# Patient Record
Sex: MALE | Race: WHITE | NOT HISPANIC OR LATINO
[De-identification: names, ages, dates, MRNs, and addresses within clinical notes are randomized per-mention and may not be internally consistent; named-entity substitution may affect disease eponyms.]

---

## 2017-01-12 ENCOUNTER — LABORATORY RESULT (OUTPATIENT)
Age: 47
End: 2017-01-12

## 2017-01-12 ENCOUNTER — APPOINTMENT (OUTPATIENT)
Dept: HEMATOLOGY ONCOLOGY | Facility: CLINIC | Age: 47
End: 2017-01-12

## 2017-01-12 VITALS
WEIGHT: 198 LBS | SYSTOLIC BLOOD PRESSURE: 139 MMHG | DIASTOLIC BLOOD PRESSURE: 81 MMHG | HEIGHT: 67 IN | HEART RATE: 87 BPM | TEMPERATURE: 99.1 F | BODY MASS INDEX: 31.08 KG/M2

## 2017-01-16 ENCOUNTER — FORM ENCOUNTER (OUTPATIENT)
Age: 47
End: 2017-01-16

## 2017-01-17 ENCOUNTER — OUTPATIENT (OUTPATIENT)
Dept: OUTPATIENT SERVICES | Facility: HOSPITAL | Age: 47
LOS: 1 days | End: 2017-01-17
Payer: COMMERCIAL

## 2017-01-17 ENCOUNTER — APPOINTMENT (OUTPATIENT)
Dept: CT IMAGING | Facility: CLINIC | Age: 47
End: 2017-01-17

## 2017-01-17 DIAGNOSIS — C34.11 MALIGNANT NEOPLASM OF UPPER LOBE, RIGHT BRONCHUS OR LUNG: ICD-10-CM

## 2017-01-17 PROCEDURE — 74177 CT ABD & PELVIS W/CONTRAST: CPT

## 2017-01-17 PROCEDURE — 71260 CT THORAX DX C+: CPT

## 2017-01-19 ENCOUNTER — APPOINTMENT (OUTPATIENT)
Dept: HEMATOLOGY ONCOLOGY | Facility: CLINIC | Age: 47
End: 2017-01-19

## 2017-01-19 VITALS
DIASTOLIC BLOOD PRESSURE: 82 MMHG | TEMPERATURE: 98.7 F | HEIGHT: 67 IN | BODY MASS INDEX: 30.13 KG/M2 | HEART RATE: 90 BPM | SYSTOLIC BLOOD PRESSURE: 134 MMHG | WEIGHT: 192 LBS

## 2017-01-31 ENCOUNTER — RX RENEWAL (OUTPATIENT)
Age: 47
End: 2017-01-31

## 2017-03-22 ENCOUNTER — APPOINTMENT (OUTPATIENT)
Dept: DERMATOLOGY | Facility: CLINIC | Age: 47
End: 2017-03-22

## 2017-04-17 ENCOUNTER — RX RENEWAL (OUTPATIENT)
Age: 47
End: 2017-04-17

## 2017-04-24 ENCOUNTER — OUTPATIENT (OUTPATIENT)
Dept: OUTPATIENT SERVICES | Facility: HOSPITAL | Age: 47
LOS: 1 days | Discharge: HOME | End: 2017-04-24

## 2017-04-24 VITALS
OXYGEN SATURATION: 98 % | HEART RATE: 78 BPM | SYSTOLIC BLOOD PRESSURE: 118 MMHG | TEMPERATURE: 98 F | RESPIRATION RATE: 18 BRPM | DIASTOLIC BLOOD PRESSURE: 70 MMHG

## 2017-04-24 VITALS — HEIGHT: 67 IN | WEIGHT: 190.04 LBS

## 2017-04-24 RX ORDER — SODIUM CHLORIDE 9 MG/ML
3 INJECTION INTRAMUSCULAR; INTRAVENOUS; SUBCUTANEOUS EVERY 8 HOURS
Qty: 0 | Refills: 0 | Status: DISCONTINUED | OUTPATIENT
Start: 2017-04-24 | End: 2017-05-09

## 2017-04-24 RX ORDER — CEFAZOLIN SODIUM 1 G
1000 VIAL (EA) INJECTION ONCE
Qty: 0 | Refills: 0 | Status: DISCONTINUED | OUTPATIENT
Start: 2017-04-24 | End: 2017-05-09

## 2017-04-24 NOTE — PACU DISCHARGE NOTE - COMMENTS
Pt. and signifigant other receive written and oral instructions for wound care, device use and post op MD followup.

## 2017-04-25 ENCOUNTER — APPOINTMENT (OUTPATIENT)
Dept: DERMATOLOGY | Facility: CLINIC | Age: 47
End: 2017-04-25

## 2017-04-25 VITALS — BODY MASS INDEX: 29.82 KG/M2 | HEIGHT: 67 IN | WEIGHT: 190 LBS

## 2017-04-28 DIAGNOSIS — Z87.891 PERSONAL HISTORY OF NICOTINE DEPENDENCE: ICD-10-CM

## 2017-04-28 DIAGNOSIS — Z85.118 PERSONAL HISTORY OF OTHER MALIGNANT NEOPLASM OF BRONCHUS AND LUNG: ICD-10-CM

## 2017-04-28 DIAGNOSIS — R55 SYNCOPE AND COLLAPSE: ICD-10-CM

## 2017-05-15 ENCOUNTER — OUTPATIENT (OUTPATIENT)
Dept: OUTPATIENT SERVICES | Facility: HOSPITAL | Age: 47
LOS: 1 days | End: 2017-05-15
Payer: COMMERCIAL

## 2017-05-15 DIAGNOSIS — S61.213A LACERATION WITHOUT FOREIGN BODY OF LEFT MIDDLE FINGER WITHOUT DAMAGE TO NAIL, INITIAL ENCOUNTER: ICD-10-CM

## 2017-05-15 DIAGNOSIS — S61.209A UNSPECIFIED OPEN WOUND OF UNSPECIFIED FINGER WITHOUT DAMAGE TO NAIL, INITIAL ENCOUNTER: ICD-10-CM

## 2017-05-15 PROCEDURE — G0463: CPT

## 2017-05-16 ENCOUNTER — APPOINTMENT (OUTPATIENT)
Dept: CT IMAGING | Facility: CLINIC | Age: 47
End: 2017-05-16

## 2017-05-18 ENCOUNTER — APPOINTMENT (OUTPATIENT)
Dept: HEMATOLOGY ONCOLOGY | Facility: CLINIC | Age: 47
End: 2017-05-18

## 2017-05-18 DIAGNOSIS — Z87.891 PERSONAL HISTORY OF NICOTINE DEPENDENCE: ICD-10-CM

## 2017-05-18 DIAGNOSIS — L26 EXFOLIATIVE DERMATITIS: ICD-10-CM

## 2017-05-18 DIAGNOSIS — Z79.899 OTHER LONG TERM (CURRENT) DRUG THERAPY: ICD-10-CM

## 2017-05-18 DIAGNOSIS — Z98.890 OTHER SPECIFIED POSTPROCEDURAL STATES: ICD-10-CM

## 2017-05-18 DIAGNOSIS — Z80.0 FAMILY HISTORY OF MALIGNANT NEOPLASM OF DIGESTIVE ORGANS: ICD-10-CM

## 2017-05-18 DIAGNOSIS — C34.90 MALIGNANT NEOPLASM OF UNSPECIFIED PART OF UNSPECIFIED BRONCHUS OR LUNG: ICD-10-CM

## 2017-05-18 DIAGNOSIS — E66.3 OVERWEIGHT: ICD-10-CM

## 2017-05-18 DIAGNOSIS — Z80.7 FAMILY HISTORY OF OTHER MALIGNANT NEOPLASMS OF LYMPHOID, HEMATOPOIETIC AND RELATED TISSUES: ICD-10-CM

## 2017-06-01 ENCOUNTER — FORM ENCOUNTER (OUTPATIENT)
Age: 47
End: 2017-06-01

## 2017-06-02 ENCOUNTER — OUTPATIENT (OUTPATIENT)
Dept: OUTPATIENT SERVICES | Facility: HOSPITAL | Age: 47
LOS: 1 days | End: 2017-06-02
Payer: COMMERCIAL

## 2017-06-02 ENCOUNTER — APPOINTMENT (OUTPATIENT)
Dept: CT IMAGING | Facility: CLINIC | Age: 47
End: 2017-06-02

## 2017-06-02 DIAGNOSIS — Z00.8 ENCOUNTER FOR OTHER GENERAL EXAMINATION: ICD-10-CM

## 2017-06-02 PROCEDURE — 71250 CT THORAX DX C-: CPT

## 2017-06-05 ENCOUNTER — APPOINTMENT (OUTPATIENT)
Dept: HEMATOLOGY ONCOLOGY | Facility: CLINIC | Age: 47
End: 2017-06-05

## 2017-06-05 VITALS
DIASTOLIC BLOOD PRESSURE: 73 MMHG | TEMPERATURE: 98.6 F | HEART RATE: 80 BPM | HEIGHT: 67 IN | BODY MASS INDEX: 29.82 KG/M2 | SYSTOLIC BLOOD PRESSURE: 113 MMHG | WEIGHT: 190 LBS

## 2017-06-25 ENCOUNTER — RX RENEWAL (OUTPATIENT)
Age: 47
End: 2017-06-25

## 2017-06-28 ENCOUNTER — APPOINTMENT (OUTPATIENT)
Dept: DERMATOLOGY | Facility: CLINIC | Age: 47
End: 2017-06-28

## 2017-06-28 RX ORDER — MOMETASONE FUROATE 1 MG/G
0.1 OINTMENT TOPICAL TWICE DAILY
Qty: 1 | Refills: 2 | Status: ACTIVE | COMMUNITY
Start: 2017-06-28 | End: 1900-01-01

## 2017-07-24 ENCOUNTER — APPOINTMENT (OUTPATIENT)
Dept: DERMATOLOGY | Facility: CLINIC | Age: 47
End: 2017-07-24

## 2017-07-24 VITALS — WEIGHT: 190 LBS | HEIGHT: 67 IN | BODY MASS INDEX: 29.82 KG/M2

## 2017-08-14 ENCOUNTER — RECORD ABSTRACTING (OUTPATIENT)
Age: 47
End: 2017-08-14

## 2017-09-01 ENCOUNTER — APPOINTMENT (OUTPATIENT)
Dept: NEUROLOGY | Facility: CLINIC | Age: 47
End: 2017-09-01

## 2017-09-17 ENCOUNTER — FORM ENCOUNTER (OUTPATIENT)
Age: 47
End: 2017-09-17

## 2017-09-18 ENCOUNTER — APPOINTMENT (OUTPATIENT)
Dept: CT IMAGING | Facility: CLINIC | Age: 47
End: 2017-09-18
Payer: COMMERCIAL

## 2017-09-18 ENCOUNTER — OUTPATIENT (OUTPATIENT)
Dept: OUTPATIENT SERVICES | Facility: HOSPITAL | Age: 47
LOS: 1 days | End: 2017-09-18
Payer: COMMERCIAL

## 2017-09-18 DIAGNOSIS — Z00.8 ENCOUNTER FOR OTHER GENERAL EXAMINATION: ICD-10-CM

## 2017-09-18 PROCEDURE — 71250 CT THORAX DX C-: CPT

## 2017-09-18 PROCEDURE — 71250 CT THORAX DX C-: CPT | Mod: 26

## 2017-09-22 ENCOUNTER — APPOINTMENT (OUTPATIENT)
Dept: HEMATOLOGY ONCOLOGY | Facility: CLINIC | Age: 47
End: 2017-09-22
Payer: COMMERCIAL

## 2017-09-22 ENCOUNTER — LABORATORY RESULT (OUTPATIENT)
Age: 47
End: 2017-09-22

## 2017-09-22 VITALS
SYSTOLIC BLOOD PRESSURE: 107 MMHG | WEIGHT: 188 LBS | BODY MASS INDEX: 29.51 KG/M2 | DIASTOLIC BLOOD PRESSURE: 69 MMHG | TEMPERATURE: 98.4 F | HEART RATE: 83 BPM | HEIGHT: 67 IN

## 2017-09-22 LAB
HCT VFR BLD CALC: 49 %
HGB BLD-MCNC: 16.7 G/DL
MCHC RBC-ENTMCNC: 30.3 PG
MCHC RBC-ENTMCNC: 34 GM/DL
MCV RBC AUTO: 89.2 FL
PLATELET # BLD AUTO: 319 K/UL
RBC # BLD: 5.49 M/UL
RBC # FLD: 12.2 %
WBC # FLD AUTO: 10.4 K/UL

## 2017-09-22 PROCEDURE — 36415 COLL VENOUS BLD VENIPUNCTURE: CPT

## 2017-09-22 PROCEDURE — 85025 COMPLETE CBC W/AUTO DIFF WBC: CPT

## 2017-09-22 PROCEDURE — 99215 OFFICE O/P EST HI 40 MIN: CPT | Mod: 25

## 2017-09-25 LAB
ANION GAP SERPL CALC-SCNC: 15 MMOL/L
BUN SERPL-MCNC: 13 MG/DL
CALCIUM SERPL-MCNC: 10 MG/DL
CHLORIDE SERPL-SCNC: 101 MMOL/L
CO2 SERPL-SCNC: 23 MMOL/L
CREAT SERPL-MCNC: 1.25 MG/DL
GLUCOSE SERPL-MCNC: 116 MG/DL
POTASSIUM SERPL-SCNC: 4.5 MMOL/L
SODIUM SERPL-SCNC: 139 MMOL/L

## 2017-10-04 ENCOUNTER — APPOINTMENT (OUTPATIENT)
Dept: INTERNAL MEDICINE | Facility: CLINIC | Age: 47
End: 2017-10-04
Payer: COMMERCIAL

## 2017-10-04 VITALS
HEART RATE: 110 BPM | SYSTOLIC BLOOD PRESSURE: 130 MMHG | TEMPERATURE: 98.7 F | DIASTOLIC BLOOD PRESSURE: 72 MMHG | RESPIRATION RATE: 18 BRPM | HEIGHT: 67 IN | OXYGEN SATURATION: 97 % | BODY MASS INDEX: 29.35 KG/M2 | WEIGHT: 187 LBS

## 2017-10-04 DIAGNOSIS — C34.91 MALIGNANT NEOPLASM OF UNSPECIFIED PART OF RIGHT BRONCHUS OR LUNG: ICD-10-CM

## 2017-10-04 DIAGNOSIS — I31.9 DISEASE OF PERICARDIUM, UNSPECIFIED: ICD-10-CM

## 2017-10-04 PROCEDURE — 94727 GAS DIL/WSHOT DETER LNG VOL: CPT

## 2017-10-04 PROCEDURE — 99215 OFFICE O/P EST HI 40 MIN: CPT | Mod: 25

## 2017-10-04 PROCEDURE — 94060 EVALUATION OF WHEEZING: CPT

## 2017-10-04 PROCEDURE — 94729 DIFFUSING CAPACITY: CPT

## 2017-10-04 RX ORDER — OXYCODONE AND ACETAMINOPHEN 5; 325 MG/1; MG/1
5-325 TABLET ORAL
Qty: 8 | Refills: 0 | Status: COMPLETED | COMMUNITY
Start: 2017-04-24

## 2017-10-04 RX ORDER — AMOXICILLIN AND CLAVULANATE POTASSIUM 875; 125 MG/1; MG/1
875-125 TABLET, COATED ORAL
Qty: 20 | Refills: 0 | Status: COMPLETED | COMMUNITY
Start: 2017-05-30

## 2017-10-04 RX ORDER — AZITHROMYCIN 250 MG/1
250 TABLET, FILM COATED ORAL
Qty: 6 | Refills: 0 | Status: COMPLETED | COMMUNITY
Start: 2017-09-07

## 2017-10-06 ENCOUNTER — OTHER (OUTPATIENT)
Age: 47
End: 2017-10-06

## 2017-10-09 ENCOUNTER — APPOINTMENT (OUTPATIENT)
Dept: HEMATOLOGY ONCOLOGY | Facility: CLINIC | Age: 47
End: 2017-10-09
Payer: COMMERCIAL

## 2017-10-09 ENCOUNTER — APPOINTMENT (OUTPATIENT)
Dept: THORACIC SURGERY | Facility: CLINIC | Age: 47
End: 2017-10-09
Payer: COMMERCIAL

## 2017-10-09 VITALS
WEIGHT: 187 LBS | OXYGEN SATURATION: 98 % | HEART RATE: 98 BPM | DIASTOLIC BLOOD PRESSURE: 94 MMHG | SYSTOLIC BLOOD PRESSURE: 147 MMHG | HEIGHT: 67 IN | BODY MASS INDEX: 29.35 KG/M2

## 2017-10-09 VITALS
SYSTOLIC BLOOD PRESSURE: 147 MMHG | HEIGHT: 67 IN | TEMPERATURE: 98.2 F | DIASTOLIC BLOOD PRESSURE: 94 MMHG | BODY MASS INDEX: 29.35 KG/M2 | WEIGHT: 187 LBS | HEART RATE: 90 BPM

## 2017-10-09 PROCEDURE — 36415 COLL VENOUS BLD VENIPUNCTURE: CPT

## 2017-10-09 PROCEDURE — 85025 COMPLETE CBC W/AUTO DIFF WBC: CPT

## 2017-10-09 PROCEDURE — 99213 OFFICE O/P EST LOW 20 MIN: CPT

## 2017-10-13 ENCOUNTER — OUTPATIENT (OUTPATIENT)
Dept: OUTPATIENT SERVICES | Facility: HOSPITAL | Age: 47
LOS: 1 days | Discharge: HOME | End: 2017-10-13
Payer: COMMERCIAL

## 2017-10-13 VITALS
OXYGEN SATURATION: 99 % | HEIGHT: 66 IN | HEART RATE: 89 BPM | WEIGHT: 192.9 LBS | SYSTOLIC BLOOD PRESSURE: 134 MMHG | RESPIRATION RATE: 17 BRPM | TEMPERATURE: 98 F | DIASTOLIC BLOOD PRESSURE: 65 MMHG

## 2017-10-13 DIAGNOSIS — Z98.890 OTHER SPECIFIED POSTPROCEDURAL STATES: Chronic | ICD-10-CM

## 2017-10-13 DIAGNOSIS — J94.8 OTHER SPECIFIED PLEURAL CONDITIONS: ICD-10-CM

## 2017-10-13 DIAGNOSIS — I30.9 ACUTE PERICARDITIS, UNSPECIFIED: Chronic | ICD-10-CM

## 2017-10-13 LAB
APPEARANCE UR: CLEAR — SIGNIFICANT CHANGE UP
BASOPHILS # BLD AUTO: 0.2 K/UL — SIGNIFICANT CHANGE UP (ref 0–0.2)
BASOPHILS NFR BLD AUTO: 1.2 % — SIGNIFICANT CHANGE UP (ref 0–2)
BILIRUB UR-MCNC: NEGATIVE — SIGNIFICANT CHANGE UP
COLOR SPEC: YELLOW — SIGNIFICANT CHANGE UP
DIFF PNL FLD: NEGATIVE — SIGNIFICANT CHANGE UP
EOSINOPHIL # BLD AUTO: 0.2 K/UL — SIGNIFICANT CHANGE UP (ref 0–0.5)
EOSINOPHIL NFR BLD AUTO: 1.1 % — SIGNIFICANT CHANGE UP (ref 0–6)
GLUCOSE UR QL: NEGATIVE MG/DL — SIGNIFICANT CHANGE UP
HCT VFR BLD CALC: 47.9 % — SIGNIFICANT CHANGE UP (ref 39–50)
HGB BLD-MCNC: 15.6 G/DL — SIGNIFICANT CHANGE UP (ref 13–17)
KETONES UR-MCNC: NEGATIVE — SIGNIFICANT CHANGE UP
LEUKOCYTE ESTERASE UR-ACNC: NEGATIVE — SIGNIFICANT CHANGE UP
LYMPHOCYTES # BLD AUTO: 30.7 % — SIGNIFICANT CHANGE UP (ref 13–44)
LYMPHOCYTES # BLD AUTO: 4.4 K/UL — HIGH (ref 1–3.3)
MCHC RBC-ENTMCNC: 29.7 PG — SIGNIFICANT CHANGE UP (ref 27–34)
MCHC RBC-ENTMCNC: 32.4 GM/DL — SIGNIFICANT CHANGE UP (ref 32–36)
MCV RBC AUTO: 91.5 FL — SIGNIFICANT CHANGE UP (ref 80–100)
MONOCYTES # BLD AUTO: 1.1 K/UL — HIGH (ref 0–0.9)
MONOCYTES NFR BLD AUTO: 7.8 % — SIGNIFICANT CHANGE UP (ref 2–14)
NEUTROPHILS # BLD AUTO: 8.5 K/UL — HIGH (ref 1.8–7.4)
NEUTROPHILS NFR BLD AUTO: 59.2 % — SIGNIFICANT CHANGE UP (ref 43–77)
NITRITE UR-MCNC: NEGATIVE — SIGNIFICANT CHANGE UP
PH UR: 7 — SIGNIFICANT CHANGE UP (ref 5–8)
PLATELET # BLD AUTO: 381 K/UL — SIGNIFICANT CHANGE UP (ref 150–400)
PROT UR-MCNC: NEGATIVE MG/DL — SIGNIFICANT CHANGE UP
RBC # BLD: 5.24 M/UL — SIGNIFICANT CHANGE UP (ref 4.2–5.8)
RBC # FLD: 12.1 % — SIGNIFICANT CHANGE UP (ref 10.3–14.5)
SP GR SPEC: 1.01 — SIGNIFICANT CHANGE UP (ref 1.01–1.02)
UROBILINOGEN FLD QL: NEGATIVE MG/DL — SIGNIFICANT CHANGE UP
WBC # BLD: 14.4 K/UL — HIGH (ref 3.8–10.5)
WBC # FLD AUTO: 14.4 K/UL — HIGH (ref 3.8–10.5)

## 2017-10-13 PROCEDURE — 71020: CPT | Mod: 26

## 2017-10-13 NOTE — H&P PST ADULT - HISTORY OF PRESENT ILLNESS
46 years old male with left pleural effusion. Diagnosed with right lung cancer 2015. He had a syncopal episode in 2015 and was diagnosed with Pericarditis and incidentally from CT.Scan Lung cancer. Presently on oral chemotherapy. Patient has been experiencing increase SOB. Denies chest pain. He was diagnosed with left pleural effusion . Planned bronchoscopy and pleurodesis

## 2017-10-13 NOTE — H&P PST ADULT - PSH
Acute pericarditis, unspecified type  pericardial window 11/2015, 12/2015  H/O bilateral inguinal hernia repair  as a child

## 2017-10-13 NOTE — H&P PST ADULT - PMH
Anxiety and depression    Bronchitis  Asthmatic  Dermatitis  due to medication  Erectile dysfunction, unspecified erectile dysfunction type    History of kidney stones    History of pericarditis    Lung cancer, upper lobe  right. metastatic non small cell Lung Carcinoma.  Pleural effusion    Syncope, unspecified syncope type

## 2017-10-13 NOTE — H&P PST ADULT - FAMILY HISTORY
Mother  Still living? No  Family history of cancer, Age at diagnosis: Age Unknown     Father  Still living? No  Family history of leukemia, Age at diagnosis: Age Unknown

## 2017-10-13 NOTE — H&P PST ADULT - ASSESSMENT
46 years old male present to [facility], [place] prior to flexible bronchoscopy, left robot assisted pleurodesis with pleural biopsy with Dr. Samuel Salvador.  Plan   1. NPO after midnight  2. Take the following medications with sips of water on the day of procedure: Ativan or Valium if needed  3. Drink a quart of extra  fluids the day before your surgery.  4. Medical clearance with Dr. Cervantes  5. CBC and  Urinalysis sent to lab  6.  CXR done

## 2017-10-16 LAB
MISCELLANEOUS TEST: NORMAL
PROC NAME: NORMAL

## 2017-10-19 RX ORDER — SODIUM CHLORIDE 9 MG/ML
1000 INJECTION INTRAMUSCULAR; INTRAVENOUS; SUBCUTANEOUS
Qty: 0 | Refills: 0 | Status: DISCONTINUED | OUTPATIENT
Start: 2017-10-20 | End: 2017-10-21

## 2017-10-20 ENCOUNTER — INPATIENT (INPATIENT)
Facility: HOSPITAL | Age: 47
LOS: 4 days | Discharge: HOME | End: 2017-10-25
Attending: THORACIC SURGERY (CARDIOTHORACIC VASCULAR SURGERY) | Admitting: THORACIC SURGERY (CARDIOTHORACIC VASCULAR SURGERY)
Payer: COMMERCIAL

## 2017-10-20 ENCOUNTER — APPOINTMENT (OUTPATIENT)
Dept: THORACIC SURGERY | Facility: HOSPITAL | Age: 47
End: 2017-10-20
Payer: COMMERCIAL

## 2017-10-20 ENCOUNTER — RESULT REVIEW (OUTPATIENT)
Age: 47
End: 2017-10-20

## 2017-10-20 VITALS
HEIGHT: 66 IN | HEART RATE: 111 BPM | OXYGEN SATURATION: 98 % | DIASTOLIC BLOOD PRESSURE: 79 MMHG | SYSTOLIC BLOOD PRESSURE: 126 MMHG | WEIGHT: 192.9 LBS | TEMPERATURE: 99 F | RESPIRATION RATE: 14 BRPM

## 2017-10-20 DIAGNOSIS — I30.9 ACUTE PERICARDITIS, UNSPECIFIED: Chronic | ICD-10-CM

## 2017-10-20 DIAGNOSIS — Z98.890 OTHER SPECIFIED POSTPROCEDURAL STATES: Chronic | ICD-10-CM

## 2017-10-20 LAB
ANION GAP SERPL CALC-SCNC: 8 MMOL/L — SIGNIFICANT CHANGE UP (ref 5–17)
BASOPHILS # BLD AUTO: 0.1 K/UL — SIGNIFICANT CHANGE UP (ref 0–0.2)
BUN SERPL-MCNC: 15 MG/DL — SIGNIFICANT CHANGE UP (ref 7–23)
CALCIUM SERPL-MCNC: 8.9 MG/DL — SIGNIFICANT CHANGE UP (ref 8.5–10.1)
CHLORIDE SERPL-SCNC: 108 MMOL/L — SIGNIFICANT CHANGE UP (ref 96–108)
CO2 SERPL-SCNC: 23 MMOL/L — SIGNIFICANT CHANGE UP (ref 22–31)
CREAT SERPL-MCNC: 1.3 MG/DL — SIGNIFICANT CHANGE UP (ref 0.5–1.3)
EOSINOPHIL # BLD AUTO: 0 K/UL — SIGNIFICANT CHANGE UP (ref 0–0.5)
GLUCOSE SERPL-MCNC: 137 MG/DL — HIGH (ref 70–99)
HCT VFR BLD CALC: 48.2 % — SIGNIFICANT CHANGE UP (ref 39–50)
HGB BLD-MCNC: 16.2 G/DL — SIGNIFICANT CHANGE UP (ref 13–17)
LYMPHOCYTES # BLD AUTO: 1.3 K/UL — SIGNIFICANT CHANGE UP (ref 1–3.3)
LYMPHOCYTES # BLD AUTO: 4 % — LOW (ref 13–44)
MANUAL DIF COMMENT BLD-IMP: SIGNIFICANT CHANGE UP
MCHC RBC-ENTMCNC: 30.7 PG — SIGNIFICANT CHANGE UP (ref 27–34)
MCHC RBC-ENTMCNC: 33.7 GM/DL — SIGNIFICANT CHANGE UP (ref 32–36)
MCV RBC AUTO: 91.1 FL — SIGNIFICANT CHANGE UP (ref 80–100)
MONOCYTES # BLD AUTO: 0.7 K/UL — SIGNIFICANT CHANGE UP (ref 0–0.9)
MONOCYTES NFR BLD AUTO: 5 % — SIGNIFICANT CHANGE UP (ref 2–14)
NEUTROPHILS # BLD AUTO: 26.1 K/UL — HIGH (ref 1.8–7.4)
NEUTROPHILS NFR BLD AUTO: 91 % — HIGH (ref 43–77)
PLAT MORPH BLD: NORMAL — SIGNIFICANT CHANGE UP
PLATELET # BLD AUTO: 350 K/UL — SIGNIFICANT CHANGE UP (ref 150–400)
POTASSIUM SERPL-MCNC: 4 MMOL/L — SIGNIFICANT CHANGE UP (ref 3.5–5.3)
POTASSIUM SERPL-SCNC: 4 MMOL/L — SIGNIFICANT CHANGE UP (ref 3.5–5.3)
RBC # BLD: 5.29 M/UL — SIGNIFICANT CHANGE UP (ref 4.2–5.8)
RBC # FLD: 12.6 % — SIGNIFICANT CHANGE UP (ref 10.3–14.5)
RBC BLD AUTO: SIGNIFICANT CHANGE UP
SODIUM SERPL-SCNC: 139 MMOL/L — SIGNIFICANT CHANGE UP (ref 135–145)
WBC # BLD: 28.3 K/UL — HIGH (ref 3.8–10.5)
WBC # FLD AUTO: 28.3 K/UL — HIGH (ref 3.8–10.5)

## 2017-10-20 PROCEDURE — S2900 ROBOTIC SURGICAL SYSTEM: CPT | Mod: NC

## 2017-10-20 PROCEDURE — 88305 TISSUE EXAM BY PATHOLOGIST: CPT | Mod: 26

## 2017-10-20 PROCEDURE — 31622 DX BRONCHOSCOPE/WASH: CPT

## 2017-10-20 PROCEDURE — 32650 THORACOSCOPY W/PLEURODESIS: CPT | Mod: LT

## 2017-10-20 PROCEDURE — 88104 CYTOPATH FL NONGYN SMEARS: CPT | Mod: 26

## 2017-10-20 PROCEDURE — 71010: CPT | Mod: 26

## 2017-10-20 RX ORDER — HYDROMORPHONE HYDROCHLORIDE 2 MG/ML
30 INJECTION INTRAMUSCULAR; INTRAVENOUS; SUBCUTANEOUS
Qty: 0 | Refills: 0 | Status: DISCONTINUED | OUTPATIENT
Start: 2017-10-20 | End: 2017-10-24

## 2017-10-20 RX ORDER — FENTANYL CITRATE 50 UG/ML
50 INJECTION INTRAVENOUS
Qty: 0 | Refills: 0 | Status: DISCONTINUED | OUTPATIENT
Start: 2017-10-20 | End: 2017-10-21

## 2017-10-20 RX ORDER — ONDANSETRON 8 MG/1
4 TABLET, FILM COATED ORAL EVERY 6 HOURS
Qty: 0 | Refills: 0 | Status: DISCONTINUED | OUTPATIENT
Start: 2017-10-20 | End: 2017-10-23

## 2017-10-20 RX ORDER — NALOXONE HYDROCHLORIDE 4 MG/.1ML
0.1 SPRAY NASAL
Qty: 0 | Refills: 0 | Status: DISCONTINUED | OUTPATIENT
Start: 2017-10-20 | End: 2017-10-25

## 2017-10-20 RX ORDER — HEPARIN SODIUM 5000 [USP'U]/ML
5000 INJECTION INTRAVENOUS; SUBCUTANEOUS EVERY 8 HOURS
Qty: 0 | Refills: 0 | Status: DISCONTINUED | OUTPATIENT
Start: 2017-10-20 | End: 2017-10-25

## 2017-10-20 RX ORDER — OXYCODONE HYDROCHLORIDE 5 MG/1
10 TABLET ORAL EVERY 6 HOURS
Qty: 0 | Refills: 0 | Status: DISCONTINUED | OUTPATIENT
Start: 2017-10-20 | End: 2017-10-21

## 2017-10-20 RX ORDER — ONDANSETRON 8 MG/1
4 TABLET, FILM COATED ORAL ONCE
Qty: 0 | Refills: 0 | Status: DISCONTINUED | OUTPATIENT
Start: 2017-10-20 | End: 2017-10-21

## 2017-10-20 RX ORDER — OXYCODONE HYDROCHLORIDE 5 MG/1
5 TABLET ORAL EVERY 4 HOURS
Qty: 0 | Refills: 0 | Status: DISCONTINUED | OUTPATIENT
Start: 2017-10-20 | End: 2017-10-21

## 2017-10-20 RX ORDER — ONDANSETRON 8 MG/1
4 TABLET, FILM COATED ORAL EVERY 6 HOURS
Qty: 0 | Refills: 0 | Status: DISCONTINUED | OUTPATIENT
Start: 2017-10-21 | End: 2017-10-25

## 2017-10-20 RX ORDER — HYDROMORPHONE HYDROCHLORIDE 2 MG/ML
0.5 INJECTION INTRAMUSCULAR; INTRAVENOUS; SUBCUTANEOUS
Qty: 0 | Refills: 0 | Status: DISCONTINUED | OUTPATIENT
Start: 2017-10-20 | End: 2017-10-24

## 2017-10-20 RX ORDER — SODIUM CHLORIDE 9 MG/ML
1000 INJECTION INTRAMUSCULAR; INTRAVENOUS; SUBCUTANEOUS
Qty: 0 | Refills: 0 | Status: DISCONTINUED | OUTPATIENT
Start: 2017-10-20 | End: 2017-10-24

## 2017-10-20 RX ADMIN — HYDROMORPHONE HYDROCHLORIDE 30 MILLILITER(S): 2 INJECTION INTRAMUSCULAR; INTRAVENOUS; SUBCUTANEOUS at 17:01

## 2017-10-20 RX ADMIN — HYDROMORPHONE HYDROCHLORIDE 0.5 MILLIGRAM(S): 2 INJECTION INTRAMUSCULAR; INTRAVENOUS; SUBCUTANEOUS at 17:33

## 2017-10-20 RX ADMIN — SODIUM CHLORIDE 75 MILLILITER(S): 9 INJECTION INTRAMUSCULAR; INTRAVENOUS; SUBCUTANEOUS at 17:06

## 2017-10-20 RX ADMIN — HEPARIN SODIUM 5000 UNIT(S): 5000 INJECTION INTRAVENOUS; SUBCUTANEOUS at 21:57

## 2017-10-20 RX ADMIN — HYDROMORPHONE HYDROCHLORIDE 0.5 MILLIGRAM(S): 2 INJECTION INTRAMUSCULAR; INTRAVENOUS; SUBCUTANEOUS at 17:10

## 2017-10-20 NOTE — PATIENT PROFILE ADULT. - BLOOD TRANSFUSION, PREVIOUS, PROFILE
Oral Chemotherapy Education    Patient: Moody Mooney    Date:  9/7/17  Diagnosis: CML   Caregivers present:     Drug names:  Tasigna 300 mg BID     Treatment Effects on Bone Marrow:  Chemotherapy action on cancer / normal cells}  Function of white blood no

## 2017-10-20 NOTE — BRIEF OPERATIVE NOTE - POST-OP DX
Malignant neoplasm of lung, unspecified laterality, unspecified part of lung  10/20/2017    Active  Michelle Orellana

## 2017-10-21 DIAGNOSIS — J90 PLEURAL EFFUSION, NOT ELSEWHERE CLASSIFIED: ICD-10-CM

## 2017-10-21 LAB
ANION GAP SERPL CALC-SCNC: 9 MMOL/L — SIGNIFICANT CHANGE UP (ref 5–17)
BASOPHILS # BLD AUTO: 0.1 K/UL — SIGNIFICANT CHANGE UP (ref 0–0.2)
BASOPHILS NFR BLD AUTO: 0.2 % — SIGNIFICANT CHANGE UP (ref 0–2)
BUN SERPL-MCNC: 18 MG/DL — SIGNIFICANT CHANGE UP (ref 7–23)
CALCIUM SERPL-MCNC: 9.5 MG/DL — SIGNIFICANT CHANGE UP (ref 8.5–10.1)
CHLORIDE SERPL-SCNC: 104 MMOL/L — SIGNIFICANT CHANGE UP (ref 96–108)
CO2 SERPL-SCNC: 23 MMOL/L — SIGNIFICANT CHANGE UP (ref 22–31)
CREAT SERPL-MCNC: 1.44 MG/DL — HIGH (ref 0.5–1.3)
EOSINOPHIL # BLD AUTO: 0 K/UL — SIGNIFICANT CHANGE UP (ref 0–0.5)
EOSINOPHIL NFR BLD AUTO: 0 % — SIGNIFICANT CHANGE UP (ref 0–6)
GLUCOSE SERPL-MCNC: 121 MG/DL — HIGH (ref 70–99)
HCT VFR BLD CALC: 47.8 % — SIGNIFICANT CHANGE UP (ref 39–50)
HGB BLD-MCNC: 15.8 G/DL — SIGNIFICANT CHANGE UP (ref 13–17)
LYMPHOCYTES # BLD AUTO: 1.1 K/UL — SIGNIFICANT CHANGE UP (ref 1–3.3)
LYMPHOCYTES # BLD AUTO: 4.3 % — LOW (ref 13–44)
MANUAL DIF COMMENT BLD-IMP: SIGNIFICANT CHANGE UP
MCHC RBC-ENTMCNC: 30.2 PG — SIGNIFICANT CHANGE UP (ref 27–34)
MCHC RBC-ENTMCNC: 33 GM/DL — SIGNIFICANT CHANGE UP (ref 32–36)
MCV RBC AUTO: 91.4 FL — SIGNIFICANT CHANGE UP (ref 80–100)
MONOCYTES # BLD AUTO: 1.2 K/UL — HIGH (ref 0–0.9)
MONOCYTES NFR BLD AUTO: 4.5 % — SIGNIFICANT CHANGE UP (ref 2–14)
NEUTROPHILS # BLD AUTO: 24.1 K/UL — HIGH (ref 1.8–7.4)
NEUTROPHILS NFR BLD AUTO: 91 % — HIGH (ref 43–77)
PLAT MORPH BLD: NORMAL — SIGNIFICANT CHANGE UP
PLATELET # BLD AUTO: 390 K/UL — SIGNIFICANT CHANGE UP (ref 150–400)
POTASSIUM SERPL-MCNC: 4.6 MMOL/L — SIGNIFICANT CHANGE UP (ref 3.5–5.3)
POTASSIUM SERPL-SCNC: 4.6 MMOL/L — SIGNIFICANT CHANGE UP (ref 3.5–5.3)
RBC # BLD: 5.23 M/UL — SIGNIFICANT CHANGE UP (ref 4.2–5.8)
RBC # FLD: 12.1 % — SIGNIFICANT CHANGE UP (ref 10.3–14.5)
RBC BLD AUTO: NORMAL — SIGNIFICANT CHANGE UP
SODIUM SERPL-SCNC: 136 MMOL/L — SIGNIFICANT CHANGE UP (ref 135–145)
WBC # BLD: 26.5 K/UL — HIGH (ref 3.8–10.5)
WBC # FLD AUTO: 26.5 K/UL — HIGH (ref 3.8–10.5)

## 2017-10-21 PROCEDURE — 99223 1ST HOSP IP/OBS HIGH 75: CPT

## 2017-10-21 PROCEDURE — 71010: CPT | Mod: 26

## 2017-10-21 RX ORDER — ALPRAZOLAM 0.25 MG
0.25 TABLET ORAL EVERY 8 HOURS
Qty: 0 | Refills: 0 | Status: DISCONTINUED | OUTPATIENT
Start: 2017-10-21 | End: 2017-10-21

## 2017-10-21 RX ORDER — ALPRAZOLAM 0.25 MG
0.25 TABLET ORAL EVERY 6 HOURS
Qty: 0 | Refills: 0 | Status: DISCONTINUED | OUTPATIENT
Start: 2017-10-21 | End: 2017-10-25

## 2017-10-21 RX ORDER — PROCHLORPERAZINE MALEATE 5 MG
10 TABLET ORAL EVERY 6 HOURS
Qty: 0 | Refills: 0 | Status: DISCONTINUED | OUTPATIENT
Start: 2017-10-21 | End: 2017-10-25

## 2017-10-21 RX ADMIN — ONDANSETRON 4 MILLIGRAM(S): 8 TABLET, FILM COATED ORAL at 06:05

## 2017-10-21 RX ADMIN — HEPARIN SODIUM 5000 UNIT(S): 5000 INJECTION INTRAVENOUS; SUBCUTANEOUS at 22:16

## 2017-10-21 RX ADMIN — Medication 0.25 MILLIGRAM(S): at 16:59

## 2017-10-21 RX ADMIN — HEPARIN SODIUM 5000 UNIT(S): 5000 INJECTION INTRAVENOUS; SUBCUTANEOUS at 06:03

## 2017-10-21 RX ADMIN — HEPARIN SODIUM 5000 UNIT(S): 5000 INJECTION INTRAVENOUS; SUBCUTANEOUS at 16:59

## 2017-10-21 RX ADMIN — Medication 10 MILLIGRAM(S): at 09:58

## 2017-10-21 RX ADMIN — ONDANSETRON 4 MILLIGRAM(S): 8 TABLET, FILM COATED ORAL at 01:18

## 2017-10-21 RX ADMIN — HYDROMORPHONE HYDROCHLORIDE 0.5 MILLIGRAM(S): 2 INJECTION INTRAMUSCULAR; INTRAVENOUS; SUBCUTANEOUS at 23:12

## 2017-10-21 RX ADMIN — Medication 0.25 MILLIGRAM(S): at 22:50

## 2017-10-21 NOTE — CONSULT NOTE ADULT - SUBJECTIVE AND OBJECTIVE BOX
HPI: 46 y.o. male w/ h.o. 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 anxiety, asthmatic bronchitis, h.o. syncope;  seen in early Oct w/ SOB, CT scan showed stable R pulm nodules, new moderate L effusion.  Underwent robotic assisted pleurodeisis and pleural bx on 10/20.  Says SOB is improved.  Denies: cough ,wheeze, sputum or hemoptysis.  No chills.  Is afebrile.       PAST MEDICAL & SURGICAL HISTORY:  Anxiety and depression  Erectile dysfunction, unspecified erectile dysfunction type  Dermatitis: due to medication  History of kidney stones  Bronchitis: Asthmatic  History of pericarditis  Pleural effusion  Syncope, unspecified syncope type  Lung cancer, upper lobe: right. metastatic non small cell Lung Carcinoma.  H/O bilateral inguinal hernia repair: as a child  Acute pericarditis, unspecified type: pericardial window 11/2015, 12/2015      MEDICATIONS  (STANDING):  heparin  Injectable 5000 Unit(s) SubCutaneous every 8 hours  HYDROmorphone PCA (1 mG/mL) 30 milliLiter(s) PCA Continuous PCA Continuous  sodium chloride 0.9%. 1000 milliLiter(s) (40 mL/Hr) IV Continuous <Continuous>    MEDICATIONS  (PRN):  HYDROmorphone PCA (1 mG/mL) Rescue Clinician Bolus 0.5 milliGRAM(s) IV Push every 15 minutes PRN for Pain Scale GREATER THAN 6  naloxone Injectable 0.1 milliGRAM(s) IV Push every 3 minutes PRN For ANY of the following changes in patient status:  A. RR LESS THAN 10 breaths per minute, B. Oxygen saturation LESS THAN 90%, C. Sedation score of 6  ondansetron Injectable 4 milliGRAM(s) IV Push every 6 hours PRN Nausea  ondansetron Injectable 4 milliGRAM(s) IV Push every 6 hours PRN Nausea  prochlorperazine   Injectable 10 milliGRAM(s) IntraMuscular every 6 hours PRN nausea      Allergies    No Known Allergies    Intolerances        SOCIAL HISTORY: Denies tobacco, etoh abuse or illicit drug use    FAMILY HISTORY:  Family history of leukemia (Father)  Family history of cancer (Mother)      Vital Signs Last 24 Hrs  T(C): 36.1 (21 Oct 2017 08:15), Max: 37.1 (20 Oct 2017 14:12)  T(F): 97 (21 Oct 2017 08:15), Max: 98.8 (20 Oct 2017 14:12)  HR: 102 (21 Oct 2017 09:00) (81 - 123)  BP: 130/90 (21 Oct 2017 09:00) (101/48 - 132/110)  BP(mean): 98 (21 Oct 2017 09:00) (80 - 115)  RR: 17 (21 Oct 2017 09:00) (7 - 19)  SpO2: 96% (21 Oct 2017 09:00) (96% - 100%)    REVIEW OF SYSTEMS:    CONSTITUTIONAL:  As per HPI.  HEENT:  Eyes:  No diplopia or blurred vision. ENT:  No earache, sore throat or runny nose.  CARDIOVASCULAR:  No pressure, squeezing, tightness, heaviness or aching about the chest, neck, axilla or epigastrium.  RESPIRATORY:  No cough, shortness of breath, PND or orthopnea.  GASTROINTESTINAL:  No nausea, vomiting or diarrhea.  GENITOURINARY:  No dysuria, frequency or urgency.  MUSCULOSKELETAL:  As per HPI.  SKIN:  No change in skin, hair or nails.  NEUROLOGIC:  No paresthesias, fasciculations, seizures or weakness.  PSYCHIATRIC:  No disorder of thought or mood.  ENDOCRINE:  No heat or cold intolerance, polyuria or polydipsia.  HEMATOLOGICAL:  No easy bruising or bleedings:  .     PHYSICAL EXAMINATION:    GENERAL APPEARANCE:  Pt. is not currently dyspneic, in no distress. Pt. is alert, oriented, and pleasant.  HEENT:  Pupils are normal and react normally. No icterus. Mucous membranes well colored.  NECK:  Supple. No lymphadenopathy. Jugular venous pressure not elevated. Carotids equal.   HEART:   The cardiac impulse has a normal quality. Regular. Normal S1 and S2. 1-2/6 murmer.  CHEST:  Chest is clear to auscultation. Normal respiratory effort.  ABDOMEN:  Soft and nontender.   EXTREMITIES:  There is no cyanosis, clubbing or edema.   SKIN:  No rash or significant lesions are noted.  Neuro: Alert, awake, and O x 3.      LABS:                        15.8   26.5  )-----------( 390      ( 21 Oct 2017 05:30 )             47.8     10-21    136  |  104  |  18  ----------------------------<  121<H>  4.6   |  23  |  1.44<H>    Ca    9.5      21 Oct 2017 05:30                    RADIOLOGY & ADDITIONAL STUDIES:     EXAM:  CHEST SINGLE VIEW FRONTAL                            PROCEDURE DATE:  10/21/2017          INTERPRETATION:  History: LEFT pleurodesis    Chest:  one view.      Comparison: 10/20/2017    AP radiograph of the chest demonstrates LEFT chest tubesunchanged in   position. Subsegmental atelectasis in LEFT lower lobe. LEFT subcutaneous   emphysema laterally and within the neck. The cardiac silhouette is normal   in size. Osseous structures are intact.    Impression:LEFT chest tubes unchanged in position. Subsegmental   atelectasis in LEFT lower lobe. LEFT subcutaneous emphysema laterally and   within the neck
47 yo gentleman with PMHx of NSCLC stage IV currently on targeted therapy Afatinib 40 mg daily. Patient had a recent surveillance CT scan of the chest 9/2017 c/c a new pleural effusion concerned for progression of disease. S/p pleurodesis and pleural biopsy 10/20/17; patient has two chest tubes in the left chest and a PCA pump with moderate pain control. Afebrile.       PAST MEDICAL & SURGICAL HISTORY:  Anxiety and depression  Erectile dysfunction, unspecified erectile dysfunction type  Dermatitis: due to medication  History of kidney stones  Bronchitis: Asthmatic  History of pericarditis  Pleural effusion  Syncope, unspecified syncope type  Lung cancer, upper lobe: right. metastatic non small cell Lung Carcinoma.  H/O bilateral inguinal hernia repair: as a child  Acute pericarditis, unspecified type: pericardial window 11/2015, 12/2015      Allergies    No Known Allergies    FAMILY HISTORY:        Family history of leukemia (Father)  Family history of cancer (Mother)      Social History: , social ETOH use, former smoker (quit 1990's)      ICU Vital Signs Last 24 Hrs  T(C): 36.7 (21 Oct 2017 16:53), Max: 36.7 (20 Oct 2017 21:00)  T(F): 98 (21 Oct 2017 16:53), Max: 98.1 (20 Oct 2017 21:00)  HR: 110 (21 Oct 2017 17:00) (90 - 123)  BP: 127/76 (21 Oct 2017 17:00) (101/48 - 132/110)  BP(mean): 87 (21 Oct 2017 17:00) (71 - 115)  ABP: 111/80 (20 Oct 2017 22:00) (111/80 - 145/84)  ABP(mean): --  RR: 13 (21 Oct 2017 16:00) (7 - 19)  SpO2: 97% (21 Oct 2017 17:00) (95% - 100%)        General gentleman in NAD  HEENT  Lungs decreased breath sounds in the LLL, no W/ R/C.   CVS S1 S2 regular, no M/R/G  Abdomen soft NT ND positive for BS, no organomegaly.  Extremities: No C/C/E          WBC Count: 26.5 K/uL    RBC Count: 5.23 M/uL    Hemoglobin: 15.8 g/dL    Hematocrit: 47.8 %    Mean Cell Volume: 91.4 fl    Mean Cell Hemoglobin: 30.2 pg    Mean Cell Hemoglobin Conc: 33.0 gm/dL    Red Cell Distrib Width: 12.1 %    Platelet Count - Automated: 390 K/uL    Auto Neutrophil #: 24.1 K/uL    Auto Lymphocyte #: 1.1 K/uL    Auto Monocyte #: 1.2 K/uL    Auto Eosinophil #: 0.0 K/uL    Auto Basophil #: 0.1 K/uL    Auto Neutrophil %: 91.0: Differential percentages must be correlated with absolute numbers for  clinical significance. %    Auto Lymphocyte %: 4.3 %    Auto Monocyte %: 4.5 %    Auto Eosinophil %: 0.0 %    Auto Basophil %: 0.2 %    Manual Differential (10.21.17 @ 05:30)    Red Cell Morphology: Normal    Platelet Morphology: Normal    Comment - Hematology: Results verified by smear review.      Basic Metabolic Panel (10.21.17 @ 05:30)    Sodium, Serum: 136 mmol/L    Potassium, Serum: 4.6 mmol/L    Chloride, Serum: 104 mmol/L    Carbon Dioxide, Serum: 23 mmol/L    Anion Gap, Serum: 9 mmol/L    Blood Urea Nitrogen, Serum: 18 mg/dL    Creatinine, Serum: 1.44 mg/dL    Glucose, Serum: 121 mg/dL    Calcium, Total Serum: 9.5 mg/dL    eGFR if Non : 58: Interpretative comment  The units for eGFR are ml/min/1.73m2 (normalized body surface area). The  eGFR is calculated from a serum creatinine using the CKD-EPI equation.  Other variables required for calculation are race, age and sex. Among  patients with chronic kidney disease (CKD), the eGFR is useful in  determining the stage of disease according to KDOQI CKD classification.  All eGFR results are reported numerically with the following  interpretation.          GFR                    With                 Without     (ml/min/1.73 m2)    Kidney Damage       Kidney Damage        >= 90                    Stage 1                     Normal        60-89                    Stage 2                     Decreased GFR        30-59     Stage 3                     Stage 3        15-29                    Stage 4                     Stage 4        < 15                      Stage 5                     Stage 5  Each stage of CKD assumes that the associated GFR level has been in  effect for at least 3 months. Determination of stages one and two (with  eGFR > 59 ml/min/m2) requires estimation of kidney damage for at least 3  months as defined by structural or functional abnormalities.  Limitations: All estimates of GFR will be less accurate for patients at  extremes of muscle mass (including but not limited to frail elderly,  critically ill, or cancer patients), those with unusual diets, and those  with conditions associated with reduced secretion or extrarenal  elimination of creatinine. The eGFR equation is not recommended for use  in patients with unstable creatinine levels. mL/min/1.73M2    eGFR if African American: 67 mL/min/1.73M2      MEDICATIONS  (STANDING):  heparin  Injectable 5000 Unit(s) SubCutaneous every 8 hours  HYDROmorphone PCA (1 mG/mL) 30 milliLiter(s) PCA Continuous PCA Continuous  sodium chloride 0.9%. 1000 milliLiter(s) (40 mL/Hr) IV Continuous <Continuous>
Patient is a 46y old  Male who presents with a chief complaint of "I feel better"      HPI: Pt is a 47 y/o male with h/o anxiety, depression, Rt lung NSCLC diagnosed in  who is on oral chemo who was diagnosed with Lt pleural effusion who was admitted for Pleurodesis and plural biopsy.  Post-op medicine consult called for comanagement.  Pt was c/o nausea earlier, failed zofran, responded to compazine, feels better.  C/o mild Lt sided chest wall pain from CTs.        PAST MEDICAL & SURGICAL HISTORY:  Anxiety and depression  Erectile dysfunction, unspecified erectile dysfunction type  Dermatitis: due to medication  History of kidney stones  Bronchitis: Asthmatic  History of pericarditis  Pleural effusion  Syncope, unspecified syncope type  Lung cancer, upper lobe: right. metastatic non small cell Lung Carcinoma.  H/O bilateral inguinal hernia repair: as a child  Acute pericarditis, unspecified type: pericardial window 2015, 2015      Allergies    No Known Allergies    Intolerances        MEDICATIONS  (STANDING):  heparin  Injectable 5000 Unit(s) SubCutaneous every 8 hours  HYDROmorphone PCA (1 mG/mL) 30 milliLiter(s) PCA Continuous PCA Continuous  sodium chloride 0.9%. 1000 milliLiter(s) (40 mL/Hr) IV Continuous <Continuous>    MEDICATIONS  (PRN):  HYDROmorphone PCA (1 mG/mL) Rescue Clinician Bolus 0.5 milliGRAM(s) IV Push every 15 minutes PRN for Pain Scale GREATER THAN 6  naloxone Injectable 0.1 milliGRAM(s) IV Push every 3 minutes PRN For ANY of the following changes in patient status:  A. RR LESS THAN 10 breaths per minute, B. Oxygen saturation LESS THAN 90%, C. Sedation score of 6  ondansetron Injectable 4 milliGRAM(s) IV Push every 6 hours PRN Nausea  ondansetron Injectable 4 milliGRAM(s) IV Push every 6 hours PRN Nausea  prochlorperazine   Injectable 10 milliGRAM(s) IntraMuscular every 6 hours PRN nausea      FAMILY HISTORY:  Family history of leukemia (Father)  Family history of cancer (Mother)      Social History: , social ETOH use, former smoker (quit )      Vital Signs Last 24 Hrs  T(C): 36.1 (21 Oct 2017 08:15), Max: 37.1 (20 Oct 2017 14:12)  T(F): 97 (21 Oct 2017 08:15), Max: 98.8 (20 Oct 2017 14:12)  HR: 102 (21 Oct 2017 09:00) (81 - 123)  BP: 130/90 (21 Oct 2017 09:00) (101/48 - 132/110)  BP(mean): 98 (21 Oct 2017 09:00) (80 - 115)  RR: 17 (21 Oct 2017 09:00) (7 - 19)  SpO2: 96% (21 Oct 2017 09:00) (96% - 100%)    Daily Height in cm: 167.64 (20 Oct 2017 14:12)    Daily Weight in k (21 Oct 2017 05:50)    I&O's Summary    20 Oct 2017 07:01  -  21 Oct 2017 07:00  --------------------------------------------------------  IN: 1251 mL / OUT: 1540 mL / NET: -289 mL          Data                          15.8   26.5  )-----------( 390      ( 21 Oct 2017 05:30 )             47.8       10-21    136  |  104  |  18  ----------------------------<  121<H>  4.6   |  23  |  1.44<H>    Ca    9.5      21 Oct 2017 05:30

## 2017-10-21 NOTE — PHYSICAL THERAPY INITIAL EVALUATION ADULT - GENERAL OBSERVATIONS, REHAB EVAL
L CT's; IV; HM; BP cuff; pulse oxym; pt rec'd seated in recliner; ; O2 Sat 97%; RR 18; /78; pain level 6/10; RN Krys made aware; fiancee / friend present

## 2017-10-21 NOTE — CONSULT NOTE ADULT - ASSESSMENT
Stage 4 nonsmall cell lung cancer, on gilotriff before admission.  Followed by oncology, Dr Arenas.  s/p L robotic assisted pleurodeisis and pleural bx on 10/20.  Has 2 L CT's.  Incentive inspirometry.   h.o. pericardial window 2 years ago.    h.o. syncope. s/p Lynx implanted.
45 yo gentleman with PMHx of NSCLC stage IV currently on targeted therapy Afatinib 40 mg daily. Patient had a recent surveillance CT scan of the chest 9/2017 c/c a new pleural effusion concerned for progression of disease. S/p pleurodesis and pleural biopsy 10/20/17;      I explained to patient my concerns with POD and reviewed with him and his girlfriend the benefits versus risks of dual targeted therapy with Cetuximab and Afatinib if the biopsy is positive for recurrent disease.    Continue pain management with PCA dilaudid, transition to oral once patient improves.    Continue management as per ICU team.    Continue Afatinib 40 mg at bed time.     ALL Arenas. MD. MSc.
Pt is a 45 y/o male with h/o anxiety, depression, Rt lung NSCLC diagnosed in 2015 who is on oral chemo who was diagnosed with Lt pleural effusion who was admitted for Pleurodesis and plural biopsy.  Post-op medicine consult called for comanagement.     * Rt NSCLC-hold gilotrif for now  * Lt Pleural Effusion-s/p pleurodessis and pleural biopsy ? malignant, f/u cytology and fluid analysis.  CT management per Dr Salvador. f/u CXR, monitor post-op labs, encourage use of incentive spirometry.  * Leukocytosis-likely reactive from above procedure, monitor for now since he is asymptomatic and non-toxic.  * Anxiety/Depression-resume low dose xanax as needed.  * Proph- heparin  * Disp-OOB, ambulate  * Comm- d/w pt, RN, family at bedside, Dr Tran, Dr Salvador

## 2017-10-21 NOTE — PHYSICAL THERAPY INITIAL EVALUATION ADULT - MODALITIES TREATMENT COMMENTS
pt left seated in recliner post Eval; all above lines / monitors in place; fiancee / friend present; callbell in reach; pt instructed not to get up alone; call nursing for assist; kennedy well; pain 6/10; MILTON silver

## 2017-10-22 DIAGNOSIS — Z86.79 PERSONAL HISTORY OF OTHER DISEASES OF THE CIRCULATORY SYSTEM: ICD-10-CM

## 2017-10-22 DIAGNOSIS — R55 SYNCOPE AND COLLAPSE: ICD-10-CM

## 2017-10-22 DIAGNOSIS — F41.8 OTHER SPECIFIED ANXIETY DISORDERS: ICD-10-CM

## 2017-10-22 DIAGNOSIS — C34.10 MALIGNANT NEOPLASM OF UPPER LOBE, UNSPECIFIED BRONCHUS OR LUNG: ICD-10-CM

## 2017-10-22 LAB
ANION GAP SERPL CALC-SCNC: 7 MMOL/L — SIGNIFICANT CHANGE UP (ref 5–17)
BUN SERPL-MCNC: 14 MG/DL — SIGNIFICANT CHANGE UP (ref 7–23)
CALCIUM SERPL-MCNC: 8.9 MG/DL — SIGNIFICANT CHANGE UP (ref 8.5–10.1)
CHLORIDE SERPL-SCNC: 99 MMOL/L — SIGNIFICANT CHANGE UP (ref 96–108)
CO2 SERPL-SCNC: 27 MMOL/L — SIGNIFICANT CHANGE UP (ref 22–31)
CREAT SERPL-MCNC: 1 MG/DL — SIGNIFICANT CHANGE UP (ref 0.5–1.3)
GLUCOSE SERPL-MCNC: 102 MG/DL — HIGH (ref 70–99)
HCT VFR BLD CALC: 42 % — SIGNIFICANT CHANGE UP (ref 39–50)
HGB BLD-MCNC: 14.1 G/DL — SIGNIFICANT CHANGE UP (ref 13–17)
MCHC RBC-ENTMCNC: 30.2 PG — SIGNIFICANT CHANGE UP (ref 27–34)
MCHC RBC-ENTMCNC: 33.5 GM/DL — SIGNIFICANT CHANGE UP (ref 32–36)
MCV RBC AUTO: 90 FL — SIGNIFICANT CHANGE UP (ref 80–100)
PLATELET # BLD AUTO: 306 K/UL — SIGNIFICANT CHANGE UP (ref 150–400)
POTASSIUM SERPL-MCNC: 4 MMOL/L — SIGNIFICANT CHANGE UP (ref 3.5–5.3)
POTASSIUM SERPL-SCNC: 4 MMOL/L — SIGNIFICANT CHANGE UP (ref 3.5–5.3)
RBC # BLD: 4.67 M/UL — SIGNIFICANT CHANGE UP (ref 4.2–5.8)
RBC # FLD: 11.9 % — SIGNIFICANT CHANGE UP (ref 10.3–14.5)
SODIUM SERPL-SCNC: 133 MMOL/L — LOW (ref 135–145)
WBC # BLD: 20.6 K/UL — HIGH (ref 3.8–10.5)
WBC # FLD AUTO: 20.6 K/UL — HIGH (ref 3.8–10.5)

## 2017-10-22 PROCEDURE — 99233 SBSQ HOSP IP/OBS HIGH 50: CPT

## 2017-10-22 RX ORDER — KETOROLAC TROMETHAMINE 30 MG/ML
30 SYRINGE (ML) INJECTION EVERY 6 HOURS
Qty: 0 | Refills: 0 | Status: DISCONTINUED | OUTPATIENT
Start: 2017-10-22 | End: 2017-10-24

## 2017-10-22 RX ADMIN — Medication 30 MILLIGRAM(S): at 09:46

## 2017-10-22 RX ADMIN — Medication 30 MILLIGRAM(S): at 23:12

## 2017-10-22 RX ADMIN — HYDROMORPHONE HYDROCHLORIDE 30 MILLILITER(S): 2 INJECTION INTRAMUSCULAR; INTRAVENOUS; SUBCUTANEOUS at 16:03

## 2017-10-22 RX ADMIN — HEPARIN SODIUM 5000 UNIT(S): 5000 INJECTION INTRAVENOUS; SUBCUTANEOUS at 14:53

## 2017-10-22 RX ADMIN — HEPARIN SODIUM 5000 UNIT(S): 5000 INJECTION INTRAVENOUS; SUBCUTANEOUS at 22:44

## 2017-10-22 RX ADMIN — Medication 30 MILLIGRAM(S): at 16:54

## 2017-10-22 RX ADMIN — Medication 30 MILLIGRAM(S): at 23:45

## 2017-10-22 RX ADMIN — Medication 30 MILLIGRAM(S): at 10:45

## 2017-10-22 RX ADMIN — HEPARIN SODIUM 5000 UNIT(S): 5000 INJECTION INTRAVENOUS; SUBCUTANEOUS at 05:36

## 2017-10-22 NOTE — PROGRESS NOTE ADULT - ASSESSMENT
Pt is a 47 y/o male with h/o anxiety, depression, Rt lung NSCLC diagnosed in 2015 who is on oral chemo who was diagnosed with Lt pleural effusion who was admitted for Pleurodesis and plural biopsy.  Post-op medicine consult called for comanagement.     * Stage IV NSCLC-continue to hold gilotrif for now  * Lt Pleural Effusion-s/p pleurodessis and pleural biopsy ? malignant, f/u cytology and fluid analysis.  CT management per Dr Salvador. f/u CXR, monitor post-op labs, encourage use of incentive spirometry.  * Leukocytosis-likely reactive from above procedure, monitor for now since he is asymptomatic, non-toxic, improving.  * Anxiety/Depression-xanax as needed.  * Proph- heparin  * Disp-OOB, ambulate  * Comm- d/w pt, RN, family at bedside

## 2017-10-22 NOTE — PROGRESS NOTE ADULT - ASSESSMENT
Stage 4 nonsmall cell lung cancer, on gilotriff before admission.   s/p L robotic assisted pleurodeisis and pleural bx on 10/20 (results pending).  Has 2 L CT's.  Incentive inspirometry.    h.o. pericardial window 2 years ago.    h.o. syncope. s/p Lynx implanted. Stage 4 nonsmall cell lung cancer, On gilotriff.  Oncology appreciated.   s/p L robotic assisted pleurodeisis and pleural bx on 10/20 (results pending).  Has 2 L CT's.  Incentive inspirometry.    h.o. pericardial window 2 years ago.    h.o. syncope. s/p Lynx implanted.

## 2017-10-23 LAB
ANION GAP SERPL CALC-SCNC: 6 MMOL/L — SIGNIFICANT CHANGE UP (ref 5–17)
BUN SERPL-MCNC: 13 MG/DL — SIGNIFICANT CHANGE UP (ref 7–23)
CALCIUM SERPL-MCNC: 9.2 MG/DL — SIGNIFICANT CHANGE UP (ref 8.5–10.1)
CHLORIDE SERPL-SCNC: 99 MMOL/L — SIGNIFICANT CHANGE UP (ref 96–108)
CO2 SERPL-SCNC: 29 MMOL/L — SIGNIFICANT CHANGE UP (ref 22–31)
CREAT SERPL-MCNC: 1.05 MG/DL — SIGNIFICANT CHANGE UP (ref 0.5–1.3)
GLUCOSE SERPL-MCNC: 94 MG/DL — SIGNIFICANT CHANGE UP (ref 70–99)
HCT VFR BLD CALC: 38 % — LOW (ref 39–50)
HGB BLD-MCNC: 12.8 G/DL — LOW (ref 13–17)
MCHC RBC-ENTMCNC: 30.6 PG — SIGNIFICANT CHANGE UP (ref 27–34)
MCHC RBC-ENTMCNC: 33.7 GM/DL — SIGNIFICANT CHANGE UP (ref 32–36)
MCV RBC AUTO: 90.6 FL — SIGNIFICANT CHANGE UP (ref 80–100)
PLATELET # BLD AUTO: 276 K/UL — SIGNIFICANT CHANGE UP (ref 150–400)
POTASSIUM SERPL-MCNC: 4.1 MMOL/L — SIGNIFICANT CHANGE UP (ref 3.5–5.3)
POTASSIUM SERPL-SCNC: 4.1 MMOL/L — SIGNIFICANT CHANGE UP (ref 3.5–5.3)
RBC # BLD: 4.2 M/UL — SIGNIFICANT CHANGE UP (ref 4.2–5.8)
RBC # FLD: 11.9 % — SIGNIFICANT CHANGE UP (ref 10.3–14.5)
SODIUM SERPL-SCNC: 134 MMOL/L — LOW (ref 135–145)
WBC # BLD: 10.9 K/UL — HIGH (ref 3.8–10.5)
WBC # FLD AUTO: 10.9 K/UL — HIGH (ref 3.8–10.5)

## 2017-10-23 PROCEDURE — 99233 SBSQ HOSP IP/OBS HIGH 50: CPT

## 2017-10-23 PROCEDURE — 71010: CPT | Mod: 26

## 2017-10-23 RX ORDER — HYDROMORPHONE HYDROCHLORIDE 2 MG/ML
0.5 INJECTION INTRAMUSCULAR; INTRAVENOUS; SUBCUTANEOUS
Qty: 0 | Refills: 0 | Status: DISCONTINUED | OUTPATIENT
Start: 2017-10-23 | End: 2017-10-25

## 2017-10-23 RX ADMIN — Medication 30 MILLIGRAM(S): at 05:55

## 2017-10-23 RX ADMIN — Medication 30 MILLIGRAM(S): at 22:00

## 2017-10-23 RX ADMIN — HEPARIN SODIUM 5000 UNIT(S): 5000 INJECTION INTRAVENOUS; SUBCUTANEOUS at 16:38

## 2017-10-23 RX ADMIN — Medication 30 MILLIGRAM(S): at 10:59

## 2017-10-23 RX ADMIN — HEPARIN SODIUM 5000 UNIT(S): 5000 INJECTION INTRAVENOUS; SUBCUTANEOUS at 05:23

## 2017-10-23 RX ADMIN — Medication 30 MILLIGRAM(S): at 13:00

## 2017-10-23 RX ADMIN — Medication 30 MILLIGRAM(S): at 05:22

## 2017-10-23 RX ADMIN — Medication 30 MILLIGRAM(S): at 21:35

## 2017-10-23 RX ADMIN — HEPARIN SODIUM 5000 UNIT(S): 5000 INJECTION INTRAVENOUS; SUBCUTANEOUS at 21:35

## 2017-10-23 NOTE — PROGRESS NOTE ADULT - ASSESSMENT
Pt is a 45 y/o male with h/o anxiety, depression, Rt lung NSCLC diagnosed in 2015 who is on oral chemo who was diagnosed with Lt pleural effusion who was admitted for Pleurodesis and plural biopsy.  Post-op medicine consult called for comanagement.     * Stage IV NSCLC-continue to hold gilotrif for now  * Lt Pleural Effusion-s/p pleurodessis and pleural biopsy ? malignant, f/u cytology and fluid analysis.  CT management per Dr Salvador, monitor post-op labs, encourage use of incentive spirometry.  * Leukocytosis-likely reactive from above procedure, monitor for now since he is asymptomatic, non-toxic, improving.  * Anxiety/Depression-xanax as needed.  * Proph- heparin  * Disp-OOB, ambulate  * Comm- d/w pt, RN

## 2017-10-23 NOTE — PROGRESS NOTE ADULT - ASSESSMENT
47 yo gentleman with PMHx of NSCLC stage IV currently on targeted therapy Afatinib 40 mg daily. Patient had a recent surveillance CT scan of the chest 9/2017 c/c a new pleural effusion concerned for progression of disease. S/p pleurodesis and pleural biopsy 10/20/17; Pathology results pending     Continue pain management with PCA dilaudid, transition to oral once patient improves.    Continue management as per ICU team.    Continue Afatinib 40 mg at bed time.     ALL Kessler MD. MSc.

## 2017-10-24 ENCOUNTER — RECORD ABSTRACTING (OUTPATIENT)
Age: 47
End: 2017-10-24

## 2017-10-24 LAB
ANION GAP SERPL CALC-SCNC: 2 MMOL/L — LOW (ref 5–17)
BUN SERPL-MCNC: 17 MG/DL — SIGNIFICANT CHANGE UP (ref 7–23)
CALCIUM SERPL-MCNC: 9.2 MG/DL — SIGNIFICANT CHANGE UP (ref 8.5–10.1)
CHLORIDE SERPL-SCNC: 100 MMOL/L — SIGNIFICANT CHANGE UP (ref 96–108)
CO2 SERPL-SCNC: 31 MMOL/L — SIGNIFICANT CHANGE UP (ref 22–31)
CREAT SERPL-MCNC: 1.08 MG/DL — SIGNIFICANT CHANGE UP (ref 0.5–1.3)
GLUCOSE SERPL-MCNC: 93 MG/DL — SIGNIFICANT CHANGE UP (ref 70–99)
HCT VFR BLD CALC: 37.1 % — LOW (ref 39–50)
HGB BLD-MCNC: 12.7 G/DL — LOW (ref 13–17)
MCHC RBC-ENTMCNC: 31.1 PG — SIGNIFICANT CHANGE UP (ref 27–34)
MCHC RBC-ENTMCNC: 34.3 GM/DL — SIGNIFICANT CHANGE UP (ref 32–36)
MCV RBC AUTO: 90.7 FL — SIGNIFICANT CHANGE UP (ref 80–100)
PLATELET # BLD AUTO: 282 K/UL — SIGNIFICANT CHANGE UP (ref 150–400)
POTASSIUM SERPL-MCNC: 4 MMOL/L — SIGNIFICANT CHANGE UP (ref 3.5–5.3)
POTASSIUM SERPL-SCNC: 4 MMOL/L — SIGNIFICANT CHANGE UP (ref 3.5–5.3)
RBC # BLD: 4.09 M/UL — LOW (ref 4.2–5.8)
RBC # FLD: 12 % — SIGNIFICANT CHANGE UP (ref 10.3–14.5)
SODIUM SERPL-SCNC: 133 MMOL/L — LOW (ref 135–145)
WBC # BLD: 9.2 K/UL — SIGNIFICANT CHANGE UP (ref 3.8–10.5)
WBC # FLD AUTO: 9.2 K/UL — SIGNIFICANT CHANGE UP (ref 3.8–10.5)

## 2017-10-24 PROCEDURE — 71010: CPT | Mod: 26,76

## 2017-10-24 PROCEDURE — 99233 SBSQ HOSP IP/OBS HIGH 50: CPT

## 2017-10-24 RX ORDER — OXYCODONE AND ACETAMINOPHEN 5; 325 MG/1; MG/1
2 TABLET ORAL EVERY 4 HOURS
Qty: 0 | Refills: 0 | Status: DISCONTINUED | OUTPATIENT
Start: 2017-10-24 | End: 2017-10-25

## 2017-10-24 RX ORDER — ACETAMINOPHEN 500 MG
650 TABLET ORAL EVERY 6 HOURS
Qty: 0 | Refills: 0 | Status: DISCONTINUED | OUTPATIENT
Start: 2017-10-24 | End: 2017-10-25

## 2017-10-24 RX ORDER — OXYCODONE AND ACETAMINOPHEN 5; 325 MG/1; MG/1
1 TABLET ORAL EVERY 4 HOURS
Qty: 0 | Refills: 0 | Status: DISCONTINUED | OUTPATIENT
Start: 2017-10-24 | End: 2017-10-25

## 2017-10-24 RX ORDER — SODIUM CHLORIDE 9 MG/ML
3 INJECTION INTRAMUSCULAR; INTRAVENOUS; SUBCUTANEOUS EVERY 8 HOURS
Qty: 0 | Refills: 0 | Status: DISCONTINUED | OUTPATIENT
Start: 2017-10-24 | End: 2017-10-25

## 2017-10-24 RX ADMIN — SODIUM CHLORIDE 40 MILLILITER(S): 9 INJECTION INTRAMUSCULAR; INTRAVENOUS; SUBCUTANEOUS at 10:51

## 2017-10-24 RX ADMIN — Medication 0.25 MILLIGRAM(S): at 21:24

## 2017-10-24 RX ADMIN — SODIUM CHLORIDE 3 MILLILITER(S): 9 INJECTION INTRAMUSCULAR; INTRAVENOUS; SUBCUTANEOUS at 21:25

## 2017-10-24 RX ADMIN — HEPARIN SODIUM 5000 UNIT(S): 5000 INJECTION INTRAVENOUS; SUBCUTANEOUS at 14:09

## 2017-10-24 RX ADMIN — OXYCODONE AND ACETAMINOPHEN 2 TABLET(S): 5; 325 TABLET ORAL at 21:22

## 2017-10-24 RX ADMIN — OXYCODONE AND ACETAMINOPHEN 2 TABLET(S): 5; 325 TABLET ORAL at 17:13

## 2017-10-24 RX ADMIN — Medication 30 MILLIGRAM(S): at 06:00

## 2017-10-24 RX ADMIN — Medication 650 MILLIGRAM(S): at 20:04

## 2017-10-24 RX ADMIN — OXYCODONE AND ACETAMINOPHEN 2 TABLET(S): 5; 325 TABLET ORAL at 22:22

## 2017-10-24 RX ADMIN — Medication 30 MILLIGRAM(S): at 06:55

## 2017-10-24 RX ADMIN — HEPARIN SODIUM 5000 UNIT(S): 5000 INJECTION INTRAVENOUS; SUBCUTANEOUS at 21:20

## 2017-10-24 RX ADMIN — HEPARIN SODIUM 5000 UNIT(S): 5000 INJECTION INTRAVENOUS; SUBCUTANEOUS at 06:52

## 2017-10-24 RX ADMIN — SODIUM CHLORIDE 3 MILLILITER(S): 9 INJECTION INTRAMUSCULAR; INTRAVENOUS; SUBCUTANEOUS at 14:11

## 2017-10-24 NOTE — PROGRESS NOTE ADULT - ASSESSMENT
Stage 4 nonsmall cell lung cancer, On gilotriff.  Oncology appreciated.   s/p L robotic assisted pleurodeisis and pleural bx on 10/20 (results pending).  Has 1 L CT.   Incentive inspirometry.    h.o. pericardial window 2 years ago.    h.o. syncope. s/p Lynx implanted.

## 2017-10-24 NOTE — PROGRESS NOTE ADULT - ASSESSMENT
46 y/o male with know lung CA, s/p left pleurodesis, POD#4  d/c last chest tube, repeat Cxray no ptx, await official read  continue current meds  ambulate  encourage incentive spirometry  possible d/c tomorrow 10/25/17  d/w Dr Salvador

## 2017-10-24 NOTE — PROGRESS NOTE ADULT - ASSESSMENT
Pt is a 47 y/o male with h/o anxiety, depression, Rt lung NSCLC diagnosed in 2015 who is on oral chemo who was diagnosed with Lt pleural effusion who was admitted for Pleurodesis and plural biopsy.  Post-op medicine consult called for comanagement.     * Stage IV NSCLC-continue gilotrif  * Lt Pleural Effusion-s/p pleurodessis and pleural biopsy ? malignant, f/u cytology and fluid analysis.  CT management per Dr Salvador, monitor post-op labs, encourage use of incentive spirometry.  * Leukocytosis-likely reactive from above procedure, resolved.  * Anxiety/Depression-xanax as needed.  * Proph- heparin  * Disp-OOB, ambulate  * Comm- d/w pt, RN

## 2017-10-25 ENCOUNTER — APPOINTMENT (OUTPATIENT)
Dept: INTERNAL MEDICINE | Facility: CLINIC | Age: 47
End: 2017-10-25

## 2017-10-25 ENCOUNTER — TRANSCRIPTION ENCOUNTER (OUTPATIENT)
Age: 47
End: 2017-10-25

## 2017-10-25 VITALS
DIASTOLIC BLOOD PRESSURE: 67 MMHG | TEMPERATURE: 98 F | SYSTOLIC BLOOD PRESSURE: 109 MMHG | OXYGEN SATURATION: 99 % | RESPIRATION RATE: 17 BRPM | HEART RATE: 82 BPM

## 2017-10-25 LAB — SURGICAL PATHOLOGY FINAL REPORT - CH: SIGNIFICANT CHANGE UP

## 2017-10-25 PROCEDURE — 71010: CPT | Mod: 26

## 2017-10-25 PROCEDURE — 99233 SBSQ HOSP IP/OBS HIGH 50: CPT

## 2017-10-25 RX ORDER — PREDNISONE 20 MG/1
20 TABLET ORAL TWICE DAILY
Qty: 12 | Refills: 0 | Status: COMPLETED | COMMUNITY
Start: 2017-10-04 | End: 2017-10-25

## 2017-10-25 RX ORDER — CEFUROXIME AXETIL 500 MG/1
500 TABLET ORAL
Qty: 20 | Refills: 0 | Status: COMPLETED | COMMUNITY
Start: 2017-10-04 | End: 2017-10-25

## 2017-10-25 RX ORDER — CODEINE PHOSPHATE AND GUAIFENESIN 10; 100 MG/5ML; MG/5ML
100-10 LIQUID ORAL
Qty: 240 | Refills: 0 | Status: COMPLETED | COMMUNITY
Start: 2017-10-04 | End: 2017-10-25

## 2017-10-25 RX ADMIN — SODIUM CHLORIDE 3 MILLILITER(S): 9 INJECTION INTRAMUSCULAR; INTRAVENOUS; SUBCUTANEOUS at 05:10

## 2017-10-25 RX ADMIN — OXYCODONE AND ACETAMINOPHEN 2 TABLET(S): 5; 325 TABLET ORAL at 06:06

## 2017-10-25 RX ADMIN — OXYCODONE AND ACETAMINOPHEN 2 TABLET(S): 5; 325 TABLET ORAL at 05:06

## 2017-10-25 RX ADMIN — HEPARIN SODIUM 5000 UNIT(S): 5000 INJECTION INTRAVENOUS; SUBCUTANEOUS at 05:06

## 2017-10-25 NOTE — DISCHARGE NOTE ADULT - CARE PROVIDER_API CALL
Samuel Salvador), Thoracic Surgery  [address] [place] [address]  Phone: [phone]  Fax: [phone]

## 2017-10-25 NOTE — DISCHARGE NOTE ADULT - PLAN OF CARE
return to normal activities change dressing as needed due to drainage, Follow up with Dr Salvador in 2 weeks, [phone] follow up with pulmonologist, continue gliotrif

## 2017-10-25 NOTE — PROGRESS NOTE ADULT - PROVIDER SPECIALTY LIST ADULT
Internal Medicine
Pulmonology
Thoracic Surgery
Pulmonology
Heme/Onc
Internal Medicine
CT Surgery
Pulmonology
Internal Medicine
Internal Medicine

## 2017-10-25 NOTE — PROGRESS NOTE ADULT - ASSESSMENT
Pt is a 47 y/o male with h/o anxiety, depression, Rt lung NSCLC diagnosed in 2015 who is on oral chemo who was diagnosed with Lt pleural effusion who was admitted for Pleurodesis and plural biopsy.  Post-op medicine consult called for comanagement.     * Stage IV NSCLC-continue gilotrif  * Lt Pleural Effusion-s/p pleurodessis and pleural biopsy ? malignant, f/u cytology and fluid analysis.  Encourage use of incentive spirometry.  * Leukocytosis-likely reactive from above procedure, resolved.  * Anxiety/Depression-xanax as needed.  * Proph- heparin  * Disp-OOB, ambulate, d/c planning for today with outpt f/u with this doctors.  * Comm- d/w pt, RN, CT PA

## 2017-10-25 NOTE — DISCHARGE NOTE ADULT - PATIENT PORTAL LINK FT
“You can access the [facility] Patient Portal, offered by [facility], by registering with the following website: [email or web address]”

## 2017-10-25 NOTE — DISCHARGE NOTE ADULT - CARE PLAN
Principal Discharge DX:	Pleural effusion  Goal:	return to normal activities  Instructions for follow-up, activity and diet:	change dressing as needed due to drainage, Follow up with Dr Salvador in 2 weeks, [phone]  Secondary Diagnosis:	Lung cancer, upper lobe  Instructions for follow-up, activity and diet:	follow up with pulmonologist, continue gliotrif

## 2017-10-25 NOTE — PROGRESS NOTE ADULT - SUBJECTIVE AND OBJECTIVE BOX
47 yo gentleman with PMHx of NSCLC stage IV currently on targeted therapy Afatinib 40 mg daily. Patient had a recent surveillance CT scan of the chest 9/2017 c/c a new pleural effusion concerned for progression of disease. S/p pleurodesis and pleural biopsy 10/20/17; one of the chest tubes was removed.  WBC is decreasing, remains afebrile.     ICU Vital Signs Last 24 Hrs  T(C): 36.2 (23 Oct 2017 08:52), Max: 37.1 (23 Oct 2017 05:57)  T(F): 97.1 (23 Oct 2017 08:52), Max: 98.8 (23 Oct 2017 05:57)  HR: 108 (23 Oct 2017 10:09) (92 - 115)  BP: 107/59 (23 Oct 2017 10:09) (92/51 - 109/60)  BP(mean): 70 (23 Oct 2017 10:09) (61 - 82)  ABP: --  ABP(mean): --  RR: 21 (23 Oct 2017 10:09) (12 - 22)  SpO2: 98% (23 Oct 2017 10:09) (92% - 99%)        General gentleman in NAD  HEENT  Lungs decreased breath sounds in the LLL, no W/ R/C.  One chest tube in the left chest attach to a pleuralvac, draining well.   CVS S1 S2 regular, no M/R/G  Abdomen soft NT ND positive for BS, no organomegaly.  Extremities: No C/C/E    Basic Metabolic Panel (10.23.17 @ 10:22)    Sodium, Serum: 134 mmol/L    Potassium, Serum: 4.1 mmol/L    Chloride, Serum: 99 mmol/L    Carbon Dioxide, Serum: 29 mmol/L    Anion Gap, Serum: 6 mmol/L    Blood Urea Nitrogen, Serum: 13 mg/dL    Creatinine, Serum: 1.05 mg/dL    Glucose, Serum: 94 mg/dL    Calcium, Total Serum: 9.2 mg/dL    eGFR if Non : 85: Interpretative comment  The units for eGFR are ml/min/1.73m2 (normalized body surface area). The  eGFR is calculated from a serum creatinine using the CKD-EPI equation.  Other variables required for calculation are race, age and sex. Among  patients with chronic kidney disease (CKD), the eGFR is useful in  determining the stage of disease according to KDOQI CKD classification.  All eGFR results are reported numerically with the following  interpretation.          GFR                    With                 Without     (ml/min/1.73 m2)    Kidney Damage       Kidney Damage        >= 90                    Stage 1                     Normal        60-89                    Stage 2                     Decreased GFR        30-59     Stage 3                     Stage 3        15-29                    Stage 4                     Stage 4        < 15                      Stage 5                     Stage 5  Each stage of CKD assumes that the associated GFR level has been in  effect for at least 3 months. Determination of stages one and two (with  eGFR > 59 ml/min/m2) requires estimation of kidney damage for at least 3  months as defined by structural or functional abnormalities.  Limitations: All estimates of GFR will be less accurate for patients at  extremes of muscle mass (including but not limited to frail elderly,  critically ill, or cancer patients), those with unusual diets, and those  with conditions associated with reduced secretion or extrarenal  elimination of creatinine. The eGFR equation is not recommended for use  in patients with unstable creatinine levels. mL/min/1.73M2    eGFR if African American: 98 mL/min/1.73M2      Complete Blood Count (10.23.17 @ 10:22)    WBC Count: 10.9 K/uL    RBC Count: 4.20 M/uL    Hemoglobin: 12.8 g/dL    Hematocrit: 38.0 %    Mean Cell Volume: 90.6 fl    Mean Cell Hemoglobin: 30.6 pg    Mean Cell Hemoglobin Conc: 33.7 gm/dL    Red Cell Distrib Width: 11.9 %    Platelet Count - Automated: 276 K/uL
HPI: 46 y.o. male w/ h.o. 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 anxiety, asthmatic bronchitis, h.o. syncope;  seen in early Oct w/ SOB, CT scan (9/17) showed stable R pulm nodules, new moderate L effusion.  Underwent robotic assisted pleurodeisis and pleural bx on 10/20.  Says SOB is improved.  Denies: cough ,wheeze, sputum or hemoptysis.  No chills.  Is afebrile.     10/22:  afebrile. no distress. no SOB.  slight cough. slight dark sputum.      PAST MEDICAL & SURGICAL HISTORY:  Anxiety and depression  Erectile dysfunction, unspecified erectile dysfunction type  Dermatitis: due to medication  History of kidney stones  Bronchitis: Asthmatic  History of pericarditis  Pleural effusion  Syncope, unspecified syncope type  Lung cancer, upper lobe: right. metastatic non small cell Lung Carcinoma.  H/O bilateral inguinal hernia repair: as a child  Acute pericarditis, unspecified type: pericardial window 11/2015, 12/2015      MEDICATIONS  (STANDING):  heparin  Injectable 5000 Unit(s) SubCutaneous every 8 hours  HYDROmorphone PCA (1 mG/mL) 30 milliLiter(s) PCA Continuous PCA Continuous  sodium chloride 0.9%. 1000 milliLiter(s) (40 mL/Hr) IV Continuous <Continuous>    MEDICATIONS  (PRN):  HYDROmorphone PCA (1 mG/mL) Rescue Clinician Bolus 0.5 milliGRAM(s) IV Push every 15 minutes PRN for Pain Scale GREATER THAN 6  naloxone Injectable 0.1 milliGRAM(s) IV Push every 3 minutes PRN For ANY of the following changes in patient status:  A. RR LESS THAN 10 breaths per minute, B. Oxygen saturation LESS THAN 90%, C. Sedation score of 6  ondansetron Injectable 4 milliGRAM(s) IV Push every 6 hours PRN Nausea  ondansetron Injectable 4 milliGRAM(s) IV Push every 6 hours PRN Nausea  prochlorperazine   Injectable 10 milliGRAM(s) IntraMuscular every 6 hours PRN nausea      Allergies    No Known Allergies    Intolerances        SOCIAL HISTORY: Denies tobacco, etoh abuse or illicit drug use    FAMILY HISTORY:  Family history of leukemia (Father)  Family history of cancer (Mother)      Vital Signs Last 24 Hrs  T(C): 36.1 (21 Oct 2017 08:15), Max: 37.1 (20 Oct 2017 14:12)  T(F): 97 (21 Oct 2017 08:15), Max: 98.8 (20 Oct 2017 14:12)  HR: 102 (21 Oct 2017 09:00) (81 - 123)  BP: 130/90 (21 Oct 2017 09:00) (101/48 - 132/110)  BP(mean): 98 (21 Oct 2017 09:00) (80 - 115)  RR: 17 (21 Oct 2017 09:00) (7 - 19)  SpO2: 96% (21 Oct 2017 09:00) (96% - 100%)    REVIEW OF SYSTEMS:    CONSTITUTIONAL:  As per HPI.  HEENT:  Eyes:  No diplopia or blurred vision. ENT:  No earache, sore throat or runny nose.  CARDIOVASCULAR:  No pressure, squeezing, tightness, heaviness or aching about the chest, neck, axilla or epigastrium.  RESPIRATORY:  see above.  GASTROINTESTINAL:  No nausea, vomiting or diarrhea.  GENITOURINARY:  No dysuria, frequency or urgency.  MUSCULOSKELETAL:  As per HPI.  SKIN:  No change in skin, hair or nails.  NEUROLOGIC:  No paresthesias, fasciculations, seizures or weakness.  PSYCHIATRIC:  No disorder of thought or mood.  ENDOCRINE:  No heat or cold intolerance, polyuria or polydipsia.  HEMATOLOGICAL:  No easy bruising or bleedings:  .     PHYSICAL EXAMINATION:    GENERAL APPEARANCE:  Pt. is not currently dyspneic, in no distress. Pt. is alert, oriented, and pleasant.  HEENT:  Pupils are normal and react normally. No icterus. Mucous membranes well colored.  NECK:  Supple. No lymphadenopathy. Jugular venous pressure not elevated. Carotids equal.   HEART:   HR 96.  CHEST:  Chest is clear to P and A. Normal respiratory effort.  ABDOMEN:  Soft and nontender.   EXTREMITIES:  There is no cyanosis, clubbing or edema.   SKIN:  No rash or significant lesions are noted.  Neuro: Alert, awake, and O x 3.      LABS:                        15.8   26.5  )-----------( 390      ( 21 Oct 2017 05:30 )             47.8     10-21    136  |  104  |  18  ----------------------------<  121<H>  4.6   |  23  |  1.44<H>    Ca    9.5      21 Oct 2017 05:30                    RADIOLOGY & ADDITIONAL STUDIES:     EXAM:  CHEST SINGLE VIEW FRONTAL                            PROCEDURE DATE:  10/21/2017          INTERPRETATION:  History: LEFT pleurodesis    Chest:  one view.      Comparison: 10/20/2017    AP radiograph of the chest demonstrates LEFT chest tubesunchanged in   position. Subsegmental atelectasis in LEFT lower lobe. LEFT subcutaneous   emphysema laterally and within the neck. The cardiac silhouette is normal   in size. Osseous structures are intact.    Impression:LEFT chest tubes unchanged in position. Subsegmental   atelectasis in LEFT lower lobe. LEFT subcutaneous emphysema laterally and   within the neck
Pt c/o pain from left sided CT, no SOB, fevers or chills.    Vital Signs Last 24 Hrs  T(C): 36.6 (22 Oct 2017 08:00), Max: 36.8 (21 Oct 2017 20:55)  T(F): 97.9 (22 Oct 2017 08:00), Max: 98.3 (22 Oct 2017 06:43)  HR: 98 (22 Oct 2017 08:48) (98 - 114)  BP: 112/61 (22 Oct 2017 08:48) (103/68 - 127/78)  BP(mean): 74 (22 Oct 2017 08:48) (71 - 91)  RR: 14 (22 Oct 2017 08:48) (12 - 24)  SpO2: 94% (22 Oct 2017 08:48) (92% - 100%)    Daily     Daily     I&O's Detail    21 Oct 2017 07:01  -  22 Oct 2017 07:00  --------------------------------------------------------  IN:    sodium chloride 0.9%.: 480 mL  Total IN: 480 mL    OUT:    Chest Tube: 150 mL    Chest Tube: 50 mL    Voided: 1450 mL  Total OUT: 1650 mL    Total NET: -1170 mL          CAPILLARY BLOOD GLUCOSE                                          14.1   20.6  )-----------( 306      ( 22 Oct 2017 04:40 )             42.0       10-22    133<L>  |  99  |  14  ----------------------------<  102<H>  4.0   |  27  |  1.00    Ca    8.9      22 Oct 2017 04:40                        MEDICATIONS  (STANDING):  Gilotrif (afatinib) 40 milliGRAM(s) 40 milliGRAM(s) Oral at bedtime  heparin  Injectable 5000 Unit(s) SubCutaneous every 8 hours  HYDROmorphone PCA (1 mG/mL) 30 milliLiter(s) PCA Continuous PCA Continuous  sodium chloride 0.9%. 1000 milliLiter(s) (40 mL/Hr) IV Continuous <Continuous>    MEDICATIONS  (PRN):  ALPRAZolam 0.25 milliGRAM(s) Oral every 6 hours PRN anxiety  HYDROmorphone PCA (1 mG/mL) Rescue Clinician Bolus 0.5 milliGRAM(s) IV Push every 15 minutes PRN for Pain Scale GREATER THAN 6  ketorolac   Injectable 30 milliGRAM(s) IV Push every 6 hours PRN Severe Pain (7 - 10)  naloxone Injectable 0.1 milliGRAM(s) IV Push every 3 minutes PRN For ANY of the following changes in patient status:  A. RR LESS THAN 10 breaths per minute, B. Oxygen saturation LESS THAN 90%, C. Sedation score of 6  ondansetron Injectable 4 milliGRAM(s) IV Push every 6 hours PRN Nausea  ondansetron Injectable 4 milliGRAM(s) IV Push every 6 hours PRN Nausea  prochlorperazine   Injectable 10 milliGRAM(s) IntraMuscular every 6 hours PRN nausea
Pt feels well.  No new complaints.     Lungs-B/L BS+  Chest-Left CTs x2- moderate drainage, no air leak  Abd-soft, NT, ND
47 y/o male w/ known Right Lung CA, who underwent Robotic-Assisted Left Pleurodesis on 10/20/17.  Patient is currently sitting-up in bed comfortably and denies any chest pains, SOB, or dizziness.    Vitals: 94/61, 97, 96% RA, 16, 98.8%  Gen: Awake, A&Ox3, NAD, sitting-up in bed comfortably  Chest: Left chest incisions C/D/I  Heart: S1, S2; no audible murmurs  Lungs: minimal inspiratory crackles left base  Abd: +BS'sx4, soft, ND/NT  Ext: All ext warm to touch with good strength; No edema B/L LE's    A/P: 47 y/o male s/p Left Pleurodesis  - 1 of 2 left pleural chest tubes removed this morning; CXR s/p removal similar to prior CXR on 10/21 - left lower lobe atelectasis appears improved.  Still some lateral SQ air.  Awaiting official read.  Remaining left pleural chest tube remains on suction.  - F/U AM labs (WBC yesterday 20, but trending down)  - Continue current medication regimen  - Ambulate/incentive spirometry  * Discussed w/ Dr. Salvador
Pt resting comfortably in bed.    Afeb VSS SA02 94% RA    Chest tube: No leak
Pt with uneventful night, last CT was removed yesterday, no CP or SOB.    Vital Signs Last 24 Hrs  T(C): 36.4 (25 Oct 2017 05:39), Max: 37.4 (24 Oct 2017 18:30)  T(F): 97.6 (25 Oct 2017 05:39), Max: 99.3 (24 Oct 2017 18:30)  HR: 82 (25 Oct 2017 05:39) (82 - 104)  BP: 109/67 (25 Oct 2017 05:39) (105/57 - 119/73)  BP(mean): 84 (24 Oct 2017 17:00) (66 - 84)  RR: 17 (25 Oct 2017 05:39) (17 - 30)  SpO2: 99% (25 Oct 2017 05:39) (95% - 100%)    Daily     Daily     I&O's Detail      CAPILLARY BLOOD GLUCOSE                                          12.7   9.2   )-----------( 282      ( 24 Oct 2017 04:27 )             37.1       10-24    133<L>  |  100  |  17  ----------------------------<  93  4.0   |  31  |  1.08    Ca    9.2      24 Oct 2017 04:27                        MEDICATIONS  (STANDING):  Gilotrif (afatinib) 40 milliGRAM(s) 40 milliGRAM(s) Oral at bedtime  heparin  Injectable 5000 Unit(s) SubCutaneous every 8 hours  sodium chloride 0.9% lock flush 3 milliLiter(s) IV Push every 8 hours    MEDICATIONS  (PRN):  acetaminophen   Tablet 650 milliGRAM(s) Oral every 6 hours PRN For Temp greater than 38.5 C (101.3 F)  ALPRAZolam 0.25 milliGRAM(s) Oral every 6 hours PRN anxiety  HYDROmorphone PCA (1 mG/mL) Rescue Clinician Bolus 0.5 milliGRAM(s) IV Push every 15 minutes PRN for Pain Scale GREATER THAN 6  naloxone Injectable 0.1 milliGRAM(s) IV Push every 3 minutes PRN For ANY of the following changes in patient status:  A. RR LESS THAN 10 breaths per minute, B. Oxygen saturation LESS THAN 90%, C. Sedation score of 6  ondansetron Injectable 4 milliGRAM(s) IV Push every 6 hours PRN Nausea  oxyCODONE    5 mG/acetaminophen 325 mG 1 Tablet(s) Oral every 4 hours PRN Moderate Pain (4 - 6)  oxyCODONE    5 mG/acetaminophen 325 mG 2 Tablet(s) Oral every 4 hours PRN Severe Pain (7 - 10)  prochlorperazine   Injectable 10 milliGRAM(s) IntraMuscular every 6 hours PRN nausea
Subjective:  47 y/o male w/ known Right Lung CA, who underwent Robotic-Assisted Left Pleurodesis on 10/20/17.  Patient is currently sitting-up in bed comfortably and denies any chest pains, SOB, or dizziness. Last chest tube removed yesterday. Plan for d/c today.    Tele:                                                              T(C): 36.4 (10-25-17 @ 05:39), Max: 37.4 (10-24-17 @ 18:30)  HR: 82 (10-25-17 @ 05:39) (82 - 104)  BP: 109/67 (10-25-17 @ 05:39) (105/57 - 119/73)  RR: 17 (10-25-17 @ 05:39) (17 - 30)  SpO2: 99% (10-25-17 @ 05:39) (95% - 100%)    10-24    133<L>  |  100  |  17  ----------------------------<  93  4.0   |  31  |  1.08    Ca    9.2      24 Oct 2017 04:27                                 12.7   9.2   )-----------( 282      ( 24 Oct 2017 04:27 )             37.1     CXR:  < from: Xray Chest 1 View AP/PA. (10.24.17 @ 12:07) >  Impression:interval removal of RIGHT chest tube. Subsegmental atelectasis   is seen in the RIGHT lower lobe. Subcutaneous edema is seen in the RIGHT   lateral soft tissues.     < end of copied text >  10/25 Chest Xray: SubQ air still present, official reading pending.    Drug Dosing Weight  Height (cm): 167.64 (20 Oct 2017 14:12)  Weight (kg): 87.5 (20 Oct 2017 14:12)  BMI (kg/m2): 31.1 (20 Oct 2017 14:12)  BSA (m2): 1.97 (20 Oct 2017 14:12)    Assessment  Neuro: Awake and alert  Pulm: CTA B/L  CV: S1, S2  Abd: Soft, non-tender, +BS  Extrem/MS: Moves all ext, no edema  Incision(s): Clean, mild drainage.    Assessment:  46yMale with PAST MEDICAL & SURGICAL HISTORY:  Anxiety and depression  Erectile dysfunction, unspecified erectile dysfunction type  Dermatitis: due to medication  History of kidney stones  Bronchitis: Asthmatic  History of pericarditis  Pleural effusion  Syncope, unspecified syncope type  Lung cancer, upper lobe: right. metastatic non small cell Lung Carcinoma.  H/O bilateral inguinal hernia repair: as a child  Acute pericarditis, unspecified type: pericardial window 11/2015, 12/2015
Subjective:  47 y/o male w/ known Right Lung CA, who underwent Robotic-Assisted Left Pleurodesis on 10/20/17.  Patient is currently sitting-up in bed comfortably and denies any chest pains, SOB, or dizziness. One chest tube removed yesterday, last chest tube removed today.    Tele:                                                              T(C): 36.2 (10-24-17 @ 09:15), Max: 36.7 (10-23-17 @ 21:14)  HR: 93 (10-24-17 @ 10:51) (89 - 108)  BP: 108/54 (10-24-17 @ 10:51) (98/64 - 120/58)  RR: 18 (10-24-17 @ 10:51) (15 - 30)  SpO2: 95% (10-24-17 @ 10:51) (94% - 99%)    10-24    133<L>  |  100  |  17  ----------------------------<  93  4.0   |  31  |  1.08    Ca    9.2      24 Oct 2017 04:27                                 12.7   9.2   )-----------( 282      ( 24 Oct 2017 04:27 )             37.1   CXR:  < from: Xray Chest 1 View AP/PA. (10.23.17 @ 09:15) >    INTERPRETATION:  Clinical information: Chest tube removal    AP view of the chest    COMPARISON: October 21, 2017    FINDINGS: Left chest tube with tipat the left lung apex. No   pneumothorax. Airspace disease at the left lung base, improved. No   definite pleural effusion. Clear right lung. Unchanged lower left chest   wall subcutaneous emphysema.    IMPRESSION:    No pneumothorax.    < end of copied text >    CXray 10/24 No ptx, awaiting official reading.    I&O's Detail    23 Oct 2017 07:01  -  24 Oct 2017 07:00  --------------------------------------------------------  IN:    sodium chloride 0.9%: 280 mL  Total IN: 280 mL    OUT:    Chest Tube: 70 mL    Voided: 700 mL  Total OUT: 770 mL    Total NET: -490 mL    Drug Dosing Weight  Height (cm): 167.64 (20 Oct 2017 14:12)  Weight (kg): 87.5 (20 Oct 2017 14:12)  BMI (kg/m2): 31.1 (20 Oct 2017 14:12)  BSA (m2): 1.97 (20 Oct 2017 14:12)    Assessment  Neuro: awake and alert  Pulm: CTA B/L  CV: S1, S2  Abd: Soft, non-tender, +BS  Extrem/MS: No edema  Incision(s): clean, some oozing    Assessment:  46yMale with PAST MEDICAL & SURGICAL HISTORY:  Anxiety and depression  Erectile dysfunction, unspecified erectile dysfunction type  Dermatitis: due to medication  History of kidney stones  Bronchitis: Asthmatic  History of pericarditis  Pleural effusion  Syncope, unspecified syncope type  Lung cancer, upper lobe: right. metastatic non small cell Lung Carcinoma.  H/O bilateral inguinal hernia repair: as a child  Acute pericarditis, unspecified type: pericardial window 11/2015, 12/2015
HPI: 46 y.o. male w/ h.o. 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 anxiety, asthmatic bronchitis, h.o. syncope;  seen in early Oct w/ SOB, CT scan (9/17) showed stable R pulm nodules, new moderate L effusion.  Underwent robotic assisted pleurodeisis and pleural bx on 10/20.  Says SOB is improved.  Denies: cough ,wheeze, sputum or hemoptysis.  No chills.  Is afebrile.     10/22:  afebrile. no distress. no SOB.  slight cough. slight dark sputum.    10/24: afebrile, no distress. getting into chair.      PAST MEDICAL & SURGICAL HISTORY:  Anxiety and depression  Erectile dysfunction, unspecified erectile dysfunction type  Dermatitis: due to medication  History of kidney stones  Bronchitis: Asthmatic  History of pericarditis  Pleural effusion  Syncope, unspecified syncope type  Lung cancer, upper lobe: right. metastatic non small cell Lung Carcinoma.  H/O bilateral inguinal hernia repair: as a child  Acute pericarditis, unspecified type: pericardial window 11/2015, 12/2015      MEDICATIONS  (STANDING):  heparin  Injectable 5000 Unit(s) SubCutaneous every 8 hours  HYDROmorphone PCA (1 mG/mL) 30 milliLiter(s) PCA Continuous PCA Continuous  sodium chloride 0.9%. 1000 milliLiter(s) (40 mL/Hr) IV Continuous <Continuous>    MEDICATIONS  (PRN):  HYDROmorphone PCA (1 mG/mL) Rescue Clinician Bolus 0.5 milliGRAM(s) IV Push every 15 minutes PRN for Pain Scale GREATER THAN 6  naloxone Injectable 0.1 milliGRAM(s) IV Push every 3 minutes PRN For ANY of the following changes in patient status:  A. RR LESS THAN 10 breaths per minute, B. Oxygen saturation LESS THAN 90%, C. Sedation score of 6  ondansetron Injectable 4 milliGRAM(s) IV Push every 6 hours PRN Nausea  ondansetron Injectable 4 milliGRAM(s) IV Push every 6 hours PRN Nausea  prochlorperazine   Injectable 10 milliGRAM(s) IntraMuscular every 6 hours PRN nausea      Allergies    No Known Allergies    Intolerances        SOCIAL HISTORY: Denies tobacco, etoh abuse or illicit drug use    FAMILY HISTORY:  Family history of leukemia (Father)  Family history of cancer (Mother)      Vital Signs Last 24 Hrs  T(C): 36.1 (21 Oct 2017 08:15), Max: 37.1 (20 Oct 2017 14:12)  T(F): 97 (21 Oct 2017 08:15), Max: 98.8 (20 Oct 2017 14:12)  HR: 102 (21 Oct 2017 09:00) (81 - 123)  BP: 130/90 (21 Oct 2017 09:00) (101/48 - 132/110)  BP(mean): 98 (21 Oct 2017 09:00) (80 - 115)  RR: 17 (21 Oct 2017 09:00) (7 - 19)  SpO2: 96% (21 Oct 2017 09:00) (96% - 100%)    REVIEW OF SYSTEMS:    CONSTITUTIONAL:  As per HPI.  HEENT:  Eyes:  No diplopia or blurred vision. ENT:  No earache, sore throat or runny nose.  CARDIOVASCULAR:  No pressure, squeezing, tightness, heaviness or aching about the chest, neck, axilla or epigastrium.  RESPIRATORY:  see above.  GASTROINTESTINAL:  No nausea, vomiting or diarrhea.  GENITOURINARY:  No dysuria, frequency or urgency.  MUSCULOSKELETAL:  As per HPI.  SKIN:  No change in skin, hair or nails.  NEUROLOGIC:  No paresthesias, fasciculations, seizures or weakness.  PSYCHIATRIC:  No disorder of thought or mood.  ENDOCRINE:  No heat or cold intolerance, polyuria or polydipsia.  HEMATOLOGICAL:  No easy bruising or bleedings:  .     PHYSICAL EXAMINATION:    GENERAL APPEARANCE:  Pt. is not currently dyspneic, in no distress. Pt. is alert, oriented, and pleasant.  HEENT:  Pupils are normal and react normally. No icterus. Mucous membranes well colored.  NECK:  Supple. No lymphadenopathy. Jugular venous pressure not elevated. Carotids equal.   HEART:   HR 96.  CHEST:  Chest is clear to P and A.  Mild decreased aud BS's bilat.  Normal respiratory effort.  ABDOMEN:  Soft and nontender.   EXTREMITIES:  There is no cyanosis, clubbing or edema.   SKIN:  No rash or significant lesions are noted.  Neuro: Alert, awake, and O x 3.      LABS:                        15.8   26.5  )-----------( 390      ( 21 Oct 2017 05:30 )             47.8     10-21    136  |  104  |  18  ----------------------------<  121<H>  4.6   |  23  |  1.44<H>    Ca    9.5      21 Oct 2017 05:30        EXAM:  CHEST SINGLE VIEW FRONTAL                            PROCEDURE DATE:  10/23/2017          INTERPRETATION:  Clinical information: Chest tube removal    AP view of the chest    COMPARISON: October 21, 2017    FINDINGS: Left chest tube with tipat the left lung apex. No   pneumothorax. Airspace disease at the left lung base, improved. No   definite pleural effusion. Clear right lung. Unchanged lower left chest   wall subcutaneous emphysema.    IMPRESSION:    No pneumothorax.
HPI: 46 y.o. male w/ h.o. 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 anxiety, asthmatic bronchitis, h.o. syncope;  seen in early Oct w/ SOB, CT scan (9/17) showed stable R pulm nodules, new moderate L effusion.  Underwent robotic assisted pleurodeisis and pleural bx on 10/20.  Says SOB is improved.  Denies: cough ,wheeze, sputum or hemoptysis.  No chills.  Is afebrile.     10/22:  afebrile. no distress. no SOB.  slight cough. slight dark sputum.    10/24: afebrile, no distress. getting into chair.    10/25: afebrile, no distress. no SOB.  mild cough.      PAST MEDICAL & SURGICAL HISTORY:  Anxiety and depression  Erectile dysfunction, unspecified erectile dysfunction type  Dermatitis: due to medication  History of kidney stones  Bronchitis: Asthmatic  History of pericarditis  Pleural effusion  Syncope, unspecified syncope type  Lung cancer, upper lobe: right. metastatic non small cell Lung Carcinoma.  H/O bilateral inguinal hernia repair: as a child  Acute pericarditis, unspecified type: pericardial window 11/2015, 12/2015      MEDICATIONS  (STANDING):  heparin  Injectable 5000 Unit(s) SubCutaneous every 8 hours  HYDROmorphone PCA (1 mG/mL) 30 milliLiter(s) PCA Continuous PCA Continuous  sodium chloride 0.9%. 1000 milliLiter(s) (40 mL/Hr) IV Continuous <Continuous>    MEDICATIONS  (PRN):  HYDROmorphone PCA (1 mG/mL) Rescue Clinician Bolus 0.5 milliGRAM(s) IV Push every 15 minutes PRN for Pain Scale GREATER THAN 6  naloxone Injectable 0.1 milliGRAM(s) IV Push every 3 minutes PRN For ANY of the following changes in patient status:  A. RR LESS THAN 10 breaths per minute, B. Oxygen saturation LESS THAN 90%, C. Sedation score of 6  ondansetron Injectable 4 milliGRAM(s) IV Push every 6 hours PRN Nausea  ondansetron Injectable 4 milliGRAM(s) IV Push every 6 hours PRN Nausea  prochlorperazine   Injectable 10 milliGRAM(s) IntraMuscular every 6 hours PRN nausea      Allergies    No Known Allergies    Intolerances        SOCIAL HISTORY: Denies tobacco, etoh abuse or illicit drug use    FAMILY HISTORY:  Family history of leukemia (Father)  Family history of cancer (Mother)      Vital Signs Last 24 Hrs  T(C): 36.1 (21 Oct 2017 08:15), Max: 37.1 (20 Oct 2017 14:12)  T(F): 97 (21 Oct 2017 08:15), Max: 98.8 (20 Oct 2017 14:12)  HR: 102 (21 Oct 2017 09:00) (81 - 123)  BP: 130/90 (21 Oct 2017 09:00) (101/48 - 132/110)  BP(mean): 98 (21 Oct 2017 09:00) (80 - 115)  RR: 17 (21 Oct 2017 09:00) (7 - 19)  SpO2: 96% (21 Oct 2017 09:00) (96% - 100%)    REVIEW OF SYSTEMS:    CONSTITUTIONAL:  As per HPI.  HEENT:  Eyes:  No diplopia or blurred vision. ENT:  No earache, sore throat or runny nose.  CARDIOVASCULAR:  No pressure, squeezing, tightness, heaviness or aching about the chest, neck, axilla or epigastrium.  RESPIRATORY:  see above.  GASTROINTESTINAL:  No nausea, vomiting or diarrhea.  GENITOURINARY:  No dysuria, frequency or urgency.  MUSCULOSKELETAL:  As per HPI.  SKIN:  No change in skin, hair or nails.  NEUROLOGIC:  No paresthesias, fasciculations, seizures or weakness.  PSYCHIATRIC:  No disorder of thought or mood.  ENDOCRINE:  No heat or cold intolerance, polyuria or polydipsia.  HEMATOLOGICAL:  No easy bruising or bleedings:  .     PHYSICAL EXAMINATION:    GENERAL APPEARANCE:  Pt. is not currently dyspneic, in no distress. Pt. is alert, oriented, and pleasant.  HEENT:  Pupils are normal and react normally. No icterus. Mucous membranes well colored.  NECK:  Supple. No lymphadenopathy. Jugular venous pressure not elevated. Carotids equal.   HEART:   HR 96.  CHEST:  Few rhonchi bilat.  Mild decreased aud BS's bilat.  Normal respiratory effort.  ABDOMEN:  Soft and nontender.   EXTREMITIES:  There is no cyanosis, clubbing or edema.   SKIN:  No rash or significant lesions are noted.  Neuro: Alert, awake, and O x 3.      LABS:                        15.8   26.5  )-----------( 390      ( 21 Oct 2017 05:30 )             47.8     10-21    136  |  104  |  18  ----------------------------<  121<H>  4.6   |  23  |  1.44<H>    Ca    9.5      21 Oct 2017 05:30        EXAM:  CHEST SINGLE VIEW FRONTAL                            PROCEDURE DATE:  10/23/2017          INTERPRETATION:  Clinical information: Chest tube removal    AP view of the chest    COMPARISON: October 21, 2017    FINDINGS: Left chest tube with tipat the left lung apex. No   pneumothorax. Airspace disease at the left lung base, improved. No   definite pleural effusion. Clear right lung. Unchanged lower left chest   wall subcutaneous emphysema.    IMPRESSION:    No pneumothorax.
Pt with uneventful night, no SOB, 1 CT remains in place.      Vital Signs Last 24 Hrs  T(C): 36.7 (23 Oct 2017 21:14), Max: 36.7 (23 Oct 2017 21:14)  T(F): 98 (23 Oct 2017 21:14), Max: 98 (23 Oct 2017 21:14)  HR: 99 (24 Oct 2017 07:00) (89 - 108)  BP: 106/62 (24 Oct 2017 07:00) (98/64 - 120/58)  BP(mean): 73 (24 Oct 2017 07:00) (70 - 74)  RR: 20 (24 Oct 2017 07:00) (15 - 21)  SpO2: 98% (24 Oct 2017 04:12) (94% - 99%)    Daily     Daily     I&O's Detail    23 Oct 2017 07:01  -  24 Oct 2017 07:00  --------------------------------------------------------  IN:    sodium chloride 0.9%.: 280 mL  Total IN: 280 mL    OUT:    Chest Tube: 70 mL    Voided: 700 mL  Total OUT: 770 mL    Total NET: -490 mL          CAPILLARY BLOOD GLUCOSE                                          12.7   9.2   )-----------( 282      ( 24 Oct 2017 04:27 )             37.1       10-24    133<L>  |  100  |  17  ----------------------------<  93  4.0   |  31  |  1.08    Ca    9.2      24 Oct 2017 04:27                        MEDICATIONS  (STANDING):  Gilotrif (afatinib) 40 milliGRAM(s) 40 milliGRAM(s) Oral at bedtime  heparin  Injectable 5000 Unit(s) SubCutaneous every 8 hours  HYDROmorphone PCA (1 mG/mL) 30 milliLiter(s) PCA Continuous PCA Continuous  sodium chloride 0.9%. 1000 milliLiter(s) (40 mL/Hr) IV Continuous <Continuous>    MEDICATIONS  (PRN):  ALPRAZolam 0.25 milliGRAM(s) Oral every 6 hours PRN anxiety  HYDROmorphone PCA (1 mG/mL) Rescue Clinician Bolus 0.5 milliGRAM(s) IV Push every 15 minutes PRN for Pain Scale GREATER THAN 6  HYDROmorphone PCA (1 mG/mL) Rescue Clinician Bolus 0.5 milliGRAM(s) IV Push every 15 minutes PRN for Pain Scale GREATER THAN 6  naloxone Injectable 0.1 milliGRAM(s) IV Push every 3 minutes PRN For ANY of the following changes in patient status:  A. RR LESS THAN 10 breaths per minute, B. Oxygen saturation LESS THAN 90%, C. Sedation score of 6  ondansetron Injectable 4 milliGRAM(s) IV Push every 6 hours PRN Nausea  prochlorperazine   Injectable 10 milliGRAM(s) IntraMuscular every 6 hours PRN nausea
Pt slept last night, c/o Lt chest wall pain from CT's.  No SOB.    Vital Signs Last 24 Hrs  T(C): 37.1 (23 Oct 2017 05:57), Max: 37.1 (23 Oct 2017 05:57)  T(F): 98.8 (23 Oct 2017 05:57), Max: 98.8 (23 Oct 2017 05:57)  HR: 97 (23 Oct 2017 07:00) (92 - 115)  BP: 94/61 (23 Oct 2017 07:00) (92/51 - 109/60)  BP(mean): 68 (23 Oct 2017 07:00) (61 - 82)  RR: 16 (23 Oct 2017 07:00) (12 - 22)  SpO2: 94% (23 Oct 2017 07:00) (92% - 99%)    Daily     Daily     I&O's Detail    22 Oct 2017 07:01  -  23 Oct 2017 07:00  --------------------------------------------------------  IN:    Oral Fluid: 960 mL    sodium chloride 0.9%.: 800 mL  Total IN: 1760 mL    OUT:    Chest Tube: 120 mL    Chest Tube: 30 mL    Voided: 1800 mL  Total OUT: 1950 mL    Total NET: -190 mL          CAPILLARY BLOOD GLUCOSE                                          14.1   20.6  )-----------( 306      ( 22 Oct 2017 04:40 )             42.0       10-22    133<L>  |  99  |  14  ----------------------------<  102<H>  4.0   |  27  |  1.00    Ca    8.9      22 Oct 2017 04:40                        MEDICATIONS  (STANDING):  Gilotrif (afatinib) 40 milliGRAM(s) 40 milliGRAM(s) Oral at bedtime  heparin  Injectable 5000 Unit(s) SubCutaneous every 8 hours  HYDROmorphone PCA (1 mG/mL) 30 milliLiter(s) PCA Continuous PCA Continuous  sodium chloride 0.9%. 1000 milliLiter(s) (40 mL/Hr) IV Continuous <Continuous>    MEDICATIONS  (PRN):  ALPRAZolam 0.25 milliGRAM(s) Oral every 6 hours PRN anxiety  HYDROmorphone PCA (1 mG/mL) Rescue Clinician Bolus 0.5 milliGRAM(s) IV Push every 15 minutes PRN for Pain Scale GREATER THAN 6  ketorolac   Injectable 30 milliGRAM(s) IV Push every 6 hours PRN Severe Pain (7 - 10)  naloxone Injectable 0.1 milliGRAM(s) IV Push every 3 minutes PRN For ANY of the following changes in patient status:  A. RR LESS THAN 10 breaths per minute, B. Oxygen saturation LESS THAN 90%, C. Sedation score of 6  ondansetron Injectable 4 milliGRAM(s) IV Push every 6 hours PRN Nausea  ondansetron Injectable 4 milliGRAM(s) IV Push every 6 hours PRN Nausea  prochlorperazine   Injectable 10 milliGRAM(s) IntraMuscular every 6 hours PRN nausea

## 2017-10-25 NOTE — PROGRESS NOTE ADULT - NEGATIVE GENERAL SYMPTOMS
no sweating/no anorexia/no chills/no fever/no weight loss
no sweating/no anorexia/no chills/no fever/no weight loss
no sweating/no chills/no fever/no weight loss/no anorexia
no sweating/no anorexia/no chills/no fever/no weight loss

## 2017-10-25 NOTE — DISCHARGE NOTE ADULT - HOSPITAL COURSE
46 y.o. male w/ h.o. 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 anxiety, asthmatic bronchitis, h.o. syncope;  seen in early Oct w/ SOB, CT scan (9/17) showed stable R pulm nodules, new moderate L effusion.  Underwent robotic assisted pleurodeisis and pleural bx on 10/20. Postop course uneventful, discharged with stable subq air on chest xray.

## 2017-10-25 NOTE — PROGRESS NOTE ADULT - RS GEN PE MLT RESP DETAILS PC
good air movement/diminished breath sounds, L
diminished breath sounds, L
diminished breath sounds, L
diminished breath sounds, L/good air movement

## 2017-10-25 NOTE — PROGRESS NOTE ADULT - ASSESSMENT
46 y/o male with know lung CA, s/p left pleurodesis, POD#5  stable subq air on chest Xray  continue current meds  ambulate  d/c today  d/w Dr Salvador

## 2017-10-25 NOTE — PROGRESS NOTE ADULT - GASTROINTESTINAL DETAILS
bowel sounds normal/no distention/nontender/soft
soft/bowel sounds normal/no distention/nontender
no distention/bowel sounds normal/nontender/soft
nontender/no distention/soft/bowel sounds normal

## 2017-10-25 NOTE — DISCHARGE NOTE ADULT - MEDICATION SUMMARY - MEDICATIONS TO TAKE
I will START or STAY ON the medications listed below when I get home from the hospital:    Cialis 20 mg oral tablet  -- 1 tab(s) by mouth once a day, As Needed  -- Indication: For erectile dysfunction    oxyCODONE-acetaminophen 5 mg-325 mg oral tablet  -- 1 tab(s) by mouth every 4 -6 hours as needed for pain MDD:8 tabs  -- Indication: For Pain control    Advil  --  by mouth , As Needed  -- Indication: For Pain control    Valium 2 mg oral tablet  -- 1 tab(s) by mouth 3 times a day, As Needed  -- Indication: For Anxiety and depression    Ativan 2 mg oral tablet  --  by mouth , As Needed  -- Indication: For Anxiety and depression    doxycycline hyclate 100 mg oral capsule  --  by mouth , As Needed  -- Indication: For Antibiotic    Gilotrif 40 mg oral tablet  -- 1 tab(s) by mouth once a day  -- Indication: For Lung cancer, upper lobe

## 2017-10-25 NOTE — PROGRESS NOTE ADULT - ASSESSMENT
Stage 4 nonsmall cell lung cancer, On gilotriff.    s/p L robotic assisted pleurodeisis and pleural bx on 10/20 (results pending).   Incentive inspirometry.    h.o. pericardial window 2 years ago.    h.o. syncope. s/p Lynx implanted. Stage 4 nonsmall cell lung cancer, On gilotriff.    s/p L robotic assisted pleurodeisis and pleural bx on 10/20 (results pending).   Incentive inspirometry.  As per thoracic surgery.  Following appointment w/ Dr Kirkpatrick, pulmonary, 3 weeks after d/c home.  h.o. pericardial window 2 years ago.    h.o. syncope. s/p Lynx implanted.

## 2017-10-26 RX ORDER — DIAZEPAM 2 MG/1
2 TABLET ORAL
Qty: 30 | Refills: 0 | Status: DISCONTINUED | COMMUNITY
Start: 2017-09-29 | End: 2017-10-26

## 2017-10-29 ENCOUNTER — EMERGENCY (EMERGENCY)
Facility: HOSPITAL | Age: 47
LOS: 0 days | Discharge: HOME | End: 2017-10-29
Attending: EMERGENCY MEDICINE | Admitting: EMERGENCY MEDICINE
Payer: COMMERCIAL

## 2017-10-29 VITALS
HEART RATE: 95 BPM | SYSTOLIC BLOOD PRESSURE: 120 MMHG | OXYGEN SATURATION: 98 % | TEMPERATURE: 99 F | DIASTOLIC BLOOD PRESSURE: 66 MMHG | RESPIRATION RATE: 16 BRPM

## 2017-10-29 VITALS — HEIGHT: 67 IN | WEIGHT: 169.98 LBS

## 2017-10-29 DIAGNOSIS — I30.9 ACUTE PERICARDITIS, UNSPECIFIED: Chronic | ICD-10-CM

## 2017-10-29 DIAGNOSIS — R07.81 PLEURODYNIA: ICD-10-CM

## 2017-10-29 DIAGNOSIS — C34.90 MALIGNANT NEOPLASM OF UNSPECIFIED PART OF UNSPECIFIED BRONCHUS OR LUNG: ICD-10-CM

## 2017-10-29 DIAGNOSIS — Z92.21 PERSONAL HISTORY OF ANTINEOPLASTIC CHEMOTHERAPY: ICD-10-CM
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
## 2017-10-29 DIAGNOSIS — T81.9XXA UNSPECIFIED COMPLICATION OF PROCEDURE, INITIAL ENCOUNTER: ICD-10-CM
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
## 2017-10-29 DIAGNOSIS — Z98.890 OTHER SPECIFIED POSTPROCEDURAL STATES: Chronic | ICD-10-CM

## 2017-10-29 LAB
ALBUMIN SERPL ELPH-MCNC: 2.7 G/DL — LOW (ref 3.3–5)
ALP SERPL-CCNC: 154 U/L — HIGH (ref 40–120)
ALT FLD-CCNC: 86 U/L — HIGH (ref 12–78)
ANION GAP SERPL CALC-SCNC: 5 MMOL/L — SIGNIFICANT CHANGE UP (ref 5–17)
APTT BLD: 31.7 SEC — SIGNIFICANT CHANGE UP (ref 27.5–37.4)
AST SERPL-CCNC: 32 U/L — SIGNIFICANT CHANGE UP (ref 15–37)
BASOPHILS # BLD AUTO: 0.1 K/UL — SIGNIFICANT CHANGE UP (ref 0–0.2)
BASOPHILS NFR BLD AUTO: 1.1 % — SIGNIFICANT CHANGE UP (ref 0–2)
BILIRUB SERPL-MCNC: 0.3 MG/DL — SIGNIFICANT CHANGE UP (ref 0.2–1.2)
BUN SERPL-MCNC: 16 MG/DL — SIGNIFICANT CHANGE UP (ref 7–23)
CALCIUM SERPL-MCNC: 8.9 MG/DL — SIGNIFICANT CHANGE UP (ref 8.5–10.1)
CHLORIDE SERPL-SCNC: 107 MMOL/L — SIGNIFICANT CHANGE UP (ref 96–108)
CO2 SERPL-SCNC: 26 MMOL/L — SIGNIFICANT CHANGE UP (ref 22–31)
CREAT SERPL-MCNC: 1.29 MG/DL — SIGNIFICANT CHANGE UP (ref 0.5–1.3)
EOSINOPHIL # BLD AUTO: 0.3 K/UL — SIGNIFICANT CHANGE UP (ref 0–0.5)
EOSINOPHIL NFR BLD AUTO: 2.6 % — SIGNIFICANT CHANGE UP (ref 0–6)
GLUCOSE SERPL-MCNC: 80 MG/DL — SIGNIFICANT CHANGE UP (ref 70–99)
HCT VFR BLD CALC: 38.4 % — LOW (ref 39–50)
HGB BLD-MCNC: 12.5 G/DL — LOW (ref 13–17)
INR BLD: 0.96 RATIO — SIGNIFICANT CHANGE UP (ref 0.88–1.16)
LYMPHOCYTES # BLD AUTO: 19.1 % — SIGNIFICANT CHANGE UP (ref 13–44)
LYMPHOCYTES # BLD AUTO: 2.1 K/UL — SIGNIFICANT CHANGE UP (ref 1–3.3)
MCHC RBC-ENTMCNC: 29.4 PG — SIGNIFICANT CHANGE UP (ref 27–34)
MCHC RBC-ENTMCNC: 32.5 GM/DL — SIGNIFICANT CHANGE UP (ref 32–36)
MCV RBC AUTO: 90.6 FL — SIGNIFICANT CHANGE UP (ref 80–100)
MONOCYTES # BLD AUTO: 1.1 K/UL — HIGH (ref 0–0.9)
MONOCYTES NFR BLD AUTO: 9.9 % — SIGNIFICANT CHANGE UP (ref 2–14)
NEUTROPHILS # BLD AUTO: 7.6 K/UL — HIGH (ref 1.8–7.4)
NEUTROPHILS NFR BLD AUTO: 67.3 % — SIGNIFICANT CHANGE UP (ref 43–77)
PLATELET # BLD AUTO: 405 K/UL — HIGH (ref 150–400)
POTASSIUM SERPL-MCNC: 4.1 MMOL/L — SIGNIFICANT CHANGE UP (ref 3.5–5.3)
POTASSIUM SERPL-SCNC: 4.1 MMOL/L — SIGNIFICANT CHANGE UP (ref 3.5–5.3)
PROT SERPL-MCNC: 6.3 GM/DL — SIGNIFICANT CHANGE UP (ref 6–8.3)
PROTHROM AB SERPL-ACNC: 10.4 SEC — SIGNIFICANT CHANGE UP (ref 9.8–12.7)
RBC # BLD: 4.24 M/UL — SIGNIFICANT CHANGE UP (ref 4.2–5.8)
RBC # FLD: 12.4 % — SIGNIFICANT CHANGE UP (ref 10.3–14.5)
SODIUM SERPL-SCNC: 138 MMOL/L — SIGNIFICANT CHANGE UP (ref 135–145)
WBC # BLD: 11.2 K/UL — HIGH (ref 3.8–10.5)
WBC # FLD AUTO: 11.2 K/UL — HIGH (ref 3.8–10.5)

## 2017-10-29 PROCEDURE — 99284 EMERGENCY DEPT VISIT MOD MDM: CPT

## 2017-10-29 PROCEDURE — 71020: CPT | Mod: 26

## 2017-10-29 RX ORDER — HYDROMORPHONE HYDROCHLORIDE 2 MG/ML
1 INJECTION INTRAMUSCULAR; INTRAVENOUS; SUBCUTANEOUS ONCE
Qty: 0 | Refills: 0 | Status: DISCONTINUED | OUTPATIENT
Start: 2017-10-29 | End: 2017-10-29

## 2017-10-29 RX ADMIN — HYDROMORPHONE HYDROCHLORIDE 1 MILLIGRAM(S): 2 INJECTION INTRAMUSCULAR; INTRAVENOUS; SUBCUTANEOUS at 09:26

## 2017-10-29 RX ADMIN — HYDROMORPHONE HYDROCHLORIDE 1 MILLIGRAM(S): 2 INJECTION INTRAMUSCULAR; INTRAVENOUS; SUBCUTANEOUS at 09:18

## 2017-10-29 NOTE — ED ADULT NURSE NOTE - OBJECTIVE STATEMENT
Pt c/o pain from lt side sero sanguinous drainage s/p pleurodisis 9 days ago.  Pt states he coughed last night opening a stitch, area is now draining.

## 2017-10-29 NOTE — ED ADULT TRIAGE NOTE - CHIEF COMPLAINT QUOTE
patient had pleuradesis on 10/20/2017, now with johnny from CT site told by dr richardson to come to ER for cxray, no fever

## 2017-10-29 NOTE — ED PROVIDER NOTE - OBJECTIVE STATEMENT
47 yo pt presents for drainage from left chest wall.  Pt here with fianna marie.  On oct 20th pt had thoracentesis with Dr. Varela for pleural effusion.  For the last several days pt has been having more and more drainage from one of the incision sites.  Call Dr. Varela today and sent to ED. 47 yo pt presents for drainage from left chest wall.   PMH of h.o. 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 anxiety, and asthmatic bronchitis.  Pt here with fiance.  On oct 20th pt had thoracentesis with Dr. Varela for pleural effusion.  For the last several days pt has been having more and more drainage from one of the incision sites.  Call Dr. Varela today and sent to ED.  PMD = Dr. Zeng. 45 yo pt presents for drainage from left chest wall.   PMH of h.o. 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 anxiety, and asthmatic bronchitis.  Pt here with steff.  On oct 20th pt had Pleurodesis using talc with Dr. Salvador for pleural effusion/malignancy.  For the last several days pt has been having more and more drainage from one of the incision sites.  Call Dr. Varela today and sent to ED.  PMD = Dr. Zeng.

## 2017-10-29 NOTE — CONSULT NOTE ADULT - SUBJECTIVE AND OBJECTIVE BOX
s/p left vats pleurodesis 10/20  metastatic lung CA  new serous drainage left CT site after coughing episode  no fever chills    VSS afebrile  WBC 11  CXR small mod left effusion unchanged    Serous drainge from CT incision    No evidence of infection   probable SQ fluid drain    not draining now    Dry dressing prn  FU Dr. Salvador    D/W Dr Sharma and Pt

## 2017-10-29 NOTE — ED PROVIDER NOTE - SKIN, MLM
Rash over left chest wall where tap is.  Left chest wall with 2 incision anterior dry and intact, posterior 2cm incision + yellow drainage.

## 2017-10-30 DIAGNOSIS — J91.8 PLEURAL EFFUSION IN OTHER CONDITIONS CLASSIFIED ELSEWHERE: ICD-10-CM

## 2017-10-30 DIAGNOSIS — J91.0 MALIGNANT PLEURAL EFFUSION: ICD-10-CM

## 2017-10-30 DIAGNOSIS — F32.9 MAJOR DEPRESSIVE DISORDER, SINGLE EPISODE, UNSPECIFIED: ICD-10-CM

## 2017-10-30 DIAGNOSIS — Z87.891 PERSONAL HISTORY OF NICOTINE DEPENDENCE: ICD-10-CM

## 2017-10-30 DIAGNOSIS — F41.9 ANXIETY DISORDER, UNSPECIFIED: ICD-10-CM

## 2017-10-30 DIAGNOSIS — C34.30 MALIGNANT NEOPLASM OF LOWER LOBE, UNSPECIFIED BRONCHUS OR LUNG: ICD-10-CM

## 2017-10-30 RX ORDER — AFATINIB 40 MG/1
40 TABLET, FILM COATED ORAL
Refills: 0 | Status: DISCONTINUED | COMMUNITY
End: 2017-10-30

## 2017-11-01 ENCOUNTER — APPOINTMENT (OUTPATIENT)
Dept: HEMATOLOGY ONCOLOGY | Facility: CLINIC | Age: 47
End: 2017-11-01
Payer: COMMERCIAL

## 2017-11-01 ENCOUNTER — LABORATORY RESULT (OUTPATIENT)
Age: 47
End: 2017-11-01

## 2017-11-01 VITALS
HEART RATE: 90 BPM | DIASTOLIC BLOOD PRESSURE: 74 MMHG | HEIGHT: 67 IN | TEMPERATURE: 98.3 F | WEIGHT: 189 LBS | BODY MASS INDEX: 29.66 KG/M2 | SYSTOLIC BLOOD PRESSURE: 121 MMHG

## 2017-11-01 PROBLEM — L30.1 DYSHIDROTIC DERMATITIS: Status: RESOLVED | Noted: 2017-06-28 | Resolved: 2017-11-01

## 2017-11-01 PROBLEM — J45.909 ACUTE ASTHMATIC BRONCHITIS: Status: RESOLVED | Noted: 2017-10-04 | Resolved: 2017-11-01

## 2017-11-01 PROBLEM — R55 VASOVAGAL SYNCOPE: Status: RESOLVED | Noted: 2017-08-14 | Resolved: 2017-11-01

## 2017-11-01 PROBLEM — I32: Status: RESOLVED | Noted: 2017-10-04 | Resolved: 2017-11-01

## 2017-11-01 PROBLEM — L85.3 XEROSIS OF SKIN: Status: RESOLVED | Noted: 2017-04-25 | Resolved: 2017-11-01

## 2017-11-01 PROBLEM — Z87.2 HISTORY OF SEBORRHEIC DERMATITIS: Status: RESOLVED | Noted: 2017-03-22 | Resolved: 2017-11-01

## 2017-11-01 PROCEDURE — 85025 COMPLETE CBC W/AUTO DIFF WBC: CPT

## 2017-11-01 PROCEDURE — 99215 OFFICE O/P EST HI 40 MIN: CPT | Mod: 25

## 2017-11-01 PROCEDURE — 36415 COLL VENOUS BLD VENIPUNCTURE: CPT

## 2017-11-02 LAB
ALBUMIN SERPL ELPH-MCNC: 3.6 G/DL
ALP BLD-CCNC: 124 U/L
ALT SERPL-CCNC: 46 U/L
ANION GAP SERPL CALC-SCNC: 14 MMOL/L
APTT BLD: 33.2 SEC
AST SERPL-CCNC: 23 U/L
BILIRUB SERPL-MCNC: 0.2 MG/DL
BUN SERPL-MCNC: 18 MG/DL
CALCIUM SERPL-MCNC: 9.9 MG/DL
CHLORIDE SERPL-SCNC: 103 MMOL/L
CO2 SERPL-SCNC: 22 MMOL/L
CREAT SERPL-MCNC: 1.36 MG/DL
GLUCOSE SERPL-MCNC: 107 MG/DL
HCT VFR BLD CALC: 39.9 %
HGB BLD-MCNC: 13.4 G/DL
INR PPP: 0.91 RATIO
MCHC RBC-ENTMCNC: 30.3 PG
MCHC RBC-ENTMCNC: 33.6 GM/DL
MCV RBC AUTO: 90.2 FL
PLATELET # BLD AUTO: 501 K/UL
POTASSIUM SERPL-SCNC: 4.6 MMOL/L
PROT SERPL-MCNC: 6.1 G/DL
PT BLD: 10.3 SEC
RBC # BLD: 4.42 M/UL
RBC # FLD: 12.1 %
SODIUM SERPL-SCNC: 139 MMOL/L
WBC # FLD AUTO: 12.4 K/UL

## 2017-11-06 ENCOUNTER — APPOINTMENT (OUTPATIENT)
Dept: HEMATOLOGY ONCOLOGY | Facility: CLINIC | Age: 47
End: 2017-11-06

## 2017-11-06 DIAGNOSIS — L85.3 XEROSIS CUTIS: ICD-10-CM

## 2017-11-06 DIAGNOSIS — Z87.2 PERSONAL HISTORY OF DISEASES OF THE SKIN AND SUBCUTANEOUS TISSUE: ICD-10-CM

## 2017-11-06 DIAGNOSIS — L30.1 DYSHIDROSIS [POMPHOLYX]: ICD-10-CM

## 2017-11-06 DIAGNOSIS — J45.909 UNSPECIFIED ASTHMA, UNCOMPLICATED: ICD-10-CM

## 2017-11-06 DIAGNOSIS — R55 SYNCOPE AND COLLAPSE: ICD-10-CM

## 2017-11-06 DIAGNOSIS — I32: ICD-10-CM

## 2017-11-07 ENCOUNTER — OTHER (OUTPATIENT)
Age: 47
End: 2017-11-07

## 2017-11-07 ENCOUNTER — RX RENEWAL (OUTPATIENT)
Age: 47
End: 2017-11-07

## 2017-11-07 DIAGNOSIS — C34.31 MALIGNANT NEOPLASM OF LOWER LOBE, RIGHT BRONCHUS OR LUNG: ICD-10-CM

## 2017-11-07 DIAGNOSIS — J90 PLEURAL EFFUSION, NOT ELSEWHERE CLASSIFIED: ICD-10-CM

## 2017-11-07 RX ORDER — MOMETASONE 50 UG/1
50 SPRAY, METERED NASAL
Qty: 17 | Refills: 0 | Status: DISCONTINUED | COMMUNITY
Start: 2017-05-24 | End: 2017-11-07

## 2017-11-07 RX ORDER — LORAZEPAM 1 MG/1
1 TABLET ORAL
Refills: 0 | Status: DISCONTINUED | COMMUNITY
Start: 2017-11-01 | End: 2017-11-07

## 2017-11-08 ENCOUNTER — FORM ENCOUNTER (OUTPATIENT)
Age: 47
End: 2017-11-08

## 2017-11-08 ENCOUNTER — APPOINTMENT (OUTPATIENT)
Dept: HEMATOLOGY ONCOLOGY | Facility: CLINIC | Age: 47
End: 2017-11-08

## 2017-11-09 ENCOUNTER — APPOINTMENT (OUTPATIENT)
Dept: THORACIC SURGERY | Facility: CLINIC | Age: 47
End: 2017-11-09
Payer: COMMERCIAL

## 2017-11-09 VITALS
WEIGHT: 190 LBS | OXYGEN SATURATION: 98 % | SYSTOLIC BLOOD PRESSURE: 123 MMHG | RESPIRATION RATE: 16 BRPM | HEART RATE: 98 BPM | BODY MASS INDEX: 29.82 KG/M2 | DIASTOLIC BLOOD PRESSURE: 81 MMHG | HEIGHT: 67 IN

## 2017-11-09 PROCEDURE — 99024 POSTOP FOLLOW-UP VISIT: CPT

## 2017-11-11 ENCOUNTER — APPOINTMENT (OUTPATIENT)
Dept: MRI IMAGING | Facility: CLINIC | Age: 47
End: 2017-11-11

## 2017-11-11 ENCOUNTER — APPOINTMENT (OUTPATIENT)
Dept: NUCLEAR MEDICINE | Facility: CLINIC | Age: 47
End: 2017-11-11

## 2017-11-11 ENCOUNTER — OUTPATIENT (OUTPATIENT)
Dept: OUTPATIENT SERVICES | Facility: HOSPITAL | Age: 47
LOS: 1 days | End: 2017-11-11
Payer: COMMERCIAL

## 2017-11-11 DIAGNOSIS — C34.81 MALIGNANT NEOPLASM OF OVERLAPPING SITES OF RIGHT BRONCHUS AND LUNG: ICD-10-CM

## 2017-11-11 DIAGNOSIS — I30.9 ACUTE PERICARDITIS, UNSPECIFIED: Chronic | ICD-10-CM

## 2017-11-11 DIAGNOSIS — Z98.890 OTHER SPECIFIED POSTPROCEDURAL STATES: Chronic | ICD-10-CM

## 2017-11-11 PROCEDURE — 70553 MRI BRAIN STEM W/O & W/DYE: CPT | Mod: 26

## 2017-11-13 PROCEDURE — A9585: CPT

## 2017-11-13 PROCEDURE — A9552: CPT

## 2017-11-13 PROCEDURE — 70553 MRI BRAIN STEM W/O & W/DYE: CPT

## 2017-11-13 PROCEDURE — 78815 PET IMAGE W/CT SKULL-THIGH: CPT

## 2017-11-13 PROCEDURE — 78815 PET IMAGE W/CT SKULL-THIGH: CPT | Mod: 26,PS

## 2017-11-14 ENCOUNTER — APPOINTMENT (OUTPATIENT)
Dept: HEMATOLOGY ONCOLOGY | Facility: CLINIC | Age: 47
End: 2017-11-14
Payer: COMMERCIAL

## 2017-11-14 VITALS
TEMPERATURE: 98.2 F | BODY MASS INDEX: 29.35 KG/M2 | DIASTOLIC BLOOD PRESSURE: 80 MMHG | HEIGHT: 67 IN | HEART RATE: 101 BPM | WEIGHT: 187 LBS | SYSTOLIC BLOOD PRESSURE: 118 MMHG

## 2017-11-14 PROCEDURE — 99215 OFFICE O/P EST HI 40 MIN: CPT

## 2017-11-14 RX ORDER — OMEPRAZOLE 40 MG/1
40 CAPSULE, DELAYED RELEASE ORAL DAILY
Qty: 30 | Refills: 6 | Status: ACTIVE | COMMUNITY
Start: 2017-11-14 | End: 1900-01-01

## 2017-11-15 ENCOUNTER — RESULT REVIEW (OUTPATIENT)
Age: 47
End: 2017-11-15

## 2017-11-15 ENCOUNTER — OUTPATIENT (OUTPATIENT)
Dept: OUTPATIENT SERVICES | Facility: HOSPITAL | Age: 47
LOS: 1 days | Discharge: HOME | End: 2017-11-15
Payer: COMMERCIAL

## 2017-11-15 VITALS
WEIGHT: 190.04 LBS | RESPIRATION RATE: 14 BRPM | HEART RATE: 94 BPM | HEIGHT: 67 IN | TEMPERATURE: 98 F | DIASTOLIC BLOOD PRESSURE: 90 MMHG | OXYGEN SATURATION: 95 % | SYSTOLIC BLOOD PRESSURE: 129 MMHG

## 2017-11-15 VITALS
SYSTOLIC BLOOD PRESSURE: 139 MMHG | OXYGEN SATURATION: 100 % | HEART RATE: 90 BPM | TEMPERATURE: 98 F | RESPIRATION RATE: 16 BRPM | DIASTOLIC BLOOD PRESSURE: 94 MMHG

## 2017-11-15 DIAGNOSIS — Z98.890 OTHER SPECIFIED POSTPROCEDURAL STATES: Chronic | ICD-10-CM

## 2017-11-15 DIAGNOSIS — R07.81 PLEURODYNIA: Chronic | ICD-10-CM

## 2017-11-15 DIAGNOSIS — Z29.8 ENCOUNTER FOR OTHER SPECIFIED PROPHYLACTIC MEASURES: ICD-10-CM

## 2017-11-15 DIAGNOSIS — I30.9 ACUTE PERICARDITIS, UNSPECIFIED: Chronic | ICD-10-CM

## 2017-11-15 LAB
APPEARANCE CSF: CLEAR — SIGNIFICANT CHANGE UP
COLOR CSF: SIGNIFICANT CHANGE UP
GLUCOSE CSF-MCNC: 49 MG/DL — SIGNIFICANT CHANGE UP (ref 40–70)
NEUTROPHILS # CSF: SIGNIFICANT CHANGE UP % (ref 0–6)
NRBC NFR CSF: <1 — SIGNIFICANT CHANGE UP (ref 0–5)
PROT CSF-MCNC: 51 MG/DL — HIGH (ref 15–45)
RBC # CSF: 1 /UL — SIGNIFICANT CHANGE UP (ref 0–0)
TUBE TYPE: SIGNIFICANT CHANGE UP

## 2017-11-15 PROCEDURE — 77003 FLUOROGUIDE FOR SPINE INJECT: CPT | Mod: 26

## 2017-11-15 PROCEDURE — 88108 CYTOPATH CONCENTRATE TECH: CPT | Mod: 26

## 2017-11-15 PROCEDURE — 88305 TISSUE EXAM BY PATHOLOGIST: CPT | Mod: 26

## 2017-11-15 PROCEDURE — 62270 DX LMBR SPI PNXR: CPT

## 2017-11-15 RX ORDER — METHOTREXATE 2.5 MG/1
12 TABLET ORAL ONCE
Qty: 0 | Refills: 0 | Status: DISCONTINUED | OUTPATIENT
Start: 2017-11-15 | End: 2017-11-30

## 2017-11-15 NOTE — ASU PATIENT PROFILE, ADULT - PSH
Acute pericarditis, unspecified type  pericardial window 11/2015, 12/2015  H/O bilateral inguinal hernia repair  as a child Acute pericarditis, unspecified type  pericardial window 11/2015, 12/2015  H/O bilateral inguinal hernia repair  as a child  Pleuritic chest pain  Pleurodesis left chest 10/20/2017

## 2017-11-15 NOTE — ASU DISCHARGE PLAN (ADULT/PEDIATRIC). - NURSING INSTRUCTIONS
Begin with liquids and light food ( tea, toast, Jello, soups). Advance to what you normally eat. Liquids should taken in adequate amounts today.Refer to multi color post op discharge instruction sheet     CALL the DOCTOR:    -Fever greater than  101F  - Signs  of infection such as : increase pain,swelling,redness,or a bad  smell coming from the wound.  -Excessive amount of bleeding.  - Any pain that appears to be getting worse.  - Vomiting  -  If you have  not urinated 8 hours after surgery or have any difficulty urinating.     A responsible adult should be with you for the rest of the day and night for your safety and to help you if you needed.    Review attached FACT SHEET if For any problems or concerns,contact your doctor. [facility] patients should call the [facility]. If you cannot reach the doctor or clinic, call [facility] Emergency Department at [phone] or go to your local Emergency Department.  A responsible adult should be with you for the rest of the day and night for your safety and to help you if you needed. Resume your medications as listed on the attached Medication Record. applicable.

## 2017-11-16 ENCOUNTER — LABORATORY RESULT (OUTPATIENT)
Age: 47
End: 2017-11-16

## 2017-11-16 ENCOUNTER — APPOINTMENT (OUTPATIENT)
Dept: INFUSION THERAPY | Facility: CLINIC | Age: 47
End: 2017-11-16

## 2017-11-16 ENCOUNTER — APPOINTMENT (OUTPATIENT)
Dept: THORACIC SURGERY | Facility: CLINIC | Age: 47
End: 2017-11-16

## 2017-11-16 ENCOUNTER — APPOINTMENT (OUTPATIENT)
Dept: INFUSION THERAPY | Facility: CLINIC | Age: 47
End: 2017-11-16
Payer: COMMERCIAL

## 2017-11-16 ENCOUNTER — APPOINTMENT (OUTPATIENT)
Dept: HEMATOLOGY ONCOLOGY | Facility: CLINIC | Age: 47
End: 2017-11-16

## 2017-11-16 VITALS
SYSTOLIC BLOOD PRESSURE: 114 MMHG | TEMPERATURE: 98.3 F | DIASTOLIC BLOOD PRESSURE: 71 MMHG | HEART RATE: 103 BPM | HEIGHT: 67 IN | WEIGHT: 189 LBS | BODY MASS INDEX: 29.66 KG/M2

## 2017-11-16 PROBLEM — Z00.00 ENCOUNTER FOR PREVENTIVE HEALTH EXAMINATION: Noted: 2017-11-16

## 2017-11-16 LAB
HCT VFR BLD CALC: 44.8 %
HGB BLD-MCNC: 14.1 G/DL
MCHC RBC-ENTMCNC: 28.6 PG
MCHC RBC-ENTMCNC: 31.4 GM/DL
MCV RBC AUTO: 91 FL
NON-GYN CYTOLOGY SPEC: SIGNIFICANT CHANGE UP
PLATELET # BLD AUTO: 486 K/UL
RBC # BLD: 4.92 M/UL
RBC # FLD: 12.1 %
WBC # FLD AUTO: 15.6 K/UL

## 2017-11-16 PROCEDURE — 36415 COLL VENOUS BLD VENIPUNCTURE: CPT

## 2017-11-16 PROCEDURE — 96372 THER/PROPH/DIAG INJ SC/IM: CPT

## 2017-11-16 PROCEDURE — 85025 COMPLETE CBC W/AUTO DIFF WBC: CPT

## 2017-11-16 RX ORDER — MIRTAZAPINE 15 MG/1
15 TABLET, FILM COATED ORAL
Qty: 30 | Refills: 3 | Status: ACTIVE | COMMUNITY
Start: 2017-11-16 | End: 1900-01-01

## 2017-11-16 RX ORDER — ONDANSETRON 8 MG/1
8 TABLET ORAL
Qty: 90 | Refills: 2 | Status: ACTIVE | COMMUNITY
Start: 2017-11-16 | End: 1900-01-01

## 2017-11-17 ENCOUNTER — OUTPATIENT (OUTPATIENT)
Dept: OUTPATIENT SERVICES | Facility: HOSPITAL | Age: 47
LOS: 1 days | Discharge: HOME | End: 2017-11-17
Payer: COMMERCIAL

## 2017-11-17 ENCOUNTER — APPOINTMENT (OUTPATIENT)
Dept: INFUSION THERAPY | Facility: CLINIC | Age: 47
End: 2017-11-17
Payer: COMMERCIAL

## 2017-11-17 VITALS
TEMPERATURE: 97 F | OXYGEN SATURATION: 97 % | DIASTOLIC BLOOD PRESSURE: 69 MMHG | RESPIRATION RATE: 14 BRPM | SYSTOLIC BLOOD PRESSURE: 114 MMHG | WEIGHT: 190.04 LBS | HEART RATE: 99 BPM | HEIGHT: 67 IN

## 2017-11-17 VITALS
OXYGEN SATURATION: 100 % | DIASTOLIC BLOOD PRESSURE: 79 MMHG | HEART RATE: 86 BPM | RESPIRATION RATE: 16 BRPM | SYSTOLIC BLOOD PRESSURE: 114 MMHG | TEMPERATURE: 98 F

## 2017-11-17 DIAGNOSIS — Z98.890 OTHER SPECIFIED POSTPROCEDURAL STATES: Chronic | ICD-10-CM

## 2017-11-17 DIAGNOSIS — R07.81 PLEURODYNIA: Chronic | ICD-10-CM

## 2017-11-17 DIAGNOSIS — I30.9 ACUTE PERICARDITIS, UNSPECIFIED: Chronic | ICD-10-CM

## 2017-11-17 LAB
CEA SERPL-MCNC: 8.5 NG/ML
FERRITIN SERPL-MCNC: 58 NG/ML
FOLATE SERPL-MCNC: 4.7 NG/ML
VIT B12 SERPL-MCNC: 463 PG/ML

## 2017-11-17 PROCEDURE — 36561 INSERT TUNNELED CV CATH: CPT | Mod: RT

## 2017-11-17 PROCEDURE — 93010 ELECTROCARDIOGRAM REPORT: CPT

## 2017-11-17 PROCEDURE — 77001 FLUOROGUIDE FOR VEIN DEVICE: CPT | Mod: 26

## 2017-11-17 PROCEDURE — 76937 US GUIDE VASCULAR ACCESS: CPT | Mod: 26

## 2017-11-17 RX ORDER — AFATINIB 40 MG/1
1 TABLET, FILM COATED ORAL
Qty: 0 | Refills: 0 | COMMUNITY

## 2017-11-17 RX ORDER — SODIUM CHLORIDE 9 MG/ML
1000 INJECTION INTRAMUSCULAR; INTRAVENOUS; SUBCUTANEOUS
Qty: 0 | Refills: 0 | Status: DISCONTINUED | OUTPATIENT
Start: 2017-11-17 | End: 2017-11-17

## 2017-11-17 RX ORDER — FUROSEMIDE 20 MG/1
20 TABLET ORAL DAILY
Qty: 30 | Refills: 0 | Status: DISCONTINUED | COMMUNITY
Start: 2017-11-01 | End: 2017-11-17

## 2017-11-17 RX ORDER — ONDANSETRON 8 MG/1
4 TABLET, FILM COATED ORAL ONCE
Qty: 0 | Refills: 0 | Status: DISCONTINUED | OUTPATIENT
Start: 2017-11-17 | End: 2017-11-17

## 2017-11-17 RX ORDER — FENTANYL CITRATE 50 UG/ML
50 INJECTION INTRAVENOUS
Qty: 0 | Refills: 0 | Status: DISCONTINUED | OUTPATIENT
Start: 2017-11-17 | End: 2017-11-17

## 2017-11-17 RX ORDER — OXYCODONE HYDROCHLORIDE 5 MG/1
5 TABLET ORAL EVERY 4 HOURS
Qty: 0 | Refills: 0 | Status: DISCONTINUED | OUTPATIENT
Start: 2017-11-17 | End: 2017-11-17

## 2017-11-17 RX ORDER — HYDROCORTISONE 25 MG/G
2.5 OINTMENT TOPICAL TWICE DAILY
Qty: 30 | Refills: 2 | Status: DISCONTINUED | COMMUNITY
Start: 2017-03-22 | End: 2017-11-17

## 2017-11-17 RX ADMIN — SODIUM CHLORIDE 75 MILLILITER(S): 9 INJECTION INTRAMUSCULAR; INTRAVENOUS; SUBCUTANEOUS at 13:21

## 2017-11-17 NOTE — BRIEF OPERATIVE NOTE - PROCEDURE
<<-----Click on this checkbox to enter Procedure Imaging guided Port-A-Cath placement  11/17/2017    Active  RAQUEL

## 2017-11-17 NOTE — ASU PATIENT PROFILE, ADULT - TOBACCO USE
Former smoker
Labs- CBC, CMP and EKG  MC with Dr. Mehdi Garnica  Pre op and Hibiclens instructions reviewed and given. Instructed to take his Norvasc and Protonix am of surgery.  Hold ASA from today. Hold Furosemide am of surgery.

## 2017-11-17 NOTE — ASU PATIENT PROFILE, ADULT - PSH
Acute pericarditis, unspecified type  pericardial window 11/2015, 12/2015  H/O bilateral inguinal hernia repair  as a child  Pleuritic chest pain  Pleurodesis left chest 10/20/2017

## 2017-11-18 NOTE — PROGRESS NOTE ADULT - EXTREMITIES
Spoke with patient. We will start her back on omeprazole. She is having acid reflux symptoms. She is having some postnasal drip. We will try her on the medication for a month. She should update us at that point in time. She says she is doing great with the phentermine and has lost about 11 pounds already. She is very pleased.    She is still having trouble ambulating and would like a six-month temporary parking handicap sticker.    Nursing staff, please complete parking pass iand then mail to patient.  
detailed exam

## 2017-11-20 ENCOUNTER — APPOINTMENT (OUTPATIENT)
Dept: INFUSION THERAPY | Facility: CLINIC | Age: 47
End: 2017-11-20
Payer: COMMERCIAL

## 2017-11-20 ENCOUNTER — LABORATORY RESULT (OUTPATIENT)
Age: 47
End: 2017-11-20

## 2017-11-20 VITALS
HEIGHT: 67 IN | DIASTOLIC BLOOD PRESSURE: 94 MMHG | SYSTOLIC BLOOD PRESSURE: 147 MMHG | TEMPERATURE: 97.9 F | BODY MASS INDEX: 29.9 KG/M2 | HEART RATE: 112 BPM | WEIGHT: 190.5 LBS

## 2017-11-20 DIAGNOSIS — C34.90 MALIGNANT NEOPLASM OF UNSPECIFIED PART OF UNSPECIFIED BRONCHUS OR LUNG: ICD-10-CM

## 2017-11-20 DIAGNOSIS — C34.91 MALIGNANT NEOPLASM OF UNSPECIFIED PART OF RIGHT BRONCHUS OR LUNG: ICD-10-CM

## 2017-11-20 DIAGNOSIS — C78.2 SECONDARY MALIGNANT NEOPLASM OF PLEURA: ICD-10-CM

## 2017-11-20 LAB
ALBUMIN SERPL ELPH-MCNC: 4 G/DL
ALP BLD-CCNC: 104 U/L
ALT SERPL-CCNC: 22 U/L
ANION GAP SERPL CALC-SCNC: 17 MMOL/L
AST SERPL-CCNC: 13 U/L
BILIRUB SERPL-MCNC: 0.2 MG/DL
BUN SERPL-MCNC: 15 MG/DL
CALCIUM SERPL-MCNC: 10.9 MG/DL
CHLORIDE SERPL-SCNC: 101 MMOL/L
CO2 SERPL-SCNC: 21 MMOL/L
CREAT SERPL-MCNC: 1.28 MG/DL
GLUCOSE SERPL-MCNC: 115 MG/DL
HCT VFR BLD CALC: 43.1 %
HGB BLD-MCNC: 13.7 G/DL
IRON SATN MFR SERPL: 39 %
IRON SERPL-MCNC: 136 UG/DL
MCHC RBC-ENTMCNC: 28.8 PG
MCHC RBC-ENTMCNC: 31.8 GM/DL
MCV RBC AUTO: 90.4 FL
PLATELET # BLD AUTO: 400 K/UL
POTASSIUM SERPL-SCNC: 4.4 MMOL/L
PROT SERPL-MCNC: 6.9 G/DL
RBC # BLD: 4.77 M/UL
RBC # FLD: 12.4 %
SODIUM SERPL-SCNC: 139 MMOL/L
TIBC SERPL-MCNC: 351 UG/DL
TM INTERPRETATION: SIGNIFICANT CHANGE UP
UIBC SERPL-MCNC: 215 UG/DL
WBC # FLD AUTO: 17.9 K/UL

## 2017-11-20 PROCEDURE — 96417 CHEMO IV INFUS EACH ADDL SEQ: CPT

## 2017-11-20 PROCEDURE — 96413 CHEMO IV INFUSION 1 HR: CPT

## 2017-11-20 PROCEDURE — 36415 COLL VENOUS BLD VENIPUNCTURE: CPT

## 2017-11-20 PROCEDURE — 96367 TX/PROPH/DG ADDL SEQ IV INF: CPT

## 2017-11-20 PROCEDURE — 96372 THER/PROPH/DIAG INJ SC/IM: CPT | Mod: 59

## 2017-11-20 PROCEDURE — 96375 TX/PRO/DX INJ NEW DRUG ADDON: CPT

## 2017-11-20 PROCEDURE — 85025 COMPLETE CBC W/AUTO DIFF WBC: CPT

## 2017-11-21 ENCOUNTER — RESULT REVIEW (OUTPATIENT)
Age: 47
End: 2017-11-21

## 2017-11-21 ENCOUNTER — OUTPATIENT (OUTPATIENT)
Dept: OUTPATIENT SERVICES | Facility: HOSPITAL | Age: 47
LOS: 1 days | Discharge: HOME | End: 2017-11-21
Payer: COMMERCIAL

## 2017-11-21 VITALS
OXYGEN SATURATION: 95 % | HEIGHT: 67 IN | RESPIRATION RATE: 16 BRPM | WEIGHT: 189.38 LBS | DIASTOLIC BLOOD PRESSURE: 95 MMHG | SYSTOLIC BLOOD PRESSURE: 142 MMHG | TEMPERATURE: 98 F | HEART RATE: 110 BPM

## 2017-11-21 VITALS
TEMPERATURE: 98 F | DIASTOLIC BLOOD PRESSURE: 98 MMHG | RESPIRATION RATE: 16 BRPM | HEART RATE: 78 BPM | OXYGEN SATURATION: 97 % | SYSTOLIC BLOOD PRESSURE: 140 MMHG

## 2017-11-21 DIAGNOSIS — Z87.891 PERSONAL HISTORY OF NICOTINE DEPENDENCE: ICD-10-CM

## 2017-11-21 DIAGNOSIS — C34.90 MALIGNANT NEOPLASM OF UNSPECIFIED PART OF UNSPECIFIED BRONCHUS OR LUNG: ICD-10-CM

## 2017-11-21 DIAGNOSIS — Z98.890 OTHER SPECIFIED POSTPROCEDURAL STATES: Chronic | ICD-10-CM

## 2017-11-21 DIAGNOSIS — I30.9 ACUTE PERICARDITIS, UNSPECIFIED: Chronic | ICD-10-CM

## 2017-11-21 DIAGNOSIS — Z87.442 PERSONAL HISTORY OF URINARY CALCULI: ICD-10-CM

## 2017-11-21 DIAGNOSIS — C79.31 SECONDARY MALIGNANT NEOPLASM OF BRAIN: ICD-10-CM

## 2017-11-21 DIAGNOSIS — R07.81 PLEURODYNIA: Chronic | ICD-10-CM

## 2017-11-21 DIAGNOSIS — C34.91 MALIGNANT NEOPLASM OF UNSPECIFIED PART OF RIGHT BRONCHUS OR LUNG: ICD-10-CM

## 2017-11-21 PROCEDURE — 88108 CYTOPATH CONCENTRATE TECH: CPT | Mod: 26

## 2017-11-21 PROCEDURE — 77003 FLUOROGUIDE FOR SPINE INJECT: CPT | Mod: 26

## 2017-11-21 PROCEDURE — 62270 DX LMBR SPI PNXR: CPT

## 2017-11-21 RX ORDER — METHOTREXATE 2.5 MG/1
12 TABLET ORAL ONCE
Qty: 0 | Refills: 0 | Status: DISCONTINUED | OUTPATIENT
Start: 2017-11-21 | End: 2017-12-06

## 2017-11-21 RX ORDER — IBUPROFEN 200 MG
0 TABLET ORAL
Qty: 0 | Refills: 0 | COMMUNITY

## 2017-11-21 NOTE — ASU DISCHARGE PLAN (ADULT/PEDIATRIC). - NOTIFY
Persistent Nausea and Vomiting/Unable to Urinate/Pain not relieved by Medications/Fever greater than 101

## 2017-11-21 NOTE — ASU DISCHARGE PLAN (ADULT/PEDIATRIC). - SPECIAL INSTRUCTIONS
lay on back until AM with one pillow under head. may sit up to eat, may get up to use bathroom. Tomorrow, light activity. In 2 days may resume normal activity.

## 2017-11-21 NOTE — ASU DISCHARGE PLAN (ADULT/PEDIATRIC). - NURSING INSTRUCTIONS
CALL the DOCTOR:    - Any pain that appears to be getting worse.  -  If you have  not urinated 8 hours after surgery or have any difficulty urinating.     A responsible adult should be with you for the rest of the day and night for your safety and to help you if you needed.  Review attached FACT SHEET if applicable.

## 2017-11-21 NOTE — ASU PATIENT PROFILE, ADULT - PSH
Acute pericarditis, unspecified type  pericardial window 11/2015, 12/2015  H/O bilateral inguinal hernia repair  as a child  History of vascular access device  PORT placed  Pleuritic chest pain  Pleurodesis left chest 10/20/2017

## 2017-11-22 ENCOUNTER — APPOINTMENT (OUTPATIENT)
Dept: NEUROSURGERY | Facility: CLINIC | Age: 47
End: 2017-11-22
Payer: COMMERCIAL

## 2017-11-22 ENCOUNTER — APPOINTMENT (OUTPATIENT)
Dept: INFUSION THERAPY | Facility: CLINIC | Age: 47
End: 2017-11-22
Payer: COMMERCIAL

## 2017-11-22 ENCOUNTER — OUTPATIENT (OUTPATIENT)
Dept: OUTPATIENT SERVICES | Facility: HOSPITAL | Age: 47
LOS: 1 days | Discharge: HOME | End: 2017-11-22

## 2017-11-22 VITALS
HEART RATE: 94 BPM | DIASTOLIC BLOOD PRESSURE: 90 MMHG | TEMPERATURE: 98.6 F | HEIGHT: 67 IN | WEIGHT: 189 LBS | SYSTOLIC BLOOD PRESSURE: 146 MMHG | BODY MASS INDEX: 29.66 KG/M2

## 2017-11-22 VITALS
SYSTOLIC BLOOD PRESSURE: 133 MMHG | DIASTOLIC BLOOD PRESSURE: 82 MMHG | TEMPERATURE: 98 F | HEART RATE: 95 BPM | OXYGEN SATURATION: 98 % | HEIGHT: 67 IN | RESPIRATION RATE: 14 BRPM | WEIGHT: 190.04 LBS

## 2017-11-22 DIAGNOSIS — Z92.89 PERSONAL HISTORY OF OTHER MEDICAL TREATMENT: Chronic | ICD-10-CM

## 2017-11-22 DIAGNOSIS — R07.81 PLEURODYNIA: Chronic | ICD-10-CM

## 2017-11-22 DIAGNOSIS — I30.9 ACUTE PERICARDITIS, UNSPECIFIED: Chronic | ICD-10-CM

## 2017-11-22 DIAGNOSIS — C79.49 SECONDARY MALIGNANT NEOPLASM OF OTHER PARTS OF NERVOUS SYSTEM: ICD-10-CM

## 2017-11-22 DIAGNOSIS — Z98.890 OTHER SPECIFIED POSTPROCEDURAL STATES: Chronic | ICD-10-CM

## 2017-11-22 LAB
ANION GAP SERPL CALC-SCNC: 12 MMOL/L — SIGNIFICANT CHANGE UP (ref 5–17)
APPEARANCE UR: CLEAR — SIGNIFICANT CHANGE UP
APTT BLD: 25 SEC — LOW (ref 27.5–37.4)
BASOPHILS # BLD AUTO: 0.1 K/UL — SIGNIFICANT CHANGE UP (ref 0–0.2)
BASOPHILS NFR BLD AUTO: 0.7 % — SIGNIFICANT CHANGE UP (ref 0–2)
BILIRUB UR-MCNC: NEGATIVE — SIGNIFICANT CHANGE UP
BUN SERPL-MCNC: 26 MG/DL — HIGH (ref 7–23)
CALCIUM SERPL-MCNC: 9.9 MG/DL — SIGNIFICANT CHANGE UP (ref 8.5–10.1)
CHLORIDE SERPL-SCNC: 99 MMOL/L — SIGNIFICANT CHANGE UP (ref 96–108)
CO2 SERPL-SCNC: 27 MMOL/L — SIGNIFICANT CHANGE UP (ref 22–31)
COLOR SPEC: YELLOW — SIGNIFICANT CHANGE UP
CREAT SERPL-MCNC: 1.28 MG/DL — SIGNIFICANT CHANGE UP (ref 0.5–1.3)
DIFF PNL FLD: (no result)
EOSINOPHIL # BLD AUTO: 0 K/UL — SIGNIFICANT CHANGE UP (ref 0–0.5)
EOSINOPHIL NFR BLD AUTO: 0 % — SIGNIFICANT CHANGE UP (ref 0–6)
GLUCOSE SERPL-MCNC: 98 MG/DL — SIGNIFICANT CHANGE UP (ref 70–99)
GLUCOSE UR QL: NEGATIVE MG/DL — SIGNIFICANT CHANGE UP
HCT VFR BLD CALC: 49.2 % — SIGNIFICANT CHANGE UP (ref 39–50)
HGB BLD-MCNC: 15.6 G/DL — SIGNIFICANT CHANGE UP (ref 13–17)
INR BLD: 0.99 RATIO — SIGNIFICANT CHANGE UP (ref 0.88–1.16)
KETONES UR-MCNC: NEGATIVE — SIGNIFICANT CHANGE UP
LEUKOCYTE ESTERASE UR-ACNC: NEGATIVE — SIGNIFICANT CHANGE UP
LYMPHOCYTES # BLD AUTO: 1.5 K/UL — SIGNIFICANT CHANGE UP (ref 1–3.3)
LYMPHOCYTES # BLD AUTO: 7 % — LOW (ref 13–44)
MANUAL DIF COMMENT BLD-IMP: SIGNIFICANT CHANGE UP
MCHC RBC-ENTMCNC: 28.3 PG — SIGNIFICANT CHANGE UP (ref 27–34)
MCHC RBC-ENTMCNC: 31.8 GM/DL — LOW (ref 32–36)
MCV RBC AUTO: 89.2 FL — SIGNIFICANT CHANGE UP (ref 80–100)
MONOCYTES # BLD AUTO: 1.2 K/UL — HIGH (ref 0–0.9)
MONOCYTES NFR BLD AUTO: 5.8 % — SIGNIFICANT CHANGE UP (ref 2–14)
NEUTROPHILS # BLD AUTO: 18.1 K/UL — HIGH (ref 1.8–7.4)
NEUTROPHILS NFR BLD AUTO: 86.5 % — HIGH (ref 43–77)
NITRITE UR-MCNC: NEGATIVE — SIGNIFICANT CHANGE UP
NON-GYN CYTOLOGY SPEC: SIGNIFICANT CHANGE UP
PH UR: 6 — SIGNIFICANT CHANGE UP (ref 5–8)
PLAT MORPH BLD: NORMAL — SIGNIFICANT CHANGE UP
PLATELET # BLD AUTO: 413 K/UL — HIGH (ref 150–400)
POTASSIUM SERPL-MCNC: 4.2 MMOL/L — SIGNIFICANT CHANGE UP (ref 3.5–5.3)
POTASSIUM SERPL-SCNC: 4.2 MMOL/L — SIGNIFICANT CHANGE UP (ref 3.5–5.3)
PROT UR-MCNC: NEGATIVE MG/DL — SIGNIFICANT CHANGE UP
PROTHROM AB SERPL-ACNC: 10.7 SEC — SIGNIFICANT CHANGE UP (ref 9.8–12.7)
RBC # BLD: 5.51 M/UL — SIGNIFICANT CHANGE UP (ref 4.2–5.8)
RBC # FLD: 13.3 % — SIGNIFICANT CHANGE UP (ref 10.3–14.5)
RBC BLD AUTO: NORMAL — SIGNIFICANT CHANGE UP
RBC CASTS # UR COMP ASSIST: (no result) /HPF (ref 0–4)
SODIUM SERPL-SCNC: 138 MMOL/L — SIGNIFICANT CHANGE UP (ref 135–145)
SP GR SPEC: 1.01 — SIGNIFICANT CHANGE UP (ref 1.01–1.02)
UROBILINOGEN FLD QL: NEGATIVE MG/DL — SIGNIFICANT CHANGE UP
WBC # BLD: 21 K/UL — HIGH (ref 3.8–10.5)
WBC # FLD AUTO: 21 K/UL — HIGH (ref 3.8–10.5)
WBC UR QL: SIGNIFICANT CHANGE UP

## 2017-11-22 PROCEDURE — 96372 THER/PROPH/DIAG INJ SC/IM: CPT

## 2017-11-22 PROCEDURE — 99204 OFFICE O/P NEW MOD 45 MIN: CPT | Mod: 57

## 2017-11-22 RX ORDER — COLCHICINE 0.6 MG
1 TABLET ORAL
Qty: 0 | Refills: 0 | COMMUNITY

## 2017-11-22 RX ORDER — HYDROMORPHONE HYDROCHLORIDE 2 MG/ML
0 INJECTION INTRAMUSCULAR; INTRAVENOUS; SUBCUTANEOUS
Qty: 0 | Refills: 0 | COMMUNITY

## 2017-11-22 RX ORDER — OMEPRAZOLE 10 MG/1
0 CAPSULE, DELAYED RELEASE ORAL
Qty: 0 | Refills: 0 | COMMUNITY

## 2017-11-22 NOTE — H&P PST ADULT - PMH
Anxiety and depression    Cancer with leptomeningeal spread    Dyspnea on exertion    Erectile dysfunction, unspecified erectile dysfunction type    Gait disturbance    Hearing loss  right ear-no device  History of kidney stones    History of pericarditis    Lung cancer, upper lobe  right. metastatic non small cell Lung Carcinoma.  Memory loss    Pleural effusion    Syncope, unspecified syncope type  X3-4 episodes; s/p LINQ device placed

## 2017-11-22 NOTE — H&P PST ADULT - HISTORY OF PRESENT ILLNESS
This is a  47y /o Male who presents to [facility] prior to proposed procedure with Dr. Cee for placement Ommaya reservoir with stealth navigation. Reports PMHX: Lung cancer with metastasis to brain diagnosed 2015. Reports followed by Dr. Arenas and Dr. Cee. Reports s/p Chemotherapy 2015, s/p Mediport placement 11/17/2017 right anterior chest wall. Reports has been dropping things, with some memory loss, productive cough with white sputum, dyspnea on exertion, fatigue. Denies any visual disturbances, slurred speech, fever, or chills. Evaluated by Dr. Cee and now presents for said procedure.

## 2017-11-22 NOTE — H&P PST ADULT - ASSESSMENT
This is a  47y /o Male who presents to [facility] prior to proposed procedure with Dr. Cee for placement Ommaya reservoir with stealth navigation.      1. Labs, UA: per Dr. Cee.  2. EKG and CXR results on chart.  3. Mupirocin ointment, holistic sheet, and day of procedure instructions provided and reviewed with patient and sister.  4. Instructed to follow Dr. Cee/Dr. Arenas pre-procedure instructions.

## 2017-11-22 NOTE — H&P PST ADULT - ADDITIONAL PE
right anterior chest wall with medi-port in place with steri-strips over site-no drainage, no warmth, no redness, no tenderness to palpation.

## 2017-11-22 NOTE — H&P PST ADULT - NEGATIVE CARDIOVASCULAR SYMPTOMS
no chest pain/no palpitations/no orthopnea/no peripheral edema/no paroxysmal nocturnal dyspnea/no claudication

## 2017-11-22 NOTE — H&P PST ADULT - ABILITY TO HEAR (WITH HEARING AID OR HEARING APPLIANCE IF NORMALLY USED):
right hearing loss-no device as yet/Mildly to Moderately Impaired: difficulty hearing in some environments or speaker may need to increase volume or speak distinctly

## 2017-11-22 NOTE — H&P PST ADULT - PSH
Acute pericarditis, unspecified type  pericardial window 11/2015, 12/2015  H/O bilateral inguinal hernia repair  as a child  History of cardiac monitoring  s/p LINQ device placement 4/2017  History of lumbar puncture within last 21 days    History of vascular access device  PORT placed  Pleuritic chest pain  Pleurodesis left chest 10/20/2017

## 2017-11-24 PROBLEM — R55 SYNCOPE AND COLLAPSE: Chronic | Status: ACTIVE | Noted: 2017-04-24

## 2017-11-26 ENCOUNTER — FORM ENCOUNTER (OUTPATIENT)
Age: 47
End: 2017-11-26

## 2017-11-27 ENCOUNTER — FORM ENCOUNTER (OUTPATIENT)
Age: 47
End: 2017-11-27

## 2017-11-27 ENCOUNTER — OUTPATIENT (OUTPATIENT)
Dept: OUTPATIENT SERVICES | Facility: HOSPITAL | Age: 47
LOS: 1 days | Discharge: HOME | End: 2017-11-27
Payer: COMMERCIAL

## 2017-11-27 ENCOUNTER — APPOINTMENT (OUTPATIENT)
Dept: NEUROSURGERY | Facility: HOSPITAL | Age: 47
End: 2017-11-27
Payer: COMMERCIAL

## 2017-11-27 VITALS
HEART RATE: 105 BPM | DIASTOLIC BLOOD PRESSURE: 90 MMHG | SYSTOLIC BLOOD PRESSURE: 127 MMHG | TEMPERATURE: 97 F | WEIGHT: 190.04 LBS | RESPIRATION RATE: 16 BRPM | HEIGHT: 67 IN | OXYGEN SATURATION: 97 %

## 2017-11-27 DIAGNOSIS — C79.32 SECONDARY MALIGNANT NEOPLASM OF CEREBRAL MENINGES: ICD-10-CM

## 2017-11-27 DIAGNOSIS — Z92.89 PERSONAL HISTORY OF OTHER MEDICAL TREATMENT: Chronic | ICD-10-CM

## 2017-11-27 DIAGNOSIS — J90 PLEURAL EFFUSION, NOT ELSEWHERE CLASSIFIED: ICD-10-CM

## 2017-11-27 DIAGNOSIS — Z98.890 OTHER SPECIFIED POSTPROCEDURAL STATES: Chronic | ICD-10-CM

## 2017-11-27 DIAGNOSIS — I30.9 ACUTE PERICARDITIS, UNSPECIFIED: Chronic | ICD-10-CM

## 2017-11-27 DIAGNOSIS — Z98.2 PRESENCE OF CEREBROSPINAL FLUID DRAINAGE DEVICE: ICD-10-CM

## 2017-11-27 DIAGNOSIS — R07.81 PLEURODYNIA: Chronic | ICD-10-CM

## 2017-11-27 DIAGNOSIS — Z85.118 PERSONAL HISTORY OF OTHER MALIGNANT NEOPLASM OF BRONCHUS AND LUNG: ICD-10-CM

## 2017-11-27 DIAGNOSIS — Z29.9 ENCOUNTER FOR PROPHYLACTIC MEASURES, UNSPECIFIED: ICD-10-CM

## 2017-11-27 DIAGNOSIS — J98.11 ATELECTASIS: ICD-10-CM

## 2017-11-27 DIAGNOSIS — Z92.21 PERSONAL HISTORY OF ANTINEOPLASTIC CHEMOTHERAPY: ICD-10-CM

## 2017-11-27 LAB
BLD GP AB SCN SERPL QL: SIGNIFICANT CHANGE UP
TYPE + AB SCN PNL BLD: SIGNIFICANT CHANGE UP

## 2017-11-27 PROCEDURE — 61210 BURR HOLE IMPLT VENTR CATH: CPT

## 2017-11-27 PROCEDURE — 70450 CT HEAD/BRAIN W/O DYE: CPT | Mod: 26

## 2017-11-27 PROCEDURE — 61781 SCAN PROC CRANIAL INTRA: CPT

## 2017-11-27 PROCEDURE — 61215 INS SUBQ RSVR PMP/NFS SYS: CPT | Mod: 59

## 2017-11-27 RX ORDER — COLCHICINE 0.6 MG
0.6 TABLET ORAL
Qty: 0 | Refills: 0 | Status: DISCONTINUED | OUTPATIENT
Start: 2017-11-27 | End: 2017-11-28

## 2017-11-27 RX ORDER — ACETAMINOPHEN 500 MG
650 TABLET ORAL EVERY 6 HOURS
Qty: 0 | Refills: 0 | Status: DISCONTINUED | OUTPATIENT
Start: 2017-11-27 | End: 2017-11-28

## 2017-11-27 RX ORDER — FENTANYL CITRATE 50 UG/ML
50 INJECTION INTRAVENOUS
Qty: 0 | Refills: 0 | Status: DISCONTINUED | OUTPATIENT
Start: 2017-11-27 | End: 2017-11-27

## 2017-11-27 RX ORDER — FUROSEMIDE 40 MG
20 TABLET ORAL
Qty: 0 | Refills: 0 | Status: DISCONTINUED | OUTPATIENT
Start: 2017-11-27 | End: 2017-11-28

## 2017-11-27 RX ORDER — ONDANSETRON 8 MG/1
4 TABLET, FILM COATED ORAL EVERY 6 HOURS
Qty: 0 | Refills: 0 | Status: DISCONTINUED | OUTPATIENT
Start: 2017-11-27 | End: 2017-11-28

## 2017-11-27 RX ORDER — SODIUM CHLORIDE 9 MG/ML
1000 INJECTION INTRAMUSCULAR; INTRAVENOUS; SUBCUTANEOUS
Qty: 0 | Refills: 0 | Status: DISCONTINUED | OUTPATIENT
Start: 2017-11-27 | End: 2017-11-27

## 2017-11-27 RX ORDER — PANTOPRAZOLE SODIUM 20 MG/1
40 TABLET, DELAYED RELEASE ORAL
Qty: 0 | Refills: 0 | Status: DISCONTINUED | OUTPATIENT
Start: 2017-11-27 | End: 2017-11-28

## 2017-11-27 RX ORDER — CEFAZOLIN SODIUM 1 G
1000 VIAL (EA) INJECTION EVERY 8 HOURS
Qty: 0 | Refills: 0 | Status: COMPLETED | OUTPATIENT
Start: 2017-11-27 | End: 2017-11-28

## 2017-11-27 RX ORDER — ONDANSETRON 8 MG/1
4 TABLET, FILM COATED ORAL ONCE
Qty: 0 | Refills: 0 | Status: DISCONTINUED | OUTPATIENT
Start: 2017-11-27 | End: 2017-11-27

## 2017-11-27 RX ORDER — MIRTAZAPINE 45 MG/1
15 TABLET, ORALLY DISINTEGRATING ORAL AT BEDTIME
Qty: 0 | Refills: 0 | Status: DISCONTINUED | OUTPATIENT
Start: 2017-11-27 | End: 2017-11-28

## 2017-11-27 RX ORDER — ACETAMINOPHEN 500 MG
1000 TABLET ORAL ONCE
Qty: 0 | Refills: 0 | Status: COMPLETED | OUTPATIENT
Start: 2017-11-27 | End: 2017-11-27

## 2017-11-27 RX ORDER — OXYCODONE HYDROCHLORIDE 5 MG/1
5 TABLET ORAL EVERY 4 HOURS
Qty: 0 | Refills: 0 | Status: DISCONTINUED | OUTPATIENT
Start: 2017-11-27 | End: 2017-11-27

## 2017-11-27 RX ORDER — SODIUM CHLORIDE 9 MG/ML
1000 INJECTION INTRAMUSCULAR; INTRAVENOUS; SUBCUTANEOUS
Qty: 0 | Refills: 0 | Status: DISCONTINUED | OUTPATIENT
Start: 2017-11-27 | End: 2017-11-28

## 2017-11-27 RX ORDER — DOCUSATE SODIUM 100 MG
100 CAPSULE ORAL THREE TIMES A DAY
Qty: 0 | Refills: 0 | Status: DISCONTINUED | OUTPATIENT
Start: 2017-11-27 | End: 2017-11-28

## 2017-11-27 RX ORDER — HYDROMORPHONE HYDROCHLORIDE 2 MG/ML
2 INJECTION INTRAMUSCULAR; INTRAVENOUS; SUBCUTANEOUS EVERY 4 HOURS
Qty: 0 | Refills: 0 | Status: DISCONTINUED | OUTPATIENT
Start: 2017-11-27 | End: 2017-11-28

## 2017-11-27 RX ORDER — DEXAMETHASONE 0.5 MG/5ML
2 ELIXIR ORAL
Qty: 0 | Refills: 0 | Status: DISCONTINUED | OUTPATIENT
Start: 2017-11-27 | End: 2017-11-28

## 2017-11-27 RX ORDER — METOCLOPRAMIDE HCL 10 MG
10 TABLET ORAL ONCE
Qty: 0 | Refills: 0 | Status: DISCONTINUED | OUTPATIENT
Start: 2017-11-27 | End: 2017-11-28

## 2017-11-27 RX ORDER — DIAZEPAM 5 MG
2 TABLET ORAL THREE TIMES A DAY
Qty: 0 | Refills: 0 | Status: DISCONTINUED | OUTPATIENT
Start: 2017-11-27 | End: 2017-11-28

## 2017-11-27 RX ADMIN — SODIUM CHLORIDE 75 MILLILITER(S): 9 INJECTION INTRAMUSCULAR; INTRAVENOUS; SUBCUTANEOUS at 14:28

## 2017-11-27 RX ADMIN — Medication 20 MILLIGRAM(S): at 18:54

## 2017-11-27 RX ADMIN — Medication 650 MILLIGRAM(S): at 18:54

## 2017-11-27 RX ADMIN — Medication 650 MILLIGRAM(S): at 19:30

## 2017-11-27 RX ADMIN — Medication 400 MILLIGRAM(S): at 14:27

## 2017-11-27 RX ADMIN — SODIUM CHLORIDE 75 MILLILITER(S): 9 INJECTION INTRAMUSCULAR; INTRAVENOUS; SUBCUTANEOUS at 18:54

## 2017-11-27 RX ADMIN — Medication 100 MILLIGRAM(S): at 21:45

## 2017-11-27 RX ADMIN — Medication 2 MILLIGRAM(S): at 18:54

## 2017-11-27 RX ADMIN — HYDROMORPHONE HYDROCHLORIDE 2 MILLIGRAM(S): 2 INJECTION INTRAMUSCULAR; INTRAVENOUS; SUBCUTANEOUS at 21:51

## 2017-11-27 RX ADMIN — HYDROMORPHONE HYDROCHLORIDE 2 MILLIGRAM(S): 2 INJECTION INTRAMUSCULAR; INTRAVENOUS; SUBCUTANEOUS at 18:01

## 2017-11-27 RX ADMIN — FENTANYL CITRATE 50 MICROGRAM(S): 50 INJECTION INTRAVENOUS at 15:17

## 2017-11-27 RX ADMIN — Medication 100 MILLIGRAM(S): at 21:51

## 2017-11-27 RX ADMIN — FENTANYL CITRATE 50 MICROGRAM(S): 50 INJECTION INTRAVENOUS at 14:44

## 2017-11-27 RX ADMIN — OXYCODONE HYDROCHLORIDE 5 MILLIGRAM(S): 5 TABLET ORAL at 14:47

## 2017-11-27 RX ADMIN — Medication 2 MILLIGRAM(S): at 22:36

## 2017-11-27 RX ADMIN — MIRTAZAPINE 15 MILLIGRAM(S): 45 TABLET, ORALLY DISINTEGRATING ORAL at 22:36

## 2017-11-27 RX ADMIN — HYDROMORPHONE HYDROCHLORIDE 2 MILLIGRAM(S): 2 INJECTION INTRAMUSCULAR; INTRAVENOUS; SUBCUTANEOUS at 19:00

## 2017-11-27 RX ADMIN — FENTANYL CITRATE 50 MICROGRAM(S): 50 INJECTION INTRAVENOUS at 15:56

## 2017-11-27 RX ADMIN — FENTANYL CITRATE 50 MICROGRAM(S): 50 INJECTION INTRAVENOUS at 14:27

## 2017-11-27 RX ADMIN — HYDROMORPHONE HYDROCHLORIDE 2 MILLIGRAM(S): 2 INJECTION INTRAMUSCULAR; INTRAVENOUS; SUBCUTANEOUS at 22:00

## 2017-11-27 NOTE — CONSULT NOTE ADULT - SUBJECTIVE AND OBJECTIVE BOX
Patient is a 47y old  Male who presents with a chief complaint of  47y /o Male who presents to PST prior to proposed procedure with Dr. Cee for placement Ommaya reservoir with stealth navigation. Reports PMHX: Lung cancer with metastasis to brain diagnosed 2015. Reports followed by Dr. Arenas and Dr. Cee. Reports s/p Chemotherapy 2015, s/p Mediport placement 11/17/2017 right anterior chest wall. Reports has been dropping things, with some memory loss, productive cough with white sputum, dyspnea on exertion, fatigue. Denies any visual disturbances, slurred speech, fever, or chills. Evaluated by Dr. Cee and now presents for said procedure.    HPI: 47 year old male pt with hx of lung cancer with brain mets seen and evaluated s/p Bruce Hole with ommaya reservoir placement. Pt reporting moderate post-op headache. Denies any vision changes, nausea, vomiting or weakness to upper or lower extremities.      Allergies    adhesives (Rash)  No Known Drug Allergies    Intolerances        MEDICATIONS  (STANDING):  acetaminophen   Tablet. 650 milliGRAM(s) Oral every 6 hours  ceFAZolin   IVPB 1000 milliGRAM(s) IV Intermittent every 8 hours  colchicine 0.6 milliGRAM(s) Oral two times a day  dexamethasone     Tablet 2 milliGRAM(s) Oral two times a day  docusate sodium 100 milliGRAM(s) Oral three times a day  furosemide    Tablet 20 milliGRAM(s) Oral two times a day  mirtazapine 15 milliGRAM(s) Oral at bedtime  pantoprazole    Tablet 40 milliGRAM(s) Oral before breakfast  sodium chloride 0.9%. 1000 milliLiter(s) (75 mL/Hr) IV Continuous <Continuous>  sodium chloride 0.9%. 1000 milliLiter(s) (75 mL/Hr) IV Continuous <Continuous>    MEDICATIONS  (PRN):  diazepam    Tablet 2 milliGRAM(s) Oral three times a day PRN anxiety  HYDROmorphone   Tablet 2 milliGRAM(s) Oral every 4 hours PRN Moderate Pain (4 - 6)  LORazepam     Tablet 2 milliGRAM(s) Oral two times a day PRN insomnia  metoclopramide Injectable 10 milliGRAM(s) IV Push once PRN Nausea and/or Vomiting  ondansetron Injectable 4 milliGRAM(s) IV Push every 6 hours PRN Nausea and/or Vomiting  ondansetron Injectable 4 milliGRAM(s) IV Push once PRN Nausea and/or Vomiting  oxyCODONE    IR 5 milliGRAM(s) Oral every 4 hours PRN For moderate pain      Daily Height in cm: 170.18 (27 Nov 2017 10:29)    Daily     Drug Dosing Weight  Height (cm): 170.18 (27 Nov 2017 10:29)  Weight (kg): 86.2 (27 Nov 2017 10:52)  BMI (kg/m2): 29.8 (27 Nov 2017 10:52)  BSA (m2): 1.98 (27 Nov 2017 10:52)    PAST MEDICAL & SURGICAL HISTORY:  Hearing loss: right ear-no device  Cancer with leptomeningeal spread  Dyspnea on exertion  Gait disturbance  Memory loss  Anxiety and depression  Erectile dysfunction, unspecified erectile dysfunction type  History of kidney stones  History of pericarditis  Pleural effusion  Syncope, unspecified syncope type: X3-4 episodes; s/p LINQ device placed  Lung cancer, upper lobe: right. metastatic non small cell Lung Carcinoma.  History of lumbar puncture: chemo placed  History of cardiac monitoring: s/p LINQ device placement 4/2017  History of lumbar puncture within last 21 days  History of vascular access device: PORT placed  Pleuritic chest pain: Pleurodesis left chest 10/20/2017  H/O bilateral inguinal hernia repair: as a child  Acute pericarditis, unspecified type: pericardial window 11/2015, 12/2015      FAMILY HISTORY:  Family history of leukemia (Father)  Family history of cancer (Mother)      SOCIAL HISTORY:    ADVANCE DIRECTIVES:    REVIEW OF SYSTEMS:    CONSTITUTIONAL: No fever, weight loss, or fatigue  EYES: No eye pain, visual disturbances, or discharge  ENMT:  No difficulty hearing, tinnitus, vertigo; No sinus or throat pain  NECK: No pain or stiffness  BREASTS: No pain, masses, or nipple discharge  RESPIRATORY: No cough, wheezing, chills or hemoptysis; No shortness of breath  CARDIOVASCULAR: No chest pain, palpitations, dizziness, or leg swelling  GASTROINTESTINAL: No abdominal or epigastric pain. No nausea, vomiting, or hematemesis; No diarrhea or constipation. No melena or hematochezia.  GENITOURINARY: No dysuria, frequency, hematuria, or incontinence  NEUROLOGICAL: No headaches, memory loss, loss of strength, numbness, or tremors  SKIN: No itching, burning, rashes, or lesions   LYMPH NODES: No enlarged glands  ENDOCRINE: No heat or cold intolerance; No hair loss  MUSCULOSKELETAL: No joint pain or swelling; No muscle, back, or extremity pain  PSYCHIATRIC: No depression, anxiety, mood swings, or difficulty sleeping  HEME/LYMPH: No easy bruising, or bleeding gums  ALLERGY AND IMMUNOLOGIC: No hives or eczema          ICU Vital Signs Last 24 Hrs  T(C): 36.4 (27 Nov 2017 18:00), Max: 37.1 (27 Nov 2017 13:55)  T(F): 97.6 (27 Nov 2017 18:00), Max: 98.8 (27 Nov 2017 13:55)  HR: 94 (27 Nov 2017 18:00) (87 - 105)  BP: 117/74 (27 Nov 2017 18:00) (103/57 - 135/83)  BP(mean): 84 (27 Nov 2017 18:00) (84 - 84)  ABP: --  ABP(mean): --  RR: 14 (27 Nov 2017 18:00) (10 - 16)  SpO2: 98% (27 Nov 2017 18:00) (95% - 100%)          I&O's Detail    27 Nov 2017 07:01  -  27 Nov 2017 18:24  --------------------------------------------------------  IN:    Other: 600 mL    sodium chloride 0.9%.: 150 mL  Total IN: 750 mL    OUT:  Total OUT: 0 mL    Total NET: 750 mL          PHYSICAL EXAM:    GENERAL: NAD, well-groomed, well-developed  HEAD: right Bruce Hole incision for ommaya is clean/dry and intact with no surrounding erythema, swelling or hematoma  EYES: EOMI, PERRLA, conjunctiva and sclera clear  NECK: Supple, No JVD, Normal thyroid  NERVOUS SYSTEM:  Alert & Oriented X3, Good concentration; Motor Strength 5/5 B/L upper and lower extremities; DTRs 2+ intact and symmetric  CHEST/LUNG: Clear to percussion bilaterally; No rales, rhonchi, wheezing, or rubs  HEART: Regular rate and rhythm; No murmurs, rubs, or gallops  ABDOMEN: Soft, Nontender, Nondistended; Bowel sounds present  EXTREMITIES:  2+ Peripheral Pulses, No clubbing, cyanosis, or edema  LYMPH: No lymphadenopathy noted  SKIN: No rashes or lesions    LABS:                        EKG:    ECHO, US:    RADIOLOGY:    CRITICAL CARE TIME SPENT:

## 2017-11-27 NOTE — ASU PATIENT PROFILE, ADULT - PSH
Acute pericarditis, unspecified type  pericardial window 11/2015, 12/2015  H/O bilateral inguinal hernia repair  as a child  History of cardiac monitoring  s/p LINQ device placement 4/2017  History of lumbar puncture  chemo placed  History of lumbar puncture within last 21 days    History of vascular access device  PORT placed  Pleuritic chest pain  Pleurodesis left chest 10/20/2017

## 2017-11-27 NOTE — CONSULT NOTE ADULT - ATTENDING COMMENTS
Patient seen and examined with resident and agree with above.  Additions and corrections made where necessary

## 2017-11-27 NOTE — CONSULT NOTE ADULT - PROBLEM SELECTOR RECOMMENDATION 9
-POD #0 s/p Carlisle Hole for ommaya placement  -pain control  -reglan prn  -neuro checks per routine  -follow up with neurosurgery recommendations   -Heme/onc to evaluate pt  -continue with decadron

## 2017-11-27 NOTE — BRIEF OPERATIVE NOTE - PROCEDURE
<<-----Click on this checkbox to enter Procedure Geoff hole with Ommaya reservoir placement  11/27/2017  right frontal approach with navigation  Active  [id number]

## 2017-11-28 ENCOUNTER — RESULT REVIEW (OUTPATIENT)
Age: 47
End: 2017-11-28

## 2017-11-28 ENCOUNTER — TRANSCRIPTION ENCOUNTER (OUTPATIENT)
Age: 47
End: 2017-11-28

## 2017-11-28 VITALS
OXYGEN SATURATION: 97 % | SYSTOLIC BLOOD PRESSURE: 125 MMHG | RESPIRATION RATE: 19 BRPM | DIASTOLIC BLOOD PRESSURE: 72 MMHG | HEART RATE: 95 BPM | TEMPERATURE: 98 F

## 2017-11-28 LAB
ALBUMIN SERPL ELPH-MCNC: 2.6 G/DL — LOW (ref 3.3–5)
ALP SERPL-CCNC: 134 U/L — HIGH (ref 40–120)
ALT FLD-CCNC: 34 U/L — SIGNIFICANT CHANGE UP (ref 12–78)
ANION GAP SERPL CALC-SCNC: 8 MMOL/L — SIGNIFICANT CHANGE UP (ref 5–17)
APPEARANCE CSF: (no result)
AST SERPL-CCNC: 15 U/L — SIGNIFICANT CHANGE UP (ref 15–37)
BASOPHILS # BLD AUTO: 0.1 K/UL — SIGNIFICANT CHANGE UP (ref 0–0.2)
BASOPHILS NFR BLD AUTO: 0.3 % — SIGNIFICANT CHANGE UP (ref 0–2)
BILIRUB SERPL-MCNC: 0.3 MG/DL — SIGNIFICANT CHANGE UP (ref 0.2–1.2)
BUN SERPL-MCNC: 19 MG/DL — SIGNIFICANT CHANGE UP (ref 7–23)
CALCIUM SERPL-MCNC: 8.9 MG/DL — SIGNIFICANT CHANGE UP (ref 8.5–10.1)
CHLORIDE SERPL-SCNC: 104 MMOL/L — SIGNIFICANT CHANGE UP (ref 96–108)
CO2 SERPL-SCNC: 25 MMOL/L — SIGNIFICANT CHANGE UP (ref 22–31)
COLOR CSF: SIGNIFICANT CHANGE UP
CREAT SERPL-MCNC: 1.04 MG/DL — SIGNIFICANT CHANGE UP (ref 0.5–1.3)
EOSINOPHIL # BLD AUTO: 0.1 K/UL — SIGNIFICANT CHANGE UP (ref 0–0.5)
EOSINOPHIL NFR BLD AUTO: 0.3 % — SIGNIFICANT CHANGE UP (ref 0–6)
GLUCOSE CSF-MCNC: 90 MG/DL — HIGH (ref 40–70)
GLUCOSE SERPL-MCNC: 120 MG/DL — HIGH (ref 70–99)
HCT VFR BLD CALC: 40.5 % — SIGNIFICANT CHANGE UP (ref 39–50)
HGB BLD-MCNC: 13.4 G/DL — SIGNIFICANT CHANGE UP (ref 13–17)
LYMPHOCYTES # BLD AUTO: 1.5 K/UL — SIGNIFICANT CHANGE UP (ref 1–3.3)
LYMPHOCYTES # BLD AUTO: 6.6 % — LOW (ref 13–44)
MCHC RBC-ENTMCNC: 29.1 PG — SIGNIFICANT CHANGE UP (ref 27–34)
MCHC RBC-ENTMCNC: 33.1 GM/DL — SIGNIFICANT CHANGE UP (ref 32–36)
MCV RBC AUTO: 87.8 FL — SIGNIFICANT CHANGE UP (ref 80–100)
MONOCYTES # BLD AUTO: 2.3 K/UL — HIGH (ref 0–0.9)
MONOCYTES NFR BLD AUTO: 9.9 % — SIGNIFICANT CHANGE UP (ref 2–14)
NEUTROPHILS # BLD AUTO: 19 K/UL — HIGH (ref 1.8–7.4)
NEUTROPHILS # CSF: SIGNIFICANT CHANGE UP % (ref 0–6)
NEUTROPHILS NFR BLD AUTO: 82.9 % — HIGH (ref 43–77)
NON-GYN CYTOLOGY SPEC: SIGNIFICANT CHANGE UP
NRBC NFR CSF: <1 /UL — SIGNIFICANT CHANGE UP (ref 0–5)
PLATELET # BLD AUTO: 215 K/UL — SIGNIFICANT CHANGE UP (ref 150–400)
POTASSIUM SERPL-MCNC: 4 MMOL/L — SIGNIFICANT CHANGE UP (ref 3.5–5.3)
POTASSIUM SERPL-SCNC: 4 MMOL/L — SIGNIFICANT CHANGE UP (ref 3.5–5.3)
PROT CSF-MCNC: 10 MG/DL — LOW (ref 15–45)
PROT SERPL-MCNC: 5.9 GM/DL — LOW (ref 6–8.3)
RBC # BLD: 4.61 M/UL — SIGNIFICANT CHANGE UP (ref 4.2–5.8)
RBC # CSF: 267 /UL — HIGH (ref 0–0)
RBC # FLD: 13.4 % — SIGNIFICANT CHANGE UP (ref 10.3–14.5)
SODIUM SERPL-SCNC: 137 MMOL/L — SIGNIFICANT CHANGE UP (ref 135–145)
TUBE TYPE: SIGNIFICANT CHANGE UP
WBC # BLD: 23 K/UL — HIGH (ref 3.8–10.5)
WBC # FLD AUTO: 23 K/UL — HIGH (ref 3.8–10.5)

## 2017-11-28 PROCEDURE — 88108 CYTOPATH CONCENTRATE TECH: CPT | Mod: 26

## 2017-11-28 PROCEDURE — 99231 SBSQ HOSP IP/OBS SF/LOW 25: CPT

## 2017-11-28 PROCEDURE — 71010: CPT | Mod: 26

## 2017-11-28 RX ORDER — METHOTREXATE 2.5 MG/1
6 TABLET ORAL ONCE
Qty: 0 | Refills: 0 | Status: COMPLETED | OUTPATIENT
Start: 2017-11-28 | End: 2017-11-28

## 2017-11-28 RX ORDER — FUROSEMIDE 40 MG
0 TABLET ORAL
Qty: 0 | Refills: 0 | COMMUNITY

## 2017-11-28 RX ORDER — METHOTREXATE 2.5 MG/1
6 TABLET ORAL ONCE
Qty: 0 | Refills: 0 | Status: DISCONTINUED | OUTPATIENT
Start: 2017-11-28 | End: 2017-11-28

## 2017-11-28 RX ORDER — FUROSEMIDE 40 MG
1 TABLET ORAL
Qty: 0 | Refills: 0 | COMMUNITY
Start: 2017-11-28

## 2017-11-28 RX ORDER — MUPIROCIN 20 MG/G
1 OINTMENT TOPICAL
Qty: 0 | Refills: 0 | COMMUNITY

## 2017-11-28 RX ADMIN — Medication 650 MILLIGRAM(S): at 00:10

## 2017-11-28 RX ADMIN — Medication 100 MILLIGRAM(S): at 05:21

## 2017-11-28 RX ADMIN — METHOTREXATE 2.88 MILLIGRAM(S): 2.5 TABLET ORAL at 09:30

## 2017-11-28 RX ADMIN — HYDROMORPHONE HYDROCHLORIDE 2 MILLIGRAM(S): 2 INJECTION INTRAMUSCULAR; INTRAVENOUS; SUBCUTANEOUS at 05:20

## 2017-11-28 RX ADMIN — Medication 20 MILLIGRAM(S): at 05:20

## 2017-11-28 RX ADMIN — HYDROMORPHONE HYDROCHLORIDE 2 MILLIGRAM(S): 2 INJECTION INTRAMUSCULAR; INTRAVENOUS; SUBCUTANEOUS at 09:53

## 2017-11-28 RX ADMIN — Medication 650 MILLIGRAM(S): at 12:57

## 2017-11-28 RX ADMIN — Medication 650 MILLIGRAM(S): at 06:15

## 2017-11-28 RX ADMIN — Medication 650 MILLIGRAM(S): at 11:32

## 2017-11-28 RX ADMIN — HYDROMORPHONE HYDROCHLORIDE 2 MILLIGRAM(S): 2 INJECTION INTRAMUSCULAR; INTRAVENOUS; SUBCUTANEOUS at 06:15

## 2017-11-28 RX ADMIN — Medication 2 MILLIGRAM(S): at 05:21

## 2017-11-28 RX ADMIN — Medication 650 MILLIGRAM(S): at 00:30

## 2017-11-28 RX ADMIN — Medication 2 MILLIGRAM(S): at 11:32

## 2017-11-28 RX ADMIN — Medication 100 MILLIGRAM(S): at 05:00

## 2017-11-28 RX ADMIN — HYDROMORPHONE HYDROCHLORIDE 2 MILLIGRAM(S): 2 INJECTION INTRAMUSCULAR; INTRAVENOUS; SUBCUTANEOUS at 11:31

## 2017-11-28 RX ADMIN — PANTOPRAZOLE SODIUM 40 MILLIGRAM(S): 20 TABLET, DELAYED RELEASE ORAL at 07:54

## 2017-11-28 RX ADMIN — Medication 650 MILLIGRAM(S): at 05:20

## 2017-11-28 RX ADMIN — Medication 0.6 MILLIGRAM(S): at 06:30

## 2017-11-28 NOTE — DISCHARGE NOTE ADULT - NS AS DC STROKE ED MATERIALS
Stroke Education Booklet/Stroke Warning Signs and Symptoms/Call 911 for Stroke/Need for Followup After Discharge/Risk Factors for Stroke/Prescribed Medications

## 2017-11-28 NOTE — DISCHARGE NOTE ADULT - CARE PLAN
Principal Discharge DX:	Cancer with leptomeningeal spread  Goal:	Return to normal function  Instructions for follow-up, activity and diet:	Increase diet and activity as tolerated, keep incision clean and dry, pt may shower after 48hours, follow up with Dr. Cee in his office in 12-14 days for suture removal. If you notice redness, discharge, or swelling at the surgical site please call the office, If you develop fever please call the office, if you have chest pain or shortness of breath please go to the nearest emergency room

## 2017-11-28 NOTE — DISCHARGE NOTE ADULT - MEDICATION SUMMARY - MEDICATIONS TO TAKE
I will START or STAY ON the medications listed below when I get home from the hospital:    dexamethasone 2 mg oral tablet  -- 1 tab(s) by mouth 2 times a day  -- Indication: For CANCER WITH LEPTOMENINGEAL SPREAD    Cialis 20 mg oral tablet  -- 1 tab(s) by mouth once a day, As Needed  -- Indication: For Pulmonary hypertension     Dilaudid 2 mg oral tablet  -- 1 tab(s) by mouth every 4 hours, As Needed  -- Indication: For CANCER WITH LEPTOMENINGEAL SPREAD    Ativan 2 mg oral tablet  --  by mouth , As Needed  -- Indication: For CANCER WITH LEPTOMENINGEAL SPREAD    Valium 2 mg oral tablet  -- 1 tab(s) by mouth 3 times a day, As Needed  -- Indication: For CANCER WITH LEPTOMENINGEAL SPREAD    mirtazapine 15 mg oral tablet  -- 1 tab(s) by mouth once a day (at bedtime)  -- Indication: For CANCER WITH LEPTOMENINGEAL SPREAD    colchicine 0.6 mg oral tablet  -- 1 tab(s) by mouth 2 times a day  -- Indication: For Antigout     furosemide 20 mg oral tablet  -- 1 tab(s) by mouth 2 times a day  -- Indication: For CANCER WITH LEPTOMENINGEAL SPREAD    omeprazole 20 mg oral delayed release capsule  -- 1 cap(s) by mouth once a day  -- Indication: For Prophylactic measure

## 2017-11-28 NOTE — DISCHARGE NOTE ADULT - HOSPITAL COURSE
Pt is a 47 year old man with history of lung cancer with metastasis to the  brain diagnosed in 2015, he presented through ambulatory surgery for insertion of Ommaya reservoir.  Pt tolerated procedure well, he was observed in PACU and then transferred to the ICU for observation overnight. Post-op CT head showed right frontal Ommaya catheter with tip in the third ventricle, small amount of post-surgical air noted, no post op hemorrhage seen.  Pt seen and examined with Dr. Cee post-op day #1, awake, alert, oriented X3, c/o mild headache, otherwise no complaints. Pt seen by Dr. Arenas, and first dose of chemotherapy was introduced into the Ommaya. Pt tolerated medication well. Now set for discharge home.

## 2017-11-28 NOTE — DISCHARGE NOTE ADULT - PLAN OF CARE
Return to normal function Increase diet and activity as tolerated, keep incision clean and dry, pt may shower after 48hours, follow up with Dr. Cee in his office in 12-14 days for suture removal. If you notice redness, discharge, or swelling at the surgical site please call the office, If you develop fever please call the office, if you have chest pain or shortness of breath please go to the nearest emergency room

## 2017-11-28 NOTE — PROGRESS NOTE ADULT - SUBJECTIVE AND OBJECTIVE BOX
48 y/o male s/p left pleurodesis with Dr Salvador last month, admitted for kenia hole for Ommaya placement. Thoracic surgery is called to remove sutures from previous pleurodesis. Upon questioning patient admits to some chest discomfort and SOB. Chest X-ray reviewed revealing left pleural effusion vs atelectasis. Bedside US conducted revealing left side atelectasis, no left effusion visible on US, small right pleural effusion appreciated.    Chest X-ray:  < from: Xray Chest 2 Views PA/Lat (10.29.17 @ 09:56) >  Findings:    The heart is normal in size. Again noted are multiple pleural-based   opacities may represent loculated pleural effusion versus pleural-based   lesions. Mild bilateral pleural effusions left greater than right..   Degenerative changes of the visualized osseous structures.    Impression:    Mild bilateral pleural effusions left greater than right with additional   pleural-based opacities as described above    < end of copied text >    Chest pain likely due to inflammatory reaction from previous pleurodesis.   Follow up with Dr Salvador in 1-2 weeks.  Suture removed bedside.  d/w Dr Salvador

## 2017-11-28 NOTE — DISCHARGE NOTE ADULT - INSTRUCTIONS
Regular Diet If any increased drainage, swelling, redness or pain at incision site or temperature greater than 100.5 call MD or return to ED. If any change in mental status call MD or return to ED.

## 2017-11-28 NOTE — DISCHARGE NOTE ADULT - PATIENT PORTAL LINK FT
“You can access the [facility] Patient Portal, offered by [facility], by registering with the following website: [email or web address]”

## 2017-11-28 NOTE — PROGRESS NOTE ADULT - SUBJECTIVE AND OBJECTIVE BOX
48 yo gentleman with metastatic NSCLC, admitted for placement of ommaya reservoir for intrathecal chemotherapy. Procedure was tolerated well. Ommaya site with stitches, dry and intact. Patient is hemodynamically stable.    Ommaya site was cleansed with betadine, under sterile technique. CSF 10 cc was obtained, sent for cell count and cytology. Methotrexate 6 cc were given through the ommaya without adverse events.

## 2017-11-28 NOTE — DISCHARGE NOTE ADULT - CARE PROVIDER_API CALL
Luis Cee; PhD), Neurosurgery  [address]  [address] [place] [address]  Phone: [phone]  Fax: [phone]

## 2017-11-29 DIAGNOSIS — C34.90 MALIGNANT NEOPLASM OF UNSPECIFIED PART OF UNSPECIFIED BRONCHUS OR LUNG: ICD-10-CM

## 2017-12-01 DIAGNOSIS — C78.2 SECONDARY MALIGNANT NEOPLASM OF PLEURA: ICD-10-CM

## 2017-12-01 DIAGNOSIS — Z87.891 PERSONAL HISTORY OF NICOTINE DEPENDENCE: ICD-10-CM

## 2017-12-01 DIAGNOSIS — C34.91 MALIGNANT NEOPLASM OF UNSPECIFIED PART OF RIGHT BRONCHUS OR LUNG: ICD-10-CM

## 2017-12-01 DIAGNOSIS — C34.90 MALIGNANT NEOPLASM OF UNSPECIFIED PART OF UNSPECIFIED BRONCHUS OR LUNG: ICD-10-CM

## 2017-12-01 DIAGNOSIS — F41.9 ANXIETY DISORDER, UNSPECIFIED: ICD-10-CM

## 2017-12-01 DIAGNOSIS — F40.240 CLAUSTROPHOBIA: ICD-10-CM

## 2017-12-01 DIAGNOSIS — C79.31 SECONDARY MALIGNANT NEOPLASM OF BRAIN: ICD-10-CM

## 2017-12-01 DIAGNOSIS — Z87.442 PERSONAL HISTORY OF URINARY CALCULI: ICD-10-CM

## 2017-12-05 ENCOUNTER — LABORATORY RESULT (OUTPATIENT)
Age: 47
End: 2017-12-05

## 2017-12-05 ENCOUNTER — APPOINTMENT (OUTPATIENT)
Dept: HEMATOLOGY ONCOLOGY | Facility: CLINIC | Age: 47
End: 2017-12-05
Payer: COMMERCIAL

## 2017-12-05 VITALS
TEMPERATURE: 98.5 F | HEIGHT: 67 IN | WEIGHT: 184.5 LBS | SYSTOLIC BLOOD PRESSURE: 122 MMHG | BODY MASS INDEX: 28.96 KG/M2 | HEART RATE: 103 BPM | DIASTOLIC BLOOD PRESSURE: 80 MMHG

## 2017-12-05 VITALS — TEMPERATURE: 99 F | DIASTOLIC BLOOD PRESSURE: 82 MMHG | HEART RATE: 99 BPM | SYSTOLIC BLOOD PRESSURE: 136 MMHG

## 2017-12-05 DIAGNOSIS — Z92.89 PERSONAL HISTORY OF OTHER MEDICAL TREATMENT: ICD-10-CM

## 2017-12-05 DIAGNOSIS — Z87.09 PERSONAL HISTORY OF OTHER DISEASES OF THE RESPIRATORY SYSTEM: ICD-10-CM

## 2017-12-05 DIAGNOSIS — Z79.899 OTHER LONG TERM (CURRENT) DRUG THERAPY: ICD-10-CM

## 2017-12-05 DIAGNOSIS — Z87.2 PERSONAL HISTORY OF DISEASES OF THE SKIN AND SUBCUTANEOUS TISSUE: ICD-10-CM

## 2017-12-05 DIAGNOSIS — Z87.438 PERSONAL HISTORY OF OTHER DISEASES OF MALE GENITAL ORGANS: ICD-10-CM

## 2017-12-05 DIAGNOSIS — Z87.898 PERSONAL HISTORY OF OTHER SPECIFIED CONDITIONS: ICD-10-CM

## 2017-12-05 DIAGNOSIS — J90 PLEURAL EFFUSION, NOT ELSEWHERE CLASSIFIED: ICD-10-CM

## 2017-12-05 DIAGNOSIS — C78.2 SECONDARY MALIGNANT NEOPLASM OF PLEURA: ICD-10-CM

## 2017-12-05 LAB
HCT VFR BLD CALC: 39.8 %
HGB BLD-MCNC: 13.3 G/DL
MCHC RBC-ENTMCNC: 29.4 PG
MCHC RBC-ENTMCNC: 33.5 GM/DL
MCV RBC AUTO: 87.8 FL
PLATELET # BLD AUTO: 258 K/UL
RBC # BLD: 4.53 M/UL
RBC # FLD: 13.9 %
WBC # FLD AUTO: 42 K/UL

## 2017-12-05 PROCEDURE — 85025 COMPLETE CBC W/AUTO DIFF WBC: CPT

## 2017-12-05 PROCEDURE — 96542 CHEMOTHERAPY INJECTION: CPT | Mod: 59

## 2017-12-05 PROCEDURE — 99215 OFFICE O/P EST HI 40 MIN: CPT | Mod: 25

## 2017-12-05 PROCEDURE — 36415 COLL VENOUS BLD VENIPUNCTURE: CPT

## 2017-12-05 RX ORDER — COLCHICINE 0.6 MG/1
0.6 TABLET ORAL TWICE DAILY
Qty: 60 | Refills: 3 | Status: ACTIVE | COMMUNITY
Start: 2017-12-05 | End: 1900-01-01

## 2017-12-05 RX ORDER — COLCHICINE 0.6 MG/1
0.6 TABLET ORAL
Refills: 0 | Status: DISCONTINUED | COMMUNITY
End: 2017-12-05

## 2017-12-05 RX ORDER — LORATADINE 5 MG
17 TABLET,CHEWABLE ORAL
Refills: 0 | Status: ACTIVE | COMMUNITY

## 2017-12-12 ENCOUNTER — LABORATORY RESULT (OUTPATIENT)
Age: 47
End: 2017-12-12

## 2017-12-12 ENCOUNTER — RESULT CHARGE (OUTPATIENT)
Age: 47
End: 2017-12-12

## 2017-12-12 ENCOUNTER — APPOINTMENT (OUTPATIENT)
Dept: HEMATOLOGY ONCOLOGY | Facility: CLINIC | Age: 47
End: 2017-12-12
Payer: COMMERCIAL

## 2017-12-12 ENCOUNTER — APPOINTMENT (OUTPATIENT)
Dept: INFUSION THERAPY | Facility: CLINIC | Age: 47
End: 2017-12-12
Payer: COMMERCIAL

## 2017-12-12 VITALS
DIASTOLIC BLOOD PRESSURE: 74 MMHG | TEMPERATURE: 98.3 F | HEIGHT: 67 IN | BODY MASS INDEX: 28.56 KG/M2 | HEART RATE: 115 BPM | WEIGHT: 182 LBS | SYSTOLIC BLOOD PRESSURE: 107 MMHG

## 2017-12-12 DIAGNOSIS — R51 HEADACHE: ICD-10-CM

## 2017-12-12 DIAGNOSIS — R05 COUGH: ICD-10-CM

## 2017-12-12 DIAGNOSIS — R10.9 UNSPECIFIED ABDOMINAL PAIN: ICD-10-CM

## 2017-12-12 LAB
HCT VFR BLD CALC: 42.3 %
HGB BLD-MCNC: 13.6 G/DL
MCHC RBC-ENTMCNC: 29.4 PG
MCHC RBC-ENTMCNC: 32.3 GM/DL
MCV RBC AUTO: 91.1 FL
PLATELET # BLD AUTO: 387 K/UL
RBC # BLD: 4.64 M/UL
RBC # FLD: 16.3 %
WBC # FLD AUTO: 28 K/UL

## 2017-12-12 PROCEDURE — 96542 CHEMOTHERAPY INJECTION: CPT | Mod: 59

## 2017-12-12 PROCEDURE — 96413 CHEMO IV INFUSION 1 HR: CPT

## 2017-12-12 PROCEDURE — 85025 COMPLETE CBC W/AUTO DIFF WBC: CPT

## 2017-12-12 PROCEDURE — 36415 COLL VENOUS BLD VENIPUNCTURE: CPT

## 2017-12-12 PROCEDURE — 96417 CHEMO IV INFUS EACH ADDL SEQ: CPT

## 2017-12-12 PROCEDURE — 81003 URINALYSIS AUTO W/O SCOPE: CPT | Mod: QW

## 2017-12-12 PROCEDURE — 96375 TX/PRO/DX INJ NEW DRUG ADDON: CPT

## 2017-12-12 PROCEDURE — 96367 TX/PROPH/DG ADDL SEQ IV INF: CPT

## 2017-12-12 RX ORDER — PANTOPRAZOLE 40 MG/1
40 TABLET, DELAYED RELEASE ORAL
Qty: 30 | Refills: 3 | Status: ACTIVE | COMMUNITY
Start: 2017-12-12 | End: 1900-01-01

## 2017-12-13 ENCOUNTER — APPOINTMENT (OUTPATIENT)
Dept: NEUROSURGERY | Facility: CLINIC | Age: 47
End: 2017-12-13
Payer: COMMERCIAL

## 2017-12-13 ENCOUNTER — OUTPATIENT (OUTPATIENT)
Dept: OUTPATIENT SERVICES | Facility: HOSPITAL | Age: 47
LOS: 1 days | End: 2017-12-13
Payer: COMMERCIAL

## 2017-12-13 ENCOUNTER — APPOINTMENT (OUTPATIENT)
Dept: INFUSION THERAPY | Facility: CLINIC | Age: 47
End: 2017-12-13
Payer: COMMERCIAL

## 2017-12-13 ENCOUNTER — APPOINTMENT (OUTPATIENT)
Dept: ULTRASOUND IMAGING | Facility: CLINIC | Age: 47
End: 2017-12-13
Payer: COMMERCIAL

## 2017-12-13 VITALS — SYSTOLIC BLOOD PRESSURE: 115 MMHG | DIASTOLIC BLOOD PRESSURE: 82 MMHG | HEART RATE: 108 BPM | TEMPERATURE: 98.1 F

## 2017-12-13 DIAGNOSIS — I30.9 ACUTE PERICARDITIS, UNSPECIFIED: Chronic | ICD-10-CM

## 2017-12-13 DIAGNOSIS — Z98.890 OTHER SPECIFIED POSTPROCEDURAL STATES: Chronic | ICD-10-CM

## 2017-12-13 DIAGNOSIS — Z00.8 ENCOUNTER FOR OTHER GENERAL EXAMINATION: ICD-10-CM

## 2017-12-13 DIAGNOSIS — Z92.89 PERSONAL HISTORY OF OTHER MEDICAL TREATMENT: Chronic | ICD-10-CM

## 2017-12-13 DIAGNOSIS — Z98.2 PRESENCE OF CEREBROSPINAL FLUID DRAINAGE DEVICE: ICD-10-CM

## 2017-12-13 DIAGNOSIS — R07.81 PLEURODYNIA: Chronic | ICD-10-CM

## 2017-12-13 LAB
ALBUMIN SERPL ELPH-MCNC: 3.7 G/DL
ALP BLD-CCNC: 100 U/L
ALT SERPL-CCNC: 60 U/L
ANION GAP SERPL CALC-SCNC: 16 MMOL/L
AST SERPL-CCNC: 23 U/L
BILIRUB SERPL-MCNC: 0.3 MG/DL
BUN SERPL-MCNC: 23 MG/DL
CALCIUM SERPL-MCNC: 9.3 MG/DL
CHLORIDE SERPL-SCNC: 98 MMOL/L
CO2 SERPL-SCNC: 25 MMOL/L
CREAT SERPL-MCNC: 1.08 MG/DL
GLUCOSE SERPL-MCNC: 95 MG/DL
POTASSIUM SERPL-SCNC: 4.1 MMOL/L
PROT SERPL-MCNC: 6.2 G/DL
SODIUM SERPL-SCNC: 139 MMOL/L

## 2017-12-13 PROCEDURE — 99024 POSTOP FOLLOW-UP VISIT: CPT

## 2017-12-13 PROCEDURE — 76700 US EXAM ABDOM COMPLETE: CPT

## 2017-12-13 PROCEDURE — 96372 THER/PROPH/DIAG INJ SC/IM: CPT | Mod: Q5

## 2017-12-13 PROCEDURE — 76700 US EXAM ABDOM COMPLETE: CPT | Mod: 26

## 2017-12-14 ENCOUNTER — APPOINTMENT (OUTPATIENT)
Dept: THORACIC SURGERY | Facility: CLINIC | Age: 47
End: 2017-12-14

## 2017-12-15 ENCOUNTER — APPOINTMENT (OUTPATIENT)
Dept: MRI IMAGING | Facility: CLINIC | Age: 47
End: 2017-12-15
Payer: COMMERCIAL

## 2017-12-15 ENCOUNTER — OUTPATIENT (OUTPATIENT)
Dept: OUTPATIENT SERVICES | Facility: HOSPITAL | Age: 47
LOS: 1 days | End: 2017-12-15
Payer: COMMERCIAL

## 2017-12-15 DIAGNOSIS — I30.9 ACUTE PERICARDITIS, UNSPECIFIED: Chronic | ICD-10-CM

## 2017-12-15 DIAGNOSIS — Z98.890 OTHER SPECIFIED POSTPROCEDURAL STATES: Chronic | ICD-10-CM

## 2017-12-15 DIAGNOSIS — Z92.89 PERSONAL HISTORY OF OTHER MEDICAL TREATMENT: Chronic | ICD-10-CM

## 2017-12-15 DIAGNOSIS — R07.81 PLEURODYNIA: Chronic | ICD-10-CM

## 2017-12-15 DIAGNOSIS — C79.31 SECONDARY MALIGNANT NEOPLASM OF BRAIN: ICD-10-CM

## 2017-12-15 PROCEDURE — A9585: CPT

## 2017-12-15 PROCEDURE — 70553 MRI BRAIN STEM W/O & W/DYE: CPT

## 2017-12-15 PROCEDURE — 70553 MRI BRAIN STEM W/O & W/DYE: CPT | Mod: 26

## 2017-12-19 ENCOUNTER — APPOINTMENT (OUTPATIENT)
Dept: INFUSION THERAPY | Facility: CLINIC | Age: 47
End: 2017-12-19

## 2017-12-19 ENCOUNTER — APPOINTMENT (OUTPATIENT)
Dept: HEMATOLOGY ONCOLOGY | Facility: CLINIC | Age: 47
End: 2017-12-19

## 2017-12-26 ENCOUNTER — APPOINTMENT (OUTPATIENT)
Dept: HEMATOLOGY ONCOLOGY | Facility: CLINIC | Age: 47
End: 2017-12-26

## 2017-12-26 ENCOUNTER — RX RENEWAL (OUTPATIENT)
Age: 47
End: 2017-12-26

## 2017-12-26 ENCOUNTER — APPOINTMENT (OUTPATIENT)
Dept: INFUSION THERAPY | Facility: CLINIC | Age: 47
End: 2017-12-26

## 2017-12-26 RX ORDER — DIAZEPAM 2 MG/1
2 TABLET ORAL
Qty: 90 | Refills: 0 | Status: ACTIVE | COMMUNITY
Start: 2017-12-26 | End: 1900-01-01

## 2017-12-26 RX ORDER — GUAIFENESIN AND CODEINE PHOSPHATE 10; 100 MG/5ML; MG/5ML
100-10 SOLUTION ORAL
Qty: 3 | Refills: 3 | Status: ACTIVE | COMMUNITY
Start: 2017-12-05 | End: 1900-01-01

## 2017-12-26 RX ORDER — DIAZEPAM 5 MG/1
5 TABLET ORAL
Qty: 90 | Refills: 0 | Status: DISCONTINUED | COMMUNITY
Start: 2017-10-26 | End: 2017-12-26

## 2017-12-26 RX ORDER — DEXAMETHASONE 2 MG/1
2 TABLET ORAL EVERY 8 HOURS
Qty: 90 | Refills: 1 | Status: ACTIVE | COMMUNITY
Start: 2017-11-14 | End: 1900-01-01

## 2018-01-03 ENCOUNTER — APPOINTMENT (OUTPATIENT)
Dept: ULTRASOUND IMAGING | Facility: CLINIC | Age: 48
End: 2018-01-03
Payer: COMMERCIAL

## 2018-01-03 ENCOUNTER — OUTPATIENT (OUTPATIENT)
Dept: OUTPATIENT SERVICES | Facility: HOSPITAL | Age: 48
LOS: 1 days | End: 2018-01-03
Payer: COMMERCIAL

## 2018-01-03 DIAGNOSIS — Z92.89 PERSONAL HISTORY OF OTHER MEDICAL TREATMENT: Chronic | ICD-10-CM

## 2018-01-03 DIAGNOSIS — Z98.890 OTHER SPECIFIED POSTPROCEDURAL STATES: Chronic | ICD-10-CM

## 2018-01-03 DIAGNOSIS — R07.81 PLEURODYNIA: Chronic | ICD-10-CM

## 2018-01-03 DIAGNOSIS — I30.9 ACUTE PERICARDITIS, UNSPECIFIED: Chronic | ICD-10-CM

## 2018-01-03 DIAGNOSIS — Z00.8 ENCOUNTER FOR OTHER GENERAL EXAMINATION: ICD-10-CM

## 2018-01-03 PROCEDURE — 76700 US EXAM ABDOM COMPLETE: CPT

## 2018-01-03 PROCEDURE — 76700 US EXAM ABDOM COMPLETE: CPT | Mod: 26

## 2018-01-05 ENCOUNTER — RX RENEWAL (OUTPATIENT)
Age: 48
End: 2018-01-05

## 2018-01-05 ENCOUNTER — MEDICATION RENEWAL (OUTPATIENT)
Age: 48
End: 2018-01-05

## 2018-01-05 RX ORDER — HYDROMORPHONE HYDROCHLORIDE 4 MG/1
4 TABLET ORAL
Qty: 120 | Refills: 0 | Status: ACTIVE | COMMUNITY
Start: 2017-10-26 | End: 1900-01-01

## 2018-01-05 RX ORDER — MORPHINE SULFATE 20 MG/5ML
20 SOLUTION ORAL
Qty: 200 | Refills: 0 | Status: DISCONTINUED | COMMUNITY
Start: 2018-01-03 | End: 2018-01-05

## 2018-01-05 RX ORDER — MORPHINE SULFATE 20 MG/5ML
20 SOLUTION ORAL
Qty: 200 | Refills: 0 | Status: ACTIVE | COMMUNITY
Start: 2018-01-05 | End: 1900-01-01

## 2018-01-08 ENCOUNTER — RX RENEWAL (OUTPATIENT)
Age: 48
End: 2018-01-08

## 2018-01-08 ENCOUNTER — APPOINTMENT (OUTPATIENT)
Dept: HEMATOLOGY ONCOLOGY | Facility: CLINIC | Age: 48
End: 2018-01-08
Payer: COMMERCIAL

## 2018-01-08 ENCOUNTER — LABORATORY RESULT (OUTPATIENT)
Age: 48
End: 2018-01-08

## 2018-01-08 LAB
HCT VFR BLD CALC: 46.1 %
HGB BLD-MCNC: 14.5 G/DL
MCHC RBC-ENTMCNC: 30.7 PG
MCHC RBC-ENTMCNC: 31.5 GM/DL
MCV RBC AUTO: 97.4 FL
PLATELET # BLD AUTO: 360 K/UL
RBC # BLD: 4.74 M/UL
RBC # FLD: 21.1 %
WBC # FLD AUTO: 21.1 K/UL

## 2018-01-08 PROCEDURE — 36415 COLL VENOUS BLD VENIPUNCTURE: CPT

## 2018-01-08 PROCEDURE — 85025 COMPLETE CBC W/AUTO DIFF WBC: CPT

## 2018-01-08 RX ORDER — FUROSEMIDE 20 MG/1
20 TABLET ORAL TWICE DAILY
Qty: 60 | Refills: 5 | Status: ACTIVE | COMMUNITY
Start: 2017-11-14 | End: 1900-01-01

## 2018-01-08 RX ORDER — FOLIC ACID 1 MG/1
1 TABLET ORAL DAILY
Qty: 30 | Refills: 6 | Status: ACTIVE | COMMUNITY
Start: 2017-11-17 | End: 1900-01-01

## 2018-01-09 ENCOUNTER — APPOINTMENT (OUTPATIENT)
Dept: HEMATOLOGY ONCOLOGY | Facility: CLINIC | Age: 48
End: 2018-01-09

## 2018-01-09 LAB
ALBUMIN SERPL ELPH-MCNC: 4.3 G/DL
ALP BLD-CCNC: 84 U/L
ALT SERPL-CCNC: 71 U/L
ANION GAP SERPL CALC-SCNC: 16 MMOL/L
AST SERPL-CCNC: 19 U/L
BILIRUB SERPL-MCNC: 0.2 MG/DL
BUN SERPL-MCNC: 24 MG/DL
CALCIUM SERPL-MCNC: 9.7 MG/DL
CHLORIDE SERPL-SCNC: 94 MMOL/L
CO2 SERPL-SCNC: 33 MMOL/L
CREAT SERPL-MCNC: 0.95 MG/DL
GLUCOSE SERPL-MCNC: 86 MG/DL
MAGNESIUM SERPL-MCNC: 2.5 MG/DL
POTASSIUM SERPL-SCNC: 4 MMOL/L
PROT SERPL-MCNC: 7 G/DL
SODIUM SERPL-SCNC: 143 MMOL/L

## 2018-01-09 NOTE — DISCHARGE NOTE ADULT - FUNCTIONAL STATUS DATE
Received fax to refill catheter supplies.  Patient due for yearly appointment.  He did set this up.    
28-Nov-2017

## 2018-01-10 ENCOUNTER — OTHER (OUTPATIENT)
Age: 48
End: 2018-01-10

## 2018-01-11 ENCOUNTER — INPATIENT (INPATIENT)
Facility: HOSPITAL | Age: 48
LOS: 19 days | Discharge: HOME HEALTH | End: 2018-01-31
Attending: INTERNAL MEDICINE | Admitting: INTERNAL MEDICINE
Payer: COMMERCIAL

## 2018-01-11 VITALS
HEART RATE: 127 BPM | HEIGHT: 67 IN | DIASTOLIC BLOOD PRESSURE: 97 MMHG | WEIGHT: 184.97 LBS | SYSTOLIC BLOOD PRESSURE: 141 MMHG | RESPIRATION RATE: 22 BRPM | TEMPERATURE: 98 F | OXYGEN SATURATION: 98 %

## 2018-01-11 DIAGNOSIS — Z98.890 OTHER SPECIFIED POSTPROCEDURAL STATES: Chronic | ICD-10-CM

## 2018-01-11 DIAGNOSIS — Z92.89 PERSONAL HISTORY OF OTHER MEDICAL TREATMENT: Chronic | ICD-10-CM

## 2018-01-11 DIAGNOSIS — R07.81 PLEURODYNIA: Chronic | ICD-10-CM

## 2018-01-11 DIAGNOSIS — I30.9 ACUTE PERICARDITIS, UNSPECIFIED: Chronic | ICD-10-CM

## 2018-01-11 LAB
ALBUMIN SERPL ELPH-MCNC: 3.3 G/DL — SIGNIFICANT CHANGE UP (ref 3.3–5)
ALP SERPL-CCNC: 122 U/L — HIGH (ref 40–120)
ALT FLD-CCNC: 83 U/L — HIGH (ref 12–78)
ANION GAP SERPL CALC-SCNC: 9 MMOL/L — SIGNIFICANT CHANGE UP (ref 5–17)
APPEARANCE CSF: (no result)
APPEARANCE UR: CLEAR — SIGNIFICANT CHANGE UP
APTT BLD: 27.3 SEC — LOW (ref 27.5–37.4)
AST SERPL-CCNC: 34 U/L — SIGNIFICANT CHANGE UP (ref 15–37)
BACTERIA # UR AUTO: (no result)
BASOPHILS # BLD AUTO: 0.1 K/UL — SIGNIFICANT CHANGE UP (ref 0–0.2)
BASOPHILS NFR BLD AUTO: 0.5 % — SIGNIFICANT CHANGE UP (ref 0–2)
BILIRUB SERPL-MCNC: 0.8 MG/DL — SIGNIFICANT CHANGE UP (ref 0.2–1.2)
BILIRUB UR-MCNC: NEGATIVE — SIGNIFICANT CHANGE UP
BUN SERPL-MCNC: 22 MG/DL — SIGNIFICANT CHANGE UP (ref 7–23)
CALCIUM SERPL-MCNC: 10.5 MG/DL — HIGH (ref 8.5–10.1)
CHLORIDE SERPL-SCNC: 91 MMOL/L — LOW (ref 96–108)
CO2 SERPL-SCNC: 31 MMOL/L — SIGNIFICANT CHANGE UP (ref 22–31)
COLOR CSF: SIGNIFICANT CHANGE UP
COLOR SPEC: YELLOW — SIGNIFICANT CHANGE UP
CREAT SERPL-MCNC: 1.27 MG/DL — SIGNIFICANT CHANGE UP (ref 0.5–1.3)
DIFF PNL FLD: (no result)
EOSINOPHIL # BLD AUTO: 0 K/UL — SIGNIFICANT CHANGE UP (ref 0–0.5)
EOSINOPHIL NFR BLD AUTO: 0 % — SIGNIFICANT CHANGE UP (ref 0–6)
EPI CELLS # UR: SIGNIFICANT CHANGE UP
GLUCOSE CSF-MCNC: 10 MG/DL — LOW (ref 40–70)
GLUCOSE SERPL-MCNC: 105 MG/DL — HIGH (ref 70–99)
GLUCOSE UR QL: NEGATIVE MG/DL — SIGNIFICANT CHANGE UP
HCT VFR BLD CALC: 46.4 % — SIGNIFICANT CHANGE UP (ref 39–50)
HGB BLD-MCNC: 15 G/DL — SIGNIFICANT CHANGE UP (ref 13–17)
KETONES UR-MCNC: NEGATIVE — SIGNIFICANT CHANGE UP
LACTATE SERPL-SCNC: 1.7 MMOL/L — SIGNIFICANT CHANGE UP (ref 0.7–2)
LACTATE SERPL-SCNC: 2.8 MMOL/L — HIGH (ref 0.7–2)
LACTATE SERPL-SCNC: 2.8 MMOL/L — HIGH (ref 0.7–2)
LEUKOCYTE ESTERASE UR-ACNC: NEGATIVE — SIGNIFICANT CHANGE UP
LIDOCAIN IGE QN: 102 U/L — SIGNIFICANT CHANGE UP (ref 73–393)
LYMPHOCYTES # BLD AUTO: 1.4 K/UL — SIGNIFICANT CHANGE UP (ref 1–3.3)
LYMPHOCYTES # BLD AUTO: 4.6 % — LOW (ref 13–44)
MANUAL DIF COMMENT BLD-IMP: SIGNIFICANT CHANGE UP
MCHC RBC-ENTMCNC: 30.4 PG — SIGNIFICANT CHANGE UP (ref 27–34)
MCHC RBC-ENTMCNC: 32.3 GM/DL — SIGNIFICANT CHANGE UP (ref 32–36)
MCV RBC AUTO: 93.9 FL — SIGNIFICANT CHANGE UP (ref 80–100)
MONOCYTES # BLD AUTO: 1.8 K/UL — HIGH (ref 0–0.9)
MONOCYTES NFR BLD AUTO: 5.8 % — SIGNIFICANT CHANGE UP (ref 2–14)
NEUTROPHILS # BLD AUTO: 27.7 K/UL — HIGH (ref 1.8–7.4)
NEUTROPHILS NFR BLD AUTO: 89.1 % — HIGH (ref 43–77)
NITRITE UR-MCNC: NEGATIVE — SIGNIFICANT CHANGE UP
NRBC NFR CSF: 744 /UL — HIGH (ref 0–5)
PH UR: 8 — SIGNIFICANT CHANGE UP (ref 5–8)
PLAT MORPH BLD: NORMAL — SIGNIFICANT CHANGE UP
PLATELET # BLD AUTO: 405 K/UL — HIGH (ref 150–400)
POTASSIUM SERPL-MCNC: 3.5 MMOL/L — SIGNIFICANT CHANGE UP (ref 3.5–5.3)
POTASSIUM SERPL-SCNC: 3.5 MMOL/L — SIGNIFICANT CHANGE UP (ref 3.5–5.3)
PROT CSF-MCNC: 104 MG/DL — HIGH (ref 15–45)
PROT SERPL-MCNC: 8.4 GM/DL — HIGH (ref 6–8.3)
PROT UR-MCNC: 30 MG/DL
RAPID RVP RESULT: SIGNIFICANT CHANGE UP
RBC # BLD: 4.94 M/UL — SIGNIFICANT CHANGE UP (ref 4.2–5.8)
RBC # CSF: 62 /UL — HIGH (ref 0–0)
RBC # FLD: 20.3 % — HIGH (ref 10.3–14.5)
RBC BLD AUTO: NORMAL — SIGNIFICANT CHANGE UP
RBC CASTS # UR COMP ASSIST: SIGNIFICANT CHANGE UP /HPF (ref 0–4)
SODIUM SERPL-SCNC: 131 MMOL/L — LOW (ref 135–145)
SP GR SPEC: 1.01 — SIGNIFICANT CHANGE UP (ref 1.01–1.02)
TROPONIN I SERPL-MCNC: 0.02 NG/ML — SIGNIFICANT CHANGE UP (ref 0.01–0.04)
TSH SERPL-MCNC: 0.32 UU/ML — LOW (ref 0.36–3.74)
TUBE TYPE: SIGNIFICANT CHANGE UP
UROBILINOGEN FLD QL: NEGATIVE MG/DL — SIGNIFICANT CHANGE UP
WBC # BLD: 31 K/UL — HIGH (ref 3.8–10.5)
WBC # FLD AUTO: 31 K/UL — HIGH (ref 3.8–10.5)
WBC UR QL: SIGNIFICANT CHANGE UP

## 2018-01-11 PROCEDURE — 70450 CT HEAD/BRAIN W/O DYE: CPT | Mod: 26

## 2018-01-11 PROCEDURE — 99291 CRITICAL CARE FIRST HOUR: CPT

## 2018-01-11 PROCEDURE — 71250 CT THORAX DX C-: CPT | Mod: 26

## 2018-01-11 PROCEDURE — 93010 ELECTROCARDIOGRAM REPORT: CPT

## 2018-01-11 PROCEDURE — 71045 X-RAY EXAM CHEST 1 VIEW: CPT | Mod: 26

## 2018-01-11 PROCEDURE — 99233 SBSQ HOSP IP/OBS HIGH 50: CPT

## 2018-01-11 RX ORDER — HYDROMORPHONE HYDROCHLORIDE 2 MG/ML
1 INJECTION INTRAMUSCULAR; INTRAVENOUS; SUBCUTANEOUS
Qty: 0 | Refills: 0 | COMMUNITY

## 2018-01-11 RX ORDER — DEXAMETHASONE 0.5 MG/5ML
2 ELIXIR ORAL THREE TIMES A DAY
Qty: 0 | Refills: 0 | Status: DISCONTINUED | OUTPATIENT
Start: 2018-01-11 | End: 2018-01-12

## 2018-01-11 RX ORDER — IBUPROFEN 200 MG
600 TABLET ORAL ONCE
Qty: 0 | Refills: 0 | Status: COMPLETED | OUTPATIENT
Start: 2018-01-11 | End: 2018-01-11

## 2018-01-11 RX ORDER — VANCOMYCIN HCL 1 G
1000 VIAL (EA) INTRAVENOUS ONCE
Qty: 0 | Refills: 0 | Status: COMPLETED | OUTPATIENT
Start: 2018-01-11 | End: 2018-01-11

## 2018-01-11 RX ORDER — SODIUM CHLORIDE 9 MG/ML
3 INJECTION INTRAMUSCULAR; INTRAVENOUS; SUBCUTANEOUS ONCE
Qty: 0 | Refills: 0 | Status: COMPLETED | OUTPATIENT
Start: 2018-01-11 | End: 2018-01-11

## 2018-01-11 RX ORDER — ACETAMINOPHEN 500 MG
650 TABLET ORAL EVERY 6 HOURS
Qty: 0 | Refills: 0 | Status: DISCONTINUED | OUTPATIENT
Start: 2018-01-11 | End: 2018-01-31

## 2018-01-11 RX ORDER — CEFEPIME 1 G/1
2000 INJECTION, POWDER, FOR SOLUTION INTRAMUSCULAR; INTRAVENOUS EVERY 12 HOURS
Qty: 0 | Refills: 0 | Status: DISCONTINUED | OUTPATIENT
Start: 2018-01-11 | End: 2018-01-12

## 2018-01-11 RX ORDER — DEXAMETHASONE 0.5 MG/5ML
1 ELIXIR ORAL
Qty: 0 | Refills: 0 | COMMUNITY

## 2018-01-11 RX ORDER — FOLIC ACID 0.8 MG
1 TABLET ORAL DAILY
Qty: 0 | Refills: 0 | Status: DISCONTINUED | OUTPATIENT
Start: 2018-01-11 | End: 2018-01-31

## 2018-01-11 RX ORDER — CEFEPIME 1 G/1
1000 INJECTION, POWDER, FOR SOLUTION INTRAMUSCULAR; INTRAVENOUS ONCE
Qty: 0 | Refills: 0 | Status: COMPLETED | OUTPATIENT
Start: 2018-01-11 | End: 2018-01-11

## 2018-01-11 RX ORDER — PANTOPRAZOLE SODIUM 20 MG/1
40 TABLET, DELAYED RELEASE ORAL
Qty: 0 | Refills: 0 | Status: DISCONTINUED | OUTPATIENT
Start: 2018-01-11 | End: 2018-01-12

## 2018-01-11 RX ORDER — TADALAFIL 10 MG/1
1 TABLET, FILM COATED ORAL
Qty: 0 | Refills: 0 | COMMUNITY

## 2018-01-11 RX ORDER — OMEPRAZOLE 10 MG/1
1 CAPSULE, DELAYED RELEASE ORAL
Qty: 0 | Refills: 0 | COMMUNITY

## 2018-01-11 RX ORDER — SODIUM CHLORIDE 9 MG/ML
2000 INJECTION INTRAMUSCULAR; INTRAVENOUS; SUBCUTANEOUS ONCE
Qty: 0 | Refills: 0 | Status: COMPLETED | OUTPATIENT
Start: 2018-01-11 | End: 2018-01-11

## 2018-01-11 RX ORDER — COLCHICINE 0.6 MG
0.6 TABLET ORAL
Qty: 0 | Refills: 0 | Status: DISCONTINUED | OUTPATIENT
Start: 2018-01-11 | End: 2018-01-31

## 2018-01-11 RX ORDER — MIRTAZAPINE 45 MG/1
15 TABLET, ORALLY DISINTEGRATING ORAL AT BEDTIME
Qty: 0 | Refills: 0 | Status: DISCONTINUED | OUTPATIENT
Start: 2018-01-11 | End: 2018-01-27

## 2018-01-11 RX ORDER — ACETAMINOPHEN 500 MG
975 TABLET ORAL ONCE
Qty: 0 | Refills: 0 | Status: COMPLETED | OUTPATIENT
Start: 2018-01-11 | End: 2018-01-11

## 2018-01-11 RX ORDER — DIAZEPAM 5 MG
2 TABLET ORAL THREE TIMES A DAY
Qty: 0 | Refills: 0 | Status: DISCONTINUED | OUTPATIENT
Start: 2018-01-11 | End: 2018-01-17

## 2018-01-11 RX ORDER — SODIUM CHLORIDE 9 MG/ML
1000 INJECTION INTRAMUSCULAR; INTRAVENOUS; SUBCUTANEOUS ONCE
Qty: 0 | Refills: 0 | Status: COMPLETED | OUTPATIENT
Start: 2018-01-11 | End: 2018-01-11

## 2018-01-11 RX ORDER — HEPARIN SODIUM 5000 [USP'U]/ML
5000 INJECTION INTRAVENOUS; SUBCUTANEOUS EVERY 8 HOURS
Qty: 0 | Refills: 0 | Status: DISCONTINUED | OUTPATIENT
Start: 2018-01-11 | End: 2018-01-28

## 2018-01-11 RX ORDER — VANCOMYCIN HCL 1 G
1000 VIAL (EA) INTRAVENOUS EVERY 12 HOURS
Qty: 0 | Refills: 0 | Status: DISCONTINUED | OUTPATIENT
Start: 2018-01-11 | End: 2018-01-12

## 2018-01-11 RX ORDER — HYDROMORPHONE HYDROCHLORIDE 2 MG/ML
4 INJECTION INTRAMUSCULAR; INTRAVENOUS; SUBCUTANEOUS
Qty: 0 | Refills: 0 | Status: DISCONTINUED | OUTPATIENT
Start: 2018-01-11 | End: 2018-01-17

## 2018-01-11 RX ORDER — SODIUM CHLORIDE 9 MG/ML
1000 INJECTION INTRAMUSCULAR; INTRAVENOUS; SUBCUTANEOUS
Qty: 0 | Refills: 0 | Status: DISCONTINUED | OUTPATIENT
Start: 2018-01-11 | End: 2018-01-15

## 2018-01-11 RX ADMIN — Medication 975 MILLIGRAM(S): at 11:50

## 2018-01-11 RX ADMIN — Medication 250 MILLIGRAM(S): at 11:55

## 2018-01-11 RX ADMIN — SODIUM CHLORIDE 1000 MILLILITER(S): 9 INJECTION INTRAMUSCULAR; INTRAVENOUS; SUBCUTANEOUS at 17:42

## 2018-01-11 RX ADMIN — CEFEPIME 100 MILLIGRAM(S): 1 INJECTION, POWDER, FOR SOLUTION INTRAMUSCULAR; INTRAVENOUS at 13:58

## 2018-01-11 RX ADMIN — MIRTAZAPINE 15 MILLIGRAM(S): 45 TABLET, ORALLY DISINTEGRATING ORAL at 21:36

## 2018-01-11 RX ADMIN — SODIUM CHLORIDE 1000 MILLILITER(S): 9 INJECTION INTRAMUSCULAR; INTRAVENOUS; SUBCUTANEOUS at 11:21

## 2018-01-11 RX ADMIN — HEPARIN SODIUM 5000 UNIT(S): 5000 INJECTION INTRAVENOUS; SUBCUTANEOUS at 21:36

## 2018-01-11 RX ADMIN — SODIUM CHLORIDE 125 MILLILITER(S): 9 INJECTION INTRAMUSCULAR; INTRAVENOUS; SUBCUTANEOUS at 21:00

## 2018-01-11 RX ADMIN — Medication 2 MILLIGRAM(S): at 21:35

## 2018-01-11 RX ADMIN — Medication 0.6 MILLIGRAM(S): at 18:00

## 2018-01-11 RX ADMIN — SODIUM CHLORIDE 66.67 MILLILITER(S): 9 INJECTION INTRAMUSCULAR; INTRAVENOUS; SUBCUTANEOUS at 12:56

## 2018-01-11 RX ADMIN — Medication 2 MILLIGRAM(S): at 17:47

## 2018-01-11 RX ADMIN — Medication 1 MILLIGRAM(S): at 17:47

## 2018-01-11 RX ADMIN — SODIUM CHLORIDE 3 MILLILITER(S): 9 INJECTION INTRAMUSCULAR; INTRAVENOUS; SUBCUTANEOUS at 11:19

## 2018-01-11 RX ADMIN — Medication 600 MILLIGRAM(S): at 21:36

## 2018-01-11 RX ADMIN — Medication 250 MILLIGRAM(S): at 21:36

## 2018-01-11 RX ADMIN — SODIUM CHLORIDE 4000 MILLILITER(S): 9 INJECTION INTRAMUSCULAR; INTRAVENOUS; SUBCUTANEOUS at 14:00

## 2018-01-11 RX ADMIN — PANTOPRAZOLE SODIUM 40 MILLIGRAM(S): 20 TABLET, DELAYED RELEASE ORAL at 17:48

## 2018-01-11 RX ADMIN — Medication 2 MILLIGRAM(S): at 17:48

## 2018-01-11 RX ADMIN — Medication 650 MILLIGRAM(S): at 18:00

## 2018-01-11 NOTE — H&P ADULT - ASSESSMENT
46 yo male with PMH Stage 4 Lung CA with mets to brain s/p Ommaya reservoir 11/2017 for leptomeningeal disease s/p chemo and radiation, anxiety, depression, h/o nephrolithiasis, h/o pericarditis presents to ED with complaint of fever and AMS. Pt is a lethargic and unable to obtain history from him. Does wake up to verbal command but falls back alseep. History obtained from pt's sister at bedside. As per sister pt completed 10 course radiation treatment 1 day ago. 3 days ago he was complaining of severe headache and also had episode of vomiting. He was seen at oncologist office and was given morphine and valium. This morning pt developed a fever 0f 103.6 and complained of rigors. He was also noted to be confused. He did have a cough which has been chronic.    In ED pt found to have temp of 103.6 rectally. Lactate of 2.8. CXR with left multiloculated effusion. Ct head with no acute pathology. Was given 5L NS and started on vanco and cefepime.     #Severe sepsis secondary to possible PNA (r/p parapneumonic effusion) vs r/o meningitis  - admit to med-surg  - IV abx - vanco and cefepime  - CT chest w/o contrast to evaluate effusion  - IV fluids  - NPO  - f/u blood and urine cultures  - isolation precautions  - ID consult - d/w Dr. Hardy  - neurosx consult for tap of ommaya reservoir for csf to r/o meningitis  - lactate 2.8, f/u repeat  - RVP negative    #Leukocytosis - multifactorial due to above and chronic steroid use  - monitor CBC    #Stage 4 Lung CA with mets to brain  - Onc consult Dr. Keane  - continue steroids  - consider palliative care consult pending clinical course    #DVT prophylaxis  - lovenox 46 yo male with PMH Stage 4 Lung CA with mets to brain s/p Ommaya reservoir 11/2017 for leptomeningeal disease s/p chemo and radiation, anxiety, depression, h/o nephrolithiasis, h/o pericarditis presents to ED with complaint of fever and AMS. Pt is a lethargic and unable to obtain history from him. Does wake up to verbal command but falls back alseep. History obtained from pt's sister at bedside. As per sister pt completed 10 course radiation treatment 1 day ago. 3 days ago he was complaining of severe headache and also had episode of vomiting. He was seen at oncologist office and was given morphine and valium. This morning pt developed a fever 0f 103.6 and complained of rigors. He was also noted to be confused. He did have a cough which has been chronic.    In ED pt found to have temp of 103.6 rectally. Lactate of 2.8. CXR with left multiloculated effusion. Ct head with no acute pathology. Was given 3L NS and started on vanco and cefepime.     #Severe sepsis secondary to possible PNA (r/p parapneumonic effusion) vs r/o meningitis  - admit to med-surg  - IV abx - vanco and cefepime  - CT chest w/o contrast to evaluate effusion  - IV fluids  - NPO  - f/u blood and urine cultures  - isolation precautions  - ID consult - d/w Dr. Hardy  - neurosx consult for tap of ommaya reservoir for csf to r/o meningitis  - lactate 2.8, f/u repeat  - RVP negative    #Leukocytosis - multifactorial due to above and chronic steroid use  - monitor CBC    #Stage 4 Lung CA with mets to brain  - Onc consult Dr. Keane  - continue steroids  - consider palliative care consult pending clinical course    #DVT prophylaxis  - lovenox 46 yo male with PMH Stage 4 Lung CA with mets to brain s/p Ommaya reservoir 11/2017 for leptomeningeal disease s/p chemo and radiation, anxiety, depression, h/o nephrolithiasis, h/o pericarditis presents to ED with complaint of fever and AMS. Pt is a lethargic and unable to obtain history from him. Does wake up to verbal command but falls back alseep. History obtained from pt's sister at bedside. As per sister pt completed 10 course radiation treatment 1 day ago. 3 days ago he was complaining of severe headache and also had episode of vomiting. He was seen at oncologist office and was given morphine and valium. This morning pt developed a fever 0f 103.6 and complained of rigors. He was also noted to be confused. He did have a cough which has been chronic.    In ED pt found to have temp of 103.6 rectally. Lactate of 2.8. CXR with left multiloculated effusion. Ct head with no acute pathology. Was given 3L NS and started on vanco and cefepime.     #Severe sepsis secondary to possible PNA (r/p parapneumonic effusion) vs r/o meningitis  - admit to med-surg  - IV abx - vanco and cefepime  - CT chest w/o contrast to evaluate effusion  - IV fluids  - NPO  - f/u blood and urine cultures  - isolation precautions  - ID consult - d/w Dr. Hardy  - neurosx consult for tap of ommaya reservoir for csf to r/o meningitis  - lactate 2.8, f/u repeat  - RVP negative    #Leukocytosis - multifactorial due to above and chronic steroid use  - monitor CBC    #Stage 4 Lung CA with mets to brain  - Onc consult Dr. Keane  - continue steroids  - consider palliative care consult pending clinical course    #DVT prophylaxis  - heparin sq

## 2018-01-11 NOTE — CONSULT NOTE ADULT - SUBJECTIVE AND OBJECTIVE BOX
HPI:  48 yo male with PMH Stage 4 Lung CA with mets to brain s/p Ommaya reservoir 2017 for leptomeningeal disease s/p chemo and radiation, anxiety, depression, h/o nephrolithiasis, h/o pericarditis presents to ED with complaint of fever and AMS. Pt is a lethargic and unable to obtain history from him. Does wake up to verbal command but falls back alseep. History obtained from pt's sister at bedside. As per sister pt completed 10 course radiation treatment 1 day ago. 3 days ago he was complaining of severe headache and also had episode of vomiting. He was seen at oncologist office and was given morphine and valium. This morning pt developed a fever 0f 103.6 and complained of rigors. He was also noted to be confused. He did have a cough which has been chronic.    In ED pt found to have temp of 103.6 rectally. Lactate of 2.8. CXR with left multiloculated effusion. Ct head with no acute pathology. Was given 3L NS and started on vanco and cefepime. (2018 16:20)    Pt seen and examined and d/w Dr Cee, Pt is know to our service s/p Ommaya placed for intrathecal chemo for metastatic lung CA. Pt had chemo 3 weeks ago.   pt appears ill, lethargic, confused. Pt fiance at bedside states he started c/o headache a few days ago and developed a fever today.     PAST MEDICAL & SURGICAL HISTORY:  Hearing loss: right ear-no device  Cancer with leptomeningeal spread  Dyspnea on exertion  Gait disturbance  Memory loss  Anxiety and depression  Erectile dysfunction, unspecified erectile dysfunction type  History of kidney stones  History of pericarditis  Pleural effusion  Syncope, unspecified syncope type: X3-4 episodes; s/p LINQ device placed  Lung cancer, upper lobe: right. metastatic non small cell Lung Carcinoma.  History of lumbar puncture: chemo placed  History of cardiac monitoring: s/p LINQ device placement 2017  History of lumbar puncture within last 21 days  History of vascular access device: PORT placed  Pleuritic chest pain: Pleurodesis left chest 10/20/2017  H/O bilateral inguinal hernia repair: as a child  Acute pericarditis, unspecified type: pericardial window 2015, 2015      Allergies    adhesives (Rash)  No Known Drug Allergies    Intolerances        MEDICATIONS  (STANDING):  cefepime  IVPB 2000 milliGRAM(s) IV Intermittent every 12 hours  colchicine 0.6 milliGRAM(s) Oral two times a day  dexamethasone     Tablet 2 milliGRAM(s) Oral three times a day  folic acid 1 milliGRAM(s) Oral daily  heparin  Injectable 5000 Unit(s) SubCutaneous every 8 hours  mirtazapine 15 milliGRAM(s) Oral at bedtime  pantoprazole    Tablet 40 milliGRAM(s) Oral before breakfast  sodium chloride 0.9%. 1000 milliLiter(s) (125 mL/Hr) IV Continuous <Continuous>  vancomycin  IVPB 1000 milliGRAM(s) IV Intermittent every 12 hours    MEDICATIONS  (PRN):  acetaminophen   Tablet 650 milliGRAM(s) Oral every 6 hours PRN For Temp greater than 38 C (100.4 F)  diazepam    Tablet 2 milliGRAM(s) Oral three times a day PRN anxiety  HYDROmorphone   Tablet 4 milliGRAM(s) Oral two times a day PRN Severe Pain (7 - 10)      SOCIAL HISTORY:    FAMILY HISTORY:  Family history of leukemia (Father)  Family history of cancer (Mother)      Vital Signs Last 24 Hrs  T(C): 38.8 (2018 21:06), Max: 39.8 (2018 11:13)  T(F): 101.8 (2018 21:06), Max: 103.6 (2018 11:13)  HR: 119 (2018 21:06) (95 - 128)  BP: 172/91 (2018 21:06) (141/97 - 172/91)  BP(mean): --  RR: 18 (2018 21:06) (13 - 22)  SpO2: 100% (2018 15:15) (98% - 100%)    LABS:                        15.0   31.0  )-----------( 405      ( 2018 11:19 )             46.4     01-11    131<L>  |  91<L>  |  22  ----------------------------<  105<H>  3.5   |  31  |  1.27    Ca    10.5<H>      2018 11:19    TPro  8.4<H>  /  Alb  3.3  /  TBili  0.8  /  DBili  x   /  AST  34  /  ALT  83<H>  /  AlkPhos  122<H>      PTT - ( 2018 11:19 )  PTT:27.3 sec  Urinalysis Basic - ( 2018 12:33 )    Color: Yellow / Appearance: Clear / S.015 / pH: x  Gluc: x / Ketone: Negative  / Bili: Negative / Urobili: Negative mg/dL   Blood: x / Protein: 30 mg/dL / Nitrite: Negative   Leuk Esterase: Negative / RBC: 0-2 /HPF / WBC 0-2   Sq Epi: x / Non Sq Epi: Occasional / Bacteria: Occasional        RADIOLOGY & ADDITIONAL STUDIES:  ~~~~~~~~~~~~~~~~~~~~~~~~~~~~~~  XAM:  CT BRAIN                            PROCEDURE DATE:  2018          INTERPRETATION:      CT head without IV contrast        CLINICAL INFORMATION:  Metastatic disease to brain.          TECHNIQUE: Contiguous axial 5 mm sections were obtained through the head.   Sagittal and coronal 2-D reformatted images were also obtained.   This   scan was performed using automatic exposure control (radiation dose   reduction software) to obtain a diagnostic image quality scan with   patient dose as low as reasonably achievable.     FINDINGS:   CT dated 2017 available for review.    The brain again demonstrates unchanged RIGHT frontal Ommaya catheter  in   place within the anterior horn of the RIGHT lateral ventricle. Faint   hyperdense lesions scattered throughout the brain consistent with   patient's known metastatic disease. IV contrast would be needed for   complete evaluation. No acute cerebral cortical infarct is seen.  No   intracranial hemorrhage is found.  No mass effect is found in the brain.      The ventricles, sulci and basal cisterns appear unremarkable.         The orbits are unremarkable.  The paranasal sinuses are clear.  The nasal   cavity appears intact.  The nasopharynx is symmetric.  The central skull   base, petrous temporal bones and calvarium remain intact.      IMPRESSION:   unchanged RIGHT frontal Ommaya catheter  in place within   the anterior horn of the RIGHT lateral ventricle. Faint hyperdense   lesions scattered throughout the brain consistent with patient's known   metastatic disease. IV contrast would be needed for complete evaluation.                   LESLIE BURNHAM M.D., ATTENDING RADIOLOGIST  This document has been electronically signed. 2018  1:20PM

## 2018-01-11 NOTE — H&P ADULT - NSHPOUTPATIENTPROVIDERS_GEN_ALL_CORE
PCP: Dr. Cervantes  Onc: Dr. Dagoberto Londono Onc: Dr. Aiken  NeuroSx: Dr. austin  Cardio: Dr. King  CTSx: Dr. Salvador

## 2018-01-11 NOTE — H&P ADULT - HISTORY OF PRESENT ILLNESS
46 yo male with PMH Stage 4 Lung CA with mets to brain s/p Ommaya reservoir 11/2017 for leptomeningeal disease s/p chemo and radiation, anxiety, depression, h/o nephrolithiasis, h/o pericarditis presents to ED with complaint of fever and AMS. Pt is a lethargic and unable to obtain history from him. Does wake up to verbal command but falls back alseep. History obtained from pt's sister at bedside. As per sister pt completed 10 course radiation treatment 1 day ago. 3 days ago he was complaining of severe headache and also had episode of vomiting. He was seen at oncologist office and was given morphine and valium. This morning pt developed a fever 0f 103.6 and complained of rigors. He was also noted to be confused. He did have a cough which has been chronic.    In ED pt found to have temp of 103.6 rectally. Lactate of 2.8. CXR with left multiloculated effusion. Ct head with no acute pathology. Was given 5L NS and started on vanco and cefepime. 46 yo male with PMH Stage 4 Lung CA with mets to brain s/p Ommaya reservoir 11/2017 for leptomeningeal disease s/p chemo and radiation, anxiety, depression, h/o nephrolithiasis, h/o pericarditis presents to ED with complaint of fever and AMS. Pt is a lethargic and unable to obtain history from him. Does wake up to verbal command but falls back alseep. History obtained from pt's sister at bedside. As per sister pt completed 10 course radiation treatment 1 day ago. 3 days ago he was complaining of severe headache and also had episode of vomiting. He was seen at oncologist office and was given morphine and valium. This morning pt developed a fever 0f 103.6 and complained of rigors. He was also noted to be confused. He did have a cough which has been chronic.    In ED pt found to have temp of 103.6 rectally. Lactate of 2.8. CXR with left multiloculated effusion. Ct head with no acute pathology. Was given 3L NS and started on vanco and cefepime.

## 2018-01-11 NOTE — PROCEDURE NOTE - NSSITEPREP_SKIN_A_CORE
povidone iodine (if allergic to chlorhexidine)/Adherence to aseptic technique: hand hygiene prior to donning barriers (gown, gloves), don cap and mask, sterile drape over patient/alcohol/chlorhexidine

## 2018-01-11 NOTE — CONSULT NOTE ADULT - SUBJECTIVE AND OBJECTIVE BOX
HPI:  48 yo male with PMH Stage 4 Lung CA with mets to brain s/p Ommaya reservoir 11/2017 for leptomeningeal disease s/p chemo and radiation, anxiety, depression, h/o nephrolithiasis, h/o pericarditis presents to ED with complaint of fever and AMS. Pt is a lethargic and unable to obtain history from him. Does wake up to verbal command but falls back alseep. History obtained from pt's sister at bedside. As per sister pt completed 10 course radiation treatment 1 day ago. 3 days ago he was complaining of severe headache and also had episode of vomiting. He was seen at oncologist office and was given morphine and valium. This morning pt developed a fever 0f 103.6 and complained of rigors. He was also noted to be confused. He did have a cough which has been chronic.    In ED pt found to have temp of 103.6 rectally. Lactate of 2.8. CXR with left multiloculated effusion. Ct head with no acute pathology. Was given 3L NS and started on vanco and cefepime.     Oncologist Dr Arenas     Radiation Oncologist Dr ERIKA Buckley  Patient initially diagnosed in October 2015 with metastatic adenocarcinoma of lung EGFR mutated. S/p targeted therapy.  In fall 2017- progression of disease- multiple brain metastases, extensive  leptomeningeal disease, worsening pleural effusion.  S/p two cycles of systemic chemotherapy ( pemetrexed, carboplatin, avastin) and several doses of intrathecal methotrexate.    Last chemotherapy - on December 12, 2017 ( both systemic and IT).  Received WBRT- completed course of 10 treatments yesterday. Scheduled to resume systemic therapy next week.     PAST MEDICAL & SURGICAL HISTORY:  Hearing loss: right ear-no device  Cancer with leptomeningeal spread  Dyspnea on exertion  Gait disturbance  Memory loss  Anxiety and depression  Erectile dysfunction, unspecified erectile dysfunction type  History of kidney stones  History of pericarditis  Pleural effusion  Syncope, unspecified syncope type: X3-4 episodes; s/p LINQ device placed  Lung cancer, upper lobe: right. metastatic non small cell Lung Carcinoma.  History of lumbar puncture: chemo placed  History of cardiac monitoring: s/p LINQ device placement 4/2017  History of lumbar puncture within last 21 days  History of vascular access device: PORT placed  Pleuritic chest pain: Pleurodesis left chest 10/20/2017  H/O bilateral inguinal hernia repair: as a child  Acute pericarditis, unspecified type: pericardial window 11/2015, 12/2015      Social Hx: lives with family, former light smoker ( very light smoker for short period of time)     Family hx: father- leukemia    ROS: as above. generalized swelling- improved with lasix     Vital Signs Last 24 Hrs  T(C): 36.8 (11 Jan 2018 17:02), Max: 39.8 (11 Jan 2018 11:13)  T(F): 98.2 (11 Jan 2018 17:02), Max: 103.6 (11 Jan 2018 11:13)  HR: 95 (11 Jan 2018 17:02) (95 - 128)  BP: 169/101 (11 Jan 2018 17:02) (141/97 - 171/116)  BP(mean): --  RR: 18 (11 Jan 2018 17:02) (13 - 22)  SpO2: 100% (11 Jan 2018 15:15) (98% - 100%)    Lethargic, confused,  oriented to person  Omaya site and mediport site look OK.  Questionable neck stiffness.  Lungs - decreased BS. Heart tachycardia with slight murmur. Abdomen soft. Leg slight edema.   Neuro : moving all extremities.     EXAM:  XR CHEST AP OR PA 1V                            PROCEDURE DATE:  01/11/2018          INTERPRETATION:  Portable chest radiograph dated 1/11/2018.    COMPARISON: 11/28/2017.    CLINICAL INFORMATION: Fever.    FINDINGS:    The airway is midline.   Continued application of the right IJ line, distal tip is in the distal   superior vena cava.  The multi loculated left pleural effusion is  slightly smaller and the   left lower lobe is slightly better expanded at this time. The right lung   is clear.  The cardiac silhouette is normal size.   The bones are normal.     IMPRESSION:  A multiloculated left pleural effusion is slightly smaller and the left   lower lobe is slightly better expanded at this time.      SVETLANA PAREDES M.D., ATTENDING RADIOLOGIST  This document has been electronically signed. Jan 11 2018  2:18PM            < from: CT Head No Cont (01.11.18 @ 13:09) >    EXAM:  CT BRAIN                            PROCEDURE DATE:  01/11/2018          INTERPRETATION:      CT head without IV contrast        CLINICAL INFORMATION:  Metastatic disease to brain.          TECHNIQUE: Contiguous axial 5 mm sections were obtained through the head.   Sagittal and coronal 2-D reformatted images were also obtained.   This   scan was performed using automatic exposure control (radiation dose   reduction software) to obtain a diagnostic image quality scan with   patient dose as low as reasonably achievable.     FINDINGS:   CT dated 11/27/2017 available for review.    The brain again demonstrates unchanged RIGHT frontal Ommaya catheter  in   place within the anterior horn of the RIGHT lateral ventricle. Faint   hyperdense lesions scattered throughout the brain consistent with   patient's known metastatic disease. IV contrast would be needed for   complete evaluation. No acute cerebral cortical infarct is seen.  No   intracranial hemorrhage is found.  No mass effect is found in the brain.      The ventricles, sulci and basal cisterns appear unremarkable.         The orbits are unremarkable.  The paranasal sinuses are clear.  The nasal   cavity appears intact.  The nasopharynx is symmetric.  The central skull   base, petrous temporal bones and calvarium remain intact.      IMPRESSION:   unchanged RIGHT frontal Ommaya catheter  in place within   the anterior horn of the RIGHT lateral ventricle. Faint hyperdense   lesions scattered throughout the brain consistent with patient's known   metastatic disease. IV contrast would be needed for complete evaluation.         LESLIE BURNHAM M.D., ATTENDING RADIOLOGIST  This document has been electronically signed. Jan 11 2018  1:20PM                            15.0   31.0  )-----------( 405      ( 11 Jan 2018 11:19 )             46.4   01-11    131<L>  |  91<L>  |  22  ----------------------------<  105<H>  3.5   |  31  |  1.27    Ca    10.5<H>      11 Jan 2018 11:19    TPro  8.4<H>  /  Alb  3.3  /  TBili  0.8  /  DBili  x   /  AST  34  /  ALT  83<H>  /  AlkPhos  122<H>  01-11    Lactate, Blood: 2.8 mmol/L (01.11.18 @ 16:03)        MEDICATIONS  (STANDING):  cefepime  IVPB 2000 milliGRAM(s) IV Intermittent every 12 hours  colchicine 0.6 milliGRAM(s) Oral two times a day  dexamethasone     Tablet 2 milliGRAM(s) Oral three times a day  folic acid 1 milliGRAM(s) Oral daily  heparin  Injectable 5000 Unit(s) SubCutaneous every 8 hours  mirtazapine 15 milliGRAM(s) Oral at bedtime  pantoprazole    Tablet 40 milliGRAM(s) Oral before breakfast  sodium chloride 0.9%. 1000 milliLiter(s) (125 mL/Hr) IV Continuous <Continuous>  vancomycin  IVPB 1000 milliGRAM(s) IV Intermittent every 12 hours    MEDICATIONS  (PRN):  acetaminophen   Tablet 650 milliGRAM(s) Oral every 6 hours PRN For Temp greater than 38 C (100.4 F)  diazepam    Tablet 2 milliGRAM(s) Oral three times a day PRN anxiety  HYDROmorphone   Tablet 4 milliGRAM(s) Oral two times a day PRN Severe Pain (7 - 10)

## 2018-01-11 NOTE — PROCEDURE NOTE - GENERAL PROCEDURE DETAILS
site was prepped with chlorhexidine scrub, betadine swab and alcohol and draped in usual sterile fashion using sterile technique 25gauge butterfly needle was placed into ommaya reservoir, CSF was easily removed using a 5cc syringe. pt tolerated procedure well, no complications

## 2018-01-11 NOTE — ED PROVIDER NOTE - PROGRESS NOTE DETAILS
Code sepsis called and pt seen upon placement in bed here in [facility]. Aramis DO: s/o to Hospitalist pending admission at this time.

## 2018-01-11 NOTE — PROCEDURE NOTE - PROCEDURE
<<-----Click on this checkbox to enter Procedure Ventriculoperitoneal shunt tap  01/11/2018    Active  MNIELSEN

## 2018-01-11 NOTE — CONSULT NOTE ADULT - MENTAL STATUS
sleepy but arousable to voice, falls asleep easily, lethargic, oriented to person and place  clear coherent speech   follows commands well   sym facies   EOMI, pupils equal reactive   VARGAS x4 antigravity strong   sensation intact

## 2018-01-11 NOTE — CONSULT NOTE ADULT - ASSESSMENT
Unfortunate 48 y/o male with metastatic adenocarcinoma of lung, with brain metastases and extensive leptomeningeal disease , s/p systemic and intrathecal  chemotherapy , s/p recent WBRT now with fever and acute mental status change, leukocytosis, elevated lactate.  Sepsis. R/o pneumonia. R/o meningitis.  Panculture. CT chest  pending.  Neurosurgery evaluation for CSF sampling from [facility]   Empiric antibiotics- on Cefepime and Vanco.   Continue steroids.  DVT prophylaxis.   Thanks. Will follow closely.

## 2018-01-11 NOTE — CONSULT NOTE ADULT - SKIN
detailed exam bilateral lower extremities + excoriations, and multiple small nonblanchable petechial

## 2018-01-11 NOTE — ED PROVIDER NOTE - MEDICAL DECISION MAKING DETAILS
46 y/o M PMhx of lung CA with mets to brain, receiving chemo tx with plans to receive sepsis workup. 46 y/o M PMhx of lung CA with mets to brain, on radiation tx; sepsis protocol intiated; will admit.

## 2018-01-11 NOTE — H&P ADULT - NSHPPHYSICALEXAM_GEN_ALL_CORE
Vital Signs Last 24 Hrs  T(C): 37.6 (11 Jan 2018 15:15), Max: 39.8 (11 Jan 2018 11:13)  T(F): 99.6 (11 Jan 2018 15:15), Max: 103.6 (11 Jan 2018 11:13)  HR: 100 (11 Jan 2018 15:15) (100 - 128)  BP: 151/95 (11 Jan 2018 15:15) (141/97 - 171/116)  RR: 18 (11 Jan 2018 15:15) (13 - 22)  SpO2: 100% (11 Jan 2018 15:15) (98% - 100%)

## 2018-01-11 NOTE — CONSULT NOTE ADULT - ASSESSMENT
46 yo male with PMH Stage 4 Lung CA with mets to brain s/p Ommaya reservoir 11/2017 for leptomeningeal disease s/p chemo and radiation, anxiety, depression, h/o nephrolithiasis, h/o pericarditis presents to ED with complaint of fever and AMS.  - UA negative   - blood cultures pending   - ID consult called   - per Dr Cee Ommaya reservoir tapped for CSF- hematology, chemistry, gram stain and culture sent r/o meningitis   - pt started on vanco and cefapime   - on IVF @125cc/hr   - given IV tylenol and IVF bolus   - pt admitted recommend close monitoring for sepsis   - will continue to follow   - all above d/w Dr cee who agrees with the plan

## 2018-01-11 NOTE — ED PROVIDER NOTE - OBJECTIVE STATEMENT
(Code Sepsis) 46 y/o M with a PMhx of stage IV lung CA with metastasis to brain, x2 pericardial windows, anxiety, depression, kidney stones, pericarditis, pleural effusion presents to the ED c/o worsening fever, confusion as per steff who is at bedside providing hx. Pt family states that he recently received chemo therapy tx, unable to provide hx himself, currently lethargic and denies abd pain, NVD, CP, extremity pain or any other acute c/o at this time. Pt febrile here in ED with temp of 103.6 (Code Sepsis) 46 y/o M with a PMhx of stage IV lung CA with metastasis to brain; anxiety, depression, kidney stones, pericarditis, pleural effusion presents to ED with "shaking"- x prior to arrival; found to have fever upon arrival; required my immediate response; per patient's family- patient with intermittent confusion with hx of brain mets; undergoing radiation tx- last session this week.

## 2018-01-12 ENCOUNTER — APPOINTMENT (OUTPATIENT)
Dept: NEUROSURGERY | Facility: HOSPITAL | Age: 48
End: 2018-01-12

## 2018-01-12 LAB
ANION GAP SERPL CALC-SCNC: 9 MMOL/L — SIGNIFICANT CHANGE UP (ref 5–17)
ANISOCYTOSIS BLD QL: SLIGHT — SIGNIFICANT CHANGE UP
BASE EXCESS BLDA CALC-SCNC: -2.6 MMOL/L — LOW (ref -2–2)
BLD GP AB SCN SERPL QL: SIGNIFICANT CHANGE UP
BLOOD GAS COMMENTS ARTERIAL: SIGNIFICANT CHANGE UP
BUN SERPL-MCNC: 16 MG/DL — SIGNIFICANT CHANGE UP (ref 7–23)
CALCIUM SERPL-MCNC: 10.2 MG/DL — HIGH (ref 8.5–10.1)
CHLORIDE SERPL-SCNC: 95 MMOL/L — LOW (ref 96–108)
CO2 SERPL-SCNC: 27 MMOL/L — SIGNIFICANT CHANGE UP (ref 22–31)
CREAT SERPL-MCNC: 1.03 MG/DL — SIGNIFICANT CHANGE UP (ref 0.5–1.3)
CULTURE RESULTS: SIGNIFICANT CHANGE UP
GLUCOSE BLDC GLUCOMTR-MCNC: 117 MG/DL — HIGH (ref 70–99)
GLUCOSE SERPL-MCNC: 139 MG/DL — HIGH (ref 70–99)
GRAM STN FLD: SIGNIFICANT CHANGE UP
HCO3 BLDA-SCNC: 20 MMOL/L — LOW (ref 21–29)
HCT VFR BLD CALC: 45.7 % — SIGNIFICANT CHANGE UP (ref 39–50)
HGB BLD-MCNC: 15 G/DL — SIGNIFICANT CHANGE UP (ref 13–17)
HOROWITZ INDEX BLDA+IHG-RTO: 50 — SIGNIFICANT CHANGE UP
LACTATE SERPL-SCNC: 2.4 MMOL/L — HIGH (ref 0.7–2)
LYMPHOCYTES # BLD AUTO: 0.9 K/UL — LOW (ref 1–3.3)
LYMPHOCYTES # BLD AUTO: 4 % — LOW (ref 13–44)
LYMPHOCYTES # CSF: 3 % — LOW (ref 40–80)
MACROCYTES BLD QL: SLIGHT — SIGNIFICANT CHANGE UP
MANUAL DIF COMMENT BLD-IMP: SIGNIFICANT CHANGE UP
MCHC RBC-ENTMCNC: 30.5 PG — SIGNIFICANT CHANGE UP (ref 27–34)
MCHC RBC-ENTMCNC: 32.8 GM/DL — SIGNIFICANT CHANGE UP (ref 32–36)
MCV RBC AUTO: 93 FL — SIGNIFICANT CHANGE UP (ref 80–100)
MONOCYTES # BLD AUTO: 1.8 K/UL — HIGH (ref 0–0.9)
MONOCYTES NFR BLD AUTO: 4 % — SIGNIFICANT CHANGE UP (ref 2–14)
MONOS+MACROS NFR CSF: 1 % — LOW (ref 15–45)
NEUTROPHILS # CSF: 96 % — HIGH (ref 0–6)
NEUTROPHILS NFR BLD AUTO: 91 % — HIGH (ref 43–77)
NEUTS BAND # BLD: 1 % — SIGNIFICANT CHANGE UP (ref 0–8)
NIGHT BLUE STAIN TISS: SIGNIFICANT CHANGE UP
OVALOCYTES BLD QL SMEAR: SLIGHT — SIGNIFICANT CHANGE UP
PCO2 BLDA: 31 MMHG — LOW (ref 32–46)
PH BLDA: 7.43 — SIGNIFICANT CHANGE UP (ref 7.35–7.45)
PLAT MORPH BLD: NORMAL — SIGNIFICANT CHANGE UP
PLATELET # BLD AUTO: 382 K/UL — SIGNIFICANT CHANGE UP (ref 150–400)
PO2 BLDA: 175 MMHG — HIGH (ref 74–108)
POIKILOCYTOSIS BLD QL AUTO: SLIGHT — SIGNIFICANT CHANGE UP
POLYCHROMASIA BLD QL SMEAR: SLIGHT — SIGNIFICANT CHANGE UP
POTASSIUM SERPL-MCNC: 3.8 MMOL/L — SIGNIFICANT CHANGE UP (ref 3.5–5.3)
POTASSIUM SERPL-SCNC: 3.8 MMOL/L — SIGNIFICANT CHANGE UP (ref 3.5–5.3)
RBC # BLD: 4.92 M/UL — SIGNIFICANT CHANGE UP (ref 4.2–5.8)
RBC # FLD: 19.7 % — HIGH (ref 10.3–14.5)
RBC BLD AUTO: (no result)
SAO2 % BLDA: 99 % — HIGH (ref 92–96)
SODIUM SERPL-SCNC: 131 MMOL/L — LOW (ref 135–145)
SPECIMEN SOURCE: SIGNIFICANT CHANGE UP
TOXIC GRANULES BLD QL SMEAR: PRESENT — SIGNIFICANT CHANGE UP
TYPE + AB SCN PNL BLD: SIGNIFICANT CHANGE UP
WBC # BLD: 26.8 K/UL — HIGH (ref 3.8–10.5)
WBC # FLD AUTO: 26.8 K/UL — HIGH (ref 3.8–10.5)

## 2018-01-12 PROCEDURE — 62365 REMOVE SPINE INFUSION DEVICE: CPT | Mod: 78

## 2018-01-12 PROCEDURE — 99232 SBSQ HOSP IP/OBS MODERATE 35: CPT

## 2018-01-12 PROCEDURE — 93306 TTE W/DOPPLER COMPLETE: CPT | Mod: 26

## 2018-01-12 PROCEDURE — 62355 REMOVE SPINAL CANAL CATHETER: CPT | Mod: 78

## 2018-01-12 PROCEDURE — 99223 1ST HOSP IP/OBS HIGH 75: CPT

## 2018-01-12 RX ORDER — AMPICILLIN TRIHYDRATE 250 MG
2 CAPSULE ORAL ONCE
Qty: 0 | Refills: 0 | Status: COMPLETED | OUTPATIENT
Start: 2018-01-12 | End: 2018-01-12

## 2018-01-12 RX ORDER — PANTOPRAZOLE SODIUM 20 MG/1
40 TABLET, DELAYED RELEASE ORAL DAILY
Qty: 0 | Refills: 0 | Status: DISCONTINUED | OUTPATIENT
Start: 2018-01-13 | End: 2018-01-16

## 2018-01-12 RX ORDER — CEFEPIME 1 G/1
INJECTION, POWDER, FOR SOLUTION INTRAMUSCULAR; INTRAVENOUS
Qty: 0 | Refills: 0 | Status: DISCONTINUED | OUTPATIENT
Start: 2018-01-12 | End: 2018-01-22

## 2018-01-12 RX ORDER — ACETAMINOPHEN 500 MG
650 TABLET ORAL EVERY 6 HOURS
Qty: 0 | Refills: 0 | Status: DISCONTINUED | OUTPATIENT
Start: 2018-01-12 | End: 2018-01-19

## 2018-01-12 RX ORDER — CEFEPIME 1 G/1
2000 INJECTION, POWDER, FOR SOLUTION INTRAMUSCULAR; INTRAVENOUS EVERY 8 HOURS
Qty: 0 | Refills: 0 | Status: DISCONTINUED | OUTPATIENT
Start: 2018-01-12 | End: 2018-01-22

## 2018-01-12 RX ORDER — MORPHINE SULFATE 50 MG/1
1 CAPSULE, EXTENDED RELEASE ORAL ONCE
Qty: 0 | Refills: 0 | Status: DISCONTINUED | OUTPATIENT
Start: 2018-01-12 | End: 2018-01-12

## 2018-01-12 RX ORDER — ALBUTEROL 90 UG/1
2.5 AEROSOL, METERED ORAL EVERY 6 HOURS
Qty: 0 | Refills: 0 | Status: DISCONTINUED | OUTPATIENT
Start: 2018-01-12 | End: 2018-01-31

## 2018-01-12 RX ORDER — AMPICILLIN TRIHYDRATE 250 MG
2 CAPSULE ORAL EVERY 6 HOURS
Qty: 0 | Refills: 0 | Status: DISCONTINUED | OUTPATIENT
Start: 2018-01-12 | End: 2018-01-12

## 2018-01-12 RX ORDER — CHLORHEXIDINE GLUCONATE 213 G/1000ML
15 SOLUTION TOPICAL
Qty: 0 | Refills: 0 | Status: DISCONTINUED | OUTPATIENT
Start: 2018-01-12 | End: 2018-01-16

## 2018-01-12 RX ORDER — VANCOMYCIN HCL 1 G
1500 VIAL (EA) INTRAVENOUS EVERY 12 HOURS
Qty: 0 | Refills: 0 | Status: DISCONTINUED | OUTPATIENT
Start: 2018-01-12 | End: 2018-01-13

## 2018-01-12 RX ORDER — ALBUTEROL 90 UG/1
1 AEROSOL, METERED ORAL EVERY 4 HOURS
Qty: 0 | Refills: 0 | Status: DISCONTINUED | OUTPATIENT
Start: 2018-01-12 | End: 2018-01-31

## 2018-01-12 RX ORDER — DEXAMETHASONE 0.5 MG/5ML
2 ELIXIR ORAL THREE TIMES A DAY
Qty: 0 | Refills: 0 | Status: DISCONTINUED | OUTPATIENT
Start: 2018-01-12 | End: 2018-01-24

## 2018-01-12 RX ORDER — AMPICILLIN TRIHYDRATE 250 MG
CAPSULE ORAL
Qty: 0 | Refills: 0 | Status: DISCONTINUED | OUTPATIENT
Start: 2018-01-12 | End: 2018-01-12

## 2018-01-12 RX ORDER — CEFEPIME 1 G/1
2000 INJECTION, POWDER, FOR SOLUTION INTRAMUSCULAR; INTRAVENOUS ONCE
Qty: 0 | Refills: 0 | Status: COMPLETED | OUTPATIENT
Start: 2018-01-12 | End: 2018-01-12

## 2018-01-12 RX ORDER — PROPOFOL 10 MG/ML
5 INJECTION, EMULSION INTRAVENOUS
Qty: 1000 | Refills: 0 | Status: DISCONTINUED | OUTPATIENT
Start: 2018-01-12 | End: 2018-01-14

## 2018-01-12 RX ADMIN — PANTOPRAZOLE SODIUM 40 MILLIGRAM(S): 20 TABLET, DELAYED RELEASE ORAL at 05:59

## 2018-01-12 RX ADMIN — MORPHINE SULFATE 1 MILLIGRAM(S): 50 CAPSULE, EXTENDED RELEASE ORAL at 01:36

## 2018-01-12 RX ADMIN — Medication 0.6 MILLIGRAM(S): at 05:59

## 2018-01-12 RX ADMIN — CHLORHEXIDINE GLUCONATE 15 MILLILITER(S): 213 SOLUTION TOPICAL at 18:53

## 2018-01-12 RX ADMIN — CEFEPIME 100 MILLIGRAM(S): 1 INJECTION, POWDER, FOR SOLUTION INTRAMUSCULAR; INTRAVENOUS at 15:59

## 2018-01-12 RX ADMIN — Medication 650 MILLIGRAM(S): at 12:04

## 2018-01-12 RX ADMIN — CEFEPIME 100 MILLIGRAM(S): 1 INJECTION, POWDER, FOR SOLUTION INTRAMUSCULAR; INTRAVENOUS at 05:00

## 2018-01-12 RX ADMIN — Medication 2 MILLIGRAM(S): at 06:04

## 2018-01-12 RX ADMIN — Medication 650 MILLIGRAM(S): at 22:30

## 2018-01-12 RX ADMIN — Medication 216 GRAM(S): at 10:03

## 2018-01-12 RX ADMIN — CEFEPIME 100 MILLIGRAM(S): 1 INJECTION, POWDER, FOR SOLUTION INTRAMUSCULAR; INTRAVENOUS at 22:32

## 2018-01-12 RX ADMIN — SODIUM CHLORIDE 125 MILLILITER(S): 9 INJECTION INTRAMUSCULAR; INTRAVENOUS; SUBCUTANEOUS at 12:16

## 2018-01-12 RX ADMIN — PROPOFOL 2.52 MICROGRAM(S)/KG/MIN: 10 INJECTION, EMULSION INTRAVENOUS at 21:41

## 2018-01-12 RX ADMIN — Medication 2 MILLIGRAM(S): at 22:27

## 2018-01-12 RX ADMIN — Medication 250 MILLIGRAM(S): at 18:53

## 2018-01-12 RX ADMIN — HEPARIN SODIUM 5000 UNIT(S): 5000 INJECTION INTRAVENOUS; SUBCUTANEOUS at 06:04

## 2018-01-12 RX ADMIN — Medication 250 MILLIGRAM(S): at 06:10

## 2018-01-12 RX ADMIN — Medication 2 MILLIGRAM(S): at 13:27

## 2018-01-12 RX ADMIN — MORPHINE SULFATE 1 MILLIGRAM(S): 50 CAPSULE, EXTENDED RELEASE ORAL at 03:19

## 2018-01-12 RX ADMIN — Medication 650 MILLIGRAM(S): at 09:30

## 2018-01-12 NOTE — CONSULT NOTE ADULT - SUBJECTIVE AND OBJECTIVE BOX
Patient is a 47y old  Male who presents with a chief complaint of fever, AMS (2018 16:20)    HPI:  46 y/o male with h/o Stage 4 Lung CA with mets to brain s/p Ommaya reservoir 2017 for leptomeningeal disease s/p chemo and radiation, anxiety, depression, h/o nephrolithiasis, h/o pericarditis was admitted on  for fever and AMS. Pt was lethargic and unable to give history. As per sister, pt completed 10 course radiation treatment 1 day PTA. 3 days ago he was complaining of severe headache and also had episode of vomiting. He was seen at oncologist office and was given morphine and valium. The day of admission, the pt developed a fever 0f 103.6F and complained of rigors. He was also noted to be confused.  In ED pt found to have temp of 103.6 rectally. He received vanco IV and cefepime.     This AM his fever is down and the patient is resting more comfortable  s/p CSF sampling    PMH: as above  PSH: as above  Meds: per reconciliation sheet, noted below  MEDICATIONS  (STANDING):  cefepime  IVPB 2000 milliGRAM(s) IV Intermittent every 12 hours  colchicine 0.6 milliGRAM(s) Oral two times a day  dexamethasone     Tablet 2 milliGRAM(s) Oral three times a day  folic acid 1 milliGRAM(s) Oral daily  heparin  Injectable 5000 Unit(s) SubCutaneous every 8 hours  mirtazapine 15 milliGRAM(s) Oral at bedtime  pantoprazole    Tablet 40 milliGRAM(s) Oral before breakfast  sodium chloride 0.9%. 1000 milliLiter(s) (125 mL/Hr) IV Continuous <Continuous>  vancomycin  IVPB 1000 milliGRAM(s) IV Intermittent every 12 hours    MEDICATIONS  (PRN):  acetaminophen   Tablet 650 milliGRAM(s) Oral every 6 hours PRN For Temp greater than 38 C (100.4 F)  diazepam    Tablet 2 milliGRAM(s) Oral three times a day PRN anxiety  HYDROmorphone   Tablet 4 milliGRAM(s) Oral two times a day PRN Severe Pain (7 - 10)    Allergies    adhesives (Rash)  No Known Drug Allergies    Intolerances      Social: no smoking, no alcohol, no illegal drugs; no recent travel, no exposure to TB  FAMILY HISTORY:  Family history of leukemia (Father)  Family history of cancer (Mother)    ROS: the patient denies dizziness, had severe HA, no sore throat, no blurry vision, no CP, no palpitations, no SOB, no cough, no abdominal pain, no diarrhea, had N/V, no dysuria, no leg pain, no claudication, no rash, no joint aches, no rectal pain or bleeding, no night sweats    Vital Signs Last 24 Hrs  T(C): 36.8 (2018 04:28), Max: 39.8 (2018 11:13)  T(F): 98.2 (2018 04:28), Max: 103.6 (2018 11:13)  HR: 82 (2018 04:28) (82 - 128)  BP: 166/92 (2018 04:28) (134/92 - 172/91)  BP(mean): --  RR: 18 (2018 04:28) (13 - 22)  SpO2: 95% (2018 04:28) (95% - 100%)  Daily Height in cm: 170.18 (2018 11:02)    Daily     PE:    Constitutional: frail looking  HEENT: NC/AT, EOMI, PERRLA  Neck: supple  Back: no tenderness  Respiratory: crackles at bases; decreased BS  Cardiovascular: S1S2 regular, no murmurs  Abdomen: soft, not tender, not distended, positive BS  Genitourinary: no LN palpable  Rectal: deferred  Musculoskeletal: no muscle tenderness, no joint swelling or tenderness  Extremities: no pedal edema  Neurological: AxOx3, moving all extremities  Skin: no rashes    Labs:                        15.0   26.8  )-----------( 382      ( 2018 07:30 )             45.7     01-12    131<L>  |  95<L>  |  16  ----------------------------<  139<H>  3.8   |  27  |  1.03    Ca    10.2<H>      2018 07:30    TPro  8.4<H>  /  Alb  3.3  /  TBili  0.8  /  DBili  x   /  AST  34  /  ALT  83<H>  /  AlkPhos  122<H>  01-11     LIVER FUNCTIONS - ( 2018 11:19 )  Alb: 3.3 g/dL / Pro: 8.4 gm/dL / ALK PHOS: 122 U/L / ALT: 83 U/L / AST: 34 U/L / GGT: x           Urinalysis Basic - ( 2018 12:33 )    Color: Yellow / Appearance: Clear / S.015 / pH: x  Gluc: x / Ketone: Negative  / Bili: Negative / Urobili: Negative mg/dL   Blood: x / Protein: 30 mg/dL / Nitrite: Negative   Leuk Esterase: Negative / RBC: 0-2 /HPF / WBC 0-2   Sq Epi: x / Non Sq Epi: Occasional / Bacteria: Occasional      Culture - CSF with Gram Stain (collected 2018 21:14)  Source: .CSF  Gram Stain (2018 01:16):    polymorphonuclear leukocytes seen per low power field    Gram Variable Cocci seen per oil power field    by cytocentrifuge          Radiology:    < from: CT Chest No Cont (18 @ 17:21) >  Several scattered groundglass nodules and opacities within the right   upper lobe, suggestive of pneumonia given the clinical context of fever.   Small to right pleural effusion is present. Loculated left pleural   effusion along the left lateral pleura and within the fissures is again   identified. Small pericardial effusion is present.    < end of copied text >    < from: CT Head No Cont (18 @ 13:09) >   unchanged RIGHT frontal Ommaya catheter  in place within   the anterior horn of the RIGHT lateral ventricle. Faint hyperdense   lesions scattered throughout the brain consistent with patient's known   metastatic disease. IV contrast would be needed for complete evaluation.     < end of copied text >      Advanced directives addressed: full resuscitation Patient is a 47y old  Male who presents with a chief complaint of fever, AMS (2018 16:20)    HPI:  46 y/o male with h/o Stage 4 Lung CA with mets to brain s/p Ommaya reservoir 2017 for leptomeningeal disease s/p chemo and radiation, anxiety, depression, h/o nephrolithiasis, h/o pericarditis was admitted on  for fever and AMS. Pt was lethargic and unable to give history. As per sister, pt completed 10 course radiation treatment 1 day PTA. 3 days ago he was complaining of severe headache and also had episode of vomiting. He was seen at oncologist office and was given morphine and valium. The day of admission, the pt developed a fever 0f 103.6F and complained of rigors. He was also noted to be confused.  In ED pt found to have temp of 103.6 rectally. He received vanco IV and cefepime.     This AM his fever is down and the patient is resting more comfortable  s/p CSF sampling    PMH: as above  PSH: as above  Meds: per reconciliation sheet, noted below  MEDICATIONS  (STANDING):  cefepime  IVPB 2000 milliGRAM(s) IV Intermittent every 12 hours  colchicine 0.6 milliGRAM(s) Oral two times a day  dexamethasone     Tablet 2 milliGRAM(s) Oral three times a day  folic acid 1 milliGRAM(s) Oral daily  heparin  Injectable 5000 Unit(s) SubCutaneous every 8 hours  mirtazapine 15 milliGRAM(s) Oral at bedtime  pantoprazole    Tablet 40 milliGRAM(s) Oral before breakfast  sodium chloride 0.9%. 1000 milliLiter(s) (125 mL/Hr) IV Continuous <Continuous>  vancomycin  IVPB 1000 milliGRAM(s) IV Intermittent every 12 hours    MEDICATIONS  (PRN):  acetaminophen   Tablet 650 milliGRAM(s) Oral every 6 hours PRN For Temp greater than 38 C (100.4 F)  diazepam    Tablet 2 milliGRAM(s) Oral three times a day PRN anxiety  HYDROmorphone   Tablet 4 milliGRAM(s) Oral two times a day PRN Severe Pain (7 - 10)    Allergies    adhesives (Rash)  No Known Drug Allergies    Intolerances      Social: no smoking, no alcohol, no illegal drugs; no recent travel, no exposure to TB  FAMILY HISTORY:  Family history of leukemia (Father)  Family history of cancer (Mother)    ROS: the patient is confused; arousable    Vital Signs Last 24 Hrs  T(C): 36.8 (2018 04:28), Max: 39.8 (2018 11:13)  T(F): 98.2 (2018 04:28), Max: 103.6 (2018 11:13)  HR: 82 (2018 04:28) (82 - 128)  BP: 166/92 (2018 04:28) (134/92 - 172/91)  BP(mean): --  RR: 18 (2018 04:28) (13 - 22)  SpO2: 95% (2018 04:28) (95% - 100%)  Daily Height in cm: 170.18 (2018 11:02)    Daily     PE:    Constitutional: frail looking  HEENT: NC/AT, EOMI, PERRLA  Neck: supple  Back: no tenderness  Respiratory: crackles at bases; decreased BS  Cardiovascular: S1S2 regular, no murmurs  Abdomen: soft, not tender, not distended, positive BS  Genitourinary: no LN palpable  Rectal: deferred  Musculoskeletal: no muscle tenderness, no joint swelling or tenderness  Extremities: no pedal edema  Neurological: AxOx3, moving all extremities  Skin: no rashes    Labs:                        15.0   26.8  )-----------( 382      ( 2018 07:30 )             45.7     01-12    131<L>  |  95<L>  |  16  ----------------------------<  139<H>  3.8   |  27  |  1.03    Ca    10.2<H>      2018 07:30    TPro  8.4<H>  /  Alb  3.3  /  TBili  0.8  /  DBili  x   /  AST  34  /  ALT  83<H>  /  AlkPhos  122<H>  -11     LIVER FUNCTIONS - ( 2018 11:19 )  Alb: 3.3 g/dL / Pro: 8.4 gm/dL / ALK PHOS: 122 U/L / ALT: 83 U/L / AST: 34 U/L / GGT: x           Urinalysis Basic - ( 2018 12:33 )    Color: Yellow / Appearance: Clear / S.015 / pH: x  Gluc: x / Ketone: Negative  / Bili: Negative / Urobili: Negative mg/dL   Blood: x / Protein: 30 mg/dL / Nitrite: Negative   Leuk Esterase: Negative / RBC: 0-2 /HPF / WBC 0-2   Sq Epi: x / Non Sq Epi: Occasional / Bacteria: Occasional    Cerebrospinal Fluid Cell Count-1 (18 @ 21:14)    CSF Segmented Neutrophils: 96 %    Total Nucleated Cell Count, CSF: 744 /uL    CSF Appearance: Slightly Cloudy    CSF Lymphocytes: 3 %    CSF Monocytes/Macrophages: 1 %    CSF Color: See Note: slightly xanthrochromic        Culture - CSF with Gram Stain (collected 2018 21:14)  Source: .CSF  Gram Stain (2018 01:16):    polymorphonuclear leukocytes seen per low power field    Gram Variable Cocci seen per oil power field    by cytocentrifuge          Radiology:    < from: CT Chest No Cont (18 @ 17:21) >  Several scattered groundglass nodules and opacities within the right   upper lobe, suggestive of pneumonia given the clinical context of fever.   Small to right pleural effusion is present. Loculated left pleural   effusion along the left lateral pleura and within the fissures is again   identified. Small pericardial effusion is present.    < end of copied text >    < from: CT Head No Cont (18 @ 13:09) >   unchanged RIGHT frontal Ommaya catheter  in place within   the anterior horn of the RIGHT lateral ventricle. Faint hyperdense   lesions scattered throughout the brain consistent with patient's known   metastatic disease. IV contrast would be needed for complete evaluation.     < end of copied text >      Advanced directives addressed: full resuscitation

## 2018-01-12 NOTE — CONSULT NOTE ADULT - SUBJECTIVE AND OBJECTIVE BOX
HPI:  48 yo male with PMH Stage 4 Lung CA with mets to brain s/p Ommaya reservoir 2017 for leptomeningeal disease s/p chemo and radiation, anxiety, depression, h/o nephrolithiasis, h/o pericarditis presents to ED with complaint of fever and AMS. Pt is a lethargic and unable to obtain history from him. Does wake up to verbal command but falls back alseep. History obtained from pt's sister at bedside. As per sister pt completed 10 course radiation treatment 1 day ago. 3 days ago he was complaining of severe headache and also had episode of vomiting. He was seen at oncologist office and was given morphine and valium. This morning pt developed a fever 0f 103.6 and complained of rigors. He was also noted to be confused. He did have a cough which has been chronic.    In ED pt found to have temp of 103.6 rectally. Lactate of 2.8. CXR with left multiloculated effusion. Ct head with no acute pathology. Was given 3L NS and started on vanco and cefepime. (2018 16:20)    has chronic cough, no purulent sputum. Temp is down this AM. CT shows loculated L pleural effusion, patchy/nodular findings on R and moderate R effusion.      PAST MEDICAL & SURGICAL HISTORY:  Hearing loss: right ear-no device  Cancer with leptomeningeal spread  Dyspnea on exertion  Gait disturbance  Memory loss  Anxiety and depression  Erectile dysfunction, unspecified erectile dysfunction type  History of kidney stones  History of pericarditis  Pleural effusion  Syncope, unspecified syncope type: X3-4 episodes; s/p LINQ device placed  Lung cancer, upper lobe: right. metastatic non small cell Lung Carcinoma.  History of lumbar puncture: chemo placed  History of cardiac monitoring: s/p LINQ device placement 2017  History of lumbar puncture within last 21 days  History of vascular access device: PORT placed  Pleuritic chest pain: Pleurodesis left chest 10/20/2017  H/O bilateral inguinal hernia repair: as a child  Acute pericarditis, unspecified type: pericardial window 2015, 2015      MEDICATIONS  (STANDING):  cefepime  IVPB 2000 milliGRAM(s) IV Intermittent every 12 hours  colchicine 0.6 milliGRAM(s) Oral two times a day  dexamethasone     Tablet 2 milliGRAM(s) Oral three times a day  folic acid 1 milliGRAM(s) Oral daily  heparin  Injectable 5000 Unit(s) SubCutaneous every 8 hours  mirtazapine 15 milliGRAM(s) Oral at bedtime  pantoprazole    Tablet 40 milliGRAM(s) Oral before breakfast  sodium chloride 0.9%. 1000 milliLiter(s) (125 mL/Hr) IV Continuous <Continuous>  vancomycin  IVPB 1000 milliGRAM(s) IV Intermittent every 12 hours    MEDICATIONS  (PRN):  acetaminophen   Tablet 650 milliGRAM(s) Oral every 6 hours PRN For Temp greater than 38 C (100.4 F)  diazepam    Tablet 2 milliGRAM(s) Oral three times a day PRN anxiety  HYDROmorphone   Tablet 4 milliGRAM(s) Oral two times a day PRN Severe Pain (7 - 10)      Allergies    adhesives (Rash)  No Known Drug Allergies    Intolerances        SOCIAL HISTORY: Denies tobacco, etoh abuse or illicit drug use    FAMILY HISTORY:  Family history of leukemia (Father)  Family history of cancer (Mother)      Vital Signs Last 24 Hrs  T(C): 36.8 (2018 04:28), Max: 39.8 (2018 11:13)  T(F): 98.2 (2018 04:28), Max: 103.6 (2018 11:13)  HR: 82 (2018 04:28) (82 - 128)  BP: 166/92 (2018 04:28) (134/92 - 172/91)  BP(mean): --  RR: 18 (2018 04:28) (13 - 22)  SpO2: 95% (2018 04:28) (95% - 100%)    REVIEW OF SYSTEMS:    CONSTITUTIONAL:  As per HPI.  HEENT:  Eyes:  No diplopia or blurred vision. ENT:  No earache, sore throat or runny nose.  CARDIOVASCULAR:  No pressure, squeezing, tightness, heaviness or aching about the chest, neck, axilla or epigastrium.  RESPIRATORY:  see above.  GASTROINTESTINAL:  No nausea, vomiting or diarrhea.  GENITOURINARY:  No dysuria, frequency or urgency.  MUSCULOSKELETAL:  As per HPI.  SKIN:  No change in skin, hair or nails.  NEUROLOGIC:  No paresthesias, fasciculations, seizures or weakness.  PSYCHIATRIC:  No disorder of thought or mood.  ENDOCRINE:  No heat or cold intolerance, polyuria or polydipsia.  HEMATOLOGICAL:  No easy bruising or bleedings:  .     PHYSICAL EXAMINATION:    GENERAL APPEARANCE:  Pt. is not currently dyspneic, in no distress. Pt. is alert, oriented, and pleasant.  HEENT:  Pupils are normal and react normally. No icterus. Mucous membranes well colored.  NECK:  Supple. No lymphadenopathy. Jugular venous pressure not elevated. Carotids equal.   HEART:   The cardiac impulse has a normal quality. Regular. Normal S1 and S2. There are no murmurs, rubs or gallops noted  CHEST:  Few crackles bilat  ABDOMEN:  Soft and nontender.   EXTREMITIES:  There is no cyanosis, clubbing or edema.   SKIN:  No rash or significant lesions are noted.  Neuro: Alert, awake, and O x 3.      LABS:                        15.0   26.8  )-----------( 382      ( 2018 07:30 )             45.7     01-12    131<L>  |  95<L>  |  16  ----------------------------<  139<H>  3.8   |  27  |  1.03    Ca    10.2<H>      2018 07:30    TPro  8.4<H>  /  Alb  3.3  /  TBili  0.8  /  DBili  x   /  AST  34  /  ALT  83<H>  /  AlkPhos  122<H>  -11    LIVER FUNCTIONS - ( 2018 11:19 )  Alb: 3.3 g/dL / Pro: 8.4 gm/dL / ALK PHOS: 122 U/L / ALT: 83 U/L / AST: 34 U/L / GGT: x           PTT - ( 2018 11:19 )  PTT:27.3 sec  CARDIAC MARKERS ( 2018 11:19 )  0.019 ng/mL / x     / x     / x     / x          Urinalysis Basic - ( 2018 12:33 )    Color: Yellow / Appearance: Clear / S.015 / pH: x  Gluc: x / Ketone: Negative  / Bili: Negative / Urobili: Negative mg/dL   Blood: x / Protein: 30 mg/dL / Nitrite: Negative   Leuk Esterase: Negative / RBC: 0-2 /HPF / WBC 0-2   Sq Epi: x / Non Sq Epi: Occasional / Bacteria: Occasional          RADIOLOGY & ADDITIONAL STUDIES:       EXAM:  XR CHEST AP OR PA 1V                            PROCEDURE DATE:  2018          INTERPRETATION:  Portable chest radiograph dated 2018.    COMPARISON: 2017.    CLINICAL INFORMATION: Fever.    FINDINGS:    The airway is midline.   Continued application of the right IJ line, distal tip is in the distal   superior vena cava.  The multi loculated left pleural effusion is  slightly smaller and the   left lower lobe is slightly better expanded at this time. The right lung   is clear.  The cardiac silhouette is normal size.   The bones are normal.     IMPRESSION:  A multiloculated left pleural effusion is slightly smaller and the left   lower lobe is slightly better expanded at this time.        PROCEDURE DATE:  2018          INTERPRETATION:  Exam Date: 2018 5:21 PM    CT chest without contrast    CLINICAL INFORMATION:  Fever    TECHNIQUE:  Contiguous axial 3 mm sections were obtained through the   chest using single helical acquisition.  In addition, 2D sagittal and   coronal reformatted images obtained.   This scan was performed using   automatic exposure control (radiation dose reduction software) to obtain   a diagnostic image quality scan with patient dose as low as reasonably   achievable.        FINDINGS: Comparison is made to report of PET CT of 2017.    The thyroid gland appears intact.    There are several scattered groundglass nodules and opacities withinthe   right upper lobe, suggestive of pneumonia given the clinical context of   fever. Small to right pleural effusion is present. Loculated left pleural   effusion along the left lateral pleura and within the fissures is again   identified. The trachea and major bronchi are patent.    No enlarged axillary lymph nodes are found.   No enlarged hilar lymph   nodes are recognized, allowing for the noncontrast technique.  Within the   mediastinum no pathologic lymph nodes are found.  The heart size is   normal.  There is a small pericardial effusion. Right ventral chest wall   port catheter is in place with tip in the SVC.    Limited evaluation of the upper abdomen is unremarkable.      IMPRESSION:     Several scattered groundglass nodules and opacities within the right   upper lobe, suggestive of pneumonia given the clinical context of fever.   Small to right pleural effusion is present. Loculated left pleural   effusion along the left lateral pleura and within the fissures is again   identified. Small pericardial effusion is present. HPI:  46 yo male with PMH Stage 4 Lung CA with mets to brain s/p Ommaya reservoir 2017 for leptomeningeal disease s/p chemo and radiation, anxiety, depression, h/o nephrolithiasis, h/o pericarditis presents to ED with complaint of fever and AMS. Pt is a lethargic and unable to obtain history from him. Does wake up to verbal command but falls back alseep. History obtained from pt's sister at bedside. As per sister pt completed 10 course radiation treatment 1 day ago. 3 days ago he was complaining of severe headache and also had episode of vomiting. He was seen at oncologist office and was given morphine and valium. This morning pt developed a fever 0f 103.6 and complained of rigors. He was also noted to be confused. He did have a cough which has been chronic.    In ED pt found to have temp of 103.6 rectally. Lactate of 2.8. CXR with left multiloculated effusion. Ct head with no acute pathology. Was given 3L NS and started on vanco and cefepime. (2018 16:20)    has chronic cough, no purulent sputum. Temp is down this AM. CT mild patchy nodular findings on R,  R effusion and loculated L effusion are unchanged from PET/CT .  Pt is s/p pleurodeisis on L 10/17 w/ positive pleural bx for adeno ca.      PAST MEDICAL & SURGICAL HISTORY:  Hearing loss: right ear-no device  Cancer with leptomeningeal spread  Dyspnea on exertion  Gait disturbance  Memory loss  Anxiety and depression  Erectile dysfunction, unspecified erectile dysfunction type  History of kidney stones  History of pericarditis  Pleural effusion  Syncope, unspecified syncope type: X3-4 episodes; s/p LINQ device placed  Lung cancer, upper lobe: right. metastatic non small cell Lung Carcinoma.  History of lumbar puncture: chemo placed  History of cardiac monitoring: s/p LINQ device placement 2017  History of lumbar puncture within last 21 days  History of vascular access device: PORT placed  Pleuritic chest pain: Pleurodesis left chest 10/20/2017  H/O bilateral inguinal hernia repair: as a child  Acute pericarditis, unspecified type: pericardial window 2015, 2015      MEDICATIONS  (STANDING):  cefepime  IVPB 2000 milliGRAM(s) IV Intermittent every 12 hours  colchicine 0.6 milliGRAM(s) Oral two times a day  dexamethasone     Tablet 2 milliGRAM(s) Oral three times a day  folic acid 1 milliGRAM(s) Oral daily  heparin  Injectable 5000 Unit(s) SubCutaneous every 8 hours  mirtazapine 15 milliGRAM(s) Oral at bedtime  pantoprazole    Tablet 40 milliGRAM(s) Oral before breakfast  sodium chloride 0.9%. 1000 milliLiter(s) (125 mL/Hr) IV Continuous <Continuous>  vancomycin  IVPB 1000 milliGRAM(s) IV Intermittent every 12 hours    MEDICATIONS  (PRN):  acetaminophen   Tablet 650 milliGRAM(s) Oral every 6 hours PRN For Temp greater than 38 C (100.4 F)  diazepam    Tablet 2 milliGRAM(s) Oral three times a day PRN anxiety  HYDROmorphone   Tablet 4 milliGRAM(s) Oral two times a day PRN Severe Pain (7 - 10)      Allergies    adhesives (Rash)  No Known Drug Allergies    Intolerances        SOCIAL HISTORY: Denies tobacco, etoh abuse or illicit drug use    FAMILY HISTORY:  Family history of leukemia (Father)  Family history of cancer (Mother)      Vital Signs Last 24 Hrs  T(C): 36.8 (2018 04:28), Max: 39.8 (2018 11:13)  T(F): 98.2 (2018 04:28), Max: 103.6 (2018 11:13)  HR: 82 (2018 04:28) (82 - 128)  BP: 166/92 (2018 04:28) (134/92 - 172/91)  BP(mean): --  RR: 18 (2018 04:28) (13 - 22)  SpO2: 95% (2018 04:28) (95% - 100%)    REVIEW OF SYSTEMS:    CONSTITUTIONAL:  As per HPI.  HEENT:  Eyes:  No diplopia or blurred vision. ENT:  No earache, sore throat or runny nose.  CARDIOVASCULAR:  No pressure, squeezing, tightness, heaviness or aching about the chest, neck, axilla or epigastrium.  RESPIRATORY:  see above.  GASTROINTESTINAL:  No nausea, vomiting or diarrhea.  GENITOURINARY:  No dysuria, frequency or urgency.  MUSCULOSKELETAL:  As per HPI.  SKIN:  No change in skin, hair or nails.  NEUROLOGIC:  No paresthesias, fasciculations, seizures or weakness.  PSYCHIATRIC:  No disorder of thought or mood.  ENDOCRINE:  No heat or cold intolerance, polyuria or polydipsia.  HEMATOLOGICAL:  No easy bruising or bleedings:  .     PHYSICAL EXAMINATION:    GENERAL APPEARANCE:  Pt. is not currently dyspneic, in no distress. Pt. is alert, oriented, and pleasant.  HEENT:  Pupils are normal and react normally. No icterus. Mucous membranes well colored.  NECK:  Supple. No lymphadenopathy. Jugular venous pressure not elevated. Carotids equal.   HEART:   The cardiac impulse has a normal quality. Regular. Normal S1 and S2. There are no murmurs, rubs or gallops noted  CHEST:  Few crackles bilat  ABDOMEN:  Soft and nontender.   EXTREMITIES:  There is no cyanosis, clubbing or edema.   SKIN:  No rash or significant lesions are noted.  Neuro: Alert, awake, and O x 3.      LABS:                        15.0   26.8  )-----------( 382      ( 2018 07:30 )             45.7     01-12    131<L>  |  95<L>  |  16  ----------------------------<  139<H>  3.8   |  27  |  1.03    Ca    10.2<H>      2018 07:30    TPro  8.4<H>  /  Alb  3.3  /  TBili  0.8  /  DBili  x   /  AST  34  /  ALT  83<H>  /  AlkPhos  122<H>  01-11    LIVER FUNCTIONS - ( 2018 11:19 )  Alb: 3.3 g/dL / Pro: 8.4 gm/dL / ALK PHOS: 122 U/L / ALT: 83 U/L / AST: 34 U/L / GGT: x           PTT - ( 2018 11:19 )  PTT:27.3 sec  CARDIAC MARKERS ( 2018 11:19 )  0.019 ng/mL / x     / x     / x     / x          Urinalysis Basic - ( 2018 12:33 )    Color: Yellow / Appearance: Clear / S.015 / pH: x  Gluc: x / Ketone: Negative  / Bili: Negative / Urobili: Negative mg/dL   Blood: x / Protein: 30 mg/dL / Nitrite: Negative   Leuk Esterase: Negative / RBC: 0-2 /HPF / WBC 0-2   Sq Epi: x / Non Sq Epi: Occasional / Bacteria: Occasional          RADIOLOGY & ADDITIONAL STUDIES:       EXAM:  XR CHEST AP OR PA 1V                            PROCEDURE DATE:  2018          INTERPRETATION:  Portable chest radiograph dated 2018.    COMPARISON: 2017.    CLINICAL INFORMATION: Fever.    FINDINGS:    The airway is midline.   Continued application of the right IJ line, distal tip is in the distal   superior vena cava.  The multi loculated left pleural effusion is  slightly smaller and the   left lower lobe is slightly better expanded at this time. The right lung   is clear.  The cardiac silhouette is normal size.   The bones are normal.     IMPRESSION:  A multiloculated left pleural effusion is slightly smaller and the left   lower lobe is slightly better expanded at this time.        PROCEDURE DATE:  2018          INTERPRETATION:  Exam Date: 2018 5:21 PM    CT chest without contrast    CLINICAL INFORMATION:  Fever    TECHNIQUE:  Contiguous axial 3 mm sections were obtained through the   chest using single helical acquisition.  In addition, 2D sagittal and   coronal reformatted images obtained.   This scan was performed using   automatic exposure control (radiation dose reduction software) to obtain   a diagnostic image quality scan with patient dose as low as reasonably   achievable.        FINDINGS: Comparison is made to report of PET CT of 2017.    The thyroid gland appears intact.    There are several scattered groundglass nodules and opacities withinthe   right upper lobe, suggestive of pneumonia given the clinical context of   fever. Small to right pleural effusion is present. Loculated left pleural   effusion along the left lateral pleura and within the fissures is again   identified. The trachea and major bronchi are patent.    No enlarged axillary lymph nodes are found.   No enlarged hilar lymph   nodes are recognized, allowing for the noncontrast technique.  Within the   mediastinum no pathologic lymph nodes are found.  The heart size is   normal.  There is a small pericardial effusion. Right ventral chest wall   port catheter is in place with tip in the SVC.    Limited evaluation of the upper abdomen is unremarkable.      IMPRESSION:     Several scattered groundglass nodules and opacities within the right   upper lobe, suggestive of pneumonia given the clinical context of fever.   Small to right pleural effusion is present. Loculated left pleural   effusion along the left lateral pleura and within the fissures is again   identified. Small pericardial effusion is present.

## 2018-01-12 NOTE — PROCEDURE NOTE - PROCEDURE
<<-----Click on this checkbox to enter Procedure Tracheal intubation  01/12/2018    Active  [email or web address]

## 2018-01-12 NOTE — PROGRESS NOTE ADULT - ASSESSMENT
48 yo male with PMH Stage 4 Lung CA with mets to brain s/p Ommaya reservoir 11/2017 for leptomeningeal disease s/p chemo and radiation, anxiety, depression, h/o nephrolithiasis, h/o pericarditis s/p pericardial window; pleural effusion s/p thoracentesis; admitted with    1) Bacterial meningitis + infectious encephalopathy + gradually declining + sepsis sec to meningitis:  s/p rapid response  moved to ICU under ICU care  cont vanco + cefepime + ampicillin+ decadron  iv fluids  poor prognosis  CSF growing staph aureus    2) PNA: HCAP/ GNR + left loculated Pleural effusion and mod right pleural effusion:  cont vanco + cefepime  case discussed with IR: not enough fluid to tap on right side and left is loculated  case discussed with Dr Dickinson  f/u blood cx    3) Stage 4 Lung ca with brain mets:  oncology eval  on chemo    4) DVT PPX: heparin s./q    poc discussed in great detail with pt's sister and HCP, Mayra at bedside and she understands that her brother is critically ill and prognosis is poor. case also discussed with Dr Antony Dr, [place]burn, Dr, Kartiyar, IR attending, team.     very prolonged visit 90 min.

## 2018-01-12 NOTE — BRIEF OPERATIVE NOTE - PROCEDURE
<<-----Click on this checkbox to enter Procedure Removal of Ommaya reservoir  01/12/2018  evidence of infection at reservoir site of seating.  Active  [id number]

## 2018-01-12 NOTE — PROGRESS NOTE ADULT - SUBJECTIVE AND OBJECTIVE BOX
CC: Weakness and lethargy    HPI: 47y old M with PMH Stage 4 Lung CA with mets to brain s/p Ommaya reservoir 2017 for leptomeningeal disease s/p chemo and radiation, anxiety, depression, h/o nephrolithiasis, h/o pericarditis presents to ED with complaint of fever and AMS. Pt is a lethargic and unable to obtain history from him. Does wake up to verbal command but falls back asleep History obtained from pt's sister at bedside. He recently completed a 10 day course radiation treatment.   Called to see patient for Rapid Response for fever, AMS, and lethargy which has been going on since admission.      PAST MEDICAL & SURGICAL HISTORY:  Hearing loss: right ear-no device  Cancer with leptomeningeal spread  Dyspnea on exertion  Gait disturbance  Memory loss  Anxiety and depression  Erectile dysfunction, unspecified erectile dysfunction type  History of kidney stones  History of pericarditis  Pleural effusion  Syncope, unspecified syncope type: X3-4 episodes; s/p LINQ device placed  Lung cancer, upper lobe: right. metastatic non small cell Lung Carcinoma.  History of lumbar puncture: chemo placed  History of cardiac monitoring: s/p LINQ device placement 2017  History of lumbar puncture within last 21 days  History of vascular access device: PORT placed  Pleuritic chest pain: Pleurodesis left chest 10/20/2017  H/O bilateral inguinal hernia repair: as a child  Acute pericarditis, unspecified type: pericardial window 2015, 2015      FAMILY HISTORY:  Family history of leukemia (Father)  Family history of cancer (Mother)      Social Hx:    Allergies    adhesives (Rash)  No Known Drug Allergies    Intolerances        47y        ICU Vital Signs Last 24 Hrs  T(C): 39.5 (2018 12:05), Max: 39.5 (2018 17:02)  T(F): 103.1 (2018 12:05), Max: 103.1 (2018 17:02)  HR: 100 (2018 12:05) (82 - 119)  BP: 172/86 (2018 12:05) (134/92 - 172/91)  BP(mean): --  ABP: --  ABP(mean): --  RR: 20 (2018 12:05) (18 - 20)  SpO2: 99% (2018 12:05) (95% - 100%)          I&O's Summary    2018 07:01  -  2018 07:00  --------------------------------------------------------  IN: 694 mL / OUT: 450 mL / NET: 244 mL    2018 07:01  -  2018 14:01  --------------------------------------------------------  IN: 340 mL / OUT: 0 mL / NET: 340 mL                              15.0   26.8  )-----------( 382      ( 2018 07:30 )             45.7       -12    131<L>  |  95<L>  |  16  ----------------------------<  139<H>  3.8   |  27  |  1.03    Ca    10.2<H>      2018 07:30    TPro  8.4<H>  /  Alb  3.3  /  TBili  0.8  /  DBili  x   /  AST  34  /  ALT  83<H>  /  AlkPhos  122<H>        CAPILLARY BLOOD GLUCOSE      POCT Blood Glucose.: 117 mg/dL (2018 13:22)      LIVER FUNCTIONS - ( 2018 11:19 )  Alb: 3.3 g/dL / Pro: 8.4 gm/dL / ALK PHOS: 122 U/L / ALT: 83 U/L / AST: 34 U/L / GGT: x             CARDIAC MARKERS ( 2018 11:19 )  0.019 ng/mL / x     / x     / x     / x            PTT - ( 2018 11:19 )  PTT:27.3 sec        Urinalysis Basic - ( 2018 12:33 )    Color: Yellow / Appearance: Clear / S.015 / pH: x  Gluc: x / Ketone: Negative  / Bili: Negative / Urobili: Negative mg/dL   Blood: x / Protein: 30 mg/dL / Nitrite: Negative   Leuk Esterase: Negative / RBC: 0-2 /HPF / WBC 0-2   Sq Epi: x / Non Sq Epi: Occasional / Bacteria: Occasional        MEDICATIONS  (STANDING):  ALBUTerol    90 MICROgram(s) HFA Inhaler 1 Puff(s) Inhalation every 4 hours  ampicillin  IVPB      ampicillin  IVPB 2 Gram(s) IV Intermittent every 6 hours  cefepime  IVPB 2000 milliGRAM(s) IV Intermittent every 12 hours  colchicine 0.6 milliGRAM(s) Oral two times a day  dexamethasone  Injectable 2 milliGRAM(s) IV Push three times a day  folic acid 1 milliGRAM(s) Oral daily  heparin  Injectable 5000 Unit(s) SubCutaneous every 8 hours  mirtazapine 15 milliGRAM(s) Oral at bedtime  pantoprazole    Tablet 40 milliGRAM(s) Oral before breakfast  sodium chloride 0.9%. 1000 milliLiter(s) (125 mL/Hr) IV Continuous <Continuous>  vancomycin  IVPB 1000 milliGRAM(s) IV Intermittent every 12 hours    MEDICATIONS  (PRN):  acetaminophen   Tablet 650 milliGRAM(s) Oral every 6 hours PRN For Temp greater than 38 C (100.4 F)  acetaminophen  Suppository 650 milliGRAM(s) Rectal every 6 hours PRN For Temp greater than 38 C (100.4 F)  ALBUTerol    0.083% 2.5 milliGRAM(s) Nebulizer every 6 hours PRN Shortness of Breath and/or Wheezing  diazepam    Tablet 2 milliGRAM(s) Oral three times a day PRN anxiety  HYDROmorphone   Tablet 4 milliGRAM(s) Oral two times a day PRN Severe Pain (7 - 10)        Advanced Directives:  Discussed with:    Visit Information:    30-min CC Time is exclusive of billed procedures and/or teaching and/or routine family updates.

## 2018-01-12 NOTE — CONSULT NOTE ADULT - ASSESSMENT
46 y/o male with h/o Stage 4 Lung CA with mets to brain 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 Pt was lethargic and unable to give history. As per sister, pt completed 10 course radiation treatment 1 day PTA. 3 days ago he was complaining of severe headache and also had episode of vomiting. He was seen at oncologist office and was given morphine and valium. The day of admission, the pt developed a fever 0f 103.6F and complained of rigors. He was also noted to be confused.  In ED pt found to have temp of 103.6 rectally. He received vanco IV and cefepime.     1. Febrile syndrome Probable sepsis. Acute bacterial meningitis. RUL pneumonia with loculated left pleural effusion ?empyema. Lung CA stage 4 with brain metastasis. Immunocompromised host.  -leukocytosis   -obtain BC x 2, CSF c/s  -start vancomycin 1 gm IV q12h and cefepime 2 gm IV q12h  -reason for abx use and side effects reviewed with patient; monitor BMP and vancomycin trough levels   -neurosurgical evaluation appreciated  -consider pulmonary evaluation ?thoracentesis  -monitor temps  -f/u CBC  -supportive care  2. Other issues:   -care per medicine 48 y/o male with h/o Stage 4 Lung CA with mets to brain 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 Pt was lethargic and unable to give history. As per sister, pt completed 10 course radiation treatment 1 day PTA. 3 days ago he was complaining of severe headache and also had episode of vomiting. He was seen at oncologist office and was given morphine and valium. The day of admission, the pt developed a fever 0f 103.6F and complained of rigors. He was also noted to be confused.  In ED pt found to have temp of 103.6 rectally. He received vanco IV and cefepime.     1. Febrile syndrome Probable sepsis. Acute bacterial meningitis with GVC on CSF GS. RUL pneumonia with loculated left pleural effusion ?empyema. Lung CA stage 4 with brain metastasis. Immunocompromised host.  -leukocytosis   -obtain BC x 2, CSF c/s  -start vancomycin 1 gm IV q12h and cefepime 2 gm IV q12h  -reason for abx use and side effects reviewed with patient; monitor BMP and vancomycin trough levels   -neurosurgical evaluation appreciated  -consider pulmonary evaluation ?thoracentesis  -monitor temps  -f/u CBC  -supportive care  2. Other issues:   -care per medicine 48 y/o male with h/o Stage 4 Lung CA with mets to brain 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 Pt was lethargic and unable to give history. As per sister, pt completed 10 course radiation treatment 1 day PTA. 3 days ago he was complaining of severe headache and also had episode of vomiting. He was seen at oncologist office and was given morphine and valium. The day of admission, the pt developed a fever 0f 103.6F and complained of rigors. He was also noted to be confused.  In ED pt found to have temp of 103.6 rectally. He received vanco IV and cefepime.     1. Febrile syndrome Probable sepsis. Acute bacterial meningitis with GVC on CSF GS. RUL pneumonia with loculated left pleural effusion ?empyema. Lung CA stage 4 with brain metastasis. Immunocompromised host.  -leukocytosis   -obtain BC x 2, CSF c/s  -start vancomycin 1 gm IV q12h and cefepime 2 gm IV q12h  -add ampicillin 2 gm IV q6h  -reason for abx use and side effects reviewed with patient; monitor BMP and vancomycin trough levels   -neurosurgical evaluation appreciated  -consider pulmonary evaluation ?thoracentesis  -monitor temps  -f/u CBC  -supportive care  2. Other issues:   -care per medicine

## 2018-01-12 NOTE — CONSULT NOTE ADULT - ASSESSMENT
Stage 4 lung cancer with brain mets.   Acute bacterial meningitis. Vanco, cefepime. Rx as per ID.  R sided multifocal PNA. Abx's above. R effusion.  Consult IR re therapuetic, diagnostic thoracentesis on R, if pt cooperative for.  NC O2. Nebs prn.  S/P pleurodeisis on L 10/17. Has loculated L pleural effusion, slightly smaller c/w previous CXR, 11/17..  Small pericardial effusion.  S/P pericardial window 11/15, 12/15. Stage 4 lung cancer with brain mets.   Acute bacterial meningitis. Vanco, cefepime. Rx as per ID.    R sided mild multifocal PNA. Abx's above. Pleural effusions are unchanged from PET/CT 11/17.   S/P pleurodeisis on L 10/17 and positive pleural bx for adeno ca.  Small pericardial effusion.  S/P pericardial window 11/15, 12/15.

## 2018-01-12 NOTE — BRIEF OPERATIVE NOTE - OPERATION/FINDINGS
prior incision opened - when dissecting posterior to open up pouch where ommaya was seated there was evidence of contamination/pus. Entire system and catheter removed. copious irrigation.

## 2018-01-12 NOTE — PROGRESS NOTE ADULT - ASSESSMENT
#Acute Bacterial Meningitis secondary to Staph infection  - Patient unstable  - Admit to ICU  - Continue Vanco and Cefepime  - patient on low dose steroids for Lephtomeningeal disease  - No requir #Acute Bacterial Meningitis secondary to Staph infection with RUL Pneumonia   - Patient unstable  - Admit to ICU  - Continue Vanco and Cefepime  - patient on low dose steroids for Lephtomeningeal disease  - Patient had unstable airway and required intubation   - Requires Omaya [place] removal to be done STAT  - Lactate 2.8 -> 1.7  - Serial neuro checks  - Monitor vitals    #DVT PPX  - Heparin SQ  - Venodynes

## 2018-01-12 NOTE — PROCEDURE NOTE - NSTRACHPOSTINTU_RESP_A_CORE
Breath sounds bilateral/Appropriate capnography/Chest excursion noted/Chest X-Ray/Positive end tidal Co2 noted/Breath sounds equal

## 2018-01-12 NOTE — ED ADULT NURSE REASSESSMENT NOTE - NS ED NURSE REASSESS COMMENT FT1
Rapid response called due to patients worsening condition. Transferred to ICU
Pt rec'd from MILTON Arteaga. Pt is restless and orient to person only, confused otherwise. Cardiac monitoring in place, ST as per ER, RN. Febrile - Tylenol given. Evening medications given as ordered. Family members at bedside. Will recheck VS in one hour. Awaiting for bed assignment. Safety and comfort maintained.
Pt received from ER Nurse. Currently sleeping without any distress. BP high at times, currently trending down. Fiancee at bedside. No acute distress. Pt with shaking at times which fiancee states is "normal" for him lately and he gets cold easliy. Pt given extra blankets. VSS, currently afebrile. Bandaid to R forehead to drain. Pt with R cw port, not accessed. Vancomycin infusion delayed from previous RN due to multiple distal occlusion due to position. second IV started to help. Cefipime delayed due to vanco infusion. Will give am dose early. Pt answers questions appropriately and follows commands. Denies pain. Will contiue to monitor.
Rec'd report on patient from previous RN. pt is sleeping in no distress.  fiance at bedside aware of admission status and plan of care.  will continue to monitor. safety maintained.
gave report to hold rn
neurosurgical KENNY Hernandez at bedside, consent receive to obtain CFS sample from shunt. pt tolerated procedure.  will continue to monitor.
pt remains febrile and tachycardia.  d/w Dr. Bernstein, will give Motrin and continue to abx and maintain fluids.  pt no upgraded at this time.  fiance at bedside, will continue to monitor.
pt resting comfortable, fiance at bedside, will continue to monitor.
droplet isolation maintained

## 2018-01-12 NOTE — PROGRESS NOTE ADULT - SUBJECTIVE AND OBJECTIVE BOX
c/c AMS    HPI: 46 yo male with PMH Stage 4 Lung CA with mets to brain s/p Ommaya reservoir 2017 for leptomeningeal disease s/p chemo and radiation, anxiety, depression, h/o nephrolithiasis, h/o pericarditis s/p pericardial window; pleural effusion s/p thoracentesis, presents to ED with complaint of fever and AMS. Pt is a lethargic and unable to obtain history from him. Does wake up to verbal command but falls back asleep. History obtained from pt's sister at bedside. As per sister pt completed 10 course radiation treatment 1 day ago. 3 days ago he was complaining of severe headache and also had episode of vomiting. He was seen at oncologist office and was given morphine and valium. This morning pt developed a fever 0f 103.6 and complained of rigors. He was also noted to be confused. He did have a cough which has been chronic.    : Pt lethargic but arousable. does not give any history.  P has fever  has Meningitis      PHYSICAL EXAM:    Daily     Daily     ICU Vital Signs Last 24 Hrs  T(C): 39.5 (2018 12:05), Max: 39.5 (2018 17:02)  T(F): 103.1 (2018 12:05), Max: 103.1 (2018 17:02)  HR: 100 (2018 12:05) (82 - 119)  BP: 172/86 (2018 12:05) (134/92 - 172/91)  BP(mean): --  ABP: --  ABP(mean): --  RR: 20 (2018 12:05) (18 - 20)  SpO2: 99% (2018 12:05) (95% - 100%)      Constitutional: Ill appearing; AMS  HEENT: Atraumatic,   Respiratory: Breath Sounds normal, no rhonchi/wheeze  Cardiovascular: N S1S2; GURDEEP present  Gastrointestinal: Abdomen soft, non tender, Bowel Sounds present  Extremities: No edema, peripheral pulses present  Neurological: arousable but not alert or oriented  Skin: Non cellulitic, no rash, ulcers  Genitourinary: deferred  Rectal: Deferred                          15.0   26.8  )-----------( 382      ( 2018 07:30 )             45.7       CBC Full  -  ( 2018 07:30 )  WBC Count : 26.8 K/uL  Hemoglobin : 15.0 g/dL  Hematocrit : 45.7 %  Platelet Count - Automated : 382 K/uL  Mean Cell Volume : 93.0 fl  Mean Cell Hemoglobin : 30.5 pg  Mean Cell Hemoglobin Concentration : 32.8 gm/dL  Auto Neutrophil # : x  Auto Lymphocyte # : 0.9 K/uL  Auto Monocyte # : 1.8 K/uL  Auto Eosinophil # : x  Auto Basophil # : x  Auto Neutrophil % : 91.0 %  Auto Lymphocyte % : 4.0 %  Auto Monocyte % : 4.0 %  Auto Eosinophil % : x  Auto Basophil % : x          131<L>  |  95<L>  |  16  ----------------------------<  139<H>  3.8   |  27  |  1.03    Ca    10.2<H>      2018 07:30    TPro  8.4<H>  /  Alb  3.3  /  TBili  0.8  /  DBili  x   /  AST  34  /  ALT  83<H>  /  AlkPhos  122<H>  11      LIVER FUNCTIONS - ( 2018 11:19 )  Alb: 3.3 g/dL / Pro: 8.4 gm/dL / ALK PHOS: 122 U/L / ALT: 83 U/L / AST: 34 U/L / GGT: x             PTT - ( 2018 11:19 )  PTT:27.3 sec    CARDIAC MARKERS ( 2018 11:19 )  0.019 ng/mL / x     / x     / x     / x            Urinalysis Basic - ( 2018 12:33 )    Color: Yellow / Appearance: Clear / S.015 / pH: x  Gluc: x / Ketone: Negative  / Bili: Negative / Urobili: Negative mg/dL   Blood: x / Protein: 30 mg/dL / Nitrite: Negative   Leuk Esterase: Negative / RBC: 0-2 /HPF / WBC 0-2   Sq Epi: x / Non Sq Epi: Occasional / Bacteria: Occasional            MEDICATIONS  (STANDING):  ALBUTerol    90 MICROgram(s) HFA Inhaler 1 Puff(s) Inhalation every 4 hours  ampicillin  IVPB      ampicillin  IVPB 2 Gram(s) IV Intermittent every 6 hours  cefepime  IVPB 2000 milliGRAM(s) IV Intermittent every 12 hours  colchicine 0.6 milliGRAM(s) Oral two times a day  dexamethasone  Injectable 2 milliGRAM(s) IV Push three times a day  folic acid 1 milliGRAM(s) Oral daily  heparin  Injectable 5000 Unit(s) SubCutaneous every 8 hours  mirtazapine 15 milliGRAM(s) Oral at bedtime  pantoprazole    Tablet 40 milliGRAM(s) Oral before breakfast  sodium chloride 0.9%. 1000 milliLiter(s) (125 mL/Hr) IV Continuous <Continuous>  vancomycin  IVPB 1000 milliGRAM(s) IV Intermittent every 12 hours

## 2018-01-12 NOTE — PROGRESS NOTE ADULT - ASSESSMENT
47y old M with:  Acute Bacterial Meningitis  Stage IV Lung CA with mets  s/p Ommaya reservoir on 11/2017  Leptomeningeal disease s/p Chemo and XRT  Depression  Anxiety  AMS    Plan:  Transfer to ICU  Serial Neuro Exams  Acute Bacterial Meningitis  Patient was on steroids from admission - Will Continue  Neurology / NS follow up  BP Stable  Resp - Able to protect his airway at this time  If he decompensated further may require intubation  NPO / IVF  IV Vanco / Cefepime per ID  Consider dc Ampicillin  Monitor renal function  Strict I/O's  DVT prophylaxis - Hep SQ  Case d/w sister (HCP) at bedside regarding patients current status, plan of care, and prognosis with all questions answered in detail.  Patient is full-code

## 2018-01-12 NOTE — PROGRESS NOTE ADULT - SUBJECTIVE AND OBJECTIVE BOX
Events noted. Patient in ICU. Intubated. Sedated.  S/p removal of Omaya with evidence of infection. CSF positive for Staph aureus.    Vital Signs Last 24 Hrs  T(C): 38.4 (12 Jan 2018 17:25), Max: 39.5 (12 Jan 2018 12:05)  T(F): 101.2 (12 Jan 2018 17:25), Max: 103.1 (12 Jan 2018 12:05)  HR: 107 (12 Jan 2018 17:45) (82 - 119)  BP: 117/84 (12 Jan 2018 17:45) (117/84 - 173/104)  BP(mean): 92 (12 Jan 2018 17:45) (91 - 130)  RR: 12 (12 Jan 2018 17:45) (12 - 31)  SpO2: 100% (12 Jan 2018 17:45) (95% - 100%)    Patient is intubated and sedated. Dressing post removal  of Omaya.                          15.0   26.8  )-----------( 382      ( 12 Jan 2018 07:30 )             45.7   01-12    131<L>  |  95<L>  |  16  ----------------------------<  139<H>  3.8   |  27  |  1.03    Ca    10.2<H>      12 Jan 2018 07:30    TPro  8.4<H>  /  Alb  3.3  /  TBili  0.8  /  DBili  x   /  AST  34  /  ALT  83<H>  /  AlkPhos  122<H>  01-11    Culture - CSF with Gram Stain (01.11.18 @ 21:14)    Gram Stain:   polymorphonuclear leukocytes seen per low power field  Gram Variable Cocci seen per oil power field  by cytocentrifuge    Specimen Source: .CSF    Culture Results:   Numerous Staphylococcus aureus    Culture - Blood (01.11.18 @ 11:19)    Specimen Source: .Blood Blood-Peripheral    Culture Results:   No growth to date.      MEDICATIONS  (STANDING):  ALBUTerol    90 MICROgram(s) HFA Inhaler 1 Puff(s) Inhalation every 4 hours  cefepime  IVPB      cefepime  IVPB 2000 milliGRAM(s) IV Intermittent every 8 hours  chlorhexidine 0.12% Liquid 15 milliLiter(s) Swish and Spit two times a day  colchicine 0.6 milliGRAM(s) Oral two times a day  dexamethasone  Injectable 2 milliGRAM(s) IV Push three times a day  folic acid 1 milliGRAM(s) Oral daily  heparin  Injectable 5000 Unit(s) SubCutaneous every 8 hours  mirtazapine 15 milliGRAM(s) Oral at bedtime  propofol Infusion 5 MICROgram(s)/kG/Min (2.517 mL/Hr) IV Continuous <Continuous>  sodium chloride 0.9%. 1000 milliLiter(s) (125 mL/Hr) IV Continuous <Continuous>  vancomycin  IVPB 1500 milliGRAM(s) IV Intermittent every 12 hours

## 2018-01-12 NOTE — CHART NOTE - NSCHARTNOTEFT_GEN_A_CORE
NEUROSURGERY STAFF NOTE    HPI:     This 47 year old patient is well known to our service. He has stage IV lung cancer with metastasis to the brain. He had an ommaya reservoir placed on 11/27/17 to facilitate intrathecal chemotherapy in addition to ongoing chemotherapy. The patient developed headache and emesis 4 days ago after completing 10 day course of radiation. He was brought to ER yesterday for worsening headache and lethargy.     His Ommaya reservoir was tapped - grossly elevated WBC and staph aureus identified. blood cultures and urine reported negative.     IMPRESSION AND PLAN:     Patient is immune suppressed secondary to chemotherapy and radiation. Altered mental status is related to CSF infection with staph aureus. I have discussed case with Dr. Hardy (ID). The most appropriate course of treatment is removal of the ommaya reservoir because of clear contamination.     The patient will be intubated for decreased level of alertness    The patient may subsequently develop ventriculitis and obstructive hydrocephalus secondary to infection.     IV antibiotics will continue under the direction of Dr. Hardy.       I have discussed the clinical situation to the patient's family who are present. They understand the gravity of the diagnosis and the treatment proposed.

## 2018-01-12 NOTE — PROGRESS NOTE ADULT - ASSESSMENT
46 y/o male with metastatic adenocarcinoma of lung, extensive brain mets and leptomeningeal carcinomatosis s/p systemic chemotherapy and intrathecal chemotherapy, s/p WBRT now admitted with sepsis, Staphylococcal  meningitis, pneumonia.  S/p removal of Omaya.  On cefepime and Vancomycin per ID.  Aggressive supportive care. Chemotherapy on hold. Prognosis is guarded.

## 2018-01-12 NOTE — PROGRESS NOTE ADULT - SUBJECTIVE AND OBJECTIVE BOX
HPI:  46 yo male with PMH Stage 4 Lung CA with mets to brain s/p Omaya reservoir 2017 for leptomeningeal disease s/p chemo and radiation, anxiety, depression, h/o nephrolithiasis, h/o pericarditis presents to ED with complaint of fever and AMS. Pt is a lethargic and unable to obtain history from him. Does wake up to verbal command but falls back alseep. History obtained from pt's sister at bedside. As per sister pt completed 10 course radiation treatment 1 day ago. 3 days ago he was complaining of severe headache and also had episode of vomiting. He was seen at oncologist office and was given morphine and valium. This morning pt developed a fever 0f 103.6 and complained of rigors. He was also noted to be confused. He did have a cough which has been chronic.    In ED pt found to have temp of 103.6 rectally. Lactate of 2.8. CXR with left multiloculated effusion. Ct head with no acute pathology. Was given 3L NS and started on vanco and cefepime. (2018 16:20)    18 - Patient seen and examined with sister at bedside during rapid response, which was called due to worsening altered mental status and high temp.  Patient is obtunded, responds and moves all extremities to painful stimuli.  Patient found to have Staph Acute Bacterial infection, evaluated by Dr. Cee and to be taken to the OR now for removal of Omaya Resevoir.  Patient is on IV Vancomycin and Cefepime. Sister in agreement with plan.     MEDICATIONS  (STANDING):  ALBUTerol    90 MICROgram(s) HFA Inhaler 1 Puff(s) Inhalation every 4 hours  cefepime  IVPB      cefepime  IVPB 2000 milliGRAM(s) IV Intermittent once  cefepime  IVPB 2000 milliGRAM(s) IV Intermittent every 8 hours  colchicine 0.6 milliGRAM(s) Oral two times a day  dexamethasone  Injectable 2 milliGRAM(s) IV Push three times a day  folic acid 1 milliGRAM(s) Oral daily  heparin  Injectable 5000 Unit(s) SubCutaneous every 8 hours  mirtazapine 15 milliGRAM(s) Oral at bedtime  pantoprazole    Tablet 40 milliGRAM(s) Oral before breakfast  sodium chloride 0.9%. 1000 milliLiter(s) (125 mL/Hr) IV Continuous <Continuous>  vancomycin  IVPB 1500 milliGRAM(s) IV Intermittent every 12 hours    MEDICATIONS  (PRN):  acetaminophen   Tablet 650 milliGRAM(s) Oral every 6 hours PRN For Temp greater than 38 C (100.4 F)  acetaminophen  Suppository 650 milliGRAM(s) Rectal every 6 hours PRN For Temp greater than 38 C (100.4 F)  ALBUTerol    0.083% 2.5 milliGRAM(s) Nebulizer every 6 hours PRN Shortness of Breath and/or Wheezing  diazepam    Tablet 2 milliGRAM(s) Oral three times a day PRN anxiety  HYDROmorphone   Tablet 4 milliGRAM(s) Oral two times a day PRN Severe Pain (7 - 10)    Vital Signs Last 24 Hrs  T(C): 39.5 (2018 12:05), Max: 39.5 (2018 17:02)  T(F): 103.1 (2018 12:05), Max: 103.1 (2018 17:02)  HR: 100 (2018 12:05) (82 - 119)  BP: 172/86 (2018 12:05) (134/92 - 172/91)  RR: 20 (2018 12:05) (18 - 20)  SpO2: 99% (2018 12:05) (95% - 100%)    GEN: NAD, comfortable, resting in bed  HEENT: NC/AT, PERRLA, MMM  CV: S1S2, RRR, no mumur  RESP: good air movement, CTABL, no rales, rhonchi or wheezing  ABD: +BS, soft, ND, NT, no guarding, no rigidity  EXT: +2 radial and pedal pulses, no edema, no calve tenderness  SKIN: No Rashes or Ulcers  NEURO:  CN 2-12 Grossly intact, no motor or sensory deficits     LABS:                        15.0   26.8  )-----------( 382      ( 2018 07:30 )             45.7     2018 07:30    131    |  95     |  16     ----------------------------<  139    3.8     |  27     |  1.03     Ca    10.2       2018 07:30    TPro  8.4    /  Alb  3.3    /  TBili  0.8    /  DBili  x      /  AST  34     /  ALT  83     /  AlkPhos  122    2018 11:19    LIVER FUNCTIONS - ( 2018 11:19 )  Alb: 3.3 g/dL / Pro: 8.4 gm/dL / ALK PHOS: 122 U/L / ALT: 83 U/L / AST: 34 U/L / GGT: x           PTT - ( 2018 11:19 )  PTT:27.3 sec  CAPILLARY BLOOD GLUCOSE      POCT Blood Glucose.: 117 mg/dL (2018 13:22)    CARDIAC MARKERS ( 2018 11:19 )  0.019 ng/mL / x     / x     / x     / x          Urinalysis Basic - ( 2018 12:33 )    Color: Yellow / Appearance: Clear / S.015 / pH: x  Gluc: x / Ketone: Negative  / Bili: Negative / Urobili: Negative mg/dL   Blood: x / Protein: 30 mg/dL / Nitrite: Negative   Leuk Esterase: Negative / RBC: 0-2 /HPF / WBC 0-2   Sq Epi: x / Non Sq Epi: Occasional / Bacteria: Occasional

## 2018-01-13 LAB
-  OXACILLIN: SIGNIFICANT CHANGE UP
-  PENICILLIN: SIGNIFICANT CHANGE UP
-  RIFAMPIN: SIGNIFICANT CHANGE UP
-  TRIMETHOPRIM/SULFAMETHOXAZOLE: SIGNIFICANT CHANGE UP
-  VANCOMYCIN: SIGNIFICANT CHANGE UP
ANION GAP SERPL CALC-SCNC: 8 MMOL/L — SIGNIFICANT CHANGE UP (ref 5–17)
BUN SERPL-MCNC: 17 MG/DL — SIGNIFICANT CHANGE UP (ref 7–23)
CALCIUM SERPL-MCNC: 10.5 MG/DL — HIGH (ref 8.5–10.1)
CHLORIDE SERPL-SCNC: 100 MMOL/L — SIGNIFICANT CHANGE UP (ref 96–108)
CO2 SERPL-SCNC: 24 MMOL/L — SIGNIFICANT CHANGE UP (ref 22–31)
CREAT SERPL-MCNC: 1.09 MG/DL — SIGNIFICANT CHANGE UP (ref 0.5–1.3)
CULTURE RESULTS: SIGNIFICANT CHANGE UP
GLUCOSE SERPL-MCNC: 95 MG/DL — SIGNIFICANT CHANGE UP (ref 70–99)
GRAM STN FLD: SIGNIFICANT CHANGE UP
HCT VFR BLD CALC: 53 % — HIGH (ref 39–50)
HGB BLD-MCNC: 15.3 G/DL — SIGNIFICANT CHANGE UP (ref 13–17)
MAGNESIUM SERPL-MCNC: 2.5 MG/DL — SIGNIFICANT CHANGE UP (ref 1.6–2.6)
MCHC RBC-ENTMCNC: 27.8 PG — SIGNIFICANT CHANGE UP (ref 27–34)
MCHC RBC-ENTMCNC: 28.9 GM/DL — LOW (ref 32–36)
MCV RBC AUTO: 96 FL — SIGNIFICANT CHANGE UP (ref 80–100)
METHOD TYPE: SIGNIFICANT CHANGE UP
NIGHT BLUE STAIN TISS: SIGNIFICANT CHANGE UP
NIGHT BLUE STAIN TISS: SIGNIFICANT CHANGE UP
ORGANISM # SPEC MICROSCOPIC CNT: SIGNIFICANT CHANGE UP
ORGANISM # SPEC MICROSCOPIC CNT: SIGNIFICANT CHANGE UP
PHOSPHATE SERPL-MCNC: 3 MG/DL — SIGNIFICANT CHANGE UP (ref 2.5–4.5)
PLATELET # BLD AUTO: 291 K/UL — SIGNIFICANT CHANGE UP (ref 150–400)
POTASSIUM SERPL-MCNC: 4.2 MMOL/L — SIGNIFICANT CHANGE UP (ref 3.5–5.3)
POTASSIUM SERPL-SCNC: 4.2 MMOL/L — SIGNIFICANT CHANGE UP (ref 3.5–5.3)
RBC # BLD: 5.52 M/UL — SIGNIFICANT CHANGE UP (ref 4.2–5.8)
RBC # FLD: 20 % — HIGH (ref 10.3–14.5)
SODIUM SERPL-SCNC: 132 MMOL/L — LOW (ref 135–145)
SPECIMEN SOURCE: SIGNIFICANT CHANGE UP
VANCOMYCIN TROUGH SERPL-MCNC: 11.4 UG/ML — SIGNIFICANT CHANGE UP (ref 10–20)
WBC # BLD: 19.6 K/UL — HIGH (ref 3.8–10.5)
WBC # FLD AUTO: 19.6 K/UL — HIGH (ref 3.8–10.5)

## 2018-01-13 PROCEDURE — 93970 EXTREMITY STUDY: CPT | Mod: 26

## 2018-01-13 PROCEDURE — 70450 CT HEAD/BRAIN W/O DYE: CPT | Mod: 26

## 2018-01-13 PROCEDURE — 99233 SBSQ HOSP IP/OBS HIGH 50: CPT

## 2018-01-13 PROCEDURE — 99232 SBSQ HOSP IP/OBS MODERATE 35: CPT

## 2018-01-13 PROCEDURE — 71045 X-RAY EXAM CHEST 1 VIEW: CPT | Mod: 26

## 2018-01-13 RX ADMIN — PROPOFOL 2.52 MICROGRAM(S)/KG/MIN: 10 INJECTION, EMULSION INTRAVENOUS at 13:45

## 2018-01-13 RX ADMIN — HEPARIN SODIUM 5000 UNIT(S): 5000 INJECTION INTRAVENOUS; SUBCUTANEOUS at 13:52

## 2018-01-13 RX ADMIN — CHLORHEXIDINE GLUCONATE 15 MILLILITER(S): 213 SOLUTION TOPICAL at 17:44

## 2018-01-13 RX ADMIN — SODIUM CHLORIDE 125 MILLILITER(S): 9 INJECTION INTRAMUSCULAR; INTRAVENOUS; SUBCUTANEOUS at 01:13

## 2018-01-13 RX ADMIN — SODIUM CHLORIDE 125 MILLILITER(S): 9 INJECTION INTRAMUSCULAR; INTRAVENOUS; SUBCUTANEOUS at 11:00

## 2018-01-13 RX ADMIN — CEFEPIME 100 MILLIGRAM(S): 1 INJECTION, POWDER, FOR SOLUTION INTRAMUSCULAR; INTRAVENOUS at 05:21

## 2018-01-13 RX ADMIN — CHLORHEXIDINE GLUCONATE 15 MILLILITER(S): 213 SOLUTION TOPICAL at 05:22

## 2018-01-13 RX ADMIN — Medication 250 MILLIGRAM(S): at 05:24

## 2018-01-13 RX ADMIN — SODIUM CHLORIDE 125 MILLILITER(S): 9 INJECTION INTRAMUSCULAR; INTRAVENOUS; SUBCUTANEOUS at 21:38

## 2018-01-13 RX ADMIN — Medication 2 MILLIGRAM(S): at 13:51

## 2018-01-13 RX ADMIN — PROPOFOL 2.52 MICROGRAM(S)/KG/MIN: 10 INJECTION, EMULSION INTRAVENOUS at 18:27

## 2018-01-13 RX ADMIN — PANTOPRAZOLE SODIUM 40 MILLIGRAM(S): 20 TABLET, DELAYED RELEASE ORAL at 13:04

## 2018-01-13 RX ADMIN — PROPOFOL 2.52 MICROGRAM(S)/KG/MIN: 10 INJECTION, EMULSION INTRAVENOUS at 05:22

## 2018-01-13 RX ADMIN — Medication 2 MILLIGRAM(S): at 05:22

## 2018-01-13 RX ADMIN — HEPARIN SODIUM 5000 UNIT(S): 5000 INJECTION INTRAVENOUS; SUBCUTANEOUS at 21:38

## 2018-01-13 RX ADMIN — HEPARIN SODIUM 5000 UNIT(S): 5000 INJECTION INTRAVENOUS; SUBCUTANEOUS at 05:22

## 2018-01-13 RX ADMIN — CEFEPIME 100 MILLIGRAM(S): 1 INJECTION, POWDER, FOR SOLUTION INTRAMUSCULAR; INTRAVENOUS at 13:51

## 2018-01-13 RX ADMIN — Medication 2 MILLIGRAM(S): at 21:38

## 2018-01-13 RX ADMIN — CEFEPIME 100 MILLIGRAM(S): 1 INJECTION, POWDER, FOR SOLUTION INTRAMUSCULAR; INTRAVENOUS at 21:38

## 2018-01-13 NOTE — PROGRESS NOTE ADULT - SUBJECTIVE AND OBJECTIVE BOX
HPI:  48 yo male with PMH Stage 4 Lung CA with mets to brain s/p Omaya reservoir 11/2017 for leptomeningeal disease s/p chemo and radiation, anxiety, depression, h/o nephrolithiasis, h/o pericarditis presents to ED with complaint of fever and AMS. Pt is a lethargic and unable to obtain history from him. Does wake up to verbal command but falls back alseep. History obtained from pt's sister at bedside. As per sister pt completed 10 course radiation treatment 1 day ago. 3 days ago he was complaining of severe headache and also had episode of vomiting. He was seen at oncologist office and was given morphine and valium. This morning pt developed a fever 0f 103.6 and complained of rigors. He was also noted to be confused. He did have a cough which has been chronic.    In ED pt found to have temp of 103.6 rectally. Lactate of 2.8. CXR with left multiloculated effusion. Ct head with no acute pathology. Was given 3L NS and started on vanco and cefepime. (11 Jan 2018 16:20)    1/13/18 - Patient seen and examined with fianna marie at bedside.  Patient is hemodynamically stable overnight, had a low grade temp.  Omaya reservoir removed last night by Dr. Cee.  Patient tolerated procedure well.  On IV ABX.  Intubated, sedated.     MEDICATIONS  (STANDING):  ALBUTerol    90 MICROgram(s) HFA Inhaler 1 Puff(s) Inhalation every 4 hours  cefepime  IVPB      cefepime  IVPB 2000 milliGRAM(s) IV Intermittent every 8 hours  chlorhexidine 0.12% Liquid 15 milliLiter(s) Swish and Spit two times a day  colchicine 0.6 milliGRAM(s) Oral two times a day  dexamethasone  Injectable 2 milliGRAM(s) IV Push three times a day  folic acid 1 milliGRAM(s) Oral daily  heparin  Injectable 5000 Unit(s) SubCutaneous every 8 hours  mirtazapine 15 milliGRAM(s) Oral at bedtime  pantoprazole  Injectable 40 milliGRAM(s) IV Push daily  propofol Infusion 5 MICROgram(s)/kG/Min (2.517 mL/Hr) IV Continuous <Continuous>  sodium chloride 0.9%. 1000 milliLiter(s) (125 mL/Hr) IV Continuous <Continuous>    MEDICATIONS  (PRN):  acetaminophen   Tablet 650 milliGRAM(s) Oral every 6 hours PRN For Temp greater than 38 C (100.4 F)  acetaminophen  Suppository 650 milliGRAM(s) Rectal every 6 hours PRN For Temp greater than 38 C (100.4 F)  ALBUTerol    0.083% 2.5 milliGRAM(s) Nebulizer every 6 hours PRN Shortness of Breath and/or Wheezing  diazepam    Tablet 2 milliGRAM(s) Oral three times a day PRN anxiety  HYDROmorphone   Tablet 4 milliGRAM(s) Oral two times a day PRN Severe Pain (7 - 10)    ICU Vital Signs Last 24 Hrs  T(C): 38.2 (13 Jan 2018 13:00), Max: 39.4 (12 Jan 2018 22:30)  T(F): 100.8 (13 Jan 2018 13:00), Max: 102.9 (12 Jan 2018 22:30)  HR: 97 (13 Jan 2018 15:00) (87 - 121)  BP: 101/69 (13 Jan 2018 15:00) (90/71 - 132/94)  BP(mean): 75 (13 Jan 2018 15:00) (71 - 103)  RR: 18 (13 Jan 2018 15:00) (12 - 20)  SpO2: 98% (13 Jan 2018 14:04) (98% - 100%)    GEN: NAD, sedated   HEENT: NC/AT, PERRLA, MMM  CV: S1S2, RRR, no mumur  RESP: intubated on vent,  good air movement, CTABL, no rales, rhonchi or wheezing  ABD: +BS, soft, ND, NT, no guarding, no rigidity  EXT: +2 radial and pedal pulses, no edema, no calve tenderness  SKIN: No Rashes or Ulcers  NEURO:  sedated    LABS:                                   15.3   19.6  )-----------( 291      ( 13 Jan 2018 05:26 )             53.0     13 Jan 2018 05:26    132    |  100    |  17     ----------------------------<  95     4.2     |  24     |  1.09     Ca    10.5       13 Jan 2018 05:26  Phos  3.0       13 Jan 2018 05:26  Mg     2.5       13 Jan 2018 05:26          CAPILLARY BLOOD GLUCOSE

## 2018-01-13 NOTE — PROGRESS NOTE ADULT - SUBJECTIVE AND OBJECTIVE BOX
CC: Resp failure and sepsis     HPI:    46 y/o male wit Stage 4 Lung CA with mets to brain s/p Ommaya reservoir 2017 for leptomeningeal disease s/p chemo and radiation, anxiety, depression, h/o nephrolithiasis, h/o pericarditis presents to ED with complaint of fever and AMS. pt is admitted to ICU for S. aures meningitis and s/p removal of infected reservoir.     :  Pt seen and examined in ICU.  case d/w dr austin.  Vented and sedated.  Hemodynamically stable      PMH:  As above.     PSH:  As above.     FH: Non Contributory other than those listed in HPI    Social History:      MEDICATIONS  (STANDING):  ALBUTerol    90 MICROgram(s) HFA Inhaler 1 Puff(s) Inhalation every 4 hours  cefepime  IVPB      cefepime  IVPB 2000 milliGRAM(s) IV Intermittent every 8 hours  chlorhexidine 0.12% Liquid 15 milliLiter(s) Swish and Spit two times a day  colchicine 0.6 milliGRAM(s) Oral two times a day  dexamethasone  Injectable 2 milliGRAM(s) IV Push three times a day  folic acid 1 milliGRAM(s) Oral daily  heparin  Injectable 5000 Unit(s) SubCutaneous every 8 hours  mirtazapine 15 milliGRAM(s) Oral at bedtime  pantoprazole  Injectable 40 milliGRAM(s) IV Push daily  propofol Infusion 5 MICROgram(s)/kG/Min (2.517 mL/Hr) IV Continuous <Continuous>  sodium chloride 0.9%. 1000 milliLiter(s) (125 mL/Hr) IV Continuous <Continuous>    MEDICATIONS  (PRN):  acetaminophen   Tablet 650 milliGRAM(s) Oral every 6 hours PRN For Temp greater than 38 C (100.4 F)  acetaminophen  Suppository 650 milliGRAM(s) Rectal every 6 hours PRN For Temp greater than 38 C (100.4 F)  ALBUTerol    0.083% 2.5 milliGRAM(s) Nebulizer every 6 hours PRN Shortness of Breath and/or Wheezing  diazepam    Tablet 2 milliGRAM(s) Oral three times a day PRN anxiety  HYDROmorphone   Tablet 4 milliGRAM(s) Oral two times a day PRN Severe Pain (7 - 10)      Allergies: NKDA    ROS:  SEE BELOW        ICU Vital Signs Last 24 Hrs  T(C): 37.3 (2018 06:00), Max: 39.5 (2018 12:05)  T(F): 99.2 (2018 06:00), Max: 103.1 (2018 12:05)  HR: 93 (2018 08:15) (87 - 121)  BP: 104/79 (2018 07:00) (90/71 - 173/104)  BP(mean): 82 (2018 07:00) (71 - 130)  ABP: --  ABP(mean): --  RR: 14 (2018 07:00) (12 - 31)  SpO2: 100% (2018 08:15) (97% - 100%)      Mode: AC/ CMV (Assist Control/ Continuous Mandatory Ventilation)  RR (machine): 12  TV (machine): 700  FiO2: 40  PEEP: 5  ITime: 1  PIP: 35      I&O's Summary    2018 07:01  -  2018 07:00  --------------------------------------------------------  IN: 2930 mL / OUT: 400 mL / NET: 2530 mL        Physical Exam:  SEE BELOW                          15.3   19.6  )-----------( 291      ( 2018 05:26 )             53.0       01-13    132<L>  |  100  |  17  ----------------------------<  95  4.2   |  24  |  1.09    Ca    10.5<H>      2018 05:26  Phos  3.0     01-13  Mg     2.5     01-13    TPro  8.4<H>  /  Alb  3.3  /  TBili  0.8  /  DBili  x   /  AST  34  /  ALT  83<H>  /  AlkPhos  122<H>  01-11      CARDIAC MARKERS ( 2018 11:19 )  0.019 ng/mL / x     / x     / x     / x            ABG - ( 2018 17:58 )  pH: 7.43  /  pCO2: 31    /  pO2: 175   / HCO3: 20    / Base Excess: -2.6  /  SaO2: 99                  Urinalysis Basic - ( 2018 12:33 )    Color: Yellow / Appearance: Clear / S.015 / pH: x  Gluc: x / Ketone: Negative  / Bili: Negative / Urobili: Negative mg/dL   Blood: x / Protein: 30 mg/dL / Nitrite: Negative   Leuk Esterase: Negative / RBC: 0-2 /HPF / WBC 0-2   Sq Epi: x / Non Sq Epi: Occasional / Bacteria: Occasional        DVT Prophylaxis:                                                            Contraindication:     Advanced Directives:    Discussed with:    Visit Information:  Time spent excluding procedure:  40 cc mins    ** Time is exclusive of billed procedures and/or teaching and/or routine family updates.

## 2018-01-13 NOTE — PROGRESS NOTE ADULT - ASSESSMENT
46 y/o male with metastatic adenocarcinoma of lung, extensive brain mets and leptomeningeal carcinomatosis s/p systemic chemotherapy and intrathecal chemotherapy, s/p WBRT now  with sepsis, meningitis  with MSSA, pneumonia.    S/p removal of Omaya.  On cefepime, Vancomycin  and Ampicillin per ID.  Fever and WBC trending down.   Management per ICU, ID, pulmonary.  Aggressive supportive care. Chemotherapy on hold.

## 2018-01-13 NOTE — PROGRESS NOTE ADULT - SUBJECTIVE AND OBJECTIVE BOX
No significant change from yesterday. Remains intubated and sedated.    Vital Signs Last 24 Hrs  T(C): 37.8 (13 Jan 2018 08:00), Max: 39.4 (12 Jan 2018 22:30)  T(F): 100.1 (13 Jan 2018 08:00), Max: 102.9 (12 Jan 2018 22:30)  HR: 106 (13 Jan 2018 12:00) (87 - 121)  BP: 113/89 (13 Jan 2018 12:00) (90/71 - 173/104)  BP(mean): 94 (13 Jan 2018 12:00) (71 - 130)  RR: 20 (13 Jan 2018 12:00) (12 - 31)  SpO2: 99% (13 Jan 2018 10:36) (97% - 100%)  Sedated , on vent  Chest with decreased BS. Bilateral leg edema.                          15.3   19.6  )-----------( 291      ( 13 Jan 2018 05:26 )             53.0   01-13    132<L>  |  100  |  17  ----------------------------<  95  4.2   |  24  |  1.09    Ca    10.5<H>      13 Jan 2018 05:26  Phos  3.0     01-13  Mg     2.5     01-13    Culture - CSF with Gram Stain (01.11.18 @ 21:14)    -  Oxacillin: S 0.5    -  Penicillin: R >8    -  RIF- Rifampin: R >2    -  Trimethoprim/Sulfamethoxazole: S <=0.5/9.5    -  Vancomycin: S 2    Gram Stain:   polymorphonuclear leukocytes seen per low power field  Gram Variable Cocci seen per oil power field  by cytocentrifuge    Specimen Source: .CSF    Culture Results:   Numerous Staphylococcus aureus    Organism Identification: Staphylococcus aureus    Organism: Staphylococcus aureus    Method Type: TAMMIE      < from: Xray Chest 1 View AP- PORTABLE-Urgent (01.13.18 @ 08:54) >    EXAM:  XR CHEST PORTABLE URGENT 1V                            PROCEDURE DATE:  01/13/2018          INTERPRETATION:  Exam Date: 1/13/2018 8:54 AM    History: Intubation    Technique: Single frontal portable view of the chest with comparison to    1/11/2018    Findings:    Interval placement of endotracheal tube. Right-sided Mediport with tip in   the SVC remains unchanged. Heart is enlarged. Multiple left-sided   pleural-based opacities corresponding to known loculated fluid   collections have increased in size slightly from prior exam particularly   in the left upper hemithorax.. Degenerative changes of the visualized   osseous structures.        Impression:    Multiple loculated left-sided pleural effusion slightly increased in size    Interval placement of an endotracheal tube    ABIDA IVORY M.D., ATTENDING RADIOLOGIST  This document has been electronically signed. Jan 13 2018  9:30AM                < end of copied text >

## 2018-01-13 NOTE — PROGRESS NOTE ADULT - SUBJECTIVE AND OBJECTIVE BOX
HPI:  46 yo male with PMH Stage 4 Lung CA with mets to brain s/p Ommaya reservoir 2017 for leptomeningeal disease s/p chemo and radiation, anxiety, depression, h/o nephrolithiasis, h/o pericarditis presents to ED with complaint of fever and AMS. Pt is a lethargic and unable to obtain history from him. Does wake up to verbal command but falls back alseep. History obtained from pt's sister at bedside. As per sister pt completed 10 course radiation treatment 1 day ago. 3 days ago he was complaining of severe headache and also had episode of vomiting. He was seen at oncologist office and was given morphine and valium. This morning pt developed a fever 0f 103.6 and complained of rigors. He was also noted to be confused. He did have a cough which has been chronic.    In ED pt found to have temp of 103.6 rectally. Lactate of 2.8. CXR with left multiloculated effusion. Ct head with no acute pathology. Was given 3L NS and started on vanco and cefepime. (2018 16:20)    has chronic cough, no purulent sputum.  CT chest shows patchy/nodular mild findings on R.  c/w previous PET/CT of 17, CT chest shows no change in size of R effusion and loculated L effusion.  Has h.o. pericarditis and pericardial window 2015 and 2015.  h.o. 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     : events noted, removal of infected reservoir. pt sedated, comfortable on ventilator.  CXR today again shows loculated L effusion, no change from , given technique.  Has staph meningitis, on IV Abx's. WBC down to 19,600.    PAST MEDICAL & SURGICAL HISTORY:  Hearing loss: right ear-no device  Cancer with leptomeningeal spread  Dyspnea on exertion  Gait disturbance  Memory loss  Anxiety and depression  Erectile dysfunction, unspecified erectile dysfunction type  History of kidney stones  History of pericarditis  Pleural effusion  Syncope, unspecified syncope type: X3-4 episodes; s/p LINQ device placed  Lung cancer, upper lobe: right. metastatic non small cell Lung Carcinoma.  History of lumbar puncture: chemo placed  History of cardiac monitoring: s/p LINQ device placement 2017  History of lumbar puncture within last 21 days  History of vascular access device: PORT placed  Pleuritic chest pain: Pleurodesis left chest 10/20/2017  H/O bilateral inguinal hernia repair: as a child  Acute pericarditis, unspecified type: pericardial window 2015, 2015      MEDICATIONS  (STANDING):  cefepime  IVPB 2000 milliGRAM(s) IV Intermittent every 12 hours  colchicine 0.6 milliGRAM(s) Oral two times a day  dexamethasone     Tablet 2 milliGRAM(s) Oral three times a day  folic acid 1 milliGRAM(s) Oral daily  heparin  Injectable 5000 Unit(s) SubCutaneous every 8 hours  mirtazapine 15 milliGRAM(s) Oral at bedtime  pantoprazole    Tablet 40 milliGRAM(s) Oral before breakfast  sodium chloride 0.9%. 1000 milliLiter(s) (125 mL/Hr) IV Continuous <Continuous>  vancomycin  IVPB 1000 milliGRAM(s) IV Intermittent every 12 hours    MEDICATIONS  (PRN):  acetaminophen   Tablet 650 milliGRAM(s) Oral every 6 hours PRN For Temp greater than 38 C (100.4 F)  diazepam    Tablet 2 milliGRAM(s) Oral three times a day PRN anxiety  HYDROmorphone   Tablet 4 milliGRAM(s) Oral two times a day PRN Severe Pain (7 - 10)      Allergies    adhesives (Rash)  No Known Drug Allergies    Intolerances        SOCIAL HISTORY: Denies tobacco, etoh abuse or illicit drug use    FAMILY HISTORY:  Family history of leukemia (Father)  Family history of cancer (Mother)      Vital Signs Last 24 Hrs  T(C): 36.8 (2018 04:28), Max: 39.8 (2018 11:13)  T(F): 98.2 (2018 04:28), Max: 103.6 (2018 11:13)  HR: 82 (2018 04:28) (82 - 128)  BP: 166/92 (2018 04:28) (134/92 - 172/91)  BP(mean): --  RR: 18 (2018 04:28) (13 - 22)  SpO2: 95% (2018 04:28) (95% - 100%)    REVIEW OF SYSTEMS:    CONSTITUTIONAL:  As per HPI.  HEENT:  Eyes:  No diplopia or blurred vision. ENT:  No earache, sore throat or runny nose.  CARDIOVASCULAR:  No pressure, squeezing, tightness, heaviness or aching about the chest, neck, axilla or epigastrium.  RESPIRATORY:  see above.  GASTROINTESTINAL:  No nausea, vomiting or diarrhea.  GENITOURINARY:  No dysuria, frequency or urgency.  MUSCULOSKELETAL:  As per HPI.  SKIN:  No change in skin, hair or nails.  NEUROLOGIC:  No paresthesias, fasciculations, seizures or weakness.  PSYCHIATRIC:  No disorder of thought or mood.  ENDOCRINE:  No heat or cold intolerance, polyuria or polydipsia.  HEMATOLOGICAL:  No easy bruising or bleedings:  .     PHYSICAL EXAMINATION:    GENERAL APPEARANCE:  Pt. is not currently dyspneic, in no distress. Pt. is alert, oriented, and pleasant.  HEENT:  Pupils are normal and react normally. No icterus. Mucous membranes well colored.  NECK:  Supple. No lymphadenopathy. Jugular venous pressure not elevated. Carotids equal.   HEART:   The cardiac impulse has a normal quality. Regular. Normal S1 and S2. There are no murmurs, rubs or gallops noted  CHEST:  Clear to A, anterior exam.  ABDOMEN:  Soft and nontender.   EXTREMITIES:  There is no cyanosis, clubbing or edema.   SKIN:  No rash or significant lesions are noted.  Neuro: Alert, awake, and O x 3.      LABS:                        15.0   26.8  )-----------( 382      ( 2018 07:30 )             45.7     01-12    131<L>  |  95<L>  |  16  ----------------------------<  139<H>  3.8   |  27  |  1.03    Ca    10.2<H>      2018 07:30    TPro  8.4<H>  /  Alb  3.3  /  TBili  0.8  /  DBili  x   /  AST  34  /  ALT  83<H>  /  AlkPhos  122<H>  11    LIVER FUNCTIONS - ( 2018 11:19 )  Alb: 3.3 g/dL / Pro: 8.4 gm/dL / ALK PHOS: 122 U/L / ALT: 83 U/L / AST: 34 U/L / GGT: x           PTT - ( 2018 11:19 )  PTT:27.3 sec  CARDIAC MARKERS ( 2018 11:19 )  0.019 ng/mL / x     / x     / x     / x          Urinalysis Basic - ( 2018 12:33 )    Color: Yellow / Appearance: Clear / S.015 / pH: x  Gluc: x / Ketone: Negative  / Bili: Negative / Urobili: Negative mg/dL   Blood: x / Protein: 30 mg/dL / Nitrite: Negative   Leuk Esterase: Negative / RBC: 0-2 /HPF / WBC 0-2   Sq Epi: x / Non Sq Epi: Occasional / Bacteria: Occasional          RADIOLOGY & ADDITIONAL STUDIES:       EXAM:  XR CHEST AP OR PA 1V                            PROCEDURE DATE:  2018          INTERPRETATION:  Portable chest radiograph dated 2018.    COMPARISON: 2017.    CLINICAL INFORMATION: Fever.    FINDINGS:    The airway is midline.   Continued application of the right IJ line, distal tip is in the distal   superior vena cava.  The multi loculated left pleural effusion is  slightly smaller and the   left lower lobe is slightly better expanded at this time. The right lung   is clear.  The cardiac silhouette is normal size.   The bones are normal.     IMPRESSION:  A multiloculated left pleural effusion is slightly smaller and the left   lower lobe is slightly better expanded at this time.        PROCEDURE DATE:  2018          INTERPRETATION:  Exam Date: 2018 5:21 PM    CT chest without contrast    CLINICAL INFORMATION:  Fever    TECHNIQUE:  Contiguous axial 3 mm sections were obtained through the   chest using single helical acquisition.  In addition, 2D sagittal and   coronal reformatted images obtained.   This scan was performed using   automatic exposure control (radiation dose reduction software) to obtain   a diagnostic image quality scan with patient dose as low as reasonably   achievable.        FINDINGS: Comparison is made to report of PET CT of 2017.    The thyroid gland appears intact.    There are several scattered groundglass nodules and opacities withinthe   right upper lobe, suggestive of pneumonia given the clinical context of   fever. Small to right pleural effusion is present. Loculated left pleural   effusion along the left lateral pleura and within the fissures is again   identified. The trachea and major bronchi are patent.    No enlarged axillary lymph nodes are found.   No enlarged hilar lymph   nodes are recognized, allowing for the noncontrast technique.  Within the   mediastinum no pathologic lymph nodes are found.  The heart size is   normal.  There is a small pericardial effusion. Right ventral chest wall   port catheter is in place with tip in the SVC.    Limited evaluation of the upper abdomen is unremarkable.      IMPRESSION:     Several scattered groundglass nodules and opacities within the right   upper lobe, suggestive of pneumonia given the clinical context of fever.   Small to right pleural effusion is present. Loculated left pleural   effusion along the left lateral pleura and within the fissures is again   identified. Small pericardial effusion is present.    Mode: AC/ CMV (Assist Control/ Continuous Mandatory Ventilation)  RR (machine): 12  TV (machine): 700  FiO2: 40  PEEP: 5  ITime: 1  PIP: 29    Blood Gas Profile - Arterial (18 @ 17:58)    pH, Arterial: 7.43    pCO2, Arterial: 31 mmHg    pO2, Arterial: 175 mmHg    HCO3, Arterial: 20 mmoL/L    Base Excess, Arterial: -2.6 mmol/L    Oxygen Saturation, Arterial: 99 %    FIO2, Arterial: 50    Blood Gas Comments Arterial: FIO2:50  MODE:ac   VT:700   RATE:12   PEEP:5  Comment:_

## 2018-01-13 NOTE — PROGRESS NOTE ADULT - ASSESSMENT
#Acute Bacterial Meningitis secondary to Staph infection with RUL Pneumonia   - Patient in critical condition  - Continue Vanco and Cefepime  - patient on low dose steroids for Lephtomeningeal disease  - Intubated, sedated  - S/P Omaya [employer] removal   - Serial neuro checks  - Monitor vitals  - IVF NS @125ml/hr  - Head of bed 30 degrees  - Repeat CT head in the AM to r/o hydro    #DVT PPX  - Heparin SQ  - Venodynes

## 2018-01-13 NOTE — PROGRESS NOTE ADULT - ASSESSMENT
48 y/o male with h/o Stage 4 Lung CA with mets to brain 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 Pt was lethargic and unable to give history. As per sister, pt completed 10 course radiation treatment 1 day PTA. 3 days ago he was complaining of severe headache and also had episode of vomiting. He was seen at oncologist office and was given morphine and valium. The day of admission, the pt developed a fever 0f 103.6F and complained of rigors. He was also noted to be confused.  In ED pt found to have temp of 103.6 rectally. He received vanco IV and cefepime.     1. Febrile syndrome. Probable sepsis. Acute bacterial meningitis with MSSA. Ommaya site infecton s/p port removal. RUL pneumonia with loculated left pleural effusion ?empyema. Lung CA stage 4 with brain metastasis. Immunocompromised host. Encephalopathy.  -leukocytosis improving  -f/u BC x 2, CSF c/s  -on vancomycin 1 gm IV q12h and cefepime 2 gm IV q12h and ampicillin 2 gm IV q6h  -adjust abx to cefepime 2 gm IV q8h  -tolerating abx well so far; no side effects noted  -continue abx coverage  -respiratory care  -monitor temps  -f/u CBC  -supportive care  2. Other issues:   -care per medicine

## 2018-01-13 NOTE — PROGRESS NOTE ADULT - ASSESSMENT
IMP:    Acute resp failure  S. aureus meningitis--s/p reservoir removal   Stage lung Ca  TTE results as noted--?? RV outflow tract obst    Overall poor prognosis    Plan:    As d/w dr austin--keep intubated--will repeat HCT to r/o hydro  Sedate  HOB > 30  Full vent support--decrease  and increase RR 14  IVF  Abx per ID  DVT prophy--SCD and sqhep   Will consider chest CT with IV cont when more stable    ICU care--d/w ICU staff and fiance at bedside.  All concerns addressed

## 2018-01-13 NOTE — PROGRESS NOTE ADULT - SUBJECTIVE AND OBJECTIVE BOX
Patient is a 47y old  Male who presents with a chief complaint of fever, AMS (2018 16:20)    HPI:  48 y/o male with h/o Stage 4 Lung CA with mets to brain s/p Ommaya reservoir 2017 for leptomeningeal disease s/p chemo and radiation, anxiety, depression, h/o nephrolithiasis, h/o pericarditis was admitted on  for fever and AMS. Pt was lethargic and unable to give history. As per sister, pt completed 10 course radiation treatment 1 day PTA. 3 days ago he was complaining of severe headache and also had episode of vomiting. He was seen at oncologist office and was given morphine and valium. The day of admission, the pt developed a fever 0f 103.6F and complained of rigors. He was also noted to be confused.  In ED pt found to have temp of 103.6 rectally. He received vanco IV and cefepime.     Events noted  s/p Ommaya reservoir remova  The patient is intubated on ventilatory suport    MEDICATIONS  (STANDING):  ALBUTerol    90 MICROgram(s) HFA Inhaler 1 Puff(s) Inhalation every 4 hours  cefepime  IVPB      cefepime  IVPB 2000 milliGRAM(s) IV Intermittent every 8 hours  chlorhexidine 0.12% Liquid 15 milliLiter(s) Swish and Spit two times a day  colchicine 0.6 milliGRAM(s) Oral two times a day  dexamethasone  Injectable 2 milliGRAM(s) IV Push three times a day  folic acid 1 milliGRAM(s) Oral daily  heparin  Injectable 5000 Unit(s) SubCutaneous every 8 hours  mirtazapine 15 milliGRAM(s) Oral at bedtime  pantoprazole  Injectable 40 milliGRAM(s) IV Push daily  propofol Infusion 5 MICROgram(s)/kG/Min (2.517 mL/Hr) IV Continuous <Continuous>  sodium chloride 0.9%. 1000 milliLiter(s) (125 mL/Hr) IV Continuous <Continuous>    MEDICATIONS  (PRN):  acetaminophen   Tablet 650 milliGRAM(s) Oral every 6 hours PRN For Temp greater than 38 C (100.4 F)  acetaminophen  Suppository 650 milliGRAM(s) Rectal every 6 hours PRN For Temp greater than 38 C (100.4 F)  ALBUTerol    0.083% 2.5 milliGRAM(s) Nebulizer every 6 hours PRN Shortness of Breath and/or Wheezing  diazepam    Tablet 2 milliGRAM(s) Oral three times a day PRN anxiety  HYDROmorphone   Tablet 4 milliGRAM(s) Oral two times a day PRN Severe Pain (7 - 10)      Vital Signs Last 24 Hrs  T(C): 37.8 (2018 08:00), Max: 39.5 (2018 12:05)  T(F): 100.1 (2018 08:00), Max: 103.1 (2018 12:05)  HR: 97 (2018 09:00) (87 - 121)  BP: 103/78 (2018 09:00) (90/71 - 173/104)  BP(mean): 83 (2018 09:00) (71 - 130)  RR: 14 (2018 09:00) (12 - 31)  SpO2: 99% (2018 10:36) (97% - 100%)    Physical Exam:      Constitutional: frail looking  HEENT: NC/AT, EOMI, PERRLA; postop scalp wound dressed  Neck: supple  Back: no tenderness  Respiratory: crackles at bases; decreased BS  Cardiovascular: S1S2 regular, no murmurs  Abdomen: soft, not tender, not distended, positive BS  Genitourinary: no LN palpable  Rectal: deferred  Musculoskeletal: no muscle tenderness, no joint swelling or tenderness  Extremities: no pedal edema  Neurological: sedated, moving all extremities  Skin: no rashes    Labs:                        15.3   19.6  )-----------( 291      ( 2018 05:26 )             53.0         132<L>  |  100  |  17  ----------------------------<  95  4.2   |  24  |  1.09    Ca    10.5<H>      2018 05:26  Phos  3.0       Mg     2.5              Vancomycin Level, Trough: 11.4 ug/mL ( @ 05:26)                          15.0   26.8  )-----------( 382      ( 2018 07:30 )             45.7         131<L>  |  95<L>  |  16  ----------------------------<  139<H>  3.8   |  27  |  1.03    Ca    10.2<H>      2018 07:30    TPro  8.4<H>  /  Alb  3.3  /  TBili  0.8  /  DBili  x   /  AST  34  /  ALT  83<H>  /  AlkPhos  122<H>  01-11     LIVER FUNCTIONS - ( 2018 11:19 )  Alb: 3.3 g/dL / Pro: 8.4 gm/dL / ALK PHOS: 122 U/L / ALT: 83 U/L / AST: 34 U/L / GGT: x           Urinalysis Basic - ( 2018 12:33 )    Color: Yellow / Appearance: Clear / S.015 / pH: x  Gluc: x / Ketone: Negative  / Bili: Negative / Urobili: Negative mg/dL   Blood: x / Protein: 30 mg/dL / Nitrite: Negative   Leuk Esterase: Negative / RBC: 0-2 /HPF / WBC 0-2   Sq Epi: x / Non Sq Epi: Occasional / Bacteria: Occasional    Cerebrospinal Fluid Cell Count-1 (18 @ 21:14)    CSF Segmented Neutrophils: 96 %    Total Nucleated Cell Count, CSF: 744 /uL    CSF Appearance: Slightly Cloudy    CSF Lymphocytes: 3 %    CSF Monocytes/Macrophages: 1 %    CSF Color: See Note: slightly xanthrochromic      Culture - Body Fluid with Gram Stain (collected 2018 14:36)  Source: .Body Fluid scalp incision  Gram Stain (2018 06:33):    No polymorphonuclear cells seen    No organisms seen    by cytocentrifuge    Culture - Acid Fast - CSF (collected 2018 21:14)  Source: .CSF spinal fluid    Culture - Fungal, CSF (collected 2018 21:14)  Source: .CSF spinal fluid  Preliminary Report (2018 12:37):    Testing in progress    Culture - CSF with Gram Stain (collected 2018 21:14)  Source: .CSF  Gram Stain (2018 01:16):    polymorphonuclear leukocytes seen per low power field    Gram Variable Cocci seen per oil power field    by cytocentrifuge  Final Report (2018 07:36):    Numerous Staphylococcus aureus  Organism: Staphylococcus aureus (2018 07:36)  Organism: Staphylococcus aureus (2018 07:36)      -  Oxacillin: S 0.5      -  Penicillin: R >8      -  RIF- Rifampin: R >2      -  Trimethoprim/Sulfamethoxazole: S <=0.5/9.5      -  Vancomycin: S 2      Method Type: TAMMIE    Culture - Urine (collected 2018 12:33)  Source: .Urine None  Final Report (2018 18:38):    10,000 - 49,000 CFU/mL Coag Negative Staphylococcus    Culture - Blood (collected 2018 11:19)  Source: .Blood Blood-Peripheral  Preliminary Report (2018 15:01):    No growth to date.    Culture - Blood (collected 2018 11:19)  Source: .Blood Blood-Peripheral  Preliminary Report (2018 15:01):    No growth to date.          Radiology:    < from: CT Chest No Cont (18 @ 17:21) >  Several scattered groundglass nodules and opacities within the right   upper lobe, suggestive of pneumonia given the clinical context of fever.   Small to right pleural effusion is present. Loculated left pleural   effusion along the left lateral pleura and within the fissures is again   identified. Small pericardial effusion is present.    < end of copied text >    < from: CT Head No Cont (18 @ 13:09) >   unchanged RIGHT frontal Ommaya catheter  in place within   the anterior horn of the RIGHT lateral ventricle. Faint hyperdense   lesions scattered throughout the brain consistent with patient's known   metastatic disease. IV contrast would be needed for complete evaluation.     < end of copied text >      Advanced directives addressed: full resuscitation

## 2018-01-13 NOTE — PROGRESS NOTE ADULT - SUBJECTIVE AND OBJECTIVE BOX
Neurosurgery POD#1     s/p Ommaya [place] & Ventricular Catheter Removal     HPI:   18 : 46 y/o male with h/o Stage 4 Lung CA with mets to brain s/p Ommaya reservoir Placed 2017 by Dr Cee for leptomeningeal disease s/p Intrathecal Methotrexate , chemo and radiation, anxiety, depression, h/o nephrolithiasis, h/o pericarditis was admitted on  for fever and AMS change. Pt was lethargic and unable to give history on admission. As per sister, pt completed 10 course radiation treatment 1 day PTA. 3 days ago he was complaining of severe headache and also had episode of vomiting. He was seen at the oncologist office and was given morphine and valium. The day of admission, the pt developed a fever of 103.6F and complained of rigors. He was also noted to be confused.  In ED he was found to have temp of 103.6 rectally.     He received vancomycin  IV and cefepime IV .     Subjective : : Pt is seen and examined he is Intubated and sedated , With Sedation off x 10 min he is noted to open his eyes , grimace to exam , following simple commands ,weak  with RUE , coughing noted to have copious secretions .     MEDICATIONS  (STANDING):  ALBUTerol    90 MICROgram(s) HFA Inhaler 1 Puff(s) Inhalation every 4 hours        cefepime  IVPB 2000 milliGRAM(s) IV Intermittent every 8 hours  chlorhexidine 0.12% Liquid 15 milliLiter(s) Swish and Spit two times a day  colchicine 0.6 milliGRAM(s) Oral two times a day  dexamethasone  Injectable 2 milliGRAM(s) IV Push three times a day  folic acid 1 milliGRAM(s) Oral daily  heparin  Injectable 5000 Unit(s) SubCutaneous every 8 hours  mirtazapine 15 milliGRAM(s) Oral at bedtime  pantoprazole  Injectable 40 milliGRAM(s) IV Push daily  propofol Infusion 5 MICROgram(s)/kG/Min (2.517 mL/Hr) IV Continuous <Continuous>  sodium chloride 0.9%. 1000 milliLiter(s) (125 mL/Hr) IV Continuous <Continuous>    MEDICATIONS  (PRN):  acetaminophen   Tablet 650 milliGRAM(s) Oral every 6 hours PRN For Temp greater than 38 C (100.4 F)  acetaminophen  Suppository 650 milliGRAM(s) Rectal every 6 hours PRN For Temp greater than 38 C (100.4 F)  ALBUTerol    0.083% 2.5 milliGRAM(s) Nebulizer every 6 hours PRN Shortness of Breath and/or Wheezing  diazepam    Tablet 2 milliGRAM(s) Oral three times a day PRN anxiety  HYDROmorphone   Tablet 4 milliGRAM(s) Oral two times a day PRN Severe Pain (7 - 10)      Allergies  adhesives (Rash)  No Known Drug Allergies      Vital Signs Last 24 Hrs: T max 103.1   T(C): 37.8 (2018 08:00), Max: 39.5 (2018 12:05)  T(F): 100.1 (2018 08:00), Max: 103.1 (2018 12:05)  HR: 97 (2018 09:00) (87 - 121)  BP: 103/78 (2018 09:00) (90/71 - 173/104)  BP(mean): 83 (2018 09:00) (71 - 130)  RR: 14 (2018 09:00) (12 - 31)  SpO2: 100% (2018 09:00) (97% - 100%)    PHYSICAL EXAM:    GENERAL: Intubated Sedated   HEAD:  Dressing is dry   EYES: + Grimace to eye exam PERRLA, opens eyes spontaneously   Neuro: Lethargic due to sedation effect , follows simple commands  with RUE   Face : symmetrical   EXTREMITIES:  + petechial lesions noted over the Bilateral ankles 1 + pitting edema , no cyanosis     LABS:  Urine :     Color: Yellow / Appearance: Clear / S.015 / pH: x  Gluc: x / Ketone: Negative  / Bili: Negative / Urobili: Negative mg/dL   Blood: x / Protein: 30 mg/dL / Nitrite: Negative   Leuk Esterase: Negative / RBC: 0-2 /HPF / WBC 0-2   Sq Epi: x / Non Sq Epi: Occasional / Bacteria: Occasional    CSF:   Cerebrospinal Fluid Cell Count-1 (18 @ 21:14)    CSF Segmented Neutrophils: 96 %    Total Nucleated Cell Count, CSF: 744 /uL    CSF Appearance: Slightly Cloudy    CSF Lymphocytes: 3 %    CSF Monocytes/Macrophages: 1 %    CSF Color: See Note: slightly xanthochromic    Culture - CSF with Gram Stain (collected 2018 21:14)  Source: .CSF  Gram Stain (2018 01:16):    polymorphonuclear leukocytes seen per low power field    Gram Variable Cocci seen per oil power field    by cytocentrifuge                          15.0   26.8  )-----------( 382      ( 2018 07:30 )             45.7                15.3   19.6  )-----------( 291      ( 2018 05:26 )             53.0         132<L>  |  100  |  17  ----------------------------<  95  4.2   |  24  |  1.09    Ca    10.5<H>      2018 05:26  Phos  3.0       Mg     2.5         TPro  8.4<H>  /  Alb  3.3  /  TBili  0.8  /  DBili  x   /  AST  34  /  ALT  83<H>  /  AlkPhos  122<H>      PTT - ( 2018 11:19 )  PTT:27.3 sec  Urinalysis Basic - ( 2018 12:33 )    Color: Yellow / Appearance: Clear / S.015 / pH: x  Gluc: x / Ketone: Negative  / Bili: Negative / Urobili: Negative mg/dL   Blood: x / Protein: 30 mg/dL / Nitrite: Negative   Leuk Esterase: Negative / RBC: 0-2 /HPF / WBC 0-2   Sq Epi: x / Non Sq Epi: Occasional / Bacteria: Occasional    Pathology :   Surgical Pathology Final Report -  (10.20.17 @ 09:21)    Surgical Pathology Final Report - :   Final Diagnosis    1.,  2.  PLEURAL BIOPSIES (LEFT, #1 AND #2):  - EXTENSIVE INVOLVEMENT BY ADENOCARCINOMA;  MORPHOLOGICALLY CONSISTENT WITH PATIENT'S KNOWN  PULMONARY ADENOCARCINOMA.    3.  CYTOLOGY SPECIMEN (LEFT PLEURAL FLUID):  - POSITIVE FOR MALIGNANT CELLS; ADENOCARCINOMA.      RADIOLOGY & ADDITIONAL TESTS:    EXAM:  CT BRAIN                          PROCEDURE DATE:  2018      INTERPRETATION:  Clinical indication: Ommaya catheter removed,   meningitis, assess hydrocephalus.    Technique: CT axial images of the head were obtained without intravenous   contrast. Computer-reconstructed coronal and sagittal images were   obtained.    Comparison: CT 2018. MR 12/15/2017.    Findings: The patient is status post interval removal of the right   frontal approach Ommaya catheter since prior study. Linear right frontal   lucency in the region of the previously noted catheter likely represent   gliosis. There a few foci of pneumocephalus. Again seen are scattered   nonspecific faint hyperdense foci scattered throughout the brain. There   is no obvious acute intracranial hemorrhage, large cortical infarct, mass   effect or midline shift. The ventricles are not significantly changed   since prior study. Nonspecific periventricular and subcortical white   matter lucency's are somewhat more prominent than the prior study.    There is a 1.2 x 5.5 x 1.2 cm (transverse x AP x CC) mostly   low-attenuation fluid collection with small layering of density   posteriorly in the right frontal scalp, likely containing a small amount   of acute blood products/debris. There is no depressed skull fracture.   There is minimal mucosal thickening of the sinuses with mucus retention   cyst/polyps in the left sphenoid and bilateral maxillary sinuses. The   tympanomastoid region is clear.  Partially visualized endotracheal tube.   Secretion in the visualized nasopharynx.    Impression:     Status post interval removal of the right frontal approach Ommaya   catheter with stable appearance of the ventricles since 2018.   Somewhat more prominent, nonspecific periventricular and subcortical   white matter lucency's Early transependymal CSF resorption or cerebral   edema cannot be excluded. Scattered nonspecific faint hyperdense foci   scattered throughout the brain, which may be related to patient's known   metastasis although superimposed infection cannot be excluded. If   clinically indicated, short-term follow-up or MRI may be obtained for   further evaluation.    Right frontal scalp soft tissue fluid collection as described.    Secretion in the visualized nasopharynx. Suctioning would be helpful.    PRECIOUS [place]   This document has been electronically signed. 2018  1:13AM    A/P : 47 year old male with h/o Stage 4 Lung Adenocarcinoma with mets to brain s/p Ommaya reservoir Placed 2017 by Dr Cee for leptomeningeal disease s/p Intrathecal Methotrexate , chemo and radiation, anxiety, depression, h/o nephrolithiasis, h/o pericarditis was admitted on  for fever and AMS change. Work up revealed + CSF after Ommaya Tap he was then taken to the OR yesterday 18  where he had the removal of the Ventricular catheter and Ommaya reservoir . Guarded     1.	CT Brain last night no evidence of Hydrocephalous   2.	Neurologically stable   3.	Pleura effusion ? pleurocentesis ?   4.	Recommend to keep pt intubated one more day as discussed with Intensivist team   5.	Continue IV antibiotics per ID recommendation, will follow cultures   6.	Case d/w Dr Cee Neurosurgery POD#1     s/p Ommaya [place] & Ventricular Catheter Removal     HPI:   18 : 48 y/o male with h/o Stage 4 Lung CA with mets to brain s/p Ommaya reservoir Placed 2017 by Dr Cee for leptomeningeal disease s/p Intrathecal Methotrexate , chemo and radiation, anxiety, depression, h/o nephrolithiasis, h/o pericarditis was admitted on  for fever and AMS change. Pt was lethargic and unable to give history on admission. As per sister, pt completed 10 course radiation treatment 1 day PTA. 3 days ago he was complaining of severe headache and also had episode of vomiting. He was seen at the oncologist office and was given morphine and valium. The day of admission, the pt developed a fever of 103.6F and complained of rigors. He was also noted to be confused.  In ED he was found to have temp of 103.6 rectally.     He received vancomycin  IV and cefepime IV .     Subjective : : Pt is seen and examined he is Intubated and sedated , With Sedation off x 10 min he is noted to open his eyes , grimace to exam , following simple commands ,weak  with RUE , coughing noted to have copious secretions .     MEDICATIONS  (STANDING):  ALBUTerol    90 MICROgram(s) HFA Inhaler 1 Puff(s) Inhalation every 4 hours        cefepime  IVPB 2000 milliGRAM(s) IV Intermittent every 8 hours  chlorhexidine 0.12% Liquid 15 milliLiter(s) Swish and Spit two times a day  colchicine 0.6 milliGRAM(s) Oral two times a day  dexamethasone  Injectable 2 milliGRAM(s) IV Push three times a day  folic acid 1 milliGRAM(s) Oral daily  heparin  Injectable 5000 Unit(s) SubCutaneous every 8 hours  mirtazapine 15 milliGRAM(s) Oral at bedtime  pantoprazole  Injectable 40 milliGRAM(s) IV Push daily  propofol Infusion 5 MICROgram(s)/kG/Min (2.517 mL/Hr) IV Continuous <Continuous>  sodium chloride 0.9%. 1000 milliLiter(s) (125 mL/Hr) IV Continuous <Continuous>    MEDICATIONS  (PRN):  acetaminophen   Tablet 650 milliGRAM(s) Oral every 6 hours PRN For Temp greater than 38 C (100.4 F)  acetaminophen  Suppository 650 milliGRAM(s) Rectal every 6 hours PRN For Temp greater than 38 C (100.4 F)  ALBUTerol    0.083% 2.5 milliGRAM(s) Nebulizer every 6 hours PRN Shortness of Breath and/or Wheezing  diazepam    Tablet 2 milliGRAM(s) Oral three times a day PRN anxiety  HYDROmorphone   Tablet 4 milliGRAM(s) Oral two times a day PRN Severe Pain (7 - 10)    Allergies  adhesives (Rash)  No Known Drug Allergies    Vital Signs Last 24 Hrs: T max 103.1   T(C): 37.8 (2018 08:00), Max: 39.5 (2018 12:05)  T(F): 100.1 (2018 08:00), Max: 103.1 (2018 12:05)  HR: 97 (2018 09:00) (87 - 121)  BP: 103/78 (2018 09:00) (90/71 - 173/104)  BP(mean): 83 (2018 09:00) (71 - 130)  RR: 14 (2018 09:00) (12 - 31)  SpO2: 100% (2018 09:00) (97% - 100%)    PHYSICAL EXAM:    GENERAL: Intubated Sedated   HEAD:  Dressing is dry   EYES: + Grimace to eye exam PERRLA, opens eyes spontaneously   Neuro: Lethargic due to sedation effect , follows simple commands  with RUE   Face : symmetrical   EXTREMITIES:  + petechial lesions noted over the Bilateral ankles 1 + pitting edema , no cyanosis     LABS:  Urine :     Color: Yellow / Appearance: Clear / S.015 / pH: x  Gluc: x / Ketone: Negative  / Bili: Negative / Urobili: Negative mg/dL   Blood: x / Protein: 30 mg/dL / Nitrite: Negative   Leuk Esterase: Negative / RBC: 0-2 /HPF / WBC 0-2   Sq Epi: x / Non Sq Epi: Occasional / Bacteria: Occasional    CSF:   Cerebrospinal Fluid Cell Count-1 (18 @ 21:14)    CSF Segmented Neutrophils: 96 %    Total Nucleated Cell Count, CSF: 744 /uL    CSF Appearance: Slightly Cloudy    CSF Lymphocytes: 3 %    CSF Monocytes/Macrophages: 1 %    CSF Color: See Note: slightly xanthochromic    Culture - CSF with Gram Stain (collected 2018 21:14)  Source: .CSF  Gram Stain (2018 01:16):    polymorphonuclear leukocytes seen per low power field    Gram Variable Cocci seen per oil power field    by cytocentrifuge                      15.0   26.8  )-----------( 382      ( 2018 07:30 )             45.7                15.3   19.6  )-----------( 291      ( 2018 05:26 )             53.0         132<L>  |  100  |  17  ----------------------------<  95  4.2   |  24  |  1.09    Ca    10.5<H>      2018 05:26  Phos  3.0       Mg     2.5         TPro  8.4<H>  /  Alb  3.3  /  TBili  0.8  /  DBili  x   /  AST  34  /  ALT  83<H>  /  AlkPhos  122<H>      PTT - ( 2018 11:19 )  PTT:27.3 sec  Urinalysis Basic - ( 2018 12:33 )    Color: Yellow / Appearance: Clear / S.015 / pH: x  Gluc: x / Ketone: Negative  / Bili: Negative / Urobili: Negative mg/dL   Blood: x / Protein: 30 mg/dL / Nitrite: Negative   Leuk Esterase: Negative / RBC: 0-2 /HPF / WBC 0-2   Sq Epi: x / Non Sq Epi: Occasional / Bacteria: Occasional    Pathology :   Surgical Pathology Final Report -  (10.20.17 @ 09:21)    Surgical Pathology Final Report - :   Final Diagnosis    1.,  2.  PLEURAL BIOPSIES (LEFT, #1 AND #2):  - EXTENSIVE INVOLVEMENT BY ADENOCARCINOMA;  MORPHOLOGICALLY CONSISTENT WITH PATIENT'S KNOWN  PULMONARY ADENOCARCINOMA.    3.  CYTOLOGY SPECIMEN (LEFT PLEURAL FLUID):  - POSITIVE FOR MALIGNANT CELLS; ADENOCARCINOMA.    RADIOLOGY & ADDITIONAL TESTS:    EXAM:  CT BRAIN                          PROCEDURE DATE:  2018      INTERPRETATION:  Clinical indication: Ommaya catheter removed,   meningitis, assess hydrocephalus.    Technique: CT axial images of the head were obtained without intravenous   contrast. Computer-reconstructed coronal and sagittal images were   obtained.    Comparison: CT 2018. MR 12/15/2017.    Findings: The patient is status post interval removal of the right   frontal approach Ommaya catheter since prior study. Linear right frontal   lucency in the region of the previously noted catheter likely represent   gliosis. There a few foci of pneumocephalus. Again seen are scattered   nonspecific faint hyperdense foci scattered throughout the brain. There   is no obvious acute intracranial hemorrhage, large cortical infarct, mass   effect or midline shift. The ventricles are not significantly changed   since prior study. Nonspecific periventricular and subcortical white   matter lucency's are somewhat more prominent than the prior study.    There is a 1.2 x 5.5 x 1.2 cm (transverse x AP x CC) mostly   low-attenuation fluid collection with small layering of density   posteriorly in the right frontal scalp, likely containing a small amount   of acute blood products/debris. There is no depressed skull fracture.   There is minimal mucosal thickening of the sinuses with mucus retention   cyst/polyps in the left sphenoid and bilateral maxillary sinuses. The   tympanomastoid region is clear.  Partially visualized endotracheal tube.   Secretion in the visualized nasopharynx.    Impression:     Status post interval removal of the right frontal approach Ommaya   catheter with stable appearance of the ventricles since 2018.   Somewhat more prominent, nonspecific periventricular and subcortical   white matter lucency's Early transependymal CSF resorption or cerebral   edema cannot be excluded. Scattered nonspecific faint hyperdense foci   scattered throughout the brain, which may be related to patient's known   metastasis although superimposed infection cannot be excluded. If   clinically indicated, short-term follow-up or MRI may be obtained for   further evaluation.    Right frontal scalp soft tissue fluid collection as described.    Secretion in the visualized nasopharynx. Suctioning would be helpful.    PRECIOUS [place]   This document has been electronically signed. 2018  1:13AM    A/P : 47 year old male with h/o Stage 4 Lung Adenocarcinoma with mets to brain s/p Ommaya reservoir Placed 2017 by Dr Cee for leptomeningeal disease s/p Intrathecal Methotrexate , chemo and radiation, anxiety, depression, h/o nephrolithiasis, h/o pericarditis was admitted on  for fever and AMS change. Work up revealed + CSF after Ommaya Tap he was then taken to the OR yesterday 18  where he had the removal of the Ventricular catheter and Ommaya reservoir . Guarded     1.	CT Brain last night no evidence of Hydrocephalous   2.	Neurologically stable   3.	Pleura effusion ? pleurocentesis ?   4.	Recommend to keep pt intubated one more day as discussed with Intensivist team   5.	Continue IV antibiotics per ID recommendation, will follow cultures   6.	CT Brain in am for follow up rule out Hydrocephalous   7.	Case d/w Dr Cee

## 2018-01-13 NOTE — PROGRESS NOTE ADULT - ASSESSMENT
Stage 4 lung cancer with brain mets.   Acute bacterial meningitis. Probable sepsis. Abx's as per ID.    R sided mild multifocal PNA.  Abx's, nebs.   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     Small pericardial effusion.  S/P pericardial window 11/15, 12/15.

## 2018-01-14 LAB
ANION GAP SERPL CALC-SCNC: 8 MMOL/L — SIGNIFICANT CHANGE UP (ref 5–17)
BUN SERPL-MCNC: 21 MG/DL — SIGNIFICANT CHANGE UP (ref 7–23)
CALCIUM SERPL-MCNC: 9.6 MG/DL — SIGNIFICANT CHANGE UP (ref 8.5–10.1)
CHLORIDE SERPL-SCNC: 106 MMOL/L — SIGNIFICANT CHANGE UP (ref 96–108)
CO2 SERPL-SCNC: 21 MMOL/L — LOW (ref 22–31)
CREAT SERPL-MCNC: 0.95 MG/DL — SIGNIFICANT CHANGE UP (ref 0.5–1.3)
GLUCOSE SERPL-MCNC: 130 MG/DL — HIGH (ref 70–99)
HCT VFR BLD CALC: 43.7 % — SIGNIFICANT CHANGE UP (ref 39–50)
HGB BLD-MCNC: 14 G/DL — SIGNIFICANT CHANGE UP (ref 13–17)
MAGNESIUM SERPL-MCNC: 2.4 MG/DL — SIGNIFICANT CHANGE UP (ref 1.6–2.6)
MCHC RBC-ENTMCNC: 30.6 PG — SIGNIFICANT CHANGE UP (ref 27–34)
MCHC RBC-ENTMCNC: 32 GM/DL — SIGNIFICANT CHANGE UP (ref 32–36)
MCV RBC AUTO: 95.6 FL — SIGNIFICANT CHANGE UP (ref 80–100)
PHOSPHATE SERPL-MCNC: 2.8 MG/DL — SIGNIFICANT CHANGE UP (ref 2.5–4.5)
PLATELET # BLD AUTO: 229 K/UL — SIGNIFICANT CHANGE UP (ref 150–400)
POTASSIUM SERPL-MCNC: 4 MMOL/L — SIGNIFICANT CHANGE UP (ref 3.5–5.3)
POTASSIUM SERPL-SCNC: 4 MMOL/L — SIGNIFICANT CHANGE UP (ref 3.5–5.3)
RBC # BLD: 4.57 M/UL — SIGNIFICANT CHANGE UP (ref 4.2–5.8)
RBC # FLD: 20.1 % — HIGH (ref 10.3–14.5)
SODIUM SERPL-SCNC: 135 MMOL/L — SIGNIFICANT CHANGE UP (ref 135–145)
WBC # BLD: 15.7 K/UL — HIGH (ref 3.8–10.5)
WBC # FLD AUTO: 15.7 K/UL — HIGH (ref 3.8–10.5)

## 2018-01-14 PROCEDURE — 70450 CT HEAD/BRAIN W/O DYE: CPT | Mod: 26

## 2018-01-14 PROCEDURE — 99233 SBSQ HOSP IP/OBS HIGH 50: CPT

## 2018-01-14 RX ADMIN — CHLORHEXIDINE GLUCONATE 15 MILLILITER(S): 213 SOLUTION TOPICAL at 05:07

## 2018-01-14 RX ADMIN — Medication 1 MILLIGRAM(S): at 13:13

## 2018-01-14 RX ADMIN — MIRTAZAPINE 15 MILLIGRAM(S): 45 TABLET, ORALLY DISINTEGRATING ORAL at 21:20

## 2018-01-14 RX ADMIN — CEFEPIME 100 MILLIGRAM(S): 1 INJECTION, POWDER, FOR SOLUTION INTRAMUSCULAR; INTRAVENOUS at 21:20

## 2018-01-14 RX ADMIN — Medication 650 MILLIGRAM(S): at 18:13

## 2018-01-14 RX ADMIN — HEPARIN SODIUM 5000 UNIT(S): 5000 INJECTION INTRAVENOUS; SUBCUTANEOUS at 05:08

## 2018-01-14 RX ADMIN — Medication 650 MILLIGRAM(S): at 12:42

## 2018-01-14 RX ADMIN — HEPARIN SODIUM 5000 UNIT(S): 5000 INJECTION INTRAVENOUS; SUBCUTANEOUS at 21:21

## 2018-01-14 RX ADMIN — CEFEPIME 100 MILLIGRAM(S): 1 INJECTION, POWDER, FOR SOLUTION INTRAMUSCULAR; INTRAVENOUS at 05:07

## 2018-01-14 RX ADMIN — Medication 2 MILLIGRAM(S): at 21:20

## 2018-01-14 RX ADMIN — Medication 2 MILLIGRAM(S): at 05:08

## 2018-01-14 RX ADMIN — CHLORHEXIDINE GLUCONATE 15 MILLILITER(S): 213 SOLUTION TOPICAL at 17:36

## 2018-01-14 RX ADMIN — Medication 2 MILLIGRAM(S): at 13:12

## 2018-01-14 RX ADMIN — PROPOFOL 2.52 MICROGRAM(S)/KG/MIN: 10 INJECTION, EMULSION INTRAVENOUS at 02:31

## 2018-01-14 RX ADMIN — CEFEPIME 100 MILLIGRAM(S): 1 INJECTION, POWDER, FOR SOLUTION INTRAMUSCULAR; INTRAVENOUS at 13:12

## 2018-01-14 RX ADMIN — Medication 0.6 MILLIGRAM(S): at 17:36

## 2018-01-14 RX ADMIN — HEPARIN SODIUM 5000 UNIT(S): 5000 INJECTION INTRAVENOUS; SUBCUTANEOUS at 13:13

## 2018-01-14 RX ADMIN — PROPOFOL 2.52 MICROGRAM(S)/KG/MIN: 10 INJECTION, EMULSION INTRAVENOUS at 08:41

## 2018-01-14 RX ADMIN — PANTOPRAZOLE SODIUM 40 MILLIGRAM(S): 20 TABLET, DELAYED RELEASE ORAL at 11:43

## 2018-01-14 RX ADMIN — SODIUM CHLORIDE 50 MILLILITER(S): 9 INJECTION INTRAMUSCULAR; INTRAVENOUS; SUBCUTANEOUS at 15:07

## 2018-01-14 RX ADMIN — SODIUM CHLORIDE 125 MILLILITER(S): 9 INJECTION INTRAMUSCULAR; INTRAVENOUS; SUBCUTANEOUS at 05:07

## 2018-01-14 NOTE — PROGRESS NOTE ADULT - SUBJECTIVE AND OBJECTIVE BOX
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 Pts fever and WBC count have improved, he is currently still intubated but mental status off sedation is also improved. Pt is now following commands.     Vital Signs Last 24 Hrs  T(C): 37.8 (14 Jan 2018 10:00), Max: 38.2 (13 Jan 2018 13:00)  T(F): 100 (14 Jan 2018 10:00), Max: 100.8 (13 Jan 2018 13:00)  HR: 87 (14 Jan 2018 11:00) (82 - 106)  BP: 124/82 (14 Jan 2018 11:00) (93/69 - 124/82)  BP(mean): 92 (14 Jan 2018 11:00) (74 - 100)  RR: 20 (14 Jan 2018 11:00) (16 - 20)  SpO2: 100% (14 Jan 2018 11:00) (98% - 100%)    MEDICATIONS  (STANDING):  ALBUTerol    90 MICROgram(s) HFA Inhaler 1 Puff(s) Inhalation every 4 hours  cefepime  IVPB 2000 milliGRAM(s) IV Intermittent every 8 hours  colchicine 0.6 milliGRAM(s) Oral two times a day  dexamethasone  Injectable 2 milliGRAM(s) IV Push three times a day  folic acid 1 milliGRAM(s) Oral daily  heparin  Injectable 5000 Unit(s) SubCutaneous every 8 hours  mirtazapine 15 milliGRAM(s) Oral at bedtime  pantoprazole  Injectable 40 milliGRAM(s) IV Push daily  sodium chloride 0.9%. 1000 milliLiter(s) (125 mL/Hr) IV Continuous    MEDICATIONS  (PRN):  acetaminophen   Tablet 650 milliGRAM(s) Oral every 6 hours PRN For Temp greater than 38 C (100.4 F)  acetaminophen  Suppository 650 milliGRAM(s) Rectal every 6 hours PRN For Temp greater than 38 C (100.4 F)  ALBUTerol    0.083% 2.5 milliGRAM(s) Nebulizer every 6 hours PRN Shortness of Breath and/or Wheezing  diazepam    Tablet 2 milliGRAM(s) Oral three times a day PRN anxiety  HYDROmorphone   Tablet 4 milliGRAM(s) Oral two times a day PRN Severe Pain (7 - 10)    PHYSICAL EXAM:  Pt seen and examined off sedation after CT of the head  Pt awake, alert, follows most commands, able to give a thumbs up with right hand and wiggle toes on both feet on commands  Incision site from Ommaya removal clean and dry, sutures intact, +fluctuance where ommaya was seated, skin not tense, no redness, no discharge    PEARRL, EOMI, visual fields appear intact- +blink to threat   Oral intubation, lungs clear to auscultation  S1 and S2, regular rhythm  abdomen soft  RUE anti gravity, full strengths, LUE withdraws to pain, moves both feet to command unable to lift legs anti-gravity   Sensation intact  +edema b/l lower extremities, + petechial rash b/l lower extremities chronic     LABS:                         14.0   15.7  )-----------( 229      ( 14 Jan 2018 06:14 )             43.7     01-14    135  |  106  |  21  ----------------------------<  130<H>  4.0   |  21<L>  |  0.95    Ca    9.6      14 Jan 2018 06:14  Phos  2.8     01-14  Mg     2.4     01-14    RADIOLOGY:  CT HEAD 1/14/18  IMPRESSION:  Slight interval increase in the size of the lateral   ventricles and third ventricle is prior examination. Gliosis seen in the   RIGHT frontal lobe from prior Ommaya reservoir tract. Scattered foci of   faint hyperdensity at the corticomedullary junction consistent with   patient's known brain metastases and leptomeningeal disease.       Culture - Surgical Swab (01.12.18 @ 16:15)    Specimen Source: .Surgical Swab None    Culture Results:   Numerous Staphylococcus aureus    Culture - Acid Fast - Other w/Smear (01.12.18 @ 16:15)    Specimen Source: .Other None    Acid Fast Bacilli Smear:   No acid fast bacilli seen by fluorochrome stain    Culture - CSF with Gram Stain (01.11.18 @ 21:14)    Gram Stain:   polymorphonuclear leukocytes seen per low power field  Gram Variable Cocci seen per oil power field  by cytocentrifuge    -  Oxacillin: S 0.5    -  Penicillin: R >8    -  RIF- Rifampin: R >2    -  Trimethoprim/Sulfamethoxazole: S <=0.5/9.5    -  Vancomycin: S 2    Specimen Source: .CSF    Culture Results:   Numerous Staphylococcus aureus    Organism Identification: Staphylococcus aureus    Organism: Staphylococcus aureus    Method Type: TAMMIE

## 2018-01-14 NOTE — PROGRESS NOTE ADULT - ASSESSMENT
Stage 4 lung cancer with brain mets.   Acute bacterial meningitis. Probable sepsis. Abx's as per ID.    Pt extubated. On NC O2.     R sided mild multifocal PNA.  Abx's, nebs.   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     Small pericardial effusion.  S/P pericardial window 11/15, 12/15.

## 2018-01-14 NOTE — PROGRESS NOTE ADULT - SUBJECTIVE AND OBJECTIVE BOX
Extubated one hour ago. Doing well.  Awake. Alert. Answers simple questions. Denies HA. No dyspnea.    Vital Signs Last 24 Hrs  T(C): 37.8 (14 Jan 2018 10:00), Max: 38.2 (13 Jan 2018 13:00)  T(F): 100 (14 Jan 2018 10:00), Max: 100.8 (13 Jan 2018 13:00)  HR: 92 (14 Jan 2018 12:00) (82 - 101)  BP: 129/81 (14 Jan 2018 12:00) (93/69 - 129/81)  BP(mean): 92 (14 Jan 2018 12:00) (74 - 100)  RR: 20 (14 Jan 2018 12:00) (16 - 22)  SpO2: 97% (14 Jan 2018 12:00) (97% - 100%)    S/p removal of Omaya - dressing. Left upper extremity- swollen- had iv in left forearm - removed earlier today , swelling is improving. Mediport- right chest.   Chest with diminished  breath sounds. Abdomen soft. Moving all extremities.                          14.0   15.7  )-----------( 229      ( 14 Jan 2018 06:14 )             43.7   < from: CT Head No Cont (01.14.18 @ 10:28) >    EXAM:  CT BRAIN                            PROCEDURE DATE:  01/14/2018          INTERPRETATION:      CT head without IV contrast        CLINICAL INFORMATION:  Follow-up removal of a RIGHT-sided Ommaya catheter     TECHNIQUE: Contiguous axial 5 mm sections were obtained through the head.   Sagittal and coronal 2-D reformatted images were also obtained.   This   scan was performed using automatic exposure control (radiation dose   reduction software) to obtain a diagnostic image quality scan with  patient dose as low as reasonably achievable.     FINDINGS:   CT dated 1/13/2018 as available for review.    The brain demonstrates slight interval increase in the size of the   lateral ventricles and third ventricle is prior examination. Gliosis seen   in the RIGHT frontal lobe from prior Ommaya reservoir tract. Scattered   foci of faint hyperdensity at the corticomedullary junction consistent   with patient's known brain metastases and leptomeningeal disease.   No   acute cerebral cortical infarct is seen.  No intracranial hemorrhage is   found.  No mass effect is found in the brain.      There is no midline shift.    The orbits are unremarkable.  The paranasal sinuses are clear.  The nasal   cavity appears intact.  The nasopharynx is symmetric.  The central skull   base, petrous temporal bones and calvarium demonstrate RIGHT frontal kenia   hole with overlying scalp hematoma..      IMPRESSION:    Slight interval increase in the size of the lateral   ventricles and third ventricle is prior examination. Gliosis seen in the   RIGHT frontal lobe from prior Ommaya reservoir tract. Scattered foci of   faint hyperdensity at the corticomedullary junction consistent with   patient's known brain metastases and leptomeningeal disease.         LESLIE BURNHAM M.D., ATTENDING RADIOLOGIST  This document has been electronically signed. Jan 14 2018 10:41AM         MEDICATIONS  (STANDING):  ALBUTerol    90 MICROgram(s) HFA Inhaler 1 Puff(s) Inhalation every 4 hours  cefepime  IVPB      cefepime  IVPB 2000 milliGRAM(s) IV Intermittent every 8 hours  chlorhexidine 0.12% Liquid 15 milliLiter(s) Swish and Spit two times a day  colchicine 0.6 milliGRAM(s) Oral two times a day  dexamethasone  Injectable 2 milliGRAM(s) IV Push three times a day  folic acid 1 milliGRAM(s) Oral daily  heparin  Injectable 5000 Unit(s) SubCutaneous every 8 hours  mirtazapine 15 milliGRAM(s) Oral at bedtime  pantoprazole  Injectable 40 milliGRAM(s) IV Push daily  sodium chloride 0.9%. 1000 milliLiter(s) (125 mL/Hr) IV Continuous <Continuous>

## 2018-01-14 NOTE — PROGRESS NOTE ADULT - ASSESSMENT
Pt is a 47 year old man with Stage 4 Lung CA with mets to brain s/p Ommaya reservoir 11/2017 for leptomeningeal disease s/p chemo and radiation, he presented on 1/11 with lethargy, confusion, and a fever of 103.6, he was found to have acute bacterial meningitis with MSSA and an Ommaya site infection as well as RUL pneumonia.    Pt is now POD #2 s/p removal of Ommaya  CT shows a slight interval increase in the size of the lateral ventricles and third ventricle, no EVD at this time but will closely monitor mental status  Pts mental status is improved, WBC and fever also improved    PLAN-  Repeat CT in AM, sooner if decrease in mental status   Continue ABX as per ID  Extubate as per ICU  SQ heparin for DVT PPX

## 2018-01-14 NOTE — PROGRESS NOTE ADULT - SUBJECTIVE AND OBJECTIVE BOX
CC:  Resp failure    HPI:    48 y/o male wit Stage 4 Lung CA with mets to brain s/p Ommaya reservoir 11/2017 for leptomeningeal disease s/p chemo and radiation, anxiety, depression, h/o nephrolithiasis, h/o pericarditis presents to ED with complaint of fever and AMS. pt is admitted to ICU for S. aures meningitis and s/p removal of infected reservoir.     1/13:  Pt seen and examined in ICU.  case d/w dr austin.  Vented and sedated.  Hemodynamically stable    1/14:  Vented. For repeat brain scan today      PMH:  As above.     PSH:  As above.     FH: Non Contributory other than those listed in HPI    Social History:      MEDICATIONS  (STANDING):  ALBUTerol    90 MICROgram(s) HFA Inhaler 1 Puff(s) Inhalation every 4 hours  cefepime  IVPB      cefepime  IVPB 2000 milliGRAM(s) IV Intermittent every 8 hours  chlorhexidine 0.12% Liquid 15 milliLiter(s) Swish and Spit two times a day  colchicine 0.6 milliGRAM(s) Oral two times a day  dexamethasone  Injectable 2 milliGRAM(s) IV Push three times a day  folic acid 1 milliGRAM(s) Oral daily  heparin  Injectable 5000 Unit(s) SubCutaneous every 8 hours  mirtazapine 15 milliGRAM(s) Oral at bedtime  pantoprazole  Injectable 40 milliGRAM(s) IV Push daily  propofol Infusion 5 MICROgram(s)/kG/Min (2.517 mL/Hr) IV Continuous <Continuous>  sodium chloride 0.9%. 1000 milliLiter(s) (125 mL/Hr) IV Continuous <Continuous>    MEDICATIONS  (PRN):  acetaminophen   Tablet 650 milliGRAM(s) Oral every 6 hours PRN For Temp greater than 38 C (100.4 F)  acetaminophen  Suppository 650 milliGRAM(s) Rectal every 6 hours PRN For Temp greater than 38 C (100.4 F)  ALBUTerol    0.083% 2.5 milliGRAM(s) Nebulizer every 6 hours PRN Shortness of Breath and/or Wheezing  diazepam    Tablet 2 milliGRAM(s) Oral three times a day PRN anxiety  HYDROmorphone   Tablet 4 milliGRAM(s) Oral two times a day PRN Severe Pain (7 - 10)      Allergies: NKDA    ROS:  SEE BELOW        ICU Vital Signs Last 24 Hrs  T(C): 37.3 (14 Jan 2018 06:00), Max: 38.2 (13 Jan 2018 13:00)  T(F): 99.2 (14 Jan 2018 06:00), Max: 100.8 (13 Jan 2018 13:00)  HR: 82 (14 Jan 2018 08:06) (82 - 106)  BP: 120/82 (14 Jan 2018 08:00) (93/69 - 123/88)  BP(mean): 91 (14 Jan 2018 08:00) (74 - 100)  ABP: --  ABP(mean): --  RR: 18 (14 Jan 2018 08:00) (14 - 20)  SpO2: 100% (14 Jan 2018 08:06) (98% - 100%)      Mode: AC/ CMV (Assist Control/ Continuous Mandatory Ventilation)  RR (machine): 14  TV (machine): 500  FiO2: 40  PEEP: 5  ITime: 1  PIP: 21      I&O's Summary    13 Jan 2018 07:01  -  14 Jan 2018 07:00  --------------------------------------------------------  IN: 3135 mL / OUT: 2550 mL / NET: 585 mL    14 Jan 2018 07:01  -  14 Jan 2018 08:48  --------------------------------------------------------  IN: 135 mL / OUT: 0 mL / NET: 135 mL        Physical Exam:  SEE BELOW                          14.0   15.7  )-----------( 229      ( 14 Jan 2018 06:14 )             43.7       01-14    135  |  106  |  21  ----------------------------<  130<H>  4.0   |  21<L>  |  0.95    Ca    9.6      14 Jan 2018 06:14  Phos  2.8     01-14  Mg     2.4     01-14              ABG - ( 12 Jan 2018 17:58 )  pH: 7.43  /  pCO2: 31    /  pO2: 175   / HCO3: 20    / Base Excess: -2.6  /  SaO2: 99                      DVT Prophylaxis:                                                            Contraindication:     Advanced Directives:    Discussed with:    Visit Information:  Time spent excluding procedure:  45 cc mins    ** Time is exclusive of billed procedures and/or teaching and/or routine family updates.

## 2018-01-14 NOTE — PROGRESS NOTE ADULT - SUBJECTIVE AND OBJECTIVE BOX
Patient is a 47y old  Male who presents with a chief complaint of fever, AMS (2018 16:20)    HPI:  46 y/o male with h/o Stage 4 Lung CA with mets to brain s/p Ommaya reservoir 2017 for leptomeningeal disease s/p chemo and radiation, anxiety, depression, h/o nephrolithiasis, h/o pericarditis was admitted on  for fever and AMS. Pt was lethargic and unable to give history. As per sister, pt completed 10 course radiation treatment 1 day PTA. 3 days ago he was complaining of severe headache and also had episode of vomiting. He was seen at oncologist office and was given morphine and valium. The day of admission, the pt developed a fever 0f 103.6F and complained of rigors. He was also noted to be confused.  In ED pt found to have temp of 103.6 rectally. He received vanco IV and cefepime.     Events noted  s/p Ommaya reservoir remova  The patient is intubated on ventilatory suport    MEDICATIONS  (STANDING):  ALBUTerol    90 MICROgram(s) HFA Inhaler 1 Puff(s) Inhalation every 4 hours  cefepime  IVPB      cefepime  IVPB 2000 milliGRAM(s) IV Intermittent every 8 hours  chlorhexidine 0.12% Liquid 15 milliLiter(s) Swish and Spit two times a day  colchicine 0.6 milliGRAM(s) Oral two times a day  dexamethasone  Injectable 2 milliGRAM(s) IV Push three times a day  folic acid 1 milliGRAM(s) Oral daily  heparin  Injectable 5000 Unit(s) SubCutaneous every 8 hours  mirtazapine 15 milliGRAM(s) Oral at bedtime  pantoprazole  Injectable 40 milliGRAM(s) IV Push daily  propofol Infusion 5 MICROgram(s)/kG/Min (2.517 mL/Hr) IV Continuous <Continuous>  sodium chloride 0.9%. 1000 milliLiter(s) (125 mL/Hr) IV Continuous <Continuous>    MEDICATIONS  (PRN):  acetaminophen   Tablet 650 milliGRAM(s) Oral every 6 hours PRN For Temp greater than 38 C (100.4 F)  acetaminophen  Suppository 650 milliGRAM(s) Rectal every 6 hours PRN For Temp greater than 38 C (100.4 F)  ALBUTerol    0.083% 2.5 milliGRAM(s) Nebulizer every 6 hours PRN Shortness of Breath and/or Wheezing  diazepam    Tablet 2 milliGRAM(s) Oral three times a day PRN anxiety  HYDROmorphone   Tablet 4 milliGRAM(s) Oral two times a day PRN Severe Pain (7 - 10)      Vital Signs Last 24 Hrs  T(C): 37.8 (2018 08:00), Max: 39.5 (2018 12:05)  T(F): 100.1 (2018 08:00), Max: 103.1 (2018 12:05)  HR: 97 (2018 09:00) (87 - 121)  BP: 103/78 (2018 09:00) (90/71 - 173/104)  BP(mean): 83 (2018 09:00) (71 - 130)  RR: 14 (2018 09:00) (12 - 31)  SpO2: 99% (2018 10:36) (97% - 100%)    Physical Exam:      Constitutional: frail looking  HEENT: NC/AT, EOMI, PERRLA; postop scalp wound dressed  Neck: supple  Back: no tenderness  Respiratory: crackles at bases; decreased BS  Cardiovascular: S1S2 regular, no murmurs  Abdomen: soft, not tender, not distended, positive BS  Genitourinary: no LN palpable  Rectal: deferred  Musculoskeletal: no muscle tenderness, no joint swelling or tenderness  Extremities: no pedal edema  Neurological: sedated, moving all extremities  Skin: no rashes    Labs:                        15.3   19.6  )-----------( 291      ( 2018 05:26 )             53.0         132<L>  |  100  |  17  ----------------------------<  95  4.2   |  24  |  1.09    Ca    10.5<H>      2018 05:26  Phos  3.0       Mg     2.5              Vancomycin Level, Trough: 11.4 ug/mL ( @ 05:26)                          15.0   26.8  )-----------( 382      ( 2018 07:30 )             45.7         131<L>  |  95<L>  |  16  ----------------------------<  139<H>  3.8   |  27  |  1.03    Ca    10.2<H>      2018 07:30    TPro  8.4<H>  /  Alb  3.3  /  TBili  0.8  /  DBili  x   /  AST  34  /  ALT  83<H>  /  AlkPhos  122<H>  01-11     LIVER FUNCTIONS - ( 2018 11:19 )  Alb: 3.3 g/dL / Pro: 8.4 gm/dL / ALK PHOS: 122 U/L / ALT: 83 U/L / AST: 34 U/L / GGT: x           Urinalysis Basic - ( 2018 12:33 )    Color: Yellow / Appearance: Clear / S.015 / pH: x  Gluc: x / Ketone: Negative  / Bili: Negative / Urobili: Negative mg/dL   Blood: x / Protein: 30 mg/dL / Nitrite: Negative   Leuk Esterase: Negative / RBC: 0-2 /HPF / WBC 0-2   Sq Epi: x / Non Sq Epi: Occasional / Bacteria: Occasional    Cerebrospinal Fluid Cell Count-1 (18 @ 21:14)    CSF Segmented Neutrophils: 96 %    Total Nucleated Cell Count, CSF: 744 /uL    CSF Appearance: Slightly Cloudy    CSF Lymphocytes: 3 %    CSF Monocytes/Macrophages: 1 %    CSF Color: See Note: slightly xanthrochromic      Culture - Body Fluid with Gram Stain (collected 2018 14:36)  Source: .Body Fluid scalp incision  Gram Stain (2018 06:33):    No polymorphonuclear cells seen    No organisms seen    by cytocentrifuge    Culture - Acid Fast - CSF (collected 2018 21:14)  Source: .CSF spinal fluid    Culture - Fungal, CSF (collected 2018 21:14)  Source: .CSF spinal fluid  Preliminary Report (2018 12:37):    Testing in progress    Culture - CSF with Gram Stain (collected 2018 21:14)  Source: .CSF  Gram Stain (2018 01:16):    polymorphonuclear leukocytes seen per low power field    Gram Variable Cocci seen per oil power field    by cytocentrifuge  Final Report (2018 07:36):    Numerous Staphylococcus aureus  Organism: Staphylococcus aureus (2018 07:36)  Organism: Staphylococcus aureus (2018 07:36)      -  Oxacillin: S 0.5      -  Penicillin: R >8      -  RIF- Rifampin: R >2      -  Trimethoprim/Sulfamethoxazole: S <=0.5/9.5      -  Vancomycin: S 2      Method Type: TAMMIE    Culture - Urine (collected 2018 12:33)  Source: .Urine None  Final Report (2018 18:38):    10,000 - 49,000 CFU/mL Coag Negative Staphylococcus    Culture - Blood (collected 2018 11:19)  Source: .Blood Blood-Peripheral  Preliminary Report (2018 15:01):    No growth to date.    Culture - Blood (collected 2018 11:19)  Source: .Blood Blood-Peripheral  Preliminary Report (2018 15:01):    No growth to date.          Radiology:    < from: CT Chest No Cont (18 @ 17:21) >  Several scattered groundglass nodules and opacities within the right   upper lobe, suggestive of pneumonia given the clinical context of fever.   Small to right pleural effusion is present. Loculated left pleural   effusion along the left lateral pleura and within the fissures is again   identified. Small pericardial effusion is present.    < end of copied text >    < from: CT Head No Cont (18 @ 13:09) >   unchanged RIGHT frontal Ommaya catheter  in place within   the anterior horn of the RIGHT lateral ventricle. Faint hyperdense   lesions scattered throughout the brain consistent with patient's known   metastatic disease. IV contrast would be needed for complete evaluation.     < end of copied text >      Advanced directives addressed: full resuscitation

## 2018-01-14 NOTE — PROGRESS NOTE ADULT - ASSESSMENT
IMP:    Acute resp failure  S. aureus meningitis--s/p reservoir removal   Stage 4 lung Ca  TTE results as noted--?? RV outflow tract obst    Overall poor prognosis    Plan:    Vent weaning pending brain imaging today  Sedate  HOB > 30  Vent support  IVF  Abx per ID  DVT prophy--SCD and sqhep   Will consider chest CT with IV cont when more stable to eval/R/O pulm outflow obst    ICU care--d/w ICU staff and NSGY team

## 2018-01-14 NOTE — PROGRESS NOTE ADULT - ASSESSMENT
48 y/o male with metastatic adenocarcinoma of lung, extensive brain mets and leptomeningeal carcinomatosis s/p systemic chemotherapy and intrathecal chemotherapy, s/p WBRT now  with sepsis, meningitis  with MSSA, pneumonia.    S/p removal of Omaya.  Antibiotics per ID. Clinically improved. Extubated. WBC is trending down.   Management per ICU, ID, pulmonary and neurosurgery.  Aggressive supportive care. Chemotherapy on hold.

## 2018-01-14 NOTE — PROGRESS NOTE ADULT - SUBJECTIVE AND OBJECTIVE BOX
HPI:  46 yo male with PMH Stage 4 Lung CA with mets to brain s/p Ommaya reservoir 2017 for leptomeningeal disease s/p chemo and radiation, anxiety, depression, h/o nephrolithiasis, h/o pericarditis presents to ED with complaint of fever and AMS. Pt is a lethargic and unable to obtain history from him. Does wake up to verbal command but falls back alseep. History obtained from pt's sister at bedside. As per sister pt completed 10 course radiation treatment 1 day ago. 3 days ago he was complaining of severe headache and also had episode of vomiting. He was seen at oncologist office and was given morphine and valium. This morning pt developed a fever 0f 103.6 and complained of rigors. He was also noted to be confused. He did have a cough which has been chronic.    In ED pt found to have temp of 103.6 rectally. Lactate of 2.8. CXR with left multiloculated effusion. Ct head with no acute pathology. Was given 3L NS and started on vanco and cefepime. (2018 16:20)    has chronic cough, no purulent sputum.  CT chest shows patchy/nodular mild findings on R.  c/w previous PET/CT of 17, CT chest shows no change in size of R effusion and loculated L effusion.  Has h.o. pericarditis and pericardial window 2015 and 2015.  h.o. 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     : events noted, removal of infected reservoir. pt sedated, comfortable on ventilator.  CXR today again shows loculated L effusion, no change from , given technique.  Has staph meningitis, on IV Abx's. WBC down to 19,600.  : extubated, awake, knows name and is in hospital.  WBC down to 15,700.     PAST MEDICAL & SURGICAL HISTORY:  Hearing loss: right ear-no device  Cancer with leptomeningeal spread  Dyspnea on exertion  Gait disturbance  Memory loss  Anxiety and depression  Erectile dysfunction, unspecified erectile dysfunction type  History of kidney stones  History of pericarditis  Pleural effusion  Syncope, unspecified syncope type: X3-4 episodes; s/p LINQ device placed  Lung cancer, upper lobe: right. metastatic non small cell Lung Carcinoma.  History of lumbar puncture: chemo placed  History of cardiac monitoring: s/p LINQ device placement 2017  History of lumbar puncture within last 21 days  History of vascular access device: PORT placed  Pleuritic chest pain: Pleurodesis left chest 10/20/2017  H/O bilateral inguinal hernia repair: as a child  Acute pericarditis, unspecified type: pericardial window 2015, 2015      MEDICATIONS  (STANDING):  cefepime  IVPB 2000 milliGRAM(s) IV Intermittent every 12 hours  colchicine 0.6 milliGRAM(s) Oral two times a day  dexamethasone     Tablet 2 milliGRAM(s) Oral three times a day  folic acid 1 milliGRAM(s) Oral daily  heparin  Injectable 5000 Unit(s) SubCutaneous every 8 hours  mirtazapine 15 milliGRAM(s) Oral at bedtime  pantoprazole    Tablet 40 milliGRAM(s) Oral before breakfast  sodium chloride 0.9%. 1000 milliLiter(s) (125 mL/Hr) IV Continuous <Continuous>  vancomycin  IVPB 1000 milliGRAM(s) IV Intermittent every 12 hours    MEDICATIONS  (PRN):  acetaminophen   Tablet 650 milliGRAM(s) Oral every 6 hours PRN For Temp greater than 38 C (100.4 F)  diazepam    Tablet 2 milliGRAM(s) Oral three times a day PRN anxiety  HYDROmorphone   Tablet 4 milliGRAM(s) Oral two times a day PRN Severe Pain (7 - 10)      Allergies    adhesives (Rash)  No Known Drug Allergies    Intolerances        SOCIAL HISTORY: Denies tobacco, etoh abuse or illicit drug use    FAMILY HISTORY:  Family history of leukemia (Father)  Family history of cancer (Mother)      Vital Signs Last 24 Hrs  T(C): 36.8 (2018 04:28), Max: 39.8 (2018 11:13)  T(F): 98.2 (2018 04:28), Max: 103.6 (2018 11:13)  HR: 82 (2018 04:28) (82 - 128)  BP: 166/92 (2018 04:28) (134/92 - 172/91)  BP(mean): --  RR: 18 (2018 04:28) (13 - 22)  SpO2: 95% (2018 04:28) (95% - 100%)    REVIEW OF SYSTEMS:    CONSTITUTIONAL:  As per HPI.  HEENT:  Eyes:  No diplopia or blurred vision. ENT:  No earache, sore throat or runny nose.  CARDIOVASCULAR:  No pressure, squeezing, tightness, heaviness or aching about the chest, neck, axilla or epigastrium.  RESPIRATORY:  see above.  GASTROINTESTINAL:  No nausea, vomiting or diarrhea.  GENITOURINARY:  No dysuria, frequency or urgency.  MUSCULOSKELETAL:  As per HPI.  SKIN:  No change in skin, hair or nails.  NEUROLOGIC:  No paresthesias, fasciculations, seizures or weakness.  PSYCHIATRIC:  No disorder of thought or mood.  ENDOCRINE:  No heat or cold intolerance, polyuria or polydipsia.  HEMATOLOGICAL:  No easy bruising or bleedings:  .     PHYSICAL EXAMINATION:    GENERAL APPEARANCE:  Pt. is not currently dyspneic, in no distress. Pt. is alert, oriented, and pleasant.  HEENT:  Pupils are normal and react normally. No icterus. Mucous membranes well colored.  NECK:  Supple. No lymphadenopathy. Jugular venous pressure not elevated. Carotids equal.   HEART:   The cardiac impulse has a normal quality. Regular. Normal S1 and S2.   CHEST:  Clear to A.   ABDOMEN:  Soft and nontender.   EXTREMITIES:  There is no cyanosis, clubbing or edema.   SKIN:  No rash or significant lesions are noted.  Neuro: Alert, awake, and O x 3.      LABS:                        15.0   26.8  )-----------( 382      ( 2018 07:30 )             45.7     01-12    131<L>  |  95<L>  |  16  ----------------------------<  139<H>  3.8   |  27  |  1.03    Ca    10.2<H>      2018 07:30    TPro  8.4<H>  /  Alb  3.3  /  TBili  0.8  /  DBili  x   /  AST  34  /  ALT  83<H>  /  AlkPhos  122<H>      LIVER FUNCTIONS - ( 2018 11:19 )  Alb: 3.3 g/dL / Pro: 8.4 gm/dL / ALK PHOS: 122 U/L / ALT: 83 U/L / AST: 34 U/L / GGT: x           PTT - ( 2018 11:19 )  PTT:27.3 sec  CARDIAC MARKERS ( 2018 11:19 )  0.019 ng/mL / x     / x     / x     / x          Urinalysis Basic - ( 2018 12:33 )    Color: Yellow / Appearance: Clear / S.015 / pH: x  Gluc: x / Ketone: Negative  / Bili: Negative / Urobili: Negative mg/dL   Blood: x / Protein: 30 mg/dL / Nitrite: Negative   Leuk Esterase: Negative / RBC: 0-2 /HPF / WBC 0-2   Sq Epi: x / Non Sq Epi: Occasional / Bacteria: Occasional          RADIOLOGY & ADDITIONAL STUDIES:       EXAM:  XR CHEST AP OR PA 1V                            PROCEDURE DATE:  2018          INTERPRETATION:  Portable chest radiograph dated 2018.    COMPARISON: 2017.    CLINICAL INFORMATION: Fever.    FINDINGS:    The airway is midline.   Continued application of the right IJ line, distal tip is in the distal   superior vena cava.  The multi loculated left pleural effusion is  slightly smaller and the   left lower lobe is slightly better expanded at this time. The right lung   is clear.  The cardiac silhouette is normal size.   The bones are normal.     IMPRESSION:  A multiloculated left pleural effusion is slightly smaller and the left   lower lobe is slightly better expanded at this time.        PROCEDURE DATE:  2018          INTERPRETATION:  Exam Date: 2018 5:21 PM    CT chest without contrast    CLINICAL INFORMATION:  Fever    TECHNIQUE:  Contiguous axial 3 mm sections were obtained through the   chest using single helical acquisition.  In addition, 2D sagittal and   coronal reformatted images obtained.   This scan was performed using   automatic exposure control (radiation dose reduction software) to obtain   a diagnostic image quality scan with patient dose as low as reasonably   achievable.        FINDINGS: Comparison is made to report of PET CT of 2017.    The thyroid gland appears intact.    There are several scattered groundglass nodules and opacities withinthe   right upper lobe, suggestive of pneumonia given the clinical context of   fever. Small to right pleural effusion is present. Loculated left pleural   effusion along the left lateral pleura and within the fissures is again   identified. The trachea and major bronchi are patent.    No enlarged axillary lymph nodes are found.   No enlarged hilar lymph   nodes are recognized, allowing for the noncontrast technique.  Within the   mediastinum no pathologic lymph nodes are found.  The heart size is   normal.  There is a small pericardial effusion. Right ventral chest wall   port catheter is in place with tip in the SVC.    Limited evaluation of the upper abdomen is unremarkable.      IMPRESSION:     Several scattered groundglass nodules and opacities within the right   upper lobe, suggestive of pneumonia given the clinical context of fever.   Small to right pleural effusion is present. Loculated left pleural   effusion along the left lateral pleura and within the fissures is again   identified. Small pericardial effusion is present.

## 2018-01-15 LAB
-  AMPICILLIN/SULBACTAM: SIGNIFICANT CHANGE UP
-  AMPICILLIN/SULBACTAM: SIGNIFICANT CHANGE UP
-  CEFAZOLIN: SIGNIFICANT CHANGE UP
-  CEFAZOLIN: SIGNIFICANT CHANGE UP
-  CIPROFLOXACIN: SIGNIFICANT CHANGE UP
-  CIPROFLOXACIN: SIGNIFICANT CHANGE UP
-  CLINDAMYCIN: SIGNIFICANT CHANGE UP
-  CLINDAMYCIN: SIGNIFICANT CHANGE UP
-  ERYTHROMYCIN: SIGNIFICANT CHANGE UP
-  ERYTHROMYCIN: SIGNIFICANT CHANGE UP
-  GENTAMICIN: SIGNIFICANT CHANGE UP
-  GENTAMICIN: SIGNIFICANT CHANGE UP
-  LEVOFLOXACIN: SIGNIFICANT CHANGE UP
-  LEVOFLOXACIN: SIGNIFICANT CHANGE UP
-  MOXIFLOXACIN(AEROBIC): SIGNIFICANT CHANGE UP
-  MOXIFLOXACIN(AEROBIC): SIGNIFICANT CHANGE UP
-  OXACILLIN: SIGNIFICANT CHANGE UP
-  OXACILLIN: SIGNIFICANT CHANGE UP
-  PENICILLIN: SIGNIFICANT CHANGE UP
-  PENICILLIN: SIGNIFICANT CHANGE UP
-  RIFAMPIN: SIGNIFICANT CHANGE UP
-  RIFAMPIN: SIGNIFICANT CHANGE UP
-  TETRACYCLINE: SIGNIFICANT CHANGE UP
-  TETRACYCLINE: SIGNIFICANT CHANGE UP
-  TRIMETHOPRIM/SULFAMETHOXAZOLE: SIGNIFICANT CHANGE UP
-  TRIMETHOPRIM/SULFAMETHOXAZOLE: SIGNIFICANT CHANGE UP
-  VANCOMYCIN: SIGNIFICANT CHANGE UP
-  VANCOMYCIN: SIGNIFICANT CHANGE UP
ANION GAP SERPL CALC-SCNC: 7 MMOL/L — SIGNIFICANT CHANGE UP (ref 5–17)
BUN SERPL-MCNC: 21 MG/DL — SIGNIFICANT CHANGE UP (ref 7–23)
CALCIUM SERPL-MCNC: 9.6 MG/DL — SIGNIFICANT CHANGE UP (ref 8.5–10.1)
CHLORIDE SERPL-SCNC: 104 MMOL/L — SIGNIFICANT CHANGE UP (ref 96–108)
CO2 SERPL-SCNC: 26 MMOL/L — SIGNIFICANT CHANGE UP (ref 22–31)
CREAT SERPL-MCNC: 0.74 MG/DL — SIGNIFICANT CHANGE UP (ref 0.5–1.3)
GLUCOSE SERPL-MCNC: 98 MG/DL — SIGNIFICANT CHANGE UP (ref 70–99)
HCT VFR BLD CALC: 38.9 % — LOW (ref 39–50)
HGB BLD-MCNC: 12.9 G/DL — LOW (ref 13–17)
MAGNESIUM SERPL-MCNC: 2.4 MG/DL — SIGNIFICANT CHANGE UP (ref 1.6–2.6)
MCHC RBC-ENTMCNC: 31 PG — SIGNIFICANT CHANGE UP (ref 27–34)
MCHC RBC-ENTMCNC: 33.1 GM/DL — SIGNIFICANT CHANGE UP (ref 32–36)
MCV RBC AUTO: 93.5 FL — SIGNIFICANT CHANGE UP (ref 80–100)
METHOD TYPE: SIGNIFICANT CHANGE UP
METHOD TYPE: SIGNIFICANT CHANGE UP
PHOSPHATE SERPL-MCNC: 2.8 MG/DL — SIGNIFICANT CHANGE UP (ref 2.5–4.5)
PLATELET # BLD AUTO: 315 K/UL — SIGNIFICANT CHANGE UP (ref 150–400)
POTASSIUM SERPL-MCNC: 4.1 MMOL/L — SIGNIFICANT CHANGE UP (ref 3.5–5.3)
POTASSIUM SERPL-SCNC: 4.1 MMOL/L — SIGNIFICANT CHANGE UP (ref 3.5–5.3)
RBC # BLD: 4.16 M/UL — LOW (ref 4.2–5.8)
RBC # FLD: 19 % — HIGH (ref 10.3–14.5)
SODIUM SERPL-SCNC: 137 MMOL/L — SIGNIFICANT CHANGE UP (ref 135–145)
WBC # BLD: 16.8 K/UL — HIGH (ref 3.8–10.5)
WBC # FLD AUTO: 16.8 K/UL — HIGH (ref 3.8–10.5)

## 2018-01-15 PROCEDURE — 99233 SBSQ HOSP IP/OBS HIGH 50: CPT

## 2018-01-15 PROCEDURE — 70450 CT HEAD/BRAIN W/O DYE: CPT | Mod: 26

## 2018-01-15 PROCEDURE — 95819 EEG AWAKE AND ASLEEP: CPT | Mod: 26

## 2018-01-15 RX ORDER — METOPROLOL TARTRATE 50 MG
25 TABLET ORAL
Qty: 0 | Refills: 0 | Status: DISCONTINUED | OUTPATIENT
Start: 2018-01-15 | End: 2018-01-31

## 2018-01-15 RX ADMIN — Medication 650 MILLIGRAM(S): at 22:18

## 2018-01-15 RX ADMIN — CEFEPIME 100 MILLIGRAM(S): 1 INJECTION, POWDER, FOR SOLUTION INTRAMUSCULAR; INTRAVENOUS at 22:18

## 2018-01-15 RX ADMIN — Medication 25 MILLIGRAM(S): at 18:28

## 2018-01-15 RX ADMIN — HEPARIN SODIUM 5000 UNIT(S): 5000 INJECTION INTRAVENOUS; SUBCUTANEOUS at 13:43

## 2018-01-15 RX ADMIN — PANTOPRAZOLE SODIUM 40 MILLIGRAM(S): 20 TABLET, DELAYED RELEASE ORAL at 13:43

## 2018-01-15 RX ADMIN — Medication 650 MILLIGRAM(S): at 01:05

## 2018-01-15 RX ADMIN — Medication 0.6 MILLIGRAM(S): at 18:28

## 2018-01-15 RX ADMIN — CEFEPIME 100 MILLIGRAM(S): 1 INJECTION, POWDER, FOR SOLUTION INTRAMUSCULAR; INTRAVENOUS at 13:46

## 2018-01-15 RX ADMIN — Medication 2 MILLIGRAM(S): at 22:17

## 2018-01-15 RX ADMIN — HEPARIN SODIUM 5000 UNIT(S): 5000 INJECTION INTRAVENOUS; SUBCUTANEOUS at 06:47

## 2018-01-15 RX ADMIN — Medication 100 MILLIGRAM(S): at 22:17

## 2018-01-15 RX ADMIN — MIRTAZAPINE 15 MILLIGRAM(S): 45 TABLET, ORALLY DISINTEGRATING ORAL at 22:18

## 2018-01-15 RX ADMIN — Medication 1 MILLIGRAM(S): at 13:43

## 2018-01-15 RX ADMIN — CEFEPIME 100 MILLIGRAM(S): 1 INJECTION, POWDER, FOR SOLUTION INTRAMUSCULAR; INTRAVENOUS at 06:47

## 2018-01-15 RX ADMIN — Medication 0.6 MILLIGRAM(S): at 06:47

## 2018-01-15 RX ADMIN — HEPARIN SODIUM 5000 UNIT(S): 5000 INJECTION INTRAVENOUS; SUBCUTANEOUS at 22:17

## 2018-01-15 RX ADMIN — Medication 2 MILLIGRAM(S): at 22:27

## 2018-01-15 RX ADMIN — Medication 2 MILLIGRAM(S): at 13:43

## 2018-01-15 RX ADMIN — Medication 2 MILLIGRAM(S): at 06:47

## 2018-01-15 RX ADMIN — SODIUM CHLORIDE 50 MILLILITER(S): 9 INJECTION INTRAMUSCULAR; INTRAVENOUS; SUBCUTANEOUS at 18:29

## 2018-01-15 NOTE — PROGRESS NOTE ADULT - SUBJECTIVE AND OBJECTIVE BOX
Extubate yesterday. Per family- he was  doing very well yesterday, fully awake till evening, he was able to answer questions.   Today - more lethargic, sleeping more.     Asleep now. EEG in progress.    Vital Signs Last 24 Hrs  T(C): 37.3 (15 Demetrius 2018 08:00), Max: 37.9 (14 Jan 2018 14:00)  T(F): 99.1 (15 Demetrius 2018 08:00), Max: 100.2 (14 Jan 2018 14:00)  HR: 114 (15 Demetrius 2018 11:00) (85 - 114)  BP: 157/81 (15 Demetrius 2018 11:00) (123/83 - 166/100)  BP(mean): 101 (15 Demetrius 2018 11:00) (94 - 121)  RR: 23 (15 Demetrius 2018 11:00) (15 - 27)  SpO2: 98% (15 Demetrius 2018 11:00) (97% - 100%)    No respiratory distress. Lungs clear. Left arm edema - much better.  Bilat leg edema  improved.  Site of prior Omaya- no erythema.                          12.9   16.8  )-----------( 315      ( 15 Demetrius 2018 05:29 )             38.9     < from: CT Head No Cont (01.15.18 @ 09:43) >    EXAM:  CT BRAIN                            PROCEDURE DATE:  01/15/2018          INTERPRETATION:  Exam Date: 1/15/2018 9:43 AM    CT head without IV contrast    CLINICAL INFORMATION:  meningitis  r/o hydro status post removal of   Ommaya reservoir    TECHNIQUE: Contiguous axial sections were obtained through the head.     This scan was performed using automatic exposure control (radiation dose   reduction software) to obtain a diagnostic image quality scan with   patient dose as low as reasonably achievable.      COMPARISON: No previous examinations are available for review.     FINDINGS:       Frontal kenia hole with small amount of encephalomalacia/gliosis within   the right frontal lobe. Fluid collection measuring approximately 4.7 x   1.1cm and minimal associated hemorrhage within the scalp overlying the   right frontal borehole. Ventricular size is unchanged since prior exam.   Punctate calcification in the right frontal lobe is unchanged. The   underlying neoplastic process is difficult to evaluate CT imaging. There   is no evidence of intraparenchymal or extraaxial hemorrhage.   There is   no CT evidence of large vessel acute infarct. No mass effect is found in   the brain.  No evidence of midline shift or herniation pattern.      IMPRESSION:       Frontal kenia hole with small amount of encephalomalacia/gliosis within   the right frontal lobe. Fluid collection measuring approximately 4.7 x   1.1 cm and minimal associated hemorrhage within the scalp overlying the   right frontal borehole. Ventricular size is unchanged since prior exam.   Punctate calcification in the right frontal lobe is unchanged. The   underlying neoplastic process is difficult to evaluate CT imaging.       TARIK RUEDA M.D., ATTENDING RADIOLOGIST  This document has been electronically signed. Demetrius 15 2018 10:13AM        MEDICATIONS  (STANDING):  ALBUTerol    90 MICROgram(s) HFA Inhaler 1 Puff(s) Inhalation every 4 hours  cefepime  IVPB      cefepime  IVPB 2000 milliGRAM(s) IV Intermittent every 8 hours  chlorhexidine 0.12% Liquid 15 milliLiter(s) Swish and Spit two times a day  colchicine 0.6 milliGRAM(s) Oral two times a day  dexamethasone  Injectable 2 milliGRAM(s) IV Push three times a day  folic acid 1 milliGRAM(s) Oral daily  heparin  Injectable 5000 Unit(s) SubCutaneous every 8 hours  metoprolol     tartrate 25 milliGRAM(s) Oral two times a day  mirtazapine 15 milliGRAM(s) Oral at bedtime  pantoprazole  Injectable 40 milliGRAM(s) IV Push daily  sodium chloride 0.9%. 1000 milliLiter(s) (50 mL/Hr) IV Continuous <Continuous>    MEDICATIONS  (PRN):  acetaminophen   Tablet 650 milliGRAM(s) Oral every 6 hours PRN For Temp greater than 38 C (100.4 F)  acetaminophen  Suppository 650 milliGRAM(s) Rectal every 6 hours PRN For Temp greater than 38 C (100.4 F)  ALBUTerol    0.083% 2.5 milliGRAM(s) Nebulizer every 6 hours PRN Shortness of Breath and/or Wheezing  diazepam    Tablet 2 milliGRAM(s) Oral three times a day PRN anxiety  HYDROmorphone   Tablet 4 milliGRAM(s) Oral two times a day PRN Severe Pain (7 - 10)

## 2018-01-15 NOTE — PROGRESS NOTE ADULT - ASSESSMENT
IMP:    Acute resp failure--extubated  S. aureus meningitis--s/p reservoir removal   Stage 4 lung Ca  TTE results as noted--?? RV outflow tract obst    Overall poor prognosis    Plan:    Repeat HCT today--start PO if no plans for OR and DC IVF  HOB > 30  IVF  Abx per ID  DVT prophy--SCD and sqhep   NSGY follow up  Will consider chest CT with IV cont when more stable to eval/R/O pulm outflow obst    ICU care--d/w ICU staff

## 2018-01-15 NOTE — PROGRESS NOTE ADULT - ASSESSMENT
48 y/o male with h/o Stage 4 Lung CA with mets to brain 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 Pt was lethargic and unable to give history. As per sister, pt completed 10 course radiation treatment 1 day PTA. 3 days ago he was complaining of severe headache and also had episode of vomiting. He was seen at oncologist office and was given morphine and valium. The day of admission, the pt developed a fever 0f 103.6F and complained of rigors. He was also noted to be confused.  In ED pt found to have temp of 103.6 rectally. He received vanco IV and cefepime.     1. Febrile syndrome improving.  Acute bacterial meningitis with MSSA. Ommaya site infecton s/p port removal. RUL pneumonia with loculated left pleural effusion Empyema is less likely. Lung CA stage 4 with brain metastasis. Immunocompromised host.   -encephalopathy is resolving  -leukocytosis improving  -f/u BC x 2, CSF c/s  -on cefepime 2 gm IV q12h # 4  -tolerating abx well so far; no side effects noted  -continue abx coverage  -hemodinamically stable   -f/u CT head  -respiratory care  -monitor temps  -f/u CBC  -supportive care  2. Other issues:   -care per medicine

## 2018-01-15 NOTE — PROGRESS NOTE ADULT - SUBJECTIVE AND OBJECTIVE BOX
CC:  Resp failure    HPI:    48 y/o male wit Stage 4 Lung CA with mets to brain s/p Ommaya reservoir 11/2017 for leptomeningeal disease s/p chemo and radiation, anxiety, depression, h/o nephrolithiasis, h/o pericarditis presents to ED with complaint of fever and AMS. pt is admitted to ICU for S. aures meningitis and s/p removal of infected reservoir.     1/13:  Pt seen and examined in ICU.  case d/w dr austin.  Vented and sedated.  Hemodynamically stable    1/14:  Vented. For repeat brain scan today    1/15:  Extubated.  HCT on 1/14 with slight increase in vent size.  For repeat HCT today      PMH:  As above.     PSH:  As above.     FH: Non Contributory other than those listed in HPI    Social History:  NC    MEDICATIONS  (STANDING):  ALBUTerol    90 MICROgram(s) HFA Inhaler 1 Puff(s) Inhalation every 4 hours  cefepime  IVPB      cefepime  IVPB 2000 milliGRAM(s) IV Intermittent every 8 hours  chlorhexidine 0.12% Liquid 15 milliLiter(s) Swish and Spit two times a day  colchicine 0.6 milliGRAM(s) Oral two times a day  dexamethasone  Injectable 2 milliGRAM(s) IV Push three times a day  folic acid 1 milliGRAM(s) Oral daily  heparin  Injectable 5000 Unit(s) SubCutaneous every 8 hours  mirtazapine 15 milliGRAM(s) Oral at bedtime  pantoprazole  Injectable 40 milliGRAM(s) IV Push daily  sodium chloride 0.9%. 1000 milliLiter(s) (50 mL/Hr) IV Continuous <Continuous>    MEDICATIONS  (PRN):  acetaminophen   Tablet 650 milliGRAM(s) Oral every 6 hours PRN For Temp greater than 38 C (100.4 F)  acetaminophen  Suppository 650 milliGRAM(s) Rectal every 6 hours PRN For Temp greater than 38 C (100.4 F)  ALBUTerol    0.083% 2.5 milliGRAM(s) Nebulizer every 6 hours PRN Shortness of Breath and/or Wheezing  diazepam    Tablet 2 milliGRAM(s) Oral three times a day PRN anxiety  HYDROmorphone   Tablet 4 milliGRAM(s) Oral two times a day PRN Severe Pain (7 - 10)      Allergies: NKDA    ROS:  SEE BELOW        ICU Vital Signs Last 24 Hrs  T(C): 36.4 (15 Demetrius 2018 06:00), Max: 37.9 (14 Jan 2018 14:00)  T(F): 97.5 (15 Demetrius 2018 06:00), Max: 100.2 (14 Jan 2018 14:00)  HR: 91 (15 Demetrius 2018 08:00) (82 - 103)  BP: 166/100 (15 Demetrius 2018 08:00) (106/74 - 166/100)  BP(mean): 115 (15 Demetrius 2018 08:00) (80 - 121)  ABP: --  ABP(mean): --  RR: 19 (15 Demetrius 2018 08:00) (16 - 27)  SpO2: 100% (15 Demetrius 2018 08:00) (95% - 100%)      Mode: AC/ CMV (Assist Control/ Continuous Mandatory Ventilation)  RR (machine): 14  TV (machine): 500  FiO2: 40  PEEP: 5  ITime: 1  PIP: 21      I&O's Summary    14 Jan 2018 07:01  -  15 Demetrius 2018 07:00  --------------------------------------------------------  IN: 1855 mL / OUT: 3050 mL / NET: -1195 mL        Physical Exam:  SEE BELOW                          12.9   16.8  )-----------( 315      ( 15 Demetrius 2018 05:29 )             38.9       01-15    137  |  104  |  21  ----------------------------<  98  4.1   |  26  |  0.74    Ca    9.6      15 Demetrius 2018 05:29  Phos  2.8     01-15  Mg     2.4     01-15                      DVT Prophylaxis:                                                            Contraindication:     Advanced Directives:    Discussed with:    Visit Information:  Time spent excluding procedure:      ** Time is exclusive of billed procedures and/or teaching and/or routine family updates.

## 2018-01-15 NOTE — PROGRESS NOTE ADULT - ASSESSMENT
Pt is a 47 year old man with Stage 4 Lung CA with mets to brain s/p Ommaya reservoir 11/2017 for leptomeningeal disease s/p chemo and radiation, he presented on 1/11 with lethargy, confusion, and a fever of 103.6, he was found to have acute bacterial meningitis with MSSA and an Ommaya site infection as well as RUL pneumonia.    Pt is now POD #3 s/p removal of Ommaya [place]   CT shows stable ventricles- no neurosurgical intervention at this time   Pts mental status is improved, WBC and fever also improved    PLAN-  Continue ABX as per ID  SQ heparin for DVT PPX  Chemo therapy treatement on hold due to resolving sepsis

## 2018-01-15 NOTE — PROGRESS NOTE ADULT - SUBJECTIVE AND OBJECTIVE BOX
HPI:  48 yo male with PMH Stage 4 Lung CA with mets to brain s/p Ommaya reservoir 2017 for leptomeningeal disease s/p chemo and radiation, anxiety, depression, h/o nephrolithiasis, h/o pericarditis presents to ED with complaint of fever and AMS. Pt is a lethargic and unable to obtain history from him. Does wake up to verbal command but falls back alseep. History obtained from pt's sister at bedside. As per sister pt completed 10 course radiation treatment 1 day ago. 3 days ago he was complaining of severe headache and also had episode of vomiting. He was seen at oncologist office and was given morphine and valium. This morning pt developed a fever 0f 103.6 and complained of rigors. He was also noted to be confused. He did have a cough which has been chronic.    In ED pt found to have temp of 103.6 rectally. Lactate of 2.8. CXR with left multiloculated effusion. Ct head with no acute pathology. Was given 3L NS and started on vanco and cefepime. (2018 16:20)    has chronic cough, no purulent sputum.  CT chest shows patchy/nodular mild findings on R.  c/w previous PET/CT of 17, CT chest shows no change in size of R effusion and loculated L effusion.  Has h.o. pericarditis and pericardial window 2015 and 2015.  h.o. 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     : events noted, removal of infected reservoir. pt sedated, comfortable on ventilator.  CXR today again shows loculated L effusion, no change from , given technique.  Has staph meningitis, on IV Abx's. WBC down to 19,600.  : extubated, awake, knows name and is in hospital.  WBC down to 15,700.   1/15: no distress, awake.     PAST MEDICAL & SURGICAL HISTORY:  Hearing loss: right ear-no device  Cancer with leptomeningeal spread  Dyspnea on exertion  Gait disturbance  Memory loss  Anxiety and depression  Erectile dysfunction, unspecified erectile dysfunction type  History of kidney stones  History of pericarditis  Pleural effusion  Syncope, unspecified syncope type: X3-4 episodes; s/p LINQ device placed  Lung cancer, upper lobe: right. metastatic non small cell Lung Carcinoma.  History of lumbar puncture: chemo placed  History of cardiac monitoring: s/p LINQ device placement 2017  History of lumbar puncture within last 21 days  History of vascular access device: PORT placed  Pleuritic chest pain: Pleurodesis left chest 10/20/2017  H/O bilateral inguinal hernia repair: as a child  Acute pericarditis, unspecified type: pericardial window 2015, 2015      MEDICATIONS  (STANDING):  cefepime  IVPB 2000 milliGRAM(s) IV Intermittent every 12 hours  colchicine 0.6 milliGRAM(s) Oral two times a day  dexamethasone     Tablet 2 milliGRAM(s) Oral three times a day  folic acid 1 milliGRAM(s) Oral daily  heparin  Injectable 5000 Unit(s) SubCutaneous every 8 hours  mirtazapine 15 milliGRAM(s) Oral at bedtime  pantoprazole    Tablet 40 milliGRAM(s) Oral before breakfast  sodium chloride 0.9%. 1000 milliLiter(s) (125 mL/Hr) IV Continuous <Continuous>  vancomycin  IVPB 1000 milliGRAM(s) IV Intermittent every 12 hours    MEDICATIONS  (PRN):  acetaminophen   Tablet 650 milliGRAM(s) Oral every 6 hours PRN For Temp greater than 38 C (100.4 F)  diazepam    Tablet 2 milliGRAM(s) Oral three times a day PRN anxiety  HYDROmorphone   Tablet 4 milliGRAM(s) Oral two times a day PRN Severe Pain (7 - 10)      Allergies    adhesives (Rash)  No Known Drug Allergies    Intolerances        SOCIAL HISTORY: Denies tobacco, etoh abuse or illicit drug use    FAMILY HISTORY:  Family history of leukemia (Father)  Family history of cancer (Mother)      Vital Signs Last 24 Hrs  T(C): 36.8 (2018 04:28), Max: 39.8 (2018 11:13)  T(F): 98.2 (2018 04:28), Max: 103.6 (2018 11:13)  HR: 82 (2018 04:28) (82 - 128)  BP: 166/92 (2018 04:28) (134/92 - 172/91)  BP(mean): --  RR: 18 (2018 04:28) (13 - 22)  SpO2: 95% (2018 04:28) (95% - 100%)    REVIEW OF SYSTEMS:    CONSTITUTIONAL:  As per HPI.  HEENT:  Eyes:  No diplopia or blurred vision. ENT:  No earache, sore throat or runny nose.  CARDIOVASCULAR:  No pressure, squeezing, tightness, heaviness or aching about the chest, neck, axilla or epigastrium.  RESPIRATORY:  see above.  GASTROINTESTINAL:  No nausea, vomiting or diarrhea.  GENITOURINARY:  No dysuria, frequency or urgency.  MUSCULOSKELETAL:  As per HPI.  SKIN:  No change in skin, hair or nails.  NEUROLOGIC:  No paresthesias, fasciculations, seizures or weakness.  PSYCHIATRIC:  No disorder of thought or mood.  ENDOCRINE:  No heat or cold intolerance, polyuria or polydipsia.  HEMATOLOGICAL:  No easy bruising or bleedings:  .     PHYSICAL EXAMINATION:    GENERAL APPEARANCE:  Pt. is not currently dyspneic, in no distress. Pt. is alert, oriented, and pleasant.  HEENT:  Pupils are normal and react normally. No icterus. Mucous membranes well colored.  NECK:  Supple. No lymphadenopathy. Jugular venous pressure not elevated. Carotids equal.   HEART:   The cardiac impulse has a normal quality. Regular. Normal S1 and S2.   CHEST:  Clear to A.   ABDOMEN:  Soft and nontender.   EXTREMITIES:  There is no cyanosis, clubbing or edema.   SKIN:  No rash or significant lesions are noted.  Neuro: Alert, awake, and O x 3.      LABS:                        15.0   26.8  )-----------( 382      ( 2018 07:30 )             45.7     01-12    131<L>  |  95<L>  |  16  ----------------------------<  139<H>  3.8   |  27  |  1.03    Ca    10.2<H>      2018 07:30    TPro  8.4<H>  /  Alb  3.3  /  TBili  0.8  /  DBili  x   /  AST  34  /  ALT  83<H>  /  AlkPhos  122<H>  -11    LIVER FUNCTIONS - ( 2018 11:19 )  Alb: 3.3 g/dL / Pro: 8.4 gm/dL / ALK PHOS: 122 U/L / ALT: 83 U/L / AST: 34 U/L / GGT: x           PTT - ( 2018 11:19 )  PTT:27.3 sec  CARDIAC MARKERS ( 2018 11:19 )  0.019 ng/mL / x     / x     / x     / x          Urinalysis Basic - ( 2018 12:33 )    Color: Yellow / Appearance: Clear / S.015 / pH: x  Gluc: x / Ketone: Negative  / Bili: Negative / Urobili: Negative mg/dL   Blood: x / Protein: 30 mg/dL / Nitrite: Negative   Leuk Esterase: Negative / RBC: 0-2 /HPF / WBC 0-2   Sq Epi: x / Non Sq Epi: Occasional / Bacteria: Occasional          RADIOLOGY & ADDITIONAL STUDIES:       EXAM:  XR CHEST AP OR PA 1V                            PROCEDURE DATE:  2018          INTERPRETATION:  Portable chest radiograph dated 2018.    COMPARISON: 2017.    CLINICAL INFORMATION: Fever.    FINDINGS:    The airway is midline.   Continued application of the right IJ line, distal tip is in the distal   superior vena cava.  The multi loculated left pleural effusion is  slightly smaller and the   left lower lobe is slightly better expanded at this time. The right lung   is clear.  The cardiac silhouette is normal size.   The bones are normal.     IMPRESSION:  A multiloculated left pleural effusion is slightly smaller and the left   lower lobe is slightly better expanded at this time.        PROCEDURE DATE:  2018          INTERPRETATION:  Exam Date: 2018 5:21 PM    CT chest without contrast    CLINICAL INFORMATION:  Fever    TECHNIQUE:  Contiguous axial 3 mm sections were obtained through the   chest using single helical acquisition.  In addition, 2D sagittal and   coronal reformatted images obtained.   This scan was performed using   automatic exposure control (radiation dose reduction software) to obtain   a diagnostic image quality scan with patient dose as low as reasonably   achievable.        FINDINGS: Comparison is made to report of PET CT of 2017.    The thyroid gland appears intact.    There are several scattered groundglass nodules and opacities withinthe   right upper lobe, suggestive of pneumonia given the clinical context of   fever. Small to right pleural effusion is present. Loculated left pleural   effusion along the left lateral pleura and within the fissures is again   identified. The trachea and major bronchi are patent.    No enlarged axillary lymph nodes are found.   No enlarged hilar lymph   nodes are recognized, allowing for the noncontrast technique.  Within the   mediastinum no pathologic lymph nodes are found.  The heart size is   normal.  There is a small pericardial effusion. Right ventral chest wall   port catheter is in place with tip in the SVC.    Limited evaluation of the upper abdomen is unremarkable.      IMPRESSION:     Several scattered groundglass nodules and opacities within the right   upper lobe, suggestive of pneumonia given the clinical context of fever.   Small to right pleural effusion is present. Loculated left pleural   effusion along the left lateral pleura and within the fissures is again   identified. Small pericardial effusion is present.

## 2018-01-15 NOTE — PROGRESS NOTE ADULT - ASSESSMENT
Stage 4 lung cancer with brain mets.   Acute bacterial meningitis. Abx's as per ID.    Pt extubated. On NC O2.     R sided mild multifocal PNA.  Abx's, nebs.   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     Small pericardial effusion.  S/P pericardial window 11/15, 12/15. Stage 4 lung cancer with brain mets.   Acute bacterial meningitis. Abx's as per ID. Repeat head CT today.    Pt extubated. On NC O2.     R sided mild multifocal PNA.  Abx's, nebs.   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     Small pericardial effusion.  S/P pericardial window 11/15, 12/15.

## 2018-01-15 NOTE — PROGRESS NOTE ADULT - ASSESSMENT
48 y/o male with metastatic adenocarcinoma of lung, extensive brain mets and leptomeningeal carcinomatosis s/p systemic chemotherapy and intrathecal chemotherapy, s/p WBRT now  with sepsis, meningitis  with MSSA, RLL pneumonia.    S/p removal of Omaya.  S/p extubation yesterday.  Pulmonary status stable.   Overall improved ,  although today  slightly more lethargic and WBC minimally up.   Antibiotics per ID.   Management per ICU, ID, pulmonary and neurosurgery.  Aggressive supportive care. Chemotherapy on hold.

## 2018-01-15 NOTE — PROGRESS NOTE ADULT - SUBJECTIVE AND OBJECTIVE BOX
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 Pts fever and WBC count have improved, he is currently still intubated but mental status off sedation is also improved. Pt is now following commands.     1/15- Pt was seen and examined in the ICU- s/p extubation yesterday, strength in LUE improved, mild confusion/disorientation, CT head repeated today, ventricle size now stable, fevers and WBC stable     Vital Signs Last 24 Hrs  T(C): 37.3 (15 Demetrius 2018 08:00), Max: 37.9 (14 Jan 2018 14:00)  T(F): 99.1 (15 Demetrius 2018 08:00), Max: 100.2 (14 Jan 2018 14:00)  HR: 92 (15 Demetrius 2018 10:00) (85 - 103)  BP: 153/103 (15 Demetrius 2018 10:00) (118/65 - 166/100)  BP(mean): 115 (15 Demetrius 2018 10:00) (92 - 121)  RR: 15 (15 Demetrius 2018 10:00) (15 - 27)  SpO2: 99% (15 Demetrius 2018 10:00) (95% - 100%)    MEDICATIONS  (STANDING):  ALBUTerol    90 MICROgram(s) HFA Inhaler 1 Puff(s) Inhalation every 4 hours  cefepime  IVPB 2000 milliGRAM(s) IV Intermittent every 8 hours  chlorhexidine 0.12% Liquid 15 milliLiter(s) Swish and Spit two times a day  colchicine 0.6 milliGRAM(s) Oral two times a day  dexamethasone  Injectable 2 milliGRAM(s) IV Push three times a day  folic acid 1 milliGRAM(s) Oral daily  heparin  Injectable 5000 Unit(s) SubCutaneous every 8 hours  metoprolol  tartrate 25 milliGRAM(s) Oral two times a day  mirtazapine 15 milliGRAM(s) Oral at bedtime  pantoprazole  Injectable 40 milliGRAM(s) IV Push daily  sodium chloride 0.9%. 1000 milliLiter(s) (50 mL/Hr) IV Continuous     MEDICATIONS  (PRN):  acetaminophen   Tablet 650 milliGRAM(s) Oral every 6 hours PRN For Temp greater than 38 C (100.4 F)  acetaminophen  Suppository 650 milliGRAM(s) Rectal every 6 hours PRN For Temp greater than 38 C (100.4 F)  ALBUTerol    0.083% 2.5 milliGRAM(s) Nebulizer every 6 hours PRN Shortness of Breath and/or Wheezing  diazepam    Tablet 2 milliGRAM(s) Oral three times a day PRN anxiety  HYDROmorphone Tablet 4 milliGRAM(s) Oral two times a day PRN Severe Pain (7 - 10)    PHYSICAL EXAM:  Pt seen and examined off sedation after CT of the head  Pt awake, alert, follows commands, mild confusion/disorientation -- said Lorri instead of Magdy and 2008 instead of 2018  Incision site from Ommaya removal clean and dry, sutures intact, +fluctuance where ommaya was seated, skin not tense, no redness, no discharge    PEARRL, EOMI, visual fields appear intact  s/p extubation yesterday- lungs clear to auscultation  S1 and S2, regular rhythm  abdomen soft  moving all extremities anti gravity, Sensation intact  +edema b/l lower extremities, + petechial rash b/l lower extremities chronic     LABS:                         12.9   16.8  )-----------( 315      ( 15 Demetrius 2018 05:29 )             38.9     01-15    137  |  104  |  21  ----------------------------<  98  4.1   |  26  |  0.74    Ca    9.6      15 Demetrius 2018 05:29  Phos  2.8     01-15  Mg     2.4     01-15    RADIOLOGY:  CT HEAD:  Frontal kenia hole with small amount of encephalomalacia/gliosis within the right frontal lobe.   Fluid collection measuring approximately 4.7 x 1.1 cm and minimal associated hemorrhage within the scalp overlying the right frontal borehole.   Ventricular size is unchanged since prior exam. Punctate calcification in the right frontal lobe is unchanged.   The underlying neoplastic process is difficult to evaluate CT imaging.

## 2018-01-15 NOTE — PROGRESS NOTE ADULT - SUBJECTIVE AND OBJECTIVE BOX
Patient is a 47y old  Male who presents with a chief complaint of fever, AMS (2018 16:20)    HPI:  48 y/o male with h/o Stage 4 Lung CA with mets to brain s/p Ommaya reservoir 2017 for leptomeningeal disease s/p chemo and radiation, anxiety, depression, h/o nephrolithiasis, h/o pericarditis was admitted on  for fever and AMS. Pt was lethargic and unable to give history. As per sister, pt completed 10 course radiation treatment 1 day PTA. 3 days ago he was complaining of severe headache and also had episode of vomiting. He was seen at oncologist office and was given morphine and valium. The day of admission, the pt developed a fever 0f 103.6F and complained of rigors. He was also noted to be confused.  In ED pt found to have temp of 103.6 rectally. He received vanco IV and cefepime.     Extubated  Alert and verbal  Fever is down    MEDICATIONS  (STANDING):  ALBUTerol    90 MICROgram(s) HFA Inhaler 1 Puff(s) Inhalation every 4 hours  cefepime  IVPB      cefepime  IVPB 2000 milliGRAM(s) IV Intermittent every 8 hours  chlorhexidine 0.12% Liquid 15 milliLiter(s) Swish and Spit two times a day  colchicine 0.6 milliGRAM(s) Oral two times a day  dexamethasone  Injectable 2 milliGRAM(s) IV Push three times a day  folic acid 1 milliGRAM(s) Oral daily  heparin  Injectable 5000 Unit(s) SubCutaneous every 8 hours  metoprolol     tartrate 25 milliGRAM(s) Oral two times a day  mirtazapine 15 milliGRAM(s) Oral at bedtime  pantoprazole  Injectable 40 milliGRAM(s) IV Push daily  sodium chloride 0.9%. 1000 milliLiter(s) (50 mL/Hr) IV Continuous <Continuous>    MEDICATIONS  (PRN):  acetaminophen   Tablet 650 milliGRAM(s) Oral every 6 hours PRN For Temp greater than 38 C (100.4 F)  acetaminophen  Suppository 650 milliGRAM(s) Rectal every 6 hours PRN For Temp greater than 38 C (100.4 F)  ALBUTerol    0.083% 2.5 milliGRAM(s) Nebulizer every 6 hours PRN Shortness of Breath and/or Wheezing  diazepam    Tablet 2 milliGRAM(s) Oral three times a day PRN anxiety  HYDROmorphone   Tablet 4 milliGRAM(s) Oral two times a day PRN Severe Pain (7 - 10)      Vital Signs Last 24 Hrs  T(C): 37.3 (15 Demetrius 2018 08:00), Max: 37.3 (15 Demetrius 2018 08:00)  T(F): 99.1 (15 Demetrius 2018 08:00), Max: 99.1 (15 Demetrius 2018 08:00)  HR: 114 (15 Demetrius 2018 11:00) (85 - 114)  BP: 157/81 (15 Demetrius 2018 11:00) (127/96 - 166/100)  BP(mean): 101 (15 Demetrius 2018 11:00) (96 - 121)  RR: 23 (15 Demetrius 2018 11:00) (15 - 27)  SpO2: 98% (15 Demetrius 2018 11:00) (97% - 100%)    Physical Exam:      Constitutional: frail looking  HEENT: NC/AT, EOMI, PERRLA; postop scalp wound clean  Neck: supple  Back: no tenderness  Respiratory: crackles at bases; decreased BS  Cardiovascular: S1S2 regular, no murmurs  Abdomen: soft, not tender, not distended, positive BS  Genitourinary: no LN palpable  Rectal: deferred  Musculoskeletal: no muscle tenderness, no joint swelling or tenderness  Extremities: no pedal edema  Neurological: sedated, moving all extremities  Skin: no rashes    Labs:                        12.9   16.8  )-----------( 315      ( 15 Demetrius 2018 05:29 )             38.9     01-15    137  |  104  |  21  ----------------------------<  98  4.1   |  26  |  0.74    Ca    9.6      15 Demetrius 2018 05:29  Phos  2.8     01-15  Mg     2.4     -15                        15.3   19.6  )-----------( 291      ( 2018 05:26 )             53.0     01-13    132<L>  |  100  |  17  ----------------------------<  95  4.2   |  24  |  1.09    Ca    10.5<H>      2018 05:26  Phos  3.0       Mg     2.5              Vancomycin Level, Trough: 11.4 ug/mL ( @ 05:26)                          15.0   26.8  )-----------( 382      ( 2018 07:30 )             45.7     12    131<L>  |  95<L>  |  16  ----------------------------<  139<H>  3.8   |  27  |  1.03    Ca    10.2<H>      2018 07:30    TPro  8.4<H>  /  Alb  3.3  /  TBili  0.8  /  DBili  x   /  AST  34  /  ALT  83<H>  /  AlkPhos  122<H>       LIVER FUNCTIONS - ( 2018 11:19 )  Alb: 3.3 g/dL / Pro: 8.4 gm/dL / ALK PHOS: 122 U/L / ALT: 83 U/L / AST: 34 U/L / GGT: x           Urinalysis Basic - ( 2018 12:33 )    Color: Yellow / Appearance: Clear / S.015 / pH: x  Gluc: x / Ketone: Negative  / Bili: Negative / Urobili: Negative mg/dL   Blood: x / Protein: 30 mg/dL / Nitrite: Negative   Leuk Esterase: Negative / RBC: 0-2 /HPF / WBC 0-2   Sq Epi: x / Non Sq Epi: Occasional / Bacteria: Occasional    Cerebrospinal Fluid Cell Count-1 (18 @ 21:14)    CSF Segmented Neutrophils: 96 %    Total Nucleated Cell Count, CSF: 744 /uL    CSF Appearance: Slightly Cloudy    CSF Lymphocytes: 3 %    CSF Monocytes/Macrophages: 1 %    CSF Color: See Note: slightly xanthrochromic      Culture - Body Fluid with Gram Stain (collected 2018 14:36)  Source: .Body Fluid scalp incision  Gram Stain (2018 06:33):    No polymorphonuclear cells seen    No organisms seen    by cytocentrifuge    Culture - Acid Fast - CSF (collected 2018 21:14)  Source: .CSF spinal fluid    Culture - Fungal, CSF (collected 2018 21:14)  Source: .CSF spinal fluid  Preliminary Report (2018 12:37):    Testing in progress    Culture - CSF with Gram Stain (collected 2018 21:14)  Source: .CSF  Gram Stain (2018 01:16):    polymorphonuclear leukocytes seen per low power field    Gram Variable Cocci seen per oil power field    by cytocentrifuge  Final Report (2018 07:36):    Numerous Staphylococcus aureus  Organism: Staphylococcus aureus (2018 07:36)  Organism: Staphylococcus aureus (2018 07:36)      -  Oxacillin: S 0.5      -  Penicillin: R >8      -  RIF- Rifampin: R >2      -  Trimethoprim/Sulfamethoxazole: S <=0.5/9.5      -  Vancomycin: S 2      Method Type: TAMMIE    Culture - Urine (collected 2018 12:33)  Source: .Urine None  Final Report (2018 18:38):    10,000 - 49,000 CFU/mL Coag Negative Staphylococcus    Culture - Blood (collected 2018 11:19)  Source: .Blood Blood-Peripheral  Preliminary Report (2018 15:01):    No growth to date.    Culture - Blood (collected 2018 11:19)  Source: .Blood Blood-Peripheral  Preliminary Report (2018 15:01):    No growth to date.          Radiology:    < from: CT Chest No Cont (18 @ 17:21) >  Several scattered groundglass nodules and opacities within the right   upper lobe, suggestive of pneumonia given the clinical context of fever.   Small to right pleural effusion is present. Loculated left pleural   effusion along the left lateral pleura and within the fissures is again   identified. Small pericardial effusion is present.    < end of copied text >    < from: CT Head No Cont (18 @ 13:09) >   unchanged RIGHT frontal Ommaya catheter  in place within   the anterior horn of the RIGHT lateral ventricle. Faint hyperdense   lesions scattered throughout the brain consistent with patient's known   metastatic disease. IV contrast would be needed for complete evaluation.     < end of copied text >      Advanced directives addressed: full resuscitation

## 2018-01-16 ENCOUNTER — APPOINTMENT (OUTPATIENT)
Dept: HEMATOLOGY ONCOLOGY | Facility: CLINIC | Age: 48
End: 2018-01-16

## 2018-01-16 ENCOUNTER — APPOINTMENT (OUTPATIENT)
Dept: INFUSION THERAPY | Facility: CLINIC | Age: 48
End: 2018-01-16

## 2018-01-16 LAB
ANION GAP SERPL CALC-SCNC: 7 MMOL/L — SIGNIFICANT CHANGE UP (ref 5–17)
BUN SERPL-MCNC: 19 MG/DL — SIGNIFICANT CHANGE UP (ref 7–23)
CALCIUM SERPL-MCNC: 9.9 MG/DL — SIGNIFICANT CHANGE UP (ref 8.5–10.1)
CHLORIDE SERPL-SCNC: 102 MMOL/L — SIGNIFICANT CHANGE UP (ref 96–108)
CO2 SERPL-SCNC: 27 MMOL/L — SIGNIFICANT CHANGE UP (ref 22–31)
CREAT SERPL-MCNC: 0.85 MG/DL — SIGNIFICANT CHANGE UP (ref 0.5–1.3)
CULTURE RESULTS: SIGNIFICANT CHANGE UP
CULTURE RESULTS: SIGNIFICANT CHANGE UP
GLUCOSE SERPL-MCNC: 108 MG/DL — HIGH (ref 70–99)
HCT VFR BLD CALC: 43.4 % — SIGNIFICANT CHANGE UP (ref 39–50)
HGB BLD-MCNC: 14.2 G/DL — SIGNIFICANT CHANGE UP (ref 13–17)
MAGNESIUM SERPL-MCNC: 2.3 MG/DL — SIGNIFICANT CHANGE UP (ref 1.6–2.6)
MCHC RBC-ENTMCNC: 30.4 PG — SIGNIFICANT CHANGE UP (ref 27–34)
MCHC RBC-ENTMCNC: 32.7 GM/DL — SIGNIFICANT CHANGE UP (ref 32–36)
MCV RBC AUTO: 92.8 FL — SIGNIFICANT CHANGE UP (ref 80–100)
PHOSPHATE SERPL-MCNC: 2 MG/DL — LOW (ref 2.5–4.5)
PLATELET # BLD AUTO: 350 K/UL — SIGNIFICANT CHANGE UP (ref 150–400)
POTASSIUM SERPL-MCNC: 4.1 MMOL/L — SIGNIFICANT CHANGE UP (ref 3.5–5.3)
POTASSIUM SERPL-SCNC: 4.1 MMOL/L — SIGNIFICANT CHANGE UP (ref 3.5–5.3)
RBC # BLD: 4.67 M/UL — SIGNIFICANT CHANGE UP (ref 4.2–5.8)
RBC # FLD: 18.7 % — HIGH (ref 10.3–14.5)
SODIUM SERPL-SCNC: 136 MMOL/L — SIGNIFICANT CHANGE UP (ref 135–145)
SPECIMEN SOURCE: SIGNIFICANT CHANGE UP
SPECIMEN SOURCE: SIGNIFICANT CHANGE UP
WBC # BLD: 18.6 K/UL — HIGH (ref 3.8–10.5)
WBC # FLD AUTO: 18.6 K/UL — HIGH (ref 3.8–10.5)

## 2018-01-16 PROCEDURE — 99233 SBSQ HOSP IP/OBS HIGH 50: CPT

## 2018-01-16 RX ORDER — VANCOMYCIN HCL 1 G
1000 VIAL (EA) INTRAVENOUS EVERY 12 HOURS
Qty: 0 | Refills: 0 | Status: DISCONTINUED | OUTPATIENT
Start: 2018-01-17 | End: 2018-01-17

## 2018-01-16 RX ORDER — PANTOPRAZOLE SODIUM 20 MG/1
40 TABLET, DELAYED RELEASE ORAL
Qty: 0 | Refills: 0 | Status: DISCONTINUED | OUTPATIENT
Start: 2018-01-16 | End: 2018-01-31

## 2018-01-16 RX ORDER — VANCOMYCIN HCL 1 G
VIAL (EA) INTRAVENOUS
Qty: 0 | Refills: 0 | Status: DISCONTINUED | OUTPATIENT
Start: 2018-01-16 | End: 2018-01-17

## 2018-01-16 RX ORDER — VANCOMYCIN HCL 1 G
1000 VIAL (EA) INTRAVENOUS ONCE
Qty: 0 | Refills: 0 | Status: COMPLETED | OUTPATIENT
Start: 2018-01-16 | End: 2018-01-16

## 2018-01-16 RX ADMIN — HEPARIN SODIUM 5000 UNIT(S): 5000 INJECTION INTRAVENOUS; SUBCUTANEOUS at 14:11

## 2018-01-16 RX ADMIN — Medication 2 MILLIGRAM(S): at 14:11

## 2018-01-16 RX ADMIN — Medication 2 MILLIGRAM(S): at 22:33

## 2018-01-16 RX ADMIN — Medication 25 MILLIGRAM(S): at 17:44

## 2018-01-16 RX ADMIN — PANTOPRAZOLE SODIUM 40 MILLIGRAM(S): 20 TABLET, DELAYED RELEASE ORAL at 12:52

## 2018-01-16 RX ADMIN — Medication 0.6 MILLIGRAM(S): at 06:19

## 2018-01-16 RX ADMIN — Medication 100 MILLIGRAM(S): at 06:16

## 2018-01-16 RX ADMIN — HEPARIN SODIUM 5000 UNIT(S): 5000 INJECTION INTRAVENOUS; SUBCUTANEOUS at 06:20

## 2018-01-16 RX ADMIN — Medication 1 MILLIGRAM(S): at 12:52

## 2018-01-16 RX ADMIN — Medication 25 MILLIGRAM(S): at 06:19

## 2018-01-16 RX ADMIN — CEFEPIME 100 MILLIGRAM(S): 1 INJECTION, POWDER, FOR SOLUTION INTRAMUSCULAR; INTRAVENOUS at 06:15

## 2018-01-16 RX ADMIN — Medication 650 MILLIGRAM(S): at 16:40

## 2018-01-16 RX ADMIN — Medication 650 MILLIGRAM(S): at 06:19

## 2018-01-16 RX ADMIN — Medication 2 MILLIGRAM(S): at 06:19

## 2018-01-16 RX ADMIN — Medication 0.6 MILLIGRAM(S): at 17:44

## 2018-01-16 RX ADMIN — Medication 250 MILLIGRAM(S): at 20:38

## 2018-01-16 RX ADMIN — HEPARIN SODIUM 5000 UNIT(S): 5000 INJECTION INTRAVENOUS; SUBCUTANEOUS at 22:33

## 2018-01-16 RX ADMIN — MIRTAZAPINE 15 MILLIGRAM(S): 45 TABLET, ORALLY DISINTEGRATING ORAL at 22:33

## 2018-01-16 RX ADMIN — CEFEPIME 100 MILLIGRAM(S): 1 INJECTION, POWDER, FOR SOLUTION INTRAMUSCULAR; INTRAVENOUS at 14:11

## 2018-01-16 RX ADMIN — CEFEPIME 100 MILLIGRAM(S): 1 INJECTION, POWDER, FOR SOLUTION INTRAMUSCULAR; INTRAVENOUS at 22:33

## 2018-01-16 NOTE — PROGRESS NOTE ADULT - SUBJECTIVE AND OBJECTIVE BOX
46 y/o gentleman with h/o Stage IV Lung CA with mets to brain 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  Patient completed 10 courses of  radiation treatment 1 day PTA. Patient complaint of  severe headache and an episode of vomiting 3 days prior to admission. The day of admission, the pt developed a fever 0f 103.6F and complained of rigors. He was also noted to be confused.  In ED pt found to have temp of 103.6 rectally. He received vanco IV and cefepime.     Patient is alert and oriented x 3, able to respond questions and follow commands.    ICU Vital Signs Last 24 Hrs  T(C): 36.6 (16 Jan 2018 08:00), Max: 37.7 (15 Demetrius 2018 17:00)  T(F): 97.8 (16 Jan 2018 08:00), Max: 99.9 (15 Demetrius 2018 17:00)  HR: 79 (16 Jan 2018 09:00) (79 - 114)  BP: 127/105 (16 Jan 2018 09:00) (99/71 - 165/102)  BP(mean): 110 (16 Jan 2018 09:00) (77 - 117)  ABP: --  ABP(mean): --  RR: 21 (16 Jan 2018 09:00) (15 - 28)  SpO2: 98% (16 Jan 2018 09:00) (93% - 99%)      General gentleman in NAD, mildly sleepy.  HEENT, ommaya site with stitches dry and intact.   Lungs bilaterally decrease breath sounds.  CVS S1 S2 regular, no M/R/G  Abdomen soft NT ND positive for BS, no organomegaly.  Extremities: No C/C/E    Phosphorus Level, Serum (01.16.18 @ 05:05)    Phosphorus Level, Serum: 2.0 mg/dL    Magnesium, Serum (01.16.18 @ 05:05)    Magnesium, Serum: 2.3 mg/dL    Complete Blood Count (01.16.18 @ 05:05)    WBC Count: 18.6 K/uL    RBC Count: 4.67 M/uL    Hemoglobin: 14.2 g/dL    Hematocrit: 43.4 %    Mean Cell Volume: 92.8 fl    Mean Cell Hemoglobin: 30.4 pg    Mean Cell Hemoglobin Conc: 32.7 gm/dL    Red Cell Distrib Width: 18.7 %    Platelet Count - Automated: 350 K/uL      Basic Metabolic Panel (01.16.18 @ 05:05)    Sodium, Serum: 136 mmol/L    Potassium, Serum: 4.1 mmol/L    Chloride, Serum: 102 mmol/L      Carbon Dioxide, Serum: 27 mmol/L    Anion Gap, Serum: 7 mmol/L    Blood Urea Nitrogen, Serum: 19 mg/dL    Creatinine, Serum: 0.85 mg/dL    Glucose, Serum: 108 mg/dL    Calcium, Total Serum: 9.9 mg/dL    eGFR if Non : 104: Interpretative comment  The units for eGFR are ml/min/1.73m2 (normalized body surface area). The  eGFR is calculated from a serum creatinine using the CKD-EPI equation.  Other variables required for calculation are race, age and sex. Among  patients with chronic kidney disease (CKD), the eGFR is useful in  determining the stage of disease according to KDOQI CKD classification.  All eGFR results are reported numerically with the following  interpretation.          GFR                    With                 Without     (ml/min/1.73 m2)    Kidney Damage       Kidney Damage        >= 90                    Stage 1                     Normal        60-89                    Stage 2                     Decreased GFR        30-59     Stage 3                     Stage 3        15-29                    Stage 4                     Stage 4        < 15                      Stage 5                     Stage 5  Each stage of CKD assumes that the associated GFR level has been in  effect for at least 3 months. Determination of stages one and two (with  eGFR > 59 ml/min/m2) requires estimation of kidney damage for at least 3  months as defined by structural or functional abnormalities.  Limitations: All estimates of GFR will be less accurate for patients at  extremes of muscle mass (including but not limited to frail elderly,  critically ill, or cancer patients), those with unusual diets, and those  with conditions associated with reduced secretion or extrarenal  elimination of creatinine. The eGFR equation is not recommended for use  in patients with unstable creatinine levels. mL/min/1.73M2    eGFR if African American: 120 mL/min/1.73M2        Culture Results CSF:   Numerous Staphylococcus aureus (01.12.18 @ 16:15)    MEDICATIONS  (STANDING):  ALBUTerol    90 MICROgram(s) HFA Inhaler 1 Puff(s) Inhalation every 4 hours  cefepime  IVPB      cefepime  IVPB 2000 milliGRAM(s) IV Intermittent every 8 hours  chlorhexidine 0.12% Liquid 15 milliLiter(s) Swish and Spit two times a day  colchicine 0.6 milliGRAM(s) Oral two times a day  dexamethasone  Injectable 2 milliGRAM(s) IV Push three times a day  folic acid 1 milliGRAM(s) Oral daily  heparin  Injectable 5000 Unit(s) SubCutaneous every 8 hours  metoprolol     tartrate 25 milliGRAM(s) Oral two times a day  mirtazapine 15 milliGRAM(s) Oral at bedtime  pantoprazole    Tablet 40 milliGRAM(s) Oral before breakfast

## 2018-01-16 NOTE — PROGRESS NOTE ADULT - SUBJECTIVE AND OBJECTIVE BOX
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 Pts fever and WBC count have improved, he is currently still intubated but mental status off sedation is also improved. Pt is now following commands.     1/15- Pt was seen and examined in the ICU- s/p extubation yesterday, strength in LUE improved, mild confusion/disorientation, CT head repeated today, ventricle size now stable, fevers and WBC stable    1/16- Pt seen and examined in the ICU, no complaints, family states he was up most of the night and just fell asleep, he was lethargic but followed commands, no fever     Vital Signs Last 24 Hrs  T(C): 36.6 (16 Jan 2018 08:00), Max: 37.7 (15 Demetrius 2018 17:00)  T(F): 97.8 (16 Jan 2018 08:00), Max: 99.9 (15 Demetrius 2018 17:00)  HR: 79 (16 Jan 2018 09:00) (79 - 114)  BP: 127/105 (16 Jan 2018 09:00) (99/71 - 165/102)  BP(mean): 110 (16 Jan 2018 09:00) (77 - 117)  RR: 21 (16 Jan 2018 09:00) (15 - 28)  SpO2: 98% (16 Jan 2018 09:00) (93% - 99%)    PHYSICAL EXAM:  Pt seen and examined, lethargic but arousable, family states he just fell asleep and was up most of the night   Pt awake, alert, follows commands, no confusion today- answered all questions correctly   Incision site from Ommaya removal clean and dry, sutures intact, +fluctuance where ommaya was seated, skin not tense, no redness, no discharge    PEARRL, EOMI, visual fields appear intact  lungs clear to auscultation  S1 and S2, regular rhythm  abdomen soft  moving all extremities anti gravity, Sensation intact  +edema b/l lower extremities, + petechial rash b/l lower extremities chronic     MEDS:  MEDICATIONS  (STANDING):  ALBUTerol    90 MICROgram(s) HFA Inhaler 1 Puff(s) Inhalation every 4 hours   cefepime  IVPB 2000 milliGRAM(s) IV Intermittent every 8 hours  chlorhexidine 0.12% Liquid 15 milliLiter(s) Swish and Spit two times a day  colchicine 0.6 milliGRAM(s) Oral two times a day  dexamethasone  Injectable 2 milliGRAM(s) IV Push three times a day  folic acid 1 milliGRAM(s) Oral daily  heparin  Injectable 5000 Unit(s) SubCutaneous every 8 hours  metoprolol  tartrate 25 milliGRAM(s) Oral two times a day  mirtazapine 15 milliGRAM(s) Oral at bedtime  pantoprazole Tablet 40 milliGRAM(s) Oral before breakfast    MEDICATIONS  (PRN):  acetaminophen   Tablet 650 milliGRAM(s) Oral every 6 hours PRN For Temp greater than 38 C (100.4 F)  acetaminophen  Suppository 650 milliGRAM(s) Rectal every 6 hours PRN For Temp greater than 38 C (100.4 F)  ALBUTerol    0.083% 2.5 milliGRAM(s) Nebulizer every 6 hours PRN Shortness of Breath and/or Wheezing  diazepam    Tablet 2 milliGRAM(s) Oral three times a day PRN anxiety  guaiFENesin    Syrup 100 milliGRAM(s) Oral every 6 hours PRN Cough  HYDROmorphone   Tablet 4 milliGRAM(s) Oral two times a day PRN Severe Pain (7 - 10)    LABS:                        14.2   18.6  )-----------( 350      ( 16 Jan 2018 05:05 )             43.4       01-16    136  |  102  |  19  ----------------------------<  108<H>  4.1   |  27  |  0.85    Ca    9.9      16 Jan 2018 05:05  Phos  2.0     01-16  Mg     2.3     01-16

## 2018-01-16 NOTE — PROGRESS NOTE ADULT - ASSESSMENT
48 y/o male with metastatic adenocarcinoma of lung, extensive brain mets and leptomeningeal carcinomatosis s/p systemic chemotherapy and intrathecal chemotherapy, s/p WBRT now  with sepsis, meningitis  with MSSA, RLL pneumonia.    S/p removal of Ommaya.  S/p extubation 1-14-18.    Antibiotics per ID. Cefipime 2 g q 8hrs.     Patient has been on steroids for several weeks, start bactrim DS 1 tab M-W-F, PCP prophylaxis.   Management per ICU, ID, pulmonary and neurosurgery.  Aggressive supportive care. Chemotherapy on hold.

## 2018-01-16 NOTE — PROGRESS NOTE ADULT - ASSESSMENT
Pt is a 47 year old man with Stage 4 Lung CA with mets to brain s/p Ommaya reservoir 11/2017 for leptomeningeal disease s/p chemo and radiation, he presented on 1/11 with lethargy, confusion, and a fever of 103.6, he was found to have acute bacterial meningitis with MSSA and an Ommaya site infection as well as RUL pneumonia.    Pt is now POD #4 s/p removal of Ommaya [place]   CT shows stable ventricles- no neurosurgical intervention at this time   Pts mental status is improved, Afebrile    PLAN-  Continue ABX as per ID  Monitor WBC count   SQ heparin for DVT PPX  Chemo therapy treatement on hold due to resolving sepsis

## 2018-01-16 NOTE — PHYSICAL THERAPY INITIAL EVALUATION ADULT - GENERAL OBSERVATIONS, REHAB EVAL
Pre session and post session: supine in bed with fiance by bedside. Monitor in place. bed alarm on. No c/o pain. Fiance states pt regular schedule is asleep in AM and awake in PM. Pt requires significant amount of VC and TC for mobility and safety. Tolerated well and would benefit from further skilled PT for functional mobility

## 2018-01-16 NOTE — PHYSICAL THERAPY INITIAL EVALUATION ADULT - DIAGNOSIS, PT EVAL
Stage 4 lung cancer with brain mets;Acute bacterial meningitis;R sided mild multifocal PNA;Small pericardial effusion

## 2018-01-16 NOTE — PHYSICAL THERAPY INITIAL EVALUATION ADULT - LEVEL OF INDEPENDENCE: STAND/SIT, REHAB EVAL
contact guard/minimum assist (75% patients effort)/assist to help pt flex at hips to sit back on bed correctly and safely

## 2018-01-16 NOTE — PROGRESS NOTE ADULT - ASSESSMENT
47y old M with:  Acute resp failure--extubated  S. aureus meningitis--s/p Ommaya reservoir removal   Stage 4 lung Ca  TTE results as noted--?? RV outflow tract obst      Plan:  Cont Close Monitoring  Serial Neuro Exams  BP Stable  No Acute Resp issues  PO diet  dc IVF  Monitor renal function  Strict I/O's  Steroids taper NS  DVT prophylaxis - Hep SQ

## 2018-01-16 NOTE — PROGRESS NOTE ADULT - RS GEN PE MLT RESP DETAILS PC
diminished breath sounds, L/diminished breath sounds, R
diminished breath sounds, L/diminished breath sounds, R

## 2018-01-16 NOTE — PHYSICAL THERAPY INITIAL EVALUATION ADULT - ADDITIONAL COMMENTS
does not drive. Sup with showers. Lives in house with steff. 3 SCOT no HR but garage entrance 3 SCOT and will have HR put in. bedroom and bathroom on second floor with full flight  of stairs with B HR. Uses his mothers rollator but not the correct size and is flimsy according to steff. does not drive. Sup with showers. Lives in house with steff. 3 SCOT no HR but garage entrance 3 SCOT and will have HR put in. bedroom and bathroom on second floor with full flight  of stairs with B HR. Uses his mothers rollator but not the correct size and is flimsy according to steff. States can transfor the den on main floor into bedroom. Only a half bathroom on the main floor but she also states it wouldn't be difficult to install a shower if needed

## 2018-01-16 NOTE — PHYSICAL THERAPY INITIAL EVALUATION ADULT - PERTINENT HX OF CURRENT PROBLEM, REHAB EVAL
fever and AMS:pt completed 10 course radiation treatment 1 day ago. 3 days ago he was complaining of severe headache and also had episode of vomiting. He was seen at oncologist office and was given morphine and valium. This morning pt developed a fever 0f 103.6 and complained of rigors. He was also noted to be confused. He did have a cough which has been chronic.

## 2018-01-16 NOTE — PROGRESS NOTE ADULT - SUBJECTIVE AND OBJECTIVE BOX
CC: Weakness    HPI: 47y old M with PMHx of  Stage 4 Lung CA with mets to brain s/p Ommaya reservoir 11/2017 for leptomeningeal disease s/p chemo and radiation, anxiety, depression.      1/13 - Patient seen and examined. Events noted. He is awake and alert this AM. Feels weak.      PAST MEDICAL & SURGICAL HISTORY:  Hearing loss: right ear-no device  Cancer with leptomeningeal spread  Dyspnea on exertion  Gait disturbance  Memory loss  Anxiety and depression  Erectile dysfunction, unspecified erectile dysfunction type  History of kidney stones  History of pericarditis  Pleural effusion  Syncope, unspecified syncope type: X3-4 episodes; s/p LINQ device placed  Lung cancer, upper lobe: right. metastatic non small cell Lung Carcinoma.  History of lumbar puncture: chemo placed  History of cardiac monitoring: s/p LINQ device placement 4/2017  History of lumbar puncture within last 21 days  History of vascular access device: PORT placed  Pleuritic chest pain: Pleurodesis left chest 10/20/2017  H/O bilateral inguinal hernia repair: as a child  Acute pericarditis, unspecified type: pericardial window 11/2015, 12/2015      FAMILY HISTORY:  Family history of leukemia (Father)  Family history of cancer (Mother)      Social Hx:    Allergies    adhesives (Rash)  No Known Drug Allergies    Intolerances        47y        ICU Vital Signs Last 24 Hrs  T(C): 36.6 (16 Jan 2018 08:00), Max: 37.7 (15 Demetrius 2018 17:00)  T(F): 97.8 (16 Jan 2018 08:00), Max: 99.9 (15 Demetrius 2018 17:00)  HR: 79 (16 Jan 2018 09:00) (79 - 114)  BP: 127/105 (16 Jan 2018 09:00) (99/71 - 165/102)  BP(mean): 110 (16 Jan 2018 09:00) (77 - 117)  ABP: --  ABP(mean): --  RR: 21 (16 Jan 2018 09:00) (15 - 28)  SpO2: 98% (16 Jan 2018 09:00) (93% - 99%)          I&O's Summary    15 Demetrius 2018 07:01  -  16 Jan 2018 07:00  --------------------------------------------------------  IN: 1040 mL / OUT: 2500 mL / NET: -1460 mL                              14.2   18.6  )-----------( 350      ( 16 Jan 2018 05:05 )             43.4       01-16    136  |  102  |  19  ----------------------------<  108<H>  4.1   |  27  |  0.85    Ca    9.9      16 Jan 2018 05:05  Phos  2.0     01-16  Mg     2.3     01-16        CAPILLARY BLOOD GLUCOSE                                MEDICATIONS  (STANDING):  ALBUTerol    90 MICROgram(s) HFA Inhaler 1 Puff(s) Inhalation every 4 hours  cefepime  IVPB      cefepime  IVPB 2000 milliGRAM(s) IV Intermittent every 8 hours  chlorhexidine 0.12% Liquid 15 milliLiter(s) Swish and Spit two times a day  colchicine 0.6 milliGRAM(s) Oral two times a day  dexamethasone  Injectable 2 milliGRAM(s) IV Push three times a day  folic acid 1 milliGRAM(s) Oral daily  heparin  Injectable 5000 Unit(s) SubCutaneous every 8 hours  metoprolol     tartrate 25 milliGRAM(s) Oral two times a day  mirtazapine 15 milliGRAM(s) Oral at bedtime  pantoprazole    Tablet 40 milliGRAM(s) Oral before breakfast    MEDICATIONS  (PRN):  acetaminophen   Tablet 650 milliGRAM(s) Oral every 6 hours PRN For Temp greater than 38 C (100.4 F)  acetaminophen  Suppository 650 milliGRAM(s) Rectal every 6 hours PRN For Temp greater than 38 C (100.4 F)  ALBUTerol    0.083% 2.5 milliGRAM(s) Nebulizer every 6 hours PRN Shortness of Breath and/or Wheezing  diazepam    Tablet 2 milliGRAM(s) Oral three times a day PRN anxiety  guaiFENesin    Syrup 100 milliGRAM(s) Oral every 6 hours PRN Cough  HYDROmorphone   Tablet 4 milliGRAM(s) Oral two times a day PRN Severe Pain (7 - 10)        Advanced Directives:  Discussed with:    Visit Information:    ** Time is exclusive of billed procedures and/or teaching and/or routine family updates.

## 2018-01-17 LAB
ANION GAP SERPL CALC-SCNC: 7 MMOL/L — SIGNIFICANT CHANGE UP (ref 5–17)
BUN SERPL-MCNC: 17 MG/DL — SIGNIFICANT CHANGE UP (ref 7–23)
CALCIUM SERPL-MCNC: 10.4 MG/DL — HIGH (ref 8.5–10.1)
CHLORIDE SERPL-SCNC: 96 MMOL/L — SIGNIFICANT CHANGE UP (ref 96–108)
CO2 SERPL-SCNC: 28 MMOL/L — SIGNIFICANT CHANGE UP (ref 22–31)
CREAT SERPL-MCNC: 0.87 MG/DL — SIGNIFICANT CHANGE UP (ref 0.5–1.3)
GLUCOSE SERPL-MCNC: 105 MG/DL — HIGH (ref 70–99)
HCT VFR BLD CALC: 42.9 % — SIGNIFICANT CHANGE UP (ref 39–50)
HGB BLD-MCNC: 14.4 G/DL — SIGNIFICANT CHANGE UP (ref 13–17)
MCHC RBC-ENTMCNC: 30.9 PG — SIGNIFICANT CHANGE UP (ref 27–34)
MCHC RBC-ENTMCNC: 33.6 GM/DL — SIGNIFICANT CHANGE UP (ref 32–36)
MCV RBC AUTO: 91.9 FL — SIGNIFICANT CHANGE UP (ref 80–100)
PHOSPHATE SERPL-MCNC: 2.1 MG/DL — LOW (ref 2.5–4.5)
PLATELET # BLD AUTO: 382 K/UL — SIGNIFICANT CHANGE UP (ref 150–400)
POTASSIUM SERPL-MCNC: 3.8 MMOL/L — SIGNIFICANT CHANGE UP (ref 3.5–5.3)
POTASSIUM SERPL-SCNC: 3.8 MMOL/L — SIGNIFICANT CHANGE UP (ref 3.5–5.3)
RBC # BLD: 4.67 M/UL — SIGNIFICANT CHANGE UP (ref 4.2–5.8)
RBC # FLD: 19.2 % — HIGH (ref 10.3–14.5)
SODIUM SERPL-SCNC: 131 MMOL/L — LOW (ref 135–145)
WBC # BLD: 17.8 K/UL — HIGH (ref 3.8–10.5)
WBC # FLD AUTO: 17.8 K/UL — HIGH (ref 3.8–10.5)

## 2018-01-17 PROCEDURE — 71260 CT THORAX DX C+: CPT | Mod: 26

## 2018-01-17 PROCEDURE — 99233 SBSQ HOSP IP/OBS HIGH 50: CPT

## 2018-01-17 RX ORDER — SODIUM CHLORIDE 9 MG/ML
500 INJECTION INTRAMUSCULAR; INTRAVENOUS; SUBCUTANEOUS ONCE
Qty: 0 | Refills: 0 | Status: COMPLETED | OUTPATIENT
Start: 2018-01-17 | End: 2018-01-17

## 2018-01-17 RX ADMIN — MIRTAZAPINE 15 MILLIGRAM(S): 45 TABLET, ORALLY DISINTEGRATING ORAL at 22:24

## 2018-01-17 RX ADMIN — Medication 2 MILLIGRAM(S): at 05:06

## 2018-01-17 RX ADMIN — Medication 25 MILLIGRAM(S): at 22:24

## 2018-01-17 RX ADMIN — Medication 0.6 MILLIGRAM(S): at 22:24

## 2018-01-17 RX ADMIN — PANTOPRAZOLE SODIUM 40 MILLIGRAM(S): 20 TABLET, DELAYED RELEASE ORAL at 05:06

## 2018-01-17 RX ADMIN — HEPARIN SODIUM 5000 UNIT(S): 5000 INJECTION INTRAVENOUS; SUBCUTANEOUS at 05:06

## 2018-01-17 RX ADMIN — Medication 25 MILLIGRAM(S): at 05:06

## 2018-01-17 RX ADMIN — Medication 650 MILLIGRAM(S): at 16:55

## 2018-01-17 RX ADMIN — Medication 2 MILLIGRAM(S): at 14:14

## 2018-01-17 RX ADMIN — CEFEPIME 100 MILLIGRAM(S): 1 INJECTION, POWDER, FOR SOLUTION INTRAMUSCULAR; INTRAVENOUS at 14:15

## 2018-01-17 RX ADMIN — Medication 650 MILLIGRAM(S): at 22:27

## 2018-01-17 RX ADMIN — Medication 0.6 MILLIGRAM(S): at 05:06

## 2018-01-17 RX ADMIN — CEFEPIME 100 MILLIGRAM(S): 1 INJECTION, POWDER, FOR SOLUTION INTRAMUSCULAR; INTRAVENOUS at 22:24

## 2018-01-17 RX ADMIN — HEPARIN SODIUM 5000 UNIT(S): 5000 INJECTION INTRAVENOUS; SUBCUTANEOUS at 14:15

## 2018-01-17 RX ADMIN — SODIUM CHLORIDE 500 MILLILITER(S): 9 INJECTION INTRAMUSCULAR; INTRAVENOUS; SUBCUTANEOUS at 19:23

## 2018-01-17 RX ADMIN — Medication 650 MILLIGRAM(S): at 01:16

## 2018-01-17 RX ADMIN — Medication 2 MILLIGRAM(S): at 22:24

## 2018-01-17 RX ADMIN — Medication 1 TABLET(S): at 18:47

## 2018-01-17 RX ADMIN — HYDROMORPHONE HYDROCHLORIDE 4 MILLIGRAM(S): 2 INJECTION INTRAMUSCULAR; INTRAVENOUS; SUBCUTANEOUS at 14:39

## 2018-01-17 RX ADMIN — Medication 2 MILLIGRAM(S): at 22:25

## 2018-01-17 RX ADMIN — CEFEPIME 100 MILLIGRAM(S): 1 INJECTION, POWDER, FOR SOLUTION INTRAMUSCULAR; INTRAVENOUS at 05:07

## 2018-01-17 RX ADMIN — HEPARIN SODIUM 5000 UNIT(S): 5000 INJECTION INTRAVENOUS; SUBCUTANEOUS at 22:24

## 2018-01-17 RX ADMIN — Medication 1 MILLIGRAM(S): at 14:16

## 2018-01-17 NOTE — PROGRESS NOTE ADULT - SUBJECTIVE AND OBJECTIVE BOX
Patient is a 47y old  Male who presents with a chief complaint of fever, AMS (2018 16:20)    HPI:  48 y/o male with h/o Stage 4 Lung CA with mets to brain s/p Ommaya reservoir 2017 for leptomeningeal disease s/p chemo and radiation, anxiety, depression, h/o nephrolithiasis, h/o pericarditis was admitted on  for fever and AMS. Pt was lethargic and unable to give history. As per sister, pt completed 10 course radiation treatment 1 day PTA. 3 days ago he was complaining of severe headache and also had episode of vomiting. He was seen at oncologist office and was given morphine and valium. The day of admission, the pt developed a fever 0f 103.6F and complained of rigors. He was also noted to be confused.  In ED pt found to have temp of 103.6 rectally. He received vanco IV and cefepime.     Alert and verbal; somewhat confused  Febrile episode last night  Noted with b/l calf abrasions ?related to venodynes: no erythema, mild tenderness, no discharge  Fever is down this AM    MEDICATIONS  (STANDING):  ALBUTerol    90 MICROgram(s) HFA Inhaler 1 Puff(s) Inhalation every 4 hours  cefepime  IVPB      cefepime  IVPB 2000 milliGRAM(s) IV Intermittent every 8 hours  colchicine 0.6 milliGRAM(s) Oral two times a day  dexamethasone  Injectable 2 milliGRAM(s) IV Push three times a day  folic acid 1 milliGRAM(s) Oral daily  heparin  Injectable 5000 Unit(s) SubCutaneous every 8 hours  metoprolol     tartrate 25 milliGRAM(s) Oral two times a day  mirtazapine 15 milliGRAM(s) Oral at bedtime  pantoprazole    Tablet 40 milliGRAM(s) Oral before breakfast  vancomycin  IVPB 1000 milliGRAM(s) IV Intermittent every 12 hours  vancomycin  IVPB        MEDICATIONS  (PRN):  acetaminophen   Tablet 650 milliGRAM(s) Oral every 6 hours PRN For Temp greater than 38 C (100.4 F)  acetaminophen  Suppository 650 milliGRAM(s) Rectal every 6 hours PRN For Temp greater than 38 C (100.4 F)  ALBUTerol    0.083% 2.5 milliGRAM(s) Nebulizer every 6 hours PRN Shortness of Breath and/or Wheezing  diazepam    Tablet 2 milliGRAM(s) Oral three times a day PRN anxiety  guaiFENesin    Syrup 100 milliGRAM(s) Oral every 6 hours PRN Cough  HYDROmorphone   Tablet 4 milliGRAM(s) Oral two times a day PRN Severe Pain (7 - 10)      Vital Signs Last 24 Hrs  T(C): 36.3 (2018 09:44), Max: 38.5 (2018 01:00)  T(F): 97.3 (2018 09:44), Max: 101.3 (2018 01:00)  HR: 98 (2018 10:00) (80 - 99)  BP: 128/98 (2018 10:00) (128/95 - 153/92)  BP(mean): 104 (2018 10:00) (26 - 114)  RR: 19 (2018 10:00) (12 - 25)  SpO2: 94% (2018 10:00) (94% - 100%)    Physical Exam:      Constitutional: frail looking  HEENT: NC/AT, EOMI, PERRLA; postop scalp wound clean  Neck: supple  Back: no tenderness  Respiratory: crackles at bases; decreased BS  Cardiovascular: S1S2 regular, no murmurs  Abdomen: soft, not tender, not distended, positive BS  Genitourinary: no LN palpable  Rectal: deferred  Musculoskeletal: no muscle tenderness, no joint swelling or tenderness  Extremities: no pedal edema  Neurological: alert, moving all extremities; b/l calf abrasions, dry  Skin: no rashes    Labs:                        14.4   17.8  )-----------( 382      ( 2018 05:55 )             42.9     -17    131<L>  |  96  |  17  ----------------------------<  105<H>  3.8   |  28  |  0.87    Ca    10.4<H>      2018 05:55  Phos  2.1     -17  Mg     2.3     -16                        12.9   16.8  )-----------( 315      ( 15 Demetrius 2018 05:29 )             38.9     -15    137  |  104  |  21  ----------------------------<  98  4.1   |  26  |  0.74    Ca    9.6      15 Demetrius 2018 05:29  Phos  2.8     01-15  Mg     2.4     01-15                        15.3   19.6  )-----------( 291      ( 2018 05:26 )             53.0     -13    132<L>  |  100  |  17  ----------------------------<  95  4.2   |  24  |  1.09    Ca    10.5<H>      2018 05:26  Phos  3.0     -13  Mg     2.5              Vancomycin Level, Trough: 11.4 ug/mL ( @ 05:26)                          15.0   26.8  )-----------( 382      ( 2018 07:30 )             45.7     -12    131<L>  |  95<L>  |  16  ----------------------------<  139<H>  3.8   |  27  |  1.03    Ca    10.2<H>      2018 07:30    TPro  8.4<H>  /  Alb  3.3  /  TBili  0.8  /  DBili  x   /  AST  34  /  ALT  83<H>  /  AlkPhos  122<H>  -11     LIVER FUNCTIONS - ( 2018 11:19 )  Alb: 3.3 g/dL / Pro: 8.4 gm/dL / ALK PHOS: 122 U/L / ALT: 83 U/L / AST: 34 U/L / GGT: x           Urinalysis Basic - ( 2018 12:33 )    Color: Yellow / Appearance: Clear / S.015 / pH: x  Gluc: x / Ketone: Negative  / Bili: Negative / Urobili: Negative mg/dL   Blood: x / Protein: 30 mg/dL / Nitrite: Negative   Leuk Esterase: Negative / RBC: 0-2 /HPF / WBC 0-2   Sq Epi: x / Non Sq Epi: Occasional / Bacteria: Occasional    Cerebrospinal Fluid Cell Count-1 (18 @ 21:14)    CSF Segmented Neutrophils: 96 %    Total Nucleated Cell Count, CSF: 744 /uL    CSF Appearance: Slightly Cloudy    CSF Lymphocytes: 3 %    CSF Monocytes/Macrophages: 1 %    CSF Color: See Note: slightly xanthrochromic      Culture - Body Fluid with Gram Stain (collected 2018 14:36)  Source: .Body Fluid scalp incision  Gram Stain (2018 06:33):    No polymorphonuclear cells seen    No organisms seen    by cytocentrifuge    Culture - Acid Fast - CSF (collected 2018 21:14)  Source: .CSF spinal fluid    Culture - Fungal, CSF (collected 2018 21:14)  Source: .CSF spinal fluid  Preliminary Report (2018 12:37):    Testing in progress    Culture - CSF with Gram Stain (collected 2018 21:14)  Source: .CSF  Gram Stain (2018 01:16):    polymorphonuclear leukocytes seen per low power field    Gram Variable Cocci seen per oil power field    by cytocentrifuge  Final Report (2018 07:36):    Numerous Staphylococcus aureus  Organism: Staphylococcus aureus (2018 07:36)  Organism: Staphylococcus aureus (2018 07:36)      -  Oxacillin: S 0.5      -  Penicillin: R >8      -  RIF- Rifampin: R >2      -  Trimethoprim/Sulfamethoxazole: S <=0.5/9.5      -  Vancomycin: S 2      Method Type: TAMMIE    Culture - Urine (collected 2018 12:33)  Source: .Urine None  Final Report (2018 18:38):    10,000 - 49,000 CFU/mL Coag Negative Staphylococcus    Culture - Blood (collected 2018 11:19)  Source: .Blood Blood-Peripheral  Preliminary Report (2018 15:01):    No growth to date.    Culture - Blood (collected 2018 11:19)  Source: .Blood Blood-Peripheral  Preliminary Report (2018 15:01):    No growth to date.          Radiology:    < from: CT Chest No Cont (18 @ 17:21) >  Several scattered groundglass nodules and opacities within the right   upper lobe, suggestive of pneumonia given the clinical context of fever.   Small to right pleural effusion is present. Loculated left pleural   effusion along the left lateral pleura and within the fissures is again   identified. Small pericardial effusion is present.    < end of copied text >    < from: CT Head No Cont (18 @ 13:09) >   unchanged RIGHT frontal Ommaya catheter  in place within   the anterior horn of the RIGHT lateral ventricle. Faint hyperdense   lesions scattered throughout the brain consistent with patient's known   metastatic disease. IV contrast would be needed for complete evaluation.     < end of copied text >    < from: CT Head No Cont (01.15.18 @ 09:43) >  Frontal kenia hole with small amount of encephalomalacia/gliosis within   the right frontal lobe. Fluid collection measuring approximately 4.7 x   1.1 cm and minimal associated hemorrhage within the scalp overlying the   right frontal borehole. Ventricular size is unchanged since prior exam.   Punctate calcification in the right frontal lobe is unchanged. The   underlying neoplastic process is difficult to evaluate CT imaging.     < end of copied text >      Advanced directives addressed: full resuscitation

## 2018-01-17 NOTE — PROGRESS NOTE ADULT - ASSESSMENT
46 y/o gentleman  with metastatic adenocarcinoma of lung, extensive brain mets and leptomeningeal carcinomatosis s/p systemic chemotherapy and intrathecal chemotherapy, s/p WBRT now  with sepsis, meningitis  with MSSA, RLL pneumonia. Persistent fever, appreciate recommendations by ID Dr Hardy and Neurosurgery. Repeat MRI of the brain and LP to r/o brain abscess.     S/p removal of Ommaya.  S/p extubation 1-14-18.    Antibiotics per ID. Cefipime 2 g q 8hrs.     Patient has been on steroids for several weeks, start bactrim DS 1 tab M-W-F, PCP prophylaxis.   Management per Dr Izquierdo, ID, pulmonary and neurosurgery.  Aggressive supportive care. Chemotherapy on hold.

## 2018-01-17 NOTE — PROGRESS NOTE ADULT - ASSESSMENT
Stage 4 lung cancer with brain mets.   Acute bacterial meningitis. Abx's as per ID. Neurosurg following.    Pt extubated. On NC O2.     TTE ?? RV outflow tract obstruction.  Pt to have CT chest w/ IV contrast to r/o pulmonic outflow obstruction.    R sided mild multifocal PNA.  Abx's, nebs.   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     S/P pericardial window 11/15, 12/15.    Overall poor prognosis.

## 2018-01-17 NOTE — CONSULT NOTE ADULT - SUBJECTIVE AND OBJECTIVE BOX
Patient is a 47y old  Male who presents with a chief complaint of "I have fluid in my lung and shortness of breath." (12 Jan 2018 19:53)      HPI:  46 yo male with PMH Stage 4 Lung CA with mets to brain s/p Ommaya reservoir 11/2017 for leptomeningeal disease s/p chemo and radiation, anxiety, depression, h/o nephrolithiasis, h/o pericarditis s/p window presents to ED with complaint of fever and AMS. Pt is a lethargic and unable to obtain history from him. Does wake up to verbal command but falls back alseep. History obtained from pt's sister at bedside. As per sister pt completed 10 course radiation treatment 1 day ago. 3 days PTA he was complaining of severe headache and also had episode of vomiting. He was seen at oncologist office and was given morphine and valium. This morning pt developed a fever 0f 103.6 and complained of rigors. He was also noted to be confused. He was found to have menigitis, his ommaya reservoir was removed but he's still having fevers   During this hospitization he was found to have a new RVOT obstruction by TTE of unclear etiology       PAST MEDICAL & SURGICAL HISTORY:  Hearing loss: right ear-no device  Cancer with leptomeningeal spread  Dyspnea on exertion  Gait disturbance  Memory loss  Anxiety and depression  Erectile dysfunction, unspecified erectile dysfunction type  History of kidney stones  History of pericarditis  Pleural effusion  Syncope, unspecified syncope type: X3-4 episodes; s/p LINQ device placed  Lung cancer, upper lobe: right. metastatic non small cell Lung Carcinoma.  History of lumbar puncture: chemo placed  History of cardiac monitoring: s/p LINQ device placement 4/2017  History of lumbar puncture within last 21 days  History of vascular access device: PORT placed  Pleuritic chest pain: Pleurodesis left chest 10/20/2017  H/O bilateral inguinal hernia repair: as a child  Acute pericarditis, unspecified type: pericardial window 11/2015, 12/2015                                    MEDICATIONS  (STANDING):  ALBUTerol    90 MICROgram(s) HFA Inhaler 1 Puff(s) Inhalation every 4 hours  cefepime  IVPB      cefepime  IVPB 2000 milliGRAM(s) IV Intermittent every 8 hours  colchicine 0.6 milliGRAM(s) Oral two times a day  dexamethasone  Injectable 2 milliGRAM(s) IV Push three times a day  folic acid 1 milliGRAM(s) Oral daily  heparin  Injectable 5000 Unit(s) SubCutaneous every 8 hours  metoprolol     tartrate 25 milliGRAM(s) Oral two times a day  mirtazapine 15 milliGRAM(s) Oral at bedtime  pantoprazole    Tablet 40 milliGRAM(s) Oral before breakfast  trimethoprim  160 mG/sulfamethoxazole 800 mG 1 Tablet(s) Oral daily    MEDICATIONS  (PRN):  acetaminophen   Tablet 650 milliGRAM(s) Oral every 6 hours PRN For Temp greater than 38 C (100.4 F)  acetaminophen  Suppository 650 milliGRAM(s) Rectal every 6 hours PRN For Temp greater than 38 C (100.4 F)  ALBUTerol    0.083% 2.5 milliGRAM(s) Nebulizer every 6 hours PRN Shortness of Breath and/or Wheezing  diazepam    Tablet 2 milliGRAM(s) Oral three times a day PRN anxiety  guaiFENesin    Syrup 100 milliGRAM(s) Oral every 6 hours PRN Cough  HYDROmorphone   Tablet 4 milliGRAM(s) Oral two times a day PRN Severe Pain (7 - 10)      FAMILY HISTORY:  Family history of leukemia (Father)  Family history of cancer (Mother)      SOCIAL HISTORY:    CIGARETTES:        Vital Signs Last 24 Hrs  T(C): 36.3 (17 Jan 2018 14:25), Max: 38.5 (17 Jan 2018 01:00)  T(F): 97.3 (17 Jan 2018 14:25), Max: 101.3 (17 Jan 2018 01:00)  HR: 109 (17 Jan 2018 14:40) (80 - 112)  BP: 144/93 (17 Jan 2018 14:40) (128/95 - 144/93)  BP(mean): 105 (17 Jan 2018 14:40) (26 - 109)  RR: 24 (17 Jan 2018 14:40) (12 - 26)  SpO2: 98% (17 Jan 2018 14:40) (94% - 100%)            INTERPRETATION OF TELEMETRY:  sr   ECG:    I&O's Detail    16 Jan 2018 07:01  -  17 Jan 2018 07:00  --------------------------------------------------------  IN:    IV PiggyBack: 50 mL  Total IN: 50 mL    OUT:    Voided: 450 mL  Total OUT: 450 mL    Total NET: -400 mL      17 Jan 2018 07:01  -  17 Jan 2018 16:42  --------------------------------------------------------  IN:  Total IN: 0 mL    OUT:    Incontinent per Diaper: 2 mL  Total OUT: 2 mL    Total NET: -2 mL          LABS:                        14.4   17.8  )-----------( 382      ( 17 Jan 2018 05:55 )             42.9     01-17    131<L>  |  96  |  17  ----------------------------<  105<H>  3.8   |  28  |  0.87    Ca    10.4<H>      17 Jan 2018 05:55  Phos  2.1     01-17  Mg     2.3     01-16              I&O's Summary    16 Jan 2018 07:01  -  17 Jan 2018 07:00  --------------------------------------------------------  IN: 50 mL / OUT: 450 mL / NET: -400 mL    17 Jan 2018 07:01  -  17 Jan 2018 16:42  --------------------------------------------------------  IN: 0 mL / OUT: 2 mL / NET: -2 mL      BNP  RADIOLOGY & ADDITIONAL STUDIES:

## 2018-01-17 NOTE — PROVIDER CONTACT NOTE (FALL NOTIFICATION) - ASSESSMENT
Pt alert and oriented answering questions appropriately, no s/s of injuries noted, pt states " I did not hit my head", no c/o of pain.

## 2018-01-17 NOTE — PROGRESS NOTE ADULT - SUBJECTIVE AND OBJECTIVE BOX
46 y/o gentleman with h/o Stage IV Lung CA with mets to brain 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  Patient completed 10 courses of  radiation treatment 1 day PTA. Patient complaint of  severe headache and an episode of vomiting 3 days prior to admission. The day of admission, the pt developed a fever 0f 103.6F and complained of rigors. He was also noted to be confused.  In ED pt found to have temp of 103.6 rectally. He received vanco IV and cefepime.     Patient is alert and responsive,  respond questions and follow commands.  Patient still spiking fever 101oF T max last night.    ICU Vital Signs Last 24 Hrs  T(C): 36.3 (17 Jan 2018 09:44), Max: 38.5 (17 Jan 2018 01:00)  T(F): 97.3 (17 Jan 2018 09:44), Max: 101.3 (17 Jan 2018 01:00)  HR: 100 (17 Jan 2018 11:00) (80 - 100)  BP: 136/98 (17 Jan 2018 11:00) (128/95 - 153/92)  BP(mean): 106 (17 Jan 2018 11:00) (26 - 114)  ABP: --  ABP(mean): --  RR: 26 (17 Jan 2018 11:00) (12 - 26)  SpO2: 97% (17 Jan 2018 11:00) (94% - 100%)      General gentleman in [facility].  [facility] site with stitches dry and intact.   Lungs bilaterally decrease breath sounds.  CVS S1 S2 regular, no M/R/G  Abdomen soft NT ND positive for BS, no organomegaly.  Extremities: No C/C/E. positive for bilateral LE abrasions secondary to compression device.    Basic Metabolic Panel in AM (01.17.18 @ 05:55)    Sodium, Serum: 131 mmol/L    Potassium, Serum: 3.8 mmol/L    Chloride, Serum: 96 mmol/L    Carbon Dioxide, Serum: 28 mmol/L    Anion Gap, Serum: 7 mmol/L    Blood Urea Nitrogen, Serum: 17 mg/dL    Creatinine, Serum: 0.87 mg/dL    Glucose, Serum: 105 mg/dL    Calcium, Total Serum: 10.4 mg/dL    eGFR if Non : 103: Interpretative comment  The units for eGFR are ml/min/1.73m2 (normalized body surface area). The  eGFR is calculated from a serum creatinine using the CKD-EPI equation.  Other variables required for calculation are race, age and sex. Among  patients with chronic kidney disease (CKD), the eGFR is useful in  determining the stage of disease according to KDOQI CKD classification.  All eGFR results are reported numerically with the following  interpretation.          GFR                    With                 Without     (ml/min/1.73 m2)    Kidney Damage       Kidney Damage        >= 90                    Stage 1                     Normal        60-89                    Stage 2                     Decreased GFR        30-59     Stage 3                     Stage 3        15-29                    Stage 4                     Stage 4        < 15                      Stage 5                     Stage 5  Each stage of CKD assumes that the associated GFR level has been in  effect for at least 3 months. Determination of stages one and two (with  eGFR > 59 ml/min/m2) requires estimation of kidney damage for at least 3      months as defined by structural or functional abnormalities.  Limitations: All estimates of GFR will be less accurate for patients at  extremes of muscle mass (including but not limited to frail elderly,  critically ill, or cancer patients), those with unusual diets, and those  with conditions associated with reduced secretion or extrarenal  elimination of creatinine. The eGFR equation is not recommended for use  in patients with unstable creatinine levels. mL/min/1.73M2    eGFR if African American: 119 mL/min/1.73M2      Complete Blood Count (01.17.18 @ 05:55)    WBC Count: 17.8 K/uL    RBC Count: 4.67 M/uL    Hemoglobin: 14.4 g/dL    Hematocrit: 42.9 %    Mean Cell Volume: 91.9 fl    Mean Cell Hemoglobin: 30.9 pg    Mean Cell Hemoglobin Conc: 33.6 gm/dL    Red Cell Distrib Width: 19.2 %    Platelet Count - Automated: 382 K/uL    Culture - Fungal, Other (01.12.18 @ 16:15)    Specimen Source: .Other None    Culture Results:   Testing in progress      Culture - Acid Fast - Other w/Smear (01.12.18 @ 16:15)    Specimen Source: .Other None    Acid Fast Bacilli Smear:   No acid fast bacilli seen by fluorochrome stain    Culture Results CSF:   Numerous Staphylococcus aureus (01.12.18 @ 16:15)      MEDICATIONS  (STANDING):  ALBUTerol    90 MICROgram(s) HFA Inhaler 1 Puff(s) Inhalation every 4 hours  cefepime  IVPB      cefepime  IVPB 2000 milliGRAM(s) IV Intermittent every 8 hours  colchicine 0.6 milliGRAM(s) Oral two times a day  dexamethasone  Injectable 2 milliGRAM(s) IV Push three times a day  folic acid 1 milliGRAM(s) Oral daily  heparin  Injectable 5000 Unit(s) SubCutaneous every 8 hours  metoprolol     tartrate 25 milliGRAM(s) Oral two times a day  mirtazapine 15 milliGRAM(s) Oral at bedtime  pantoprazole    Tablet 40 milliGRAM(s) Oral before breakfast

## 2018-01-17 NOTE — CONSULT NOTE ADULT - ASSESSMENT
48 yo male with PMH Stage 4 Lung CA with mets to brain s/p Ommaya reservoir 11/2017 for leptomeningeal disease s/p chemo and radiation, anxiety, depression, h/o nephrolithiasis, h/o pericarditis s/p window presents to ER with acute meinigitis , he is s/p removal of Ommaya reservoir but still with perisitent temps , incidental finding on in hospital echo of subvalvular pulmonic stenossi of unclear etiology , He is asymptomatic from it at this time . HD stable and not hypoxic.   1) would treat conservatively for now . Spoke to family  about various options including right heart cath to r/o constrictive pericarditis , CONCEPCIÓN to look at the RVOT in more detail and since both procedures would not  the family wishes not to pursue these tests. The least invasive way to assess etiology of RVOT obstruction would be a cardiac MRI as outpt if he  improves clinically .  2) will speak to Dr Thompson about removing his Linq at this time since there have been no arrhytmias and it could be another potential source of infection.

## 2018-01-17 NOTE — PROGRESS NOTE ADULT - SUBJECTIVE AND OBJECTIVE BOX
HPI:  46 yo male with PMH Stage 4 Lung CA with mets to brain s/p Ommaya reservoir 2017 for leptomeningeal disease s/p chemo and radiation, anxiety, depression, h/o nephrolithiasis, h/o pericarditis presents to ED with complaint of fever and AMS. Pt is a lethargic and unable to obtain history from him. Does wake up to verbal command but falls back alseep. History obtained from pt's sister at bedside. As per sister pt completed 10 course radiation treatment 1 day ago. 3 days ago he was complaining of severe headache and also had episode of vomiting. He was seen at oncologist office and was given morphine and valium. This morning pt developed a fever 0f 103.6 and complained of rigors. He was also noted to be confused. He did have a cough which has been chronic.    In ED pt found to have temp of 103.6 rectally. Lactate of 2.8. CXR with left multiloculated effusion. Ct head with no acute pathology. Was given 3L NS and started on vanco and cefepime. (2018 16:20)    has chronic cough, no purulent sputum.  CT chest shows patchy/nodular mild findings on R.  c/w previous PET/CT of 17, CT chest shows no change in size of R effusion and loculated L effusion.  Has h.o. pericarditis and pericardial window 2015 and 2015.  h.o. 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     : events noted, removal of infected reservoir. pt sedated, comfortable on ventilator.  CXR today again shows loculated L effusion, no change from , given technique.  Has staph meningitis, on IV Abx's. WBC down to 19,600.  : extubated, awake, knows name and is in hospital.  WBC down to 15,700.   1/15: no distress, awake.   : Tm 101.3 rectal, Vancomycin added, chr cough, no CP. no distress.    PAST MEDICAL & SURGICAL HISTORY:  Hearing loss: right ear-no device  Cancer with leptomeningeal spread  Dyspnea on exertion  Gait disturbance  Memory loss  Anxiety and depression  Erectile dysfunction, unspecified erectile dysfunction type  History of kidney stones  History of pericarditis  Pleural effusion  Syncope, unspecified syncope type: X3-4 episodes; s/p LINQ device placed  Lung cancer, upper lobe: right. metastatic non small cell Lung Carcinoma.  History of lumbar puncture: chemo placed  History of cardiac monitoring: s/p LINQ device placement 2017  History of lumbar puncture within last 21 days  History of vascular access device: PORT placed  Pleuritic chest pain: Pleurodesis left chest 10/20/2017  H/O bilateral inguinal hernia repair: as a child  Acute pericarditis, unspecified type: pericardial window 2015, 2015      MEDICATIONS  (STANDING):  cefepime  IVPB 2000 milliGRAM(s) IV Intermittent every 12 hours  colchicine 0.6 milliGRAM(s) Oral two times a day  dexamethasone     Tablet 2 milliGRAM(s) Oral three times a day  folic acid 1 milliGRAM(s) Oral daily  heparin  Injectable 5000 Unit(s) SubCutaneous every 8 hours  mirtazapine 15 milliGRAM(s) Oral at bedtime  pantoprazole    Tablet 40 milliGRAM(s) Oral before breakfast  sodium chloride 0.9%. 1000 milliLiter(s) (125 mL/Hr) IV Continuous <Continuous>  vancomycin  IVPB 1000 milliGRAM(s) IV Intermittent every 12 hours    MEDICATIONS  (PRN):  acetaminophen   Tablet 650 milliGRAM(s) Oral every 6 hours PRN For Temp greater than 38 C (100.4 F)  diazepam    Tablet 2 milliGRAM(s) Oral three times a day PRN anxiety  HYDROmorphone   Tablet 4 milliGRAM(s) Oral two times a day PRN Severe Pain (7 - 10)      Allergies    adhesives (Rash)  No Known Drug Allergies    Intolerances        SOCIAL HISTORY: Denies tobacco, etoh abuse or illicit drug use    FAMILY HISTORY:  Family history of leukemia (Father)  Family history of cancer (Mother)      Vital Signs Last 24 Hrs  T(C): 36.8 (2018 04:28), Max: 39.8 (2018 11:13)  T(F): 98.2 (2018 04:28), Max: 103.6 (2018 11:13)  HR: 82 (2018 04:28) (82 - 128)  BP: 166/92 (2018 04:28) (134/92 - 172/91)  BP(mean): --  RR: 18 (2018 04:28) (13 - 22)  SpO2: 95% (2018 04:28) (95% - 100%)    REVIEW OF SYSTEMS:    CONSTITUTIONAL:  As per HPI.  HEENT:  Eyes:  No diplopia or blurred vision. ENT:  No earache, sore throat or runny nose.  CARDIOVASCULAR:  No pressure, squeezing, tightness, heaviness or aching about the chest, neck, axilla or epigastrium.  RESPIRATORY:  see above.  GASTROINTESTINAL:  No nausea, vomiting or diarrhea.  GENITOURINARY:  No dysuria, frequency or urgency.  MUSCULOSKELETAL:  As per HPI.  SKIN:  No change in skin, hair or nails.  NEUROLOGIC:  No paresthesias, fasciculations, seizures or weakness.  PSYCHIATRIC:  No disorder of thought or mood.  ENDOCRINE:  No heat or cold intolerance, polyuria or polydipsia.  HEMATOLOGICAL:  No easy bruising or bleedings:  .     PHYSICAL EXAMINATION:    GENERAL APPEARANCE:  Pt. is not currently dyspneic, in no distress. Pt. is alert, oriented, and pleasant.  HEENT:  Pupils are normal and react normally. No icterus. Mucous membranes well colored.  NECK:  Supple. No lymphadenopathy. Jugular venous pressure not elevated. Carotids equal.   HEART:   The cardiac impulse has a normal quality. Regular. Normal S1 and S2.   CHEST:  Clear to A.   ABDOMEN:  Soft and nontender.   EXTREMITIES:  There is no cyanosis, clubbing or edema.   SKIN:  No rash or significant lesions are noted.  Neuro: Alert, awake, and O x 3.      LABS:                        15.0   26.8  )-----------( 382      ( 2018 07:30 )             45.7     01-12    131<L>  |  95<L>  |  16  ----------------------------<  139<H>  3.8   |  27  |  1.03    Ca    10.2<H>      2018 07:30    TPro  8.4<H>  /  Alb  3.3  /  TBili  0.8  /  DBili  x   /  AST  34  /  ALT  83<H>  /  AlkPhos  122<H>  01-11    LIVER FUNCTIONS - ( 2018 11:19 )  Alb: 3.3 g/dL / Pro: 8.4 gm/dL / ALK PHOS: 122 U/L / ALT: 83 U/L / AST: 34 U/L / GGT: x           PTT - ( 2018 11:19 )  PTT:27.3 sec  CARDIAC MARKERS ( 2018 11:19 )  0.019 ng/mL / x     / x     / x     / x          Urinalysis Basic - ( 2018 12:33 )    Color: Yellow / Appearance: Clear / S.015 / pH: x  Gluc: x / Ketone: Negative  / Bili: Negative / Urobili: Negative mg/dL   Blood: x / Protein: 30 mg/dL / Nitrite: Negative   Leuk Esterase: Negative / RBC: 0-2 /HPF / WBC 0-2   Sq Epi: x / Non Sq Epi: Occasional / Bacteria: Occasional          RADIOLOGY & ADDITIONAL STUDIES:       EXAM:  XR CHEST AP OR PA 1V                            PROCEDURE DATE:  2018          INTERPRETATION:  Portable chest radiograph dated 2018.    COMPARISON: 2017.    CLINICAL INFORMATION: Fever.    FINDINGS:    The airway is midline.   Continued application of the right IJ line, distal tip is in the distal   superior vena cava.  The multi loculated left pleural effusion is  slightly smaller and the   left lower lobe is slightly better expanded at this time. The right lung   is clear.  The cardiac silhouette is normal size.   The bones are normal.     IMPRESSION:  A multiloculated left pleural effusion is slightly smaller and the left   lower lobe is slightly better expanded at this time.        PROCEDURE DATE:  2018          INTERPRETATION:  Exam Date: 2018 5:21 PM    CT chest without contrast    CLINICAL INFORMATION:  Fever    TECHNIQUE:  Contiguous axial 3 mm sections were obtained through the   chest using single helical acquisition.  In addition, 2D sagittal and   coronal reformatted images obtained.   This scan was performed using   automatic exposure control (radiation dose reduction software) to obtain   a diagnostic image quality scan with patient dose as low as reasonably   achievable.        FINDINGS: Comparison is made to report of PET CT of 2017.    The thyroid gland appears intact.    There are several scattered groundglass nodules and opacities withinthe   right upper lobe, suggestive of pneumonia given the clinical context of   fever. Small to right pleural effusion is present. Loculated left pleural   effusion along the left lateral pleura and within the fissures is again   identified. The trachea and major bronchi are patent.    No enlarged axillary lymph nodes are found.   No enlarged hilar lymph   nodes are recognized, allowing for the noncontrast technique.  Within the   mediastinum no pathologic lymph nodes are found.  The heart size is   normal.  There is a small pericardial effusion. Right ventral chest wall   port catheter is in place with tip in the SVC.    Limited evaluation of the upper abdomen is unremarkable.      IMPRESSION:     Several scattered groundglass nodules and opacities within the right   upper lobe, suggestive of pneumonia given the clinical context of fever.   Small to right pleural effusion is present. Loculated left pleural   effusion along the left lateral pleura and within the fissures is again   identified. Small pericardial effusion is present.

## 2018-01-17 NOTE — PROGRESS NOTE ADULT - ASSESSMENT
48 y/o male with h/o Stage 4 Lung CA with mets to brain 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 Pt was lethargic and unable to give history. As per sister, pt completed 10 course radiation treatment 1 day PTA. 3 days ago he was complaining of severe headache and also had episode of vomiting. He was seen at oncologist office and was given morphine and valium. The day of admission, the pt developed a fever 0f 103.6F and complained of rigors. He was also noted to be confused.  In ED pt found to have temp of 103.6 rectally. He received vanco IV and cefepime.     1. Febrile syndrome persistent.  Acute bacterial meningitis with MSSA. Right frontal lobe fluid collection ?postoperative ?abscess. Ommaya site infecton s/p port removal. RUL pneumonia with loculated left pleural effusion Empyema is less likely. Lung CA stage 4 with brain metastasis. Immunocompromised host.   -leukocytosis persistent  -encephalopathy is much improved  -doubt legs cellulitis  -on cefepime 2 gm IV q12h # 6  -given vancomycin IV yesterday  -tolerating abx well so far; no side effects noted  -repeat BC x 2  -case reviewed with neurosurgery in [facility] of persistent fever and leukocytosis: ?repeat LP ?MRI brain to asses for possible brain abscess  -continue abx coverage with cefazolin  -hemodinamically stable   -respiratory care  -monitor temps  -f/u CBC  -supportive care  2. Other issues:   -care per medicine 46 y/o male with h/o Stage 4 Lung CA with mets to brain 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 Pt was lethargic and unable to give history. As per sister, pt completed 10 course radiation treatment 1 day PTA. 3 days ago he was complaining of severe headache and also had episode of vomiting. He was seen at oncologist office and was given morphine and valium. The day of admission, the pt developed a fever 0f 103.6F and complained of rigors. He was also noted to be confused.  In ED pt found to have temp of 103.6 rectally. He received vanco IV and cefepime.     1. Febrile syndrome persistent.  Acute bacterial meningitis with MSSA. Right frontal lobe fluid collection ?postoperative ?abscess. Ommaya site infecton s/p port removal. RUL pneumonia with loculated left pleural effusion Empyema is less likely. Lung CA stage 4 with brain metastasis. Immunocompromised host.   -leukocytosis persistent  -encephalopathy is much improved  -doubt legs cellulitis  -on cefepime 2 gm IV q12h # 6  -given vancomycin IV yesterday  -tolerating abx well so far; no side effects noted  -repeat BC x 2  -case reviewed with neurosurgery in [facility] of persistent fever and leukocytosis: ?repeat LP ?MRI brain to asses for possible brain abscess  -continue abx coverage with cefepime  -hemodinamically stable   -respiratory care  -monitor temps  -f/u CBC  -supportive care  2. Other issues:   -care per medicine

## 2018-01-17 NOTE — PROGRESS NOTE ADULT - SUBJECTIVE AND OBJECTIVE BOX
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 Pts fever and WBC count have improved, he is currently still intubated but mental status off sedation is also improved. Pt is now following commands.     1/15- Pt was seen and examined in the ICU- s/p extubation yesterday, strength in LUE improved, mild confusion/disorientation, CT head repeated today, ventricle size now stable, fevers and WBC stable    1/16- Pt seen and examined in the ICU, no complaints, family states he was up most of the night and just fell asleep, he was lethargic but followed commands, no fever    1/17- Pt seen and examined in SDU, transferred out of ICU yesterday, spiked a fever overnight 101.3, mental status stable, WBC 17.8 down from 18.6 yesterday     Vital Signs Last 24 Hrs  T(F): 97.3 (17 Jan 2018 09:44), Max: 101.3 (17 Jan 2018 01:00)  HR: 98 (17 Jan 2018 10:00) (80 - 99)  BP: 128/98 (17 Jan 2018 10:00) (128/95 - 153/92)  BP(mean): 104 (17 Jan 2018 10:00) (26 - 114)  RR: 19 (17 Jan 2018 10:00) (12 - 25)  SpO2: 94% (17 Jan 2018 10:00) (94% - 100%)    MEDICATIONS  (STANDING):  ALBUTerol    90 MICROgram(s) HFA Inhaler 1 Puff(s) Inhalation every 4 hours  cefepime  IVPB 2000 milliGRAM(s) IV Intermittent every 8 hours  colchicine 0.6 milliGRAM(s) Oral two times a day  dexamethasone  Injectable 2 milliGRAM(s) IV Push three times a day  folic acid 1 milliGRAM(s) Oral daily  heparin  Injectable 5000 Unit(s) SubCutaneous every 8 hours  metoprolol     tartrate 25 milliGRAM(s) Oral two times a day  mirtazapine 15 milliGRAM(s) Oral at bedtime  pantoprazole    Tablet 40 milliGRAM(s) Oral before breakfast    MEDICATIONS  (PRN):  acetaminophen   Tablet 650 milliGRAM(s) Oral every 6 hours PRN For Temp greater than 38 C (100.4 F)  acetaminophen  Suppository 650 milliGRAM(s) Rectal every 6 hours PRN For Temp greater than 38 C (100.4 F)  ALBUTerol    0.083% 2.5 milliGRAM(s) Nebulizer every 6 hours PRN Shortness of Breath and/or Wheezing  diazepam    Tablet 2 milliGRAM(s) Oral three times a day PRN anxiety  guaiFENesin    Syrup 100 milliGRAM(s) Oral every 6 hours PRN Cough  HYDROmorphone   Tablet 4 milliGRAM(s) Oral two times a day PRN Severe Pain (7 - 10)    PHYSICAL EXAM:  Pt seen and examined, lethargic but easily arousable  Pt awake, alert, follows commands, no confusion today- answered all questions correctly   Incision site from Ommaya removal clean and dry, sutures intact, +fluctuance where ommaya was seated, skin not tense, no redness, no discharge    PEARRL, EOMI, visual fields appear intact  lungs clear to auscultation  S1 and S2, regular rhythm  abdomen soft  moving all extremities anti gravity, Sensation intact  +edema b/l lower extremities, + petechial rash b/l lower extremities chronic     LABS:                         14.4   17.8  )-----------( 382      ( 17 Jan 2018 05:55 )             42.9     01-17    131<L>  |  96  |  17  ----------------------------<  105<H>  3.8   |  28  |  0.87    Ca    10.4<H>      17 Jan 2018 05:55  Phos  2.1     01-17  Mg     2.3     01-16

## 2018-01-17 NOTE — PROGRESS NOTE ADULT - ASSESSMENT
This is a 48 y/o male with h/o Stage 4 Lung CA with mets to brain s/p Ommaya reservoir 11/2017 for leptomeningeal disease s/p chemo and radiation, anxiety, depression, h/o nephrolithiasis, h/o pericarditis was admitted on 1/11 for fever and AMS found to have MSSA Meningitis s/p Ommaya reservoir removal on 1/12/18:    1. Acute Bacterial Meningitis - MSSA s/p ommaya site infection w/ reservoir removal by neurosurgery.   - POD #5  - Acute Toxic Metabolic Encephalopathy - improving w/ IV abx, patient now with generalized debility 2/2 above and will eventually need ALEXANDREA upon dc.   - Febrile syndrome - concerning for possible infected fluid collection noted on repeat CT head?   - await neurosurgery input for repeat Head CT vs LP.   - repeat blood ctx.   - cont IV Cefepime day #6, s/p Vanc. No signs of cellulitis.   - start PCP ppx w/ Bactrim M/W/F  - CT Chest w/ IV contrast reveals pericarditis. Asymptomatic, no CP or SOB. Hx of prior pericardial window. F/u outpatient w/ cardiology. No extrinsic mass to suggest endocarditis.   - trend daily labs.     # Leukocytosis - persistent, see above.     # Metastatic Adenocarcinoma of the Lung w/ extensive brain mets and leptomeningeal carcinomatosis s/p systemic chemotherapy and intrathecal chemotherapy  - further chemo on due to infection. On high dose steroids and starting PCP ppx.  - oncology following .    DVT ppx: heparin  Dispo: remain in SDU. Case discussed w/ staff, ID and patient.   Total time > 60 mins

## 2018-01-17 NOTE — PROVIDER CONTACT NOTE (FALL NOTIFICATION) - SITUATION
pt found at the side of bed with girlfriend holding pt, pt was trying to go bathroom on his own, pt weak in BLE and legs gave out, pt assisted to floor by girlfriend, bed alarm on and chair alarm on.

## 2018-01-17 NOTE — PROGRESS NOTE ADULT - SUBJECTIVE AND OBJECTIVE BOX
Patient is a 47y old  Male who presents with a chief complaint of fever, AMS (11 Jan 2018 16:20)    HPI: This is a 46 y/o male with h/o Stage 4 Lung CA with mets to brain 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 Pt completed 10 course radiation treatment 1 day PTA. 3 days ago he was complaining of severe headache and also had episode of vomiting. He was seen at oncologist office and was given morphine and valium. The day of admission, the pt developed a fever 0f 103.6F and complained of rigors. He was also noted to be confused.  In ED pt found to have temp of 103.6 rectally.     Hospital course: Patient found to have Right lobar PNA & MSSA meningitis s/p LP and underwent Ommya Emery removal by Neurosurgery on 1/12/18. Since then has been managed in ICU and extubated on 1/14. Treated w/ IV Vanc and Cefepime.     Subj: Seen this AM w/ finance and sister at bedside. Slow speech and feels tired. No CP / SOB. + Dry cough, fever noted overnight and repeat blood ctx pending.     ROS: neg unless stated above.     Vital Signs Last 24 Hrs  T(C): 36.3 (17 Jan 2018 09:44), Max: 38.5 (17 Jan 2018 01:00)  T(F): 97.3 (17 Jan 2018 09:44), Max: 101.3 (17 Jan 2018 01:00)  HR: 100 (17 Jan 2018 11:00) (80 - 100)  BP: 136/98 (17 Jan 2018 11:00) (128/95 - 153/92)  BP(mean): 106 (17 Jan 2018 11:00) (26 - 114)  RR: 26 (17 Jan 2018 11:00) (12 - 26)  SpO2: 97% (17 Jan 2018 11:00) (94% - 100%)    Physical Exam:   Constitutional: frail appearing middle aged male, awake and alert to self.   HEENT: NC/AT, EOMI, PERRLA; postop scalp wound clean with minimal swelling  Neck: supple  Back: no tenderness  Respiratory: scattered rhonchi, no active wheezing.   Cardiovascular: S1 S2 regular, no murmurs  Abdomen: soft, not tender, not distended, positive BS  Genitourinary: no LN palpable  Rectal: deferred  Musculoskeletal: no muscle tenderness, no joint swelling or tenderness  Extremities: no pedal edema  Neurological: alert, moving all extremities; b/l calf abrasions, dry  Skin: small bruising on legs around venodynes.     MEDICATIONS  (STANDING):  ALBUTerol    90 MICROgram(s) HFA Inhaler 1 Puff(s) Inhalation every 4 hours  cefepime  IVPB      cefepime  IVPB 2000 milliGRAM(s) IV Intermittent every 8 hours  colchicine 0.6 milliGRAM(s) Oral two times a day  dexamethasone  Injectable 2 milliGRAM(s) IV Push three times a day  folic acid 1 milliGRAM(s) Oral daily  heparin  Injectable 5000 Unit(s) SubCutaneous every 8 hours  metoprolol     tartrate 25 milliGRAM(s) Oral two times a day  mirtazapine 15 milliGRAM(s) Oral at bedtime  pantoprazole    Tablet 40 milliGRAM(s) Oral before breakfast    LABS: All Labs Reviewed:                        14.4   17.8  )-----------( 382      ( 17 Jan 2018 05:55 )             42.9     01-17    131<L>  |  96  |  17  ----------------------------<  105<H>  3.8   |  28  |  0.87    Ca    10.4<H>      17 Jan 2018 05:55  Phos  2.1     01-17  Mg     2.3     01-16   LIVER FUNCTIONS - ( 11 Jan 2018 11:19 )  Alb: 3.3 g/dL / Pro: 8.4 gm/dL / ALK PHOS: 122 U/L / ALT: 83 U/L / AST: 34 U/L / GGT: x           Radiology:  CT Chest No Cont (01.11.18 @ 17:21) >  Several scattered ground glass nodules and opacities within the right   upper lobe, suggestive of pneumonia given the clinical context of fever.   Small to right pleural effusion is present. Loculated left pleural   effusion along the left lateral pleura and within the fissures is again   identified. Small pericardial effusion is present.    < from: CT Head No Cont (01.11.18 @ 13:09) >   unchanged RIGHT frontal Ommaya catheter  in place within   the anterior horn of the RIGHT lateral ventricle. Faint hyperdense   lesions scattered throughout the brain consistent with patient's known   metastatic disease. IV contrast would be needed for complete evaluation.       CT Head No Cont (01.15.18 @ 09:43)   Frontal kenia hole with small amount of encephalomalacia/gliosis within   the right frontal lobe. Fluid collection measuring approximately 4.7 x   1.1 cm and minimal associated hemorrhage within the scalp overlying the   right frontal borehole. Ventricular size is unchanged since prior exam.   Punctate calcification in the right frontal lobe is unchanged. The   underlying neoplastic process is difficult to evaluate CT imaging.       Advanced directives addressed: full resuscitation

## 2018-01-17 NOTE — PROGRESS NOTE ADULT - ASSESSMENT
Pt is a 47 year old man with Stage 4 Lung CA with mets to brain s/p Ommaya reservoir 11/2017 for leptomeningeal disease s/p chemo and radiation, he presented on 1/11 with lethargy, confusion, and a fever of 103.6, he was found to have acute bacterial meningitis with MSSA and an Ommaya site infection as well as RUL pneumonia.    Pt is now POD #5 s/p removal of Ommaya Renningers   CT shows stable ventricles- no neurosurgical intervention at this time   Pts mental status is improved    PLAN-  Continue ABX as per ID  Monitor WBC count   SQ heparin for DVT PPX  Chemo therapy treatement on hold due to resolving sepsis

## 2018-01-18 ENCOUNTER — RESULT REVIEW (OUTPATIENT)
Age: 48
End: 2018-01-18

## 2018-01-18 LAB
AMYLASE FLD-CCNC: 23 U/L — SIGNIFICANT CHANGE UP
ANION GAP SERPL CALC-SCNC: 8 MMOL/L — SIGNIFICANT CHANGE UP (ref 5–17)
B PERT IGG+IGM PNL SER: (no result)
BUN SERPL-MCNC: 16 MG/DL — SIGNIFICANT CHANGE UP (ref 7–23)
CALCIUM SERPL-MCNC: 10.2 MG/DL — HIGH (ref 8.5–10.1)
CHLORIDE SERPL-SCNC: 96 MMOL/L — SIGNIFICANT CHANGE UP (ref 96–108)
CO2 SERPL-SCNC: 27 MMOL/L — SIGNIFICANT CHANGE UP (ref 22–31)
COLOR FLD: YELLOW — SIGNIFICANT CHANGE UP
CREAT SERPL-MCNC: 0.93 MG/DL — SIGNIFICANT CHANGE UP (ref 0.5–1.3)
CULTURE RESULTS: SIGNIFICANT CHANGE UP
CULTURE RESULTS: SIGNIFICANT CHANGE UP
FLUID INTAKE SUBSTANCE CLASS: SIGNIFICANT CHANGE UP
FLUID SEGMENTED GRANULOCYTES: 2 % — SIGNIFICANT CHANGE UP
GLUCOSE FLD-MCNC: 134 MG/DL — SIGNIFICANT CHANGE UP
GLUCOSE SERPL-MCNC: 121 MG/DL — HIGH (ref 70–99)
GRAM STN FLD: SIGNIFICANT CHANGE UP
HCT VFR BLD CALC: 43.2 % — SIGNIFICANT CHANGE UP (ref 39–50)
HGB BLD-MCNC: 14.4 G/DL — SIGNIFICANT CHANGE UP (ref 13–17)
INR BLD: 1.12 RATIO — SIGNIFICANT CHANGE UP (ref 0.88–1.16)
LDH SERPL L TO P-CCNC: 757 U/L — SIGNIFICANT CHANGE UP
LYMPHOCYTES # FLD: 38 % — SIGNIFICANT CHANGE UP
MCHC RBC-ENTMCNC: 30.6 PG — SIGNIFICANT CHANGE UP (ref 27–34)
MCHC RBC-ENTMCNC: 33.3 GM/DL — SIGNIFICANT CHANGE UP (ref 32–36)
MCV RBC AUTO: 92.1 FL — SIGNIFICANT CHANGE UP (ref 80–100)
MESOTHL CELL # FLD: 16 % — SIGNIFICANT CHANGE UP
MONOS+MACROS # FLD: 32 % — SIGNIFICANT CHANGE UP
ORGANISM # SPEC MICROSCOPIC CNT: SIGNIFICANT CHANGE UP
OTHER CELLS FLD MANUAL: 12 % — SIGNIFICANT CHANGE UP
PH FLD: 7.47 — SIGNIFICANT CHANGE UP
PLATELET # BLD AUTO: 367 K/UL — SIGNIFICANT CHANGE UP (ref 150–400)
POTASSIUM SERPL-MCNC: 4.2 MMOL/L — SIGNIFICANT CHANGE UP (ref 3.5–5.3)
POTASSIUM SERPL-SCNC: 4.2 MMOL/L — SIGNIFICANT CHANGE UP (ref 3.5–5.3)
PROT FLD-MCNC: 3.3 G/DL — SIGNIFICANT CHANGE UP
PROTHROM AB SERPL-ACNC: 12.1 SEC — SIGNIFICANT CHANGE UP (ref 9.8–12.7)
RBC # BLD: 4.69 M/UL — SIGNIFICANT CHANGE UP (ref 4.2–5.8)
RBC # FLD: 18.2 % — HIGH (ref 10.3–14.5)
RCV VOL RI: 2000 /UL — HIGH (ref 0–5)
SODIUM SERPL-SCNC: 131 MMOL/L — LOW (ref 135–145)
SPECIMEN SOURCE FLD: SIGNIFICANT CHANGE UP
SPECIMEN SOURCE FLD: SIGNIFICANT CHANGE UP
SPECIMEN SOURCE: SIGNIFICANT CHANGE UP
TOTAL NUCLEATED CELL COUNT, BODY FLUID: 1991 /UL — HIGH (ref 0–5)
TUBE TYPE: SIGNIFICANT CHANGE UP
WBC # BLD: 20.5 K/UL — HIGH (ref 3.8–10.5)
WBC # FLD AUTO: 20.5 K/UL — HIGH (ref 3.8–10.5)

## 2018-01-18 PROCEDURE — 88108 CYTOPATH CONCENTRATE TECH: CPT | Mod: 26

## 2018-01-18 PROCEDURE — 99233 SBSQ HOSP IP/OBS HIGH 50: CPT

## 2018-01-18 PROCEDURE — 71045 X-RAY EXAM CHEST 1 VIEW: CPT | Mod: 26

## 2018-01-18 PROCEDURE — 88305 TISSUE EXAM BY PATHOLOGIST: CPT | Mod: 26

## 2018-01-18 PROCEDURE — 70553 MRI BRAIN STEM W/O & W/DYE: CPT | Mod: 26

## 2018-01-18 PROCEDURE — 32555 ASPIRATE PLEURA W/ IMAGING: CPT | Mod: RT

## 2018-01-18 RX ORDER — CEFAZOLIN SODIUM 1 G
1000 VIAL (EA) INJECTION ONCE
Qty: 0 | Refills: 0 | Status: COMPLETED | OUTPATIENT
Start: 2018-01-18 | End: 2018-01-18

## 2018-01-18 RX ADMIN — CEFEPIME 100 MILLIGRAM(S): 1 INJECTION, POWDER, FOR SOLUTION INTRAMUSCULAR; INTRAVENOUS at 06:11

## 2018-01-18 RX ADMIN — Medication 0.6 MILLIGRAM(S): at 06:10

## 2018-01-18 RX ADMIN — Medication 1 TABLET(S): at 12:53

## 2018-01-18 RX ADMIN — Medication 0.6 MILLIGRAM(S): at 17:13

## 2018-01-18 RX ADMIN — HEPARIN SODIUM 5000 UNIT(S): 5000 INJECTION INTRAVENOUS; SUBCUTANEOUS at 06:11

## 2018-01-18 RX ADMIN — Medication 650 MILLIGRAM(S): at 06:10

## 2018-01-18 RX ADMIN — HEPARIN SODIUM 5000 UNIT(S): 5000 INJECTION INTRAVENOUS; SUBCUTANEOUS at 21:48

## 2018-01-18 RX ADMIN — CEFEPIME 100 MILLIGRAM(S): 1 INJECTION, POWDER, FOR SOLUTION INTRAMUSCULAR; INTRAVENOUS at 14:29

## 2018-01-18 RX ADMIN — Medication 650 MILLIGRAM(S): at 21:37

## 2018-01-18 RX ADMIN — MIRTAZAPINE 15 MILLIGRAM(S): 45 TABLET, ORALLY DISINTEGRATING ORAL at 21:37

## 2018-01-18 RX ADMIN — Medication 2 MILLIGRAM(S): at 21:38

## 2018-01-18 RX ADMIN — Medication 2 MILLIGRAM(S): at 14:30

## 2018-01-18 RX ADMIN — Medication 1 MILLIGRAM(S): at 12:53

## 2018-01-18 RX ADMIN — Medication 650 MILLIGRAM(S): at 12:52

## 2018-01-18 RX ADMIN — Medication 25 MILLIGRAM(S): at 17:15

## 2018-01-18 RX ADMIN — Medication 100 MILLIGRAM(S): at 16:01

## 2018-01-18 RX ADMIN — Medication 25 MILLIGRAM(S): at 06:11

## 2018-01-18 RX ADMIN — PANTOPRAZOLE SODIUM 40 MILLIGRAM(S): 20 TABLET, DELAYED RELEASE ORAL at 06:11

## 2018-01-18 RX ADMIN — CEFEPIME 100 MILLIGRAM(S): 1 INJECTION, POWDER, FOR SOLUTION INTRAMUSCULAR; INTRAVENOUS at 21:39

## 2018-01-18 RX ADMIN — Medication 2 MILLIGRAM(S): at 06:11

## 2018-01-18 NOTE — PROGRESS NOTE ADULT - ASSESSMENT
46 y/o male with h/o Stage 4 Lung CA with mets to brain 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 Pt was lethargic and unable to give history. As per sister, pt completed 10 course radiation treatment 1 day PTA. 3 days ago he was complaining of severe headache and also had episode of vomiting. He was seen at oncologist office and was given morphine and valium. The day of admission, the pt developed a fever 0f 103.6F and complained of rigors. He was also noted to be confused.  In ED pt found to have temp of 103.6 rectally. He received vanco IV and cefepime.     1. Febrile syndrome persistent. Acute bacterial meningitis with MSSA. Right frontal lobe fluid collection ?postoperative ?abscess. Ommaya site infecton s/p port removal. RUL pneumonia with loculated left pleural effusion ?empyema. Lung CA stage 4 with brain metastasis. Immunocompromised host.   -leukocytosis persistent  -encephalopathy is improved  -doubt legs cellulitis  -on cefepime 2 gm IV q12h # 7  -given vancomycin IV yesterday  -tolerating abx well so far; no side effects noted  -f/u repeat BC x 2  -case reviewed with Dr. Cee and oncologist: plan for repeat LP and thoracentesis   -continue abx coverage with cefepime  -hemodinamically stable   -respiratory care  -monitor temps  -f/u CBC  -supportive care  2. Other issues:   -care per medicine

## 2018-01-18 NOTE — PROGRESS NOTE ADULT - ASSESSMENT
Stage 4 lung cancer with brain mets.   Acute bacterial meningitis. Abx's as per ID. Neurosurg following.  Fever.  For repeat LP.    RV outflow tract obstruction.  Hemodynamically stable.  As per cardiology.    R sided mild multifocal PNA.  Improved on repeat CT chest.  s/p thoracentesis R.  pH 7.47.  results pending.    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     S/P pericardial window 11/15, 12/15.

## 2018-01-18 NOTE — PROGRESS NOTE ADULT - SUBJECTIVE AND OBJECTIVE BOX
HPI:  46 yo male with PMH Stage 4 Lung CA with mets to brain s/p Ommaya reservoir 2017 for leptomeningeal disease s/p chemo and radiation, anxiety, depression, h/o nephrolithiasis, h/o pericarditis presents to ED with complaint of fever and AMS. Pt is a lethargic and unable to obtain history from him. Does wake up to verbal command but falls back alseep. History obtained from pt's sister at bedside. As per sister pt completed 10 course radiation treatment 1 day ago. 3 days ago he was complaining of severe headache and also had episode of vomiting. He was seen at oncologist office and was given morphine and valium. This morning pt developed a fever 0f 103.6 and complained of rigors. He was also noted to be confused. He did have a cough which has been chronic.    In ED pt found to have temp of 103.6 rectally. Lactate of 2.8. CXR with left multiloculated effusion. Ct head with no acute pathology. Was given 3L NS and started on vanco and cefepime. (2018 16:20)    has chronic cough, no purulent sputum.  CT chest shows patchy/nodular mild findings on R.  c/w previous PET/CT of 17, CT chest shows no change in size of R effusion and loculated L effusion.  Has h.o. pericarditis and pericardial window 2015 and 2015.  h.o. 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     : events noted, removal of infected reservoir. pt sedated, comfortable on ventilator.  CXR today again shows loculated L effusion, no change from , given technique.  Has staph meningitis, on IV Abx's. WBC down to 19,600.  : extubated, awake, knows name and is in hospital.  WBC down to 15,700.   1/15: no distress, awake.   : Tm 101.3 rectal, Vancomycin added, chr cough, no CP. no distress.  : no distress. not SOB, T 102.4 rectally this AM. for thoracentesis and LP today.     PAST MEDICAL & SURGICAL HISTORY:  Hearing loss: right ear-no device  Cancer with leptomeningeal spread  Dyspnea on exertion  Gait disturbance  Memory loss  Anxiety and depression  Erectile dysfunction, unspecified erectile dysfunction type  History of kidney stones  History of pericarditis  Pleural effusion  Syncope, unspecified syncope type: X3-4 episodes; s/p LINQ device placed  Lung cancer, upper lobe: right. metastatic non small cell Lung Carcinoma.  History of lumbar puncture: chemo placed  History of cardiac monitoring: s/p LINQ device placement 2017  History of lumbar puncture within last 21 days  History of vascular access device: PORT placed  Pleuritic chest pain: Pleurodesis left chest 10/20/2017  H/O bilateral inguinal hernia repair: as a child  Acute pericarditis, unspecified type: pericardial window 2015, 2015      MEDICATIONS  (STANDING):  cefepime  IVPB 2000 milliGRAM(s) IV Intermittent every 12 hours  colchicine 0.6 milliGRAM(s) Oral two times a day  dexamethasone     Tablet 2 milliGRAM(s) Oral three times a day  folic acid 1 milliGRAM(s) Oral daily  heparin  Injectable 5000 Unit(s) SubCutaneous every 8 hours  mirtazapine 15 milliGRAM(s) Oral at bedtime  pantoprazole    Tablet 40 milliGRAM(s) Oral before breakfast  sodium chloride 0.9%. 1000 milliLiter(s) (125 mL/Hr) IV Continuous <Continuous>  vancomycin  IVPB 1000 milliGRAM(s) IV Intermittent every 12 hours    MEDICATIONS  (PRN):  acetaminophen   Tablet 650 milliGRAM(s) Oral every 6 hours PRN For Temp greater than 38 C (100.4 F)  diazepam    Tablet 2 milliGRAM(s) Oral three times a day PRN anxiety  HYDROmorphone   Tablet 4 milliGRAM(s) Oral two times a day PRN Severe Pain (7 - 10)      Allergies    adhesives (Rash)  No Known Drug Allergies    Intolerances        SOCIAL HISTORY: Denies tobacco, etoh abuse or illicit drug use    FAMILY HISTORY:  Family history of leukemia (Father)  Family history of cancer (Mother)      Vital Signs Last 24 Hrs  T(C): 36.8 (2018 04:28), Max: 39.8 (2018 11:13)  T(F): 98.2 (2018 04:28), Max: 103.6 (2018 11:13)  HR: 82 (2018 04:28) (82 - 128)  BP: 166/92 (2018 04:28) (134/92 - 172/91)  BP(mean): --  RR: 18 (2018 04:28) (13 - 22)  SpO2: 95% (2018 04:28) (95% - 100%)    REVIEW OF SYSTEMS:    CONSTITUTIONAL:  As per HPI.  HEENT:  Eyes:  No diplopia or blurred vision. ENT:  No earache, sore throat or runny nose.  CARDIOVASCULAR:  No pressure, squeezing, tightness, heaviness or aching about the chest, neck, axilla or epigastrium.  RESPIRATORY:  see above.  GASTROINTESTINAL:  No nausea, vomiting or diarrhea.  GENITOURINARY:  No dysuria, frequency or urgency.  MUSCULOSKELETAL:  As per HPI.  SKIN:  No change in skin, hair or nails.  NEUROLOGIC:  No paresthesias, fasciculations, seizures or weakness.  PSYCHIATRIC:  No disorder of thought or mood.  ENDOCRINE:  No heat or cold intolerance, polyuria or polydipsia.  HEMATOLOGICAL:  No easy bruising or bleedings:  .     PHYSICAL EXAMINATION:    GENERAL APPEARANCE:  Pt. is not currently dyspneic, in no distress. Pt. is alert, oriented, and pleasant.  HEENT:  Pupils are normal and react normally. No icterus. Mucous membranes well colored.  NECK:  Supple. No lymphadenopathy. Jugular venous pressure not elevated. Carotids equal.   HEART:   The cardiac impulse has a normal quality. Regular. Normal S1 and S2.   CHEST:  Clear to A.   ABDOMEN:  Soft and nontender.   EXTREMITIES:  There is no cyanosis, clubbing or edema.   SKIN:  No rash or significant lesions are noted.  Neuro: Alert, awake, and O x 3.      LABS:                        15.0   26.8  )-----------( 382      ( 2018 07:30 )             45.7     01-12    131<L>  |  95<L>  |  16  ----------------------------<  139<H>  3.8   |  27  |  1.03    Ca    10.2<H>      2018 07:30    TPro  8.4<H>  /  Alb  3.3  /  TBili  0.8  /  DBili  x   /  AST  34  /  ALT  83<H>  /  AlkPhos  122<H>  01-11    LIVER FUNCTIONS - ( 2018 11:19 )  Alb: 3.3 g/dL / Pro: 8.4 gm/dL / ALK PHOS: 122 U/L / ALT: 83 U/L / AST: 34 U/L / GGT: x           PTT - ( 2018 11:19 )  PTT:27.3 sec  CARDIAC MARKERS ( 2018 11:19 )  0.019 ng/mL / x     / x     / x     / x          Urinalysis Basic - ( 2018 12:33 )    Color: Yellow / Appearance: Clear / S.015 / pH: x  Gluc: x / Ketone: Negative  / Bili: Negative / Urobili: Negative mg/dL   Blood: x / Protein: 30 mg/dL / Nitrite: Negative   Leuk Esterase: Negative / RBC: 0-2 /HPF / WBC 0-2   Sq Epi: x / Non Sq Epi: Occasional / Bacteria: Occasional          RADIOLOGY & ADDITIONAL STUDIES:       EXAM:  XR CHEST AP OR PA 1V                            PROCEDURE DATE:  2018          INTERPRETATION:  Portable chest radiograph dated 2018.    COMPARISON: 2017.    CLINICAL INFORMATION: Fever.    FINDINGS:    The airway is midline.   Continued application of the right IJ line, distal tip is in the distal   superior vena cava.  The multi loculated left pleural effusion is  slightly smaller and the   left lower lobe is slightly better expanded at this time. The right lung   is clear.  The cardiac silhouette is normal size.   The bones are normal.     IMPRESSION:  A multiloculated left pleural effusion is slightly smaller and the left   lower lobe is slightly better expanded at this time.        PROCEDURE DATE:  2018          INTERPRETATION:  Exam Date: 2018 5:21 PM    CT chest without contrast    CLINICAL INFORMATION:  Fever    TECHNIQUE:  Contiguous axial 3 mm sections were obtained through the   chest using single helical acquisition.  In addition, 2D sagittal and   coronal reformatted images obtained.   This scan was performed using   automatic exposure control (radiation dose reduction software) to obtain   a diagnostic image quality scan with patient dose as low as reasonably   achievable.        FINDINGS: Comparison is made to report of PET CT of 2017.    The thyroid gland appears intact.    There are several scattered groundglass nodules and opacities withinthe   right upper lobe, suggestive of pneumonia given the clinical context of   fever. Small to right pleural effusion is present. Loculated left pleural   effusion along the left lateral pleura and within the fissures is again   identified. The trachea and major bronchi are patent.    No enlarged axillary lymph nodes are found.   No enlarged hilar lymph   nodes are recognized, allowing for the noncontrast technique.  Within the   mediastinum no pathologic lymph nodes are found.  The heart size is   normal.  There is a small pericardial effusion. Right ventral chest wall   port catheter is in place with tip in the SVC.    Limited evaluation of the upper abdomen is unremarkable.      IMPRESSION:     Several scattered groundglass nodules and opacities within the right   upper lobe, suggestive of pneumonia given the clinical context of fever.   Small to right pleural effusion is present. Loculated left pleural   effusion along the left lateral pleura and within the fissures is again   identified. Small pericardial effusion is present.    EXAM:  CT CHEST IC                            PROCEDURE DATE:  2018          INTERPRETATION:  Clinical information: Severe pulmonic stenosis on   echocardiogram. Evaluate for extrinsic compression or intrathoracic mass    COMPARISON:     PROCEDURE:   CT of the Chest was performed with intravenous contrast.  90 ml of Omnipaque 350 was injected intravenously. 10 ml were discarded.  Sagittal and coronal reformats were performed.    FINDINGS:    LOWER NECK: Within normal limits.  AXILLA, MEDIASTINUM AND PILO: No lymphadenopathy.Nonspecific mediastinal   calcification noted anteriorly.  VESSELS: Marked narrowing of the right ventricular outflow tract, just   below the pulmonic valves, measuring 1 cm. No extrinsic mass is   identified.  HEART: Heart size is normal. Small pericardial effusion and mild   pericardial thickening.   PLEURA: Loculated left pleural effusion with components in the major   fissure. Moderate right pleural effusion.  LUNGS AND LARGE AIRWAYS: Patent central airways. No pulmonary nodules.  VISUALIZED UPPER ABDOMEN: Fatty liver.  BONES: No acute abnormality.  CHEST WALL:  Implantable loop recorder in the left chest wall.    IMPRESSION: Marked right ventricular outflow tract narrowing just   proximal to the pulmonic valve. No extrinsic mass is visualized.  Pericardial thickening suggestive of pericarditis. The possibility that   this could be the cause of the right ventricular outflow tract narrowing   is entertained.       EXAM:  XR CHEST AP OR PA 1V                            PROCEDURE DATE:  2018          INTERPRETATION:    INDICATION: Postthoracentesis.    Single frontal view of chest dated 2018 12:46 PM, compared to prior   study of 2018.    FINDINGS /   IMPRESSION:     There is moderate left pleural effusion that appears to be partly   loculated.  Post thoracentesis, no pneumothorax. The right lung is clear.   There is borderline cardiomegaly. A loop recorder device unchanged in its   position. Port-A-Cath tip in SVC.

## 2018-01-18 NOTE — PROGRESS NOTE ADULT - SUBJECTIVE AND OBJECTIVE BOX
Patient is a 47y old  Male who presents with a chief complaint of "I have fluid in my lung and shortness of breath." (12 Jan 2018 19:53)    1/18- denies CP or SOB , but still with temp spike this AM     MEDICATIONS  (STANDING):  ALBUTerol    90 MICROgram(s) HFA Inhaler 1 Puff(s) Inhalation every 4 hours  cefepime  IVPB      cefepime  IVPB 2000 milliGRAM(s) IV Intermittent every 8 hours  colchicine 0.6 milliGRAM(s) Oral two times a day  dexamethasone  Injectable 2 milliGRAM(s) IV Push three times a day  folic acid 1 milliGRAM(s) Oral daily  heparin  Injectable 5000 Unit(s) SubCutaneous every 8 hours  metoprolol     tartrate 25 milliGRAM(s) Oral two times a day  mirtazapine 15 milliGRAM(s) Oral at bedtime  pantoprazole    Tablet 40 milliGRAM(s) Oral before breakfast  trimethoprim  160 mG/sulfamethoxazole 800 mG 1 Tablet(s) Oral daily    MEDICATIONS  (PRN):  acetaminophen   Tablet 650 milliGRAM(s) Oral every 6 hours PRN For Temp greater than 38 C (100.4 F)  acetaminophen  Suppository 650 milliGRAM(s) Rectal every 6 hours PRN For Temp greater than 38 C (100.4 F)  ALBUTerol    0.083% 2.5 milliGRAM(s) Nebulizer every 6 hours PRN Shortness of Breath and/or Wheezing  diazepam    Tablet 2 milliGRAM(s) Oral three times a day PRN anxiety  guaiFENesin    Syrup 100 milliGRAM(s) Oral every 6 hours PRN Cough  HYDROmorphone   Tablet 4 milliGRAM(s) Oral two times a day PRN Severe Pain (7 - 10)  LORazepam   Injectable 1 milliGRAM(s) IV Push every 30 minutes PRN Agitation            Vital Signs Last 24 Hrs  T(C): 36.3 (18 Jan 2018 09:44), Max: 39.2 (17 Jan 2018 16:53)  T(F): 97.3 (18 Jan 2018 09:44), Max: 102.5 (17 Jan 2018 16:53)  HR: 113 (18 Jan 2018 06:00) (85 - 113)  BP: 135/89 (18 Jan 2018 06:00) (109/78 - 154/95)  BP(mean): 98 (18 Jan 2018 06:00) (85 - 114)  RR: 26 (18 Jan 2018 06:00) (15 - 36)  SpO2: 98% (18 Jan 2018 06:00) (95% - 100%)            INTERPRETATION OF TELEMETRY:    ECG:        LABS:                        14.4   20.5  )-----------( 367      ( 18 Jan 2018 04:43 )             43.2     01-18    131<L>  |  96  |  16  ----------------------------<  121<H>  4.2   |  27  |  0.93    Ca    10.2<H>      18 Jan 2018 04:43  Phos  2.1     01-17              I&O's Summary    17 Jan 2018 07:01  -  18 Jan 2018 07:00  --------------------------------------------------------  IN: 0 mL / OUT: 4 mL / NET: -4 mL      BNP  RADIOLOGY & ADDITIONAL STUDIES:

## 2018-01-18 NOTE — PROGRESS NOTE ADULT - SUBJECTIVE AND OBJECTIVE BOX
Patient is a 47y old  Male who presents with a chief complaint of fever, AMS (2018 16:20)    HPI:  48 y/o male with h/o Stage 4 Lung CA with mets to brain s/p Ommaya reservoir 2017 for leptomeningeal disease s/p chemo and radiation, anxiety, depression, h/o nephrolithiasis, h/o pericarditis was admitted on  for fever and AMS. Pt was lethargic and unable to give history. As per sister, pt completed 10 course radiation treatment 1 day PTA. 3 days ago he was complaining of severe headache and also had episode of vomiting. He was seen at oncologist office and was given morphine and valium. The day of admission, the pt developed a fever 0f 103.6F and complained of rigors. He was also noted to be confused.  In ED pt found to have temp of 103.6 rectally. He received vanco IV and cefepime.     Alert and verbal; confused at times  Has intermittent fever  Comfortable  Denies HA    MEDICATIONS  (STANDING):  ALBUTerol    90 MICROgram(s) HFA Inhaler 1 Puff(s) Inhalation every 4 hours  cefepime  IVPB      cefepime  IVPB 2000 milliGRAM(s) IV Intermittent every 8 hours  colchicine 0.6 milliGRAM(s) Oral two times a day  dexamethasone  Injectable 2 milliGRAM(s) IV Push three times a day  folic acid 1 milliGRAM(s) Oral daily  heparin  Injectable 5000 Unit(s) SubCutaneous every 8 hours  metoprolol     tartrate 25 milliGRAM(s) Oral two times a day  mirtazapine 15 milliGRAM(s) Oral at bedtime  pantoprazole    Tablet 40 milliGRAM(s) Oral before breakfast  trimethoprim  160 mG/sulfamethoxazole 800 mG 1 Tablet(s) Oral daily    MEDICATIONS  (PRN):  acetaminophen   Tablet 650 milliGRAM(s) Oral every 6 hours PRN For Temp greater than 38 C (100.4 F)  acetaminophen  Suppository 650 milliGRAM(s) Rectal every 6 hours PRN For Temp greater than 38 C (100.4 F)  ALBUTerol    0.083% 2.5 milliGRAM(s) Nebulizer every 6 hours PRN Shortness of Breath and/or Wheezing  diazepam    Tablet 2 milliGRAM(s) Oral three times a day PRN anxiety  guaiFENesin    Syrup 100 milliGRAM(s) Oral every 6 hours PRN Cough  HYDROmorphone   Tablet 4 milliGRAM(s) Oral two times a day PRN Severe Pain (7 - 10)  LORazepam   Injectable 1 milliGRAM(s) IV Push every 30 minutes PRN Agitation      Vital Signs Last 24 Hrs  T(C): 36.3 (2018 09:44), Max: 39.2 (2018 16:53)  T(F): 97.3 (2018 09:44), Max: 102.5 (2018 16:53)  HR: 113 (2018 06:00) (85 - 113)  BP: 135/89 (2018 06:00) (109/78 - 154/95)  BP(mean): 98 (2018 06:00) (85 - 114)  RR: 26 (2018 06:00) (15 - 36)  SpO2: 98% (2018 06:00) (95% - 100%)    Physical Exam:      Constitutional: frail looking  HEENT: NC/AT, EOMI, PERRLA; postop scalp wound clean  Neck: supple  Back: no tenderness  Respiratory: crackles at bases; decreased BS  Cardiovascular: S1S2 regular, no murmurs  Abdomen: soft, not tender, not distended, positive BS  Genitourinary: no LN palpable  Rectal: deferred  Musculoskeletal: no muscle tenderness, no joint swelling or tenderness  Extremities: no pedal edema  Neurological: alert, moving all extremities; b/l calf abrasions, dry  Skin: no rashes    Labs:                        14.4   20.5  )-----------( 367      ( 2018 04:43 )             43.2         131<L>  |  96  |  16  ----------------------------<  121<H>  4.2   |  27  |  0.93    Ca    10.2<H>      2018 04:43  Phos  2.1                             14.4   17.8  )-----------( 382      ( 2018 05:55 )             42.9     -    131<L>  |  96  |  17  ----------------------------<  105<H>  3.8   |  28  |  0.87    Ca    10.4<H>      2018 05:55  Phos  2.1     01-17  Mg     2.3     01-16                        12.9   16.8  )-----------( 315      ( 15 Demetrius 2018 05:29 )             38.9     01-15    137  |  104  |  21  ----------------------------<  98  4.1   |  26  |  0.74    Ca    9.6      15 Demetrius 2018 05:29  Phos  2.8     01-15  Mg     2.4     -15                        15.3   19.6  )-----------( 291      ( 2018 05:26 )             53.0     01-13    132<L>  |  100  |  17  ----------------------------<  95  4.2   |  24  |  1.09    Ca    10.5<H>      2018 05:26  Phos  3.0     -13  Mg     2.5     13         Vancomycin Level, Trough: 11.4 ug/mL ( @ 05:26)                          15.0   26.8  )-----------( 382      ( 2018 07:30 )             45.7     01-12    131<L>  |  95<L>  |  16  ----------------------------<  139<H>  3.8   |  27  |  1.03    Ca    10.2<H>      2018 07:30    TPro  8.4<H>  /  Alb  3.3  /  TBili  0.8  /  DBili  x   /  AST  34  /  ALT  83<H>  /  AlkPhos  122<H>  11     LIVER FUNCTIONS - ( 2018 11:19 )  Alb: 3.3 g/dL / Pro: 8.4 gm/dL / ALK PHOS: 122 U/L / ALT: 83 U/L / AST: 34 U/L / GGT: x           Urinalysis Basic - ( 2018 12:33 )    Color: Yellow / Appearance: Clear / S.015 / pH: x  Gluc: x / Ketone: Negative  / Bili: Negative / Urobili: Negative mg/dL   Blood: x / Protein: 30 mg/dL / Nitrite: Negative   Leuk Esterase: Negative / RBC: 0-2 /HPF / WBC 0-2   Sq Epi: x / Non Sq Epi: Occasional / Bacteria: Occasional    Cerebrospinal Fluid Cell Count-1 (18 @ 21:14)    CSF Segmented Neutrophils: 96 %    Total Nucleated Cell Count, CSF: 744 /uL    CSF Appearance: Slightly Cloudy    CSF Lymphocytes: 3 %    CSF Monocytes/Macrophages: 1 %    CSF Color: See Note: slightly xanthrochromic      Culture - Body Fluid with Gram Stain (collected 2018 14:36)  Source: .Body Fluid scalp incision  Gram Stain (2018 06:33):    No polymorphonuclear cells seen    No organisms seen    by cytocentrifuge    Culture - Acid Fast - CSF (collected 2018 21:14)  Source: .CSF spinal fluid    Culture - Fungal, CSF (collected 2018 21:14)  Source: .CSF spinal fluid  Preliminary Report (2018 12:37):    Testing in progress    Culture - CSF with Gram Stain (collected 2018 21:14)  Source: .CSF  Gram Stain (2018 01:16):    polymorphonuclear leukocytes seen per low power field    Gram Variable Cocci seen per oil power field    by cytocentrifuge  Final Report (2018 07:36):    Numerous Staphylococcus aureus  Organism: Staphylococcus aureus (2018 07:36)  Organism: Staphylococcus aureus (2018 07:36)      -  Oxacillin: S 0.5      -  Penicillin: R >8      -  RIF- Rifampin: R >2      -  Trimethoprim/Sulfamethoxazole: S <=0.5/9.5      -  Vancomycin: S 2      Method Type: TAMMIE    Culture - Urine (collected 2018 12:33)  Source: .Urine None  Final Report (2018 18:38):    10,000 - 49,000 CFU/mL Coag Negative Staphylococcus    Culture - Blood (collected 2018 11:19)  Source: .Blood Blood-Peripheral  Preliminary Report (2018 15:01):    No growth to date.    Culture - Blood (collected 2018 11:19)  Source: .Blood Blood-Peripheral  Preliminary Report (2018 15:01):    No growth to date.          Radiology:    < from: CT Chest No Cont (18 @ 17:21) >  Several scattered groundglass nodules and opacities within the right   upper lobe, suggestive of pneumonia given the clinical context of fever.   Small to right pleural effusion is present. Loculated left pleural   effusion along the left lateral pleura and within the fissures is again   identified. Small pericardial effusion is present.    < end of copied text >    < from: CT Head No Cont (18 @ 13:09) >   unchanged RIGHT frontal Ommaya catheter  in place within   the anterior horn of the RIGHT lateral ventricle. Faint hyperdense   lesions scattered throughout the brain consistent with patient's known   metastatic disease. IV contrast would be needed for complete evaluation.     < end of copied text >    < from: CT Head No Cont (01.15.18 @ 09:43) >  Frontal kenia hole with small amount of encephalomalacia/gliosis within   the right frontal lobe. Fluid collection measuring approximately 4.7 x   1.1 cm and minimal associated hemorrhage within the scalp overlying the   right frontal borehole. Ventricular size is unchanged since prior exam.   Punctate calcification in the right frontal lobe is unchanged. The   underlying neoplastic process is difficult to evaluate CT imaging.     < end of copied text >      Advanced directives addressed: full resuscitation

## 2018-01-18 NOTE — PROGRESS NOTE ADULT - ASSESSMENT
#A/P    #Meningoencephalitis   -s/p ommaya site infection w/ reservoir removal by neurosurgery.     #still having fever and elevated wbc- MRI of his brain and LP study along with thoracocentesis to look for further source of infection   -ct abx     #Toxic encephalopathy- due to above-resolving     #Lung cancer with mets to meninges- out pt management once improves, overall poor prognosis     #Right ventricular obstruction- cardiology follow up appreciated, for now conservative management      #supportive care     #dvt pr     #above discussed with pt and all questions have been answered

## 2018-01-18 NOTE — PROGRESS NOTE ADULT - ASSESSMENT
48 yo male with PMH Stage 4 Lung CA with mets to brain s/p Ommaya reservoir 11/2017 for leptomeningeal disease s/p chemo and radiation, anxiety, depression, h/o nephrolithiasis, h/o pericarditis s/p window presents to ER with acute meinigitis , he is s/p removal of Ommaya reservoir but still with perisitent temps , incidental finding on in hospital echo of subvalvular pulmonic stenossi of unclear etiology , He is asymptomatic from it at this time . HD stable and not hypoxic.   1) would treat conservatively for now . Spoke to family  about various options including right heart cath to r/o constrictive pericarditis , CONCEPCIÓN to look at the RVOT in more detail and since both procedures would not  the family wishes not to pursue these tests. The least invasive way to assess etiology of RVOT obstruction would be a cardiac MRI as outpt if he  improves clinically .  2) will speak to Dr Thompson about removing his Linq at this time since there have been no arrhytmias and it could be another potential source of infection.   3) for possible repeat LP to investigate fevers

## 2018-01-18 NOTE — PROGRESS NOTE ADULT - SUBJECTIVE AND OBJECTIVE BOX
Patient is a 47y old  Male who presents with a chief complaint of fever / altered mental status       HPI:  48 yo male known to our service with Stage 4 Lung CA with mets to brain s/p Ommaya reservoir 11/2017 for leptomeningeal disease s/p chemo and radiation. Pt presented on 1/11 with lethargy, confusion, and a fever of 103.6. [place] was tapped and he was found to have acute bacterial meningitis with MSSA and an Ommaya site infection as well as RUL pneumonia. Pt was started on ABX as per ID and the Ommaya was removed the next morning on 1/12. Pt remained intubated for 2 days postop. He had been weak in his L UE, but it is improving. Still with mild confusion/disorientation. Serial CT scans of the head show stable ventricles       1/18 - Pt seen and examined, in NAD. Looks overall improved from presentation, still confused, starts sentences then trails off. Admits to some visual changes, denies HA      acetaminophen   Tablet 650 milliGRAM(s) Oral every 6 hours PRN  acetaminophen  Suppository 650 milliGRAM(s) Rectal every 6 hours PRN  ALBUTerol    0.083% 2.5 milliGRAM(s) Nebulizer every 6 hours PRN  ALBUTerol    90 MICROgram(s) HFA Inhaler 1 Puff(s) Inhalation every 4 hours  cefepime  IVPB      cefepime  IVPB 2000 milliGRAM(s) IV Intermittent every 8 hours  colchicine 0.6 milliGRAM(s) Oral two times a day  dexamethasone  Injectable 2 milliGRAM(s) IV Push three times a day  diazepam    Tablet 2 milliGRAM(s) Oral three times a day PRN  folic acid 1 milliGRAM(s) Oral daily  guaiFENesin    Syrup 100 milliGRAM(s) Oral every 6 hours PRN  heparin  Injectable 5000 Unit(s) SubCutaneous every 8 hours  HYDROmorphone   Tablet 4 milliGRAM(s) Oral two times a day PRN  LORazepam   Injectable 1 milliGRAM(s) IV Push every 30 minutes PRN  metoprolol     tartrate 25 milliGRAM(s) Oral two times a day  mirtazapine 15 milliGRAM(s) Oral at bedtime  pantoprazole    Tablet 40 milliGRAM(s) Oral before breakfast  trimethoprim  160 mG/sulfamethoxazole 800 mG 1 Tablet(s) Oral daily      Vital Signs Last 24 Hrs  T(C): 36.3 (18 Jan 2018 09:44), Max: 39.2 (17 Jan 2018 16:53)  T(F): 97.3 (18 Jan 2018 09:44), Max: 102.5 (17 Jan 2018 16:53)  HR: 113 (18 Jan 2018 06:00) (85 - 113)  BP: 135/89 (18 Jan 2018 06:00) (109/78 - 154/95)  BP(mean): 98 (18 Jan 2018 06:00) (85 - 114)  RR: 26 (18 Jan 2018 06:00) (15 - 36)  SpO2: 98% (18 Jan 2018 06:00) (95% - 100%)                          14.4   20.5  )-----------( 367      ( 18 Jan 2018 04:43 )             43.2       01-18    131<L>  |  96  |  16  ----------------------------<  121<H>  4.2   |  27  |  0.93    Ca    10.2<H>      18 Jan 2018 04:43  Phos  2.1     01-17      Physical Exam:  Constitutional: Awake / lethargic  HEENT: PERRLA, EOMI, sutures / incision C/D/I  Neck: Supple  Respiratory: Breath sounds are coarse bilaterally  Cardiovascular: S1 and S2, regular rhythm  Gastrointestinal: Soft, NT/ND  Extremities:  no edema  Vascular: No carotid Bruit  Musculoskeletal: no joint swelling/tenderness, no abnormal movements  Skin: No rashes    Neurological Exam:  HF: A x O x 2-3, scant speech, appropriate at times, trails off, able to name, repeats partial sentences    CN: PERRL, EOMI, facial sensation normal, no NLFD, tongue midline  Motor: +L UE weakness 3-4/5, rest grossly antigravity  Sens: Intact to light touch  Reflexes: Symmetric and normal, downgoing toes b/l  Gait/Balance: Cannot test

## 2018-01-18 NOTE — PROGRESS NOTE ADULT - ASSESSMENT
Pt is a 47 year old man with Stage 4 Lung CA with mets to brain s/p Ommaya reservoir 11/2017 for leptomeningeal disease s/p chemo and radiation, he presented on 1/11 with lethargy, confusion, and a fever of 103.6, he was found to have acute bacterial meningitis with MSSA and an Ommaya site infection as well as RUL pneumonia.    Pt is now POD#6 s/p removal of Ommaya [place]   CT shows stable ventricles- no neurosurgical intervention at this time   Pts mental status is improved    PLAN-  Continue ABX as per ID  MRI +/- brain reasonable  Pt still having temps - Consider tap of fluid collection in lung, rpt CSF sample from LP not unreasonalbe  Monitor WBC count   SQ heparin for DVT PPX    Discussed with Dr Cee

## 2018-01-18 NOTE — PROGRESS NOTE ADULT - ASSESSMENT
46 y/o gentleman  with metastatic adenocarcinoma of lung, extensive brain mets and leptomeningeal carcinomatosis s/p systemic chemotherapy and intrathecal chemotherapy, s/p WBRT now  with sepsis, meningitis  with MSSA, RLL pneumonia.     Persistent fever, and increased in WBC. Scheduled for thoracentesis, repeat LP and MRI of the brain to determine source of infection.     S/p removal of Ommaya.  S/p extubation 1-14-18.    Antibiotics per ID. Cefipime 2 g q 8hrs.     Continue bactrim DS 1 tab M-W-F, PCP prophylaxis.   Continue supportive care.

## 2018-01-18 NOTE — PROGRESS NOTE ADULT - SUBJECTIVE AND OBJECTIVE BOX
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  Patient completed 10 courses of  radiation treatment 1 day PTA. Patient complaint of  severe headache and an episode of vomiting 3 days prior to admission. The day of admission, the pt developed a fever 0f 103.6F and complained of rigors. He was also noted to be confused.  In ED pt found to have temp of 103.6 rectally. He received vanco IV and cefepime.     Patient is alert and responsive,  respond questions and follow commands.  Patient still spiking fever 39.2oC T max.     ICU Vital Signs Last 24 Hrs  T(C): 38 (18 Jan 2018 12:35), Max: 39.2 (17 Jan 2018 16:53)  T(F): 100.4 (18 Jan 2018 12:35), Max: 102.5 (17 Jan 2018 16:53)  HR: 102 (18 Jan 2018 14:01) (85 - 113)  BP: 126/109 (18 Jan 2018 15:01) (102/78 - 154/95)  BP(mean): 113 (18 Jan 2018 15:01) (84 - 114)  ABP: --  ABP(mean): --  RR: 24 (18 Jan 2018 14:01) (15 - 36)  SpO2: 69% (18 Jan 2018 15:01) (69% - 100%)    General gentleman in NAD.  [facility] site with stitches dry and intact.   Lungs bilaterally decrease breath sounds.  CVS S1 S2 regular, no M/R/G  Abdomen soft NT ND positive for BS, no organomegaly.  Extremities: No C/C/E. positive for bilateral LE abrasions secondary to compression device.        MEDICATIONS  (STANDING):  ALBUTerol    90 MICROgram(s) HFA Inhaler 1 Puff(s) Inhalation every 4 hours  cefepime  IVPB      cefepime  IVPB 2000 milliGRAM(s) IV Intermittent every 8 hours  colchicine 0.6 milliGRAM(s) Oral two times a day  dexamethasone  Injectable 2 milliGRAM(s) IV Push three times a day  folic acid 1 milliGRAM(s) Oral daily  heparin  Injectable 5000 Unit(s) SubCutaneous every 8 hours  metoprolol     tartrate 25 milliGRAM(s) Oral two times a day  mirtazapine 15 milliGRAM(s) Oral at bedtime  pantoprazole    Tablet 40 milliGRAM(s) Oral before breakfast  Lactate Dehydrogenase, Fluid (01.18.18 @ 12:15)    Lactate Dehydrogenase, Fluid: 757: Reference Ranges have NOT been established for analytes in body fluids  because of variability in body fluid composition.  The  has not determined the efficacy of this test when  performed on fluid specimens. The performance characteristics of this  test were determined by [facility] [employer] [facility] [employer]. U/L    trimethoprim  160 mG/sulfamethoxazole 800 mG 1 Tablet(s) Oral daily      Amylase, Body Fluid (01.18.18 @ 12:15)    Fluid Source: Pleural    Amylase, Body Fluid: 23: Reference Ranges have NOT been established for analytes in body fluids  because of variability in body fluid composition.  The  has not determined the efficacy of this test when  performed on fluid specimens. The performance characteristics of this  test were determined by [facility] [employer]. U/L      Protein Total, Fluid (01.18.18 @ 12:15)    Protein Total, Fluid: 3.3: Test Repeated  Reference Ranges have NOT been established for analytes in body fluids  because of variability in body fluid composition.  The  has not determined the efficacy of this test when  performed on fluid specimens. The performance characteristics of this  test were determined by [facility] [employer] [facility] [employer]. g/dL    pH, Fluid (01.18.18 @ 12:15)    pH, Fluid: 7.47    Fluid Source: pleural    Glucose, Fluid (01.18.18 @ 12:15)    Glucose, Fluid: 134: Reference Ranges have NOT  been established for analytes in body fluids  because of variability in body fluid composition.  The  has not determined the efficacy of this test when  performed on fluid specimens. The performance characteristics of this  test were determined by [facility] [employer]. mg/dL      Basic Metabolic Panel in AM (01.18.18 @ 04:43)    Sodium, Serum: 131 mmol/L    Potassium, Serum: 4.2 mmol/L    Chloride, Serum: 96 mmol/L    Carbon Dioxide, Serum: 27 mmol/L    Anion Gap, Serum: 8 mmol/L    Blood Urea Nitrogen, Serum: 16 mg/dL    Creatinine, Serum: 0.93 mg/dL    Glucose, Serum: 121 mg/dL    Calcium, Total Serum: 10.2 mg/dL    eGFR if Non : 97: Interpretative comment  The units for eGFR are ml/min/1.73m2 (normalized body surface area). The  eGFR is calculated from a serum creatinine using the CKD-EPI equation.  Other variables required for calculation are race, age and sex. Among  patients with chronic kidney disease (CKD), the eGFR is useful in  determining the stage of disease according to KDOQI CKD classification.  All eGFR results are reported numerically with the following  interpretation.          GFR                    With                 Without     (ml/min/1.73 m2)    Kidney Damage       Kidney Damage        >= 90                    Stage 1                     Normal        60-89                    Stage 2                     Decreased GFR        30-59     Stage 3                     Stage 3        15-29                    Stage 4                     Stage 4        < 15                      Stage 5                     Stage 5  Each stage of CKD assumes that the associated GFR level has been in  effect for at least 3 months. Determination of stages one and two (with  eGFR > 59 ml/min/m2) requires estimation of kidney damage for at least 3  months as defined by structural or functional abnormalities.  Limitations: All estimates of GFR will be less accurate for patients at  extremes of muscle mass (including but not limited to frail elderly,  critically ill, or cancer patients), those with unusual diets, and those  with conditions associated with reduced secretion or extrarenal  elimination of creatinine. The eGFR equation is not recommended for use  in patients with unstable creatinine levels. mL/min/1.73M2    eGFR if African American: 113 mL/min/1.73M2        Complete Blood Count (01.18.18 @ 04:43)    WBC Count: 20.5 K/uL    RBC Count: 4.69 M/uL    Hemoglobin: 14.4 g/dL    Hematocrit: 43.2 %    Mean Cell Volume: 92.1 fl    Mean Cell Hemoglobin: 30.6 pg    Mean Cell Hemoglobin Conc: 33.3 gm/dL    Red Cell Distrib Width: 18.2 %    Platelet Count - Automated: 367 K/uL

## 2018-01-18 NOTE — PROGRESS NOTE ADULT - SUBJECTIVE AND OBJECTIVE BOX
Patient is a 47y old  Male who presents with a chief complaint of fever, AMS (11 Jan 2018 16:20)    HPI: This is a 48 y/o male with h/o Stage 4 Lung CA with mets to brain 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    #review of systems- rest of review of systems are negative except as mentioned in hpi     Vital Signs Last 24 Hrs  T(C): 36.3 (18 Jan 2018 09:44), Max: 39.2 (17 Jan 2018 16:53)  T(F): 97.3 (18 Jan 2018 09:44), Max: 102.5 (17 Jan 2018 16:53)  HR: 110 (18 Jan 2018 11:01) (85 - 113)  BP: 128/82 (18 Jan 2018 11:01) (102/78 - 154/95)  BP(mean): 93 (18 Jan 2018 11:01) (84 - 114)  RR: 30 (18 Jan 2018 11:01) (15 - 36)  SpO2: 99% (18 Jan 2018 11:01) (95% - 100%)    Physical Exam:   Constitutional: comfortable, lethargic, chronically ill appearing   HEENT: NC/AT, EOMI, PERRLA; postop scalp wound clean with minimal swelling  Neck: supple  Back: no tenderness  Respiratory: aeeb,cta    Cardiovascular: S1 S2 regular, no murmurs  Abdomen: soft, not tender, not distended, positive BS  Genitourinary: no LN palpable  Musculoskeletal: no muscle tenderness, no joint swelling or tenderness  Extremities: no pedal edema  Neurological: alert, moving all extremities; b/l calf abrasions, dry  Skin: small bruising on legs around veno dynes     MEDICATIONS  (STANDING):  ALBUTerol    90 MICROgram(s) HFA Inhaler 1 Puff(s) Inhalation every 4 hours  cefepime  IVPB      cefepime  IVPB 2000 milliGRAM(s) IV Intermittent every 8 hours  colchicine 0.6 milliGRAM(s) Oral two times a day  dexamethasone  Injectable 2 milliGRAM(s) IV Push three times a day  folic acid 1 milliGRAM(s) Oral daily  heparin  Injectable 5000 Unit(s) SubCutaneous every 8 hours  metoprolol     tartrate 25 milliGRAM(s) Oral two times a day  mirtazapine 15 milliGRAM(s) Oral at bedtime  pantoprazole    Tablet 40 milliGRAM(s) Oral before breakfast  trimethoprim  160 mG/sulfamethoxazole 800 mG 1 Tablet(s) Oral daily    MEDICATIONS  (PRN):  acetaminophen   Tablet 650 milliGRAM(s) Oral every 6 hours PRN For Temp greater than 38 C (100.4 F)  acetaminophen  Suppository 650 milliGRAM(s) Rectal every 6 hours PRN For Temp greater than 38 C (100.4 F)  ALBUTerol    0.083% 2.5 milliGRAM(s) Nebulizer every 6 hours PRN Shortness of Breath and/or Wheezing  diazepam    Tablet 2 milliGRAM(s) Oral three times a day PRN anxiety  guaiFENesin    Syrup 100 milliGRAM(s) Oral every 6 hours PRN Cough  HYDROmorphone   Tablet 4 milliGRAM(s) Oral two times a day PRN Severe Pain (7 - 10)  LORazepam   Injectable 1 milliGRAM(s) IV Push every 30 minutes PRN Agitation    LABS:                                      14.4   20.5  )-----------( 367      ( 18 Jan 2018 04:43 )             43.2   01-18    131<L>  |  96  |  16  ----------------------------<  121<H>  4.2   |  27  |  0.93    Ca    10.2<H>      18 Jan 2018 04:43  Phos  2.1     01-17

## 2018-01-19 LAB
ANION GAP SERPL CALC-SCNC: 8 MMOL/L — SIGNIFICANT CHANGE UP (ref 5–17)
APPEARANCE CSF: CLEAR — SIGNIFICANT CHANGE UP
APPEARANCE SPUN FLD: SIGNIFICANT CHANGE UP
BUN SERPL-MCNC: 23 MG/DL — SIGNIFICANT CHANGE UP (ref 7–23)
CALCIUM SERPL-MCNC: 10.7 MG/DL — HIGH (ref 8.5–10.1)
CHLORIDE SERPL-SCNC: 100 MMOL/L — SIGNIFICANT CHANGE UP (ref 96–108)
CO2 SERPL-SCNC: 23 MMOL/L — SIGNIFICANT CHANGE UP (ref 22–31)
COLOR CSF: (no result)
CREAT SERPL-MCNC: 1.06 MG/DL — SIGNIFICANT CHANGE UP (ref 0.5–1.3)
GLUCOSE CSF-MCNC: 24 MG/DL — LOW (ref 40–70)
GLUCOSE SERPL-MCNC: 139 MG/DL — HIGH (ref 70–99)
HCT VFR BLD CALC: 42.7 % — SIGNIFICANT CHANGE UP (ref 39–50)
HGB BLD-MCNC: 14.4 G/DL — SIGNIFICANT CHANGE UP (ref 13–17)
LYMPHOCYTES # CSF: 41 % — SIGNIFICANT CHANGE UP (ref 40–80)
MCHC RBC-ENTMCNC: 30.9 PG — SIGNIFICANT CHANGE UP (ref 27–34)
MCHC RBC-ENTMCNC: 33.8 GM/DL — SIGNIFICANT CHANGE UP (ref 32–36)
MCV RBC AUTO: 91.6 FL — SIGNIFICANT CHANGE UP (ref 80–100)
MONOS+MACROS NFR CSF: 16 % — SIGNIFICANT CHANGE UP (ref 15–45)
NEUTROPHILS # CSF: 43 % — HIGH (ref 0–6)
NRBC NFR CSF: 114 /UL — HIGH (ref 0–5)
PLATELET # BLD AUTO: 378 K/UL — SIGNIFICANT CHANGE UP (ref 150–400)
POTASSIUM SERPL-MCNC: 4.4 MMOL/L — SIGNIFICANT CHANGE UP (ref 3.5–5.3)
POTASSIUM SERPL-SCNC: 4.4 MMOL/L — SIGNIFICANT CHANGE UP (ref 3.5–5.3)
PROT CSF-MCNC: 192 MG/DL — HIGH (ref 15–45)
RBC # BLD: 4.66 M/UL — SIGNIFICANT CHANGE UP (ref 4.2–5.8)
RBC # CSF: 1 /UL — HIGH (ref 0–0)
RBC # FLD: 18.2 % — HIGH (ref 10.3–14.5)
SODIUM SERPL-SCNC: 131 MMOL/L — LOW (ref 135–145)
TUBE TYPE: 3 — SIGNIFICANT CHANGE UP
WBC # BLD: 21.8 K/UL — HIGH (ref 3.8–10.5)
WBC # FLD AUTO: 21.8 K/UL — HIGH (ref 3.8–10.5)

## 2018-01-19 PROCEDURE — 99233 SBSQ HOSP IP/OBS HIGH 50: CPT

## 2018-01-19 RX ADMIN — Medication 25 MILLIGRAM(S): at 05:01

## 2018-01-19 RX ADMIN — Medication 650 MILLIGRAM(S): at 19:24

## 2018-01-19 RX ADMIN — CEFEPIME 100 MILLIGRAM(S): 1 INJECTION, POWDER, FOR SOLUTION INTRAMUSCULAR; INTRAVENOUS at 05:01

## 2018-01-19 RX ADMIN — CEFEPIME 100 MILLIGRAM(S): 1 INJECTION, POWDER, FOR SOLUTION INTRAMUSCULAR; INTRAVENOUS at 19:23

## 2018-01-19 RX ADMIN — HEPARIN SODIUM 5000 UNIT(S): 5000 INJECTION INTRAVENOUS; SUBCUTANEOUS at 05:02

## 2018-01-19 RX ADMIN — Medication 650 MILLIGRAM(S): at 10:29

## 2018-01-19 RX ADMIN — Medication 1 MILLIGRAM(S): at 16:16

## 2018-01-19 RX ADMIN — Medication 2 MILLIGRAM(S): at 16:15

## 2018-01-19 RX ADMIN — Medication 0.6 MILLIGRAM(S): at 19:22

## 2018-01-19 RX ADMIN — Medication 1 MILLIGRAM(S): at 19:23

## 2018-01-19 RX ADMIN — Medication 25 MILLIGRAM(S): at 19:23

## 2018-01-19 RX ADMIN — PANTOPRAZOLE SODIUM 40 MILLIGRAM(S): 20 TABLET, DELAYED RELEASE ORAL at 05:01

## 2018-01-19 RX ADMIN — Medication 2 MILLIGRAM(S): at 21:21

## 2018-01-19 RX ADMIN — Medication 0.6 MILLIGRAM(S): at 05:01

## 2018-01-19 RX ADMIN — Medication 2 MILLIGRAM(S): at 05:02

## 2018-01-19 NOTE — PROGRESS NOTE ADULT - SUBJECTIVE AND OBJECTIVE BOX
PI:  48 y/o male with h/o Stage 4 Lung CA with mets to brain 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 Pt was lethargic and unable to give history. As per sister, pt completed 10 course radiation treatment 1 day PTA. 3 days ago he was complaining of severe headache and also had episode of vomiting. He was seen at oncologist office and was given morphine and valium. The day of admission, the pt developed a fever 0f 103.6F and complained of rigors. He was also noted to be confused.  In ED pt found to have temp of 103.6 rectally. He received vanco IV and cefepime.     Alert and verbal; confused at times  Has intermittent fever Tmax 101.5F  Comfortable  Denies HA  Underwent thoracentesis        · Assessment	  48 y/o male with h/o Stage 4 Lung CA with mets to brain 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 Pt was lethargic and unable to give history. As per sister, pt completed 10 course radiation treatment 1 day PTA. 3 days ago he was complaining of severe headache and also had episode of vomiting. He was seen at oncologist office and was given morphine and valium. The day of admission, the pt developed a fever 0f 103.6F and complained of rigors. He was also noted to be confused.  In ED pt found to have temp of 103.6 rectally. He received vanco IV and cefepime.     1. Febrile syndrome persistent. Acute bacterial meningitis with MSSA. Right frontal lobe fluid collection ?postoperative ?abscess. Ommaya site infecton s/p port removal. RUL pneumonia with loculated left pleural effusion ?empyema. Lung CA stage 4 with brain metastasis. Immunocompromised host.   -thoracentesis shows pleural fluid with pH 7.47 - empyema is unlikely  -leukocytosis persistent  -encephalopathy is improved  -on cefepime 2 gm IV q12h # 8  -tolerating abx well so far; no side effects noted  -f/u repeat BC x 2  -for LP today  -continue abx coverage with cefepime  -respiratory care  -monitor temps  -f/u CBC  -supportive care  2. Other issues:   -care per medicine CHIEF COMPLAINT/diagnosis: Lung ca w/ METS/ fever/ bacterial meningitis/ rt frontal lob abcess s/p drain    SUBJECTIVE: no complaints    REVIEW OF SYSTEMS:    CONSTITUTIONAL: No weakness, fevers or chills  EYES/ENT: No visual changes;  No vertigo or throat pain   NECK: No pain or stiffness  RESPIRATORY: No cough, wheezing, hemoptysis; No shortness of breath  CARDIOVASCULAR: No chest pain or palpitations  GASTROINTESTINAL: No abdominal or epigastric pain. No nausea, vomiting, or hematemesis; No diarrhea or constipation. No melena or hematochezia.  GENITOURINARY: No dysuria, frequency or hematuria  NEUROLOGICAL: No numbness or weakness  SKIN: No itching, burning, rashes, or lesions   All other review of systems is negative unless indicated above    Vital Signs Last 24 Hrs  T(C): 35.8 (19 Jan 2018 17:21), Max: 38.6 (18 Jan 2018 21:34)  T(F): 96.4 (19 Jan 2018 17:21), Max: 101.5 (18 Jan 2018 21:34)  HR: 99 (19 Jan 2018 16:00) (79 - 112)  BP: 113/85 (19 Jan 2018 13:00) (85/47 - 122/74)  BP(mean): 91 (19 Jan 2018 13:00) (57 - 100)  RR: 24 (19 Jan 2018 16:00) (17 - 30)  SpO2: 96% (19 Jan 2018 16:00) (72% - 100%)    I&O's Summary    18 Jan 2018 07:01  -  19 Jan 2018 07:00  --------------------------------------------------------  IN: 100 mL / OUT: 2 mL / NET: 98 mL    19 Jan 2018 07:01  -  19 Jan 2018 18:50  --------------------------------------------------------  IN: 0 mL / OUT: 600 mL / NET: -600 mL        CAPILLARY BLOOD GLUCOSE          PHYSICAL EXAM:    Constitutional: NAD, awake and alert, well-developed  HEENT: PERR, EOMI, Normal Hearing, MMM  Neck: Soft and supple, No LAD, No JVD  Respiratory: Breath sounds are clear bilaterally, No wheezing, rales or rhonchi  Cardiovascular: S1 and S2, regular rate and rhythm, no Murmurs, gallops or rubs  Gastrointestinal: Bowel Sounds present, soft, nontender, nondistended, no guarding, no rebound  Extremities: No peripheral edema  Vascular: 2+ peripheral pulses  Neurological: A/O x 3, no focal deficits  Musculoskeletal: 5/5 strength b/l upper and lower extremities  Skin: No rashes    MEDICATIONS:  MEDICATIONS  (STANDING):  ALBUTerol    90 MICROgram(s) HFA Inhaler 1 Puff(s) Inhalation every 4 hours  cefepime  IVPB      cefepime  IVPB 2000 milliGRAM(s) IV Intermittent every 8 hours  colchicine 0.6 milliGRAM(s) Oral two times a day  dexamethasone  Injectable 2 milliGRAM(s) IV Push three times a day  folic acid 1 milliGRAM(s) Oral daily  heparin  Injectable 5000 Unit(s) SubCutaneous every 8 hours  metoprolol     tartrate 25 milliGRAM(s) Oral two times a day  mirtazapine 15 milliGRAM(s) Oral at bedtime  pantoprazole    Tablet 40 milliGRAM(s) Oral before breakfast  trimethoprim  160 mG/sulfamethoxazole 800 mG 1 Tablet(s) Oral daily      LABS: All Labs Reviewed:                        14.4   21.8  )-----------( 378      ( 19 Jan 2018 10:56 )             42.7     01-19    131<L>  |  100  |  23  ----------------------------<  139<H>  4.4   |  23  |  1.06    Ca    10.7<H>      19 Jan 2018 10:56      PT/INR - ( 18 Jan 2018 11:06 )   PT: 12.1 sec;   INR: 1.12 ratio               Blood Culture: 01-18 @ 12:15  Organism --  Gram Stain Blood -- Gram Stain   No polymorphonuclear leukocytes per low power field  Red blood cells per low power field  No organisms seen per oil power field  by cytocentrifuge  Specimen Source .Body Fluid Pleural Fluid  Culture-Blood --    01-17 @ 12:28  Organism --  Gram Stain Blood -- Gram Stain --  Specimen Source .Blood aerobic  Culture-Blood --      Assessment and Plan  	  46 y/o male with h/o Stage 4 Lung CA with mets to brain 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   Found to have 103.6F and complained of rigors. Found to be encephalopathic. Lactic acid 2.4    1. Severe sepsis secondary to Acute bacterial meningitis with MSSA. Right frontal lobe fluid collection ?postoperative ?abscess.   -Ommaya site infecton s/p port removal.  and s/p clean and wash out of the right frotnal lobe ommaya site.   -leukocytosis persistent  -encephalopathy is improved however, he is axox3  -has remained afebrile  -c/w cefepime  -s/p LP today      2-RUL pneumonia with loculated left pleural effusion ?empyema.  -thoracentesis shows pleural fluid with pH 7.47 - empyema is unlikely    3- Lung CA stage 4 with brain metastasis. Immunocompromised host.     4-DVT proph- sc heparin

## 2018-01-19 NOTE — PROGRESS NOTE ADULT - SUBJECTIVE AND OBJECTIVE BOX
HPI:  46 yo male with PMH Stage 4 Lung CA with mets to brain s/p Ommaya reservoir 2017 for leptomeningeal disease s/p chemo and radiation, anxiety, depression, h/o nephrolithiasis, h/o pericarditis presents to ED with complaint of fever and AMS. Pt is a lethargic and unable to obtain history from him. Does wake up to verbal command but falls back alseep. History obtained from pt's sister at bedside. As per sister pt completed 10 course radiation treatment 1 day ago. 3 days ago he was complaining of severe headache and also had episode of vomiting. He was seen at oncologist office and was given morphine and valium. This morning pt developed a fever 0f 103.6 and complained of rigors. He was also noted to be confused. He did have a cough which has been chronic.    In ED pt found to have temp of 103.6 rectally. Lactate of 2.8. CXR with left multiloculated effusion. Ct head with no acute pathology. Was given 3L NS and started on vanco and cefepime. (2018 16:20)    has chronic cough, no purulent sputum.  CT chest shows patchy/nodular mild findings on R.  c/w previous PET/CT of 17, CT chest shows no change in size of R effusion and loculated L effusion.  Has h.o. pericarditis and pericardial window 2015 and 2015.  h.o. 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     : events noted, removal of infected reservoir. pt sedated, comfortable on ventilator.  CXR today again shows loculated L effusion, no change from , given technique.  Has staph meningitis, on IV Abx's. WBC down to 19,600.  : extubated, awake, knows name and is in hospital.  WBC down to 15,700.   1/15: no distress, awake.   : Tm 101.3 rectal, Vancomycin added, chr cough, no CP. no distress.  : no distress. not SOB, T 102.4 rectally this AM. for thoracentesis and LP today.   : T 101.5 last PM. no SOB.  Pleural fluid studies: exudate, predom of lymphocytes/monocytes, no org on gm stain, pH 7.47.    PAST MEDICAL & SURGICAL HISTORY:  Hearing loss: right ear-no device  Cancer with leptomeningeal spread  Dyspnea on exertion  Gait disturbance  Memory loss  Anxiety and depression  Erectile dysfunction, unspecified erectile dysfunction type  History of kidney stones  History of pericarditis  Pleural effusion  Syncope, unspecified syncope type: X3-4 episodes; s/p LINQ device placed  Lung cancer, upper lobe: right. metastatic non small cell Lung Carcinoma.  History of lumbar puncture: chemo placed  History of cardiac monitoring: s/p LINQ device placement 2017  History of lumbar puncture within last 21 days  History of vascular access device: PORT placed  Pleuritic chest pain: Pleurodesis left chest 10/20/2017  H/O bilateral inguinal hernia repair: as a child  Acute pericarditis, unspecified type: pericardial window 2015, 2015      MEDICATIONS  (STANDING):  cefepime  IVPB 2000 milliGRAM(s) IV Intermittent every 12 hours  colchicine 0.6 milliGRAM(s) Oral two times a day  dexamethasone     Tablet 2 milliGRAM(s) Oral three times a day  folic acid 1 milliGRAM(s) Oral daily  heparin  Injectable 5000 Unit(s) SubCutaneous every 8 hours  mirtazapine 15 milliGRAM(s) Oral at bedtime  pantoprazole    Tablet 40 milliGRAM(s) Oral before breakfast  sodium chloride 0.9%. 1000 milliLiter(s) (125 mL/Hr) IV Continuous <Continuous>  vancomycin  IVPB 1000 milliGRAM(s) IV Intermittent every 12 hours    MEDICATIONS  (PRN):  acetaminophen   Tablet 650 milliGRAM(s) Oral every 6 hours PRN For Temp greater than 38 C (100.4 F)  diazepam    Tablet 2 milliGRAM(s) Oral three times a day PRN anxiety  HYDROmorphone   Tablet 4 milliGRAM(s) Oral two times a day PRN Severe Pain (7 - 10)      Allergies    adhesives (Rash)  No Known Drug Allergies    Intolerances        SOCIAL HISTORY: Denies tobacco, etoh abuse or illicit drug use    FAMILY HISTORY:  Family history of leukemia (Father)  Family history of cancer (Mother)      Vital Signs Last 24 Hrs  T(C): 36.8 (2018 04:28), Max: 39.8 (2018 11:13)  T(F): 98.2 (2018 04:28), Max: 103.6 (2018 11:13)  HR: 82 (2018 04:28) (82 - 128)  BP: 166/92 (2018 04:28) (134/92 - 172/91)  BP(mean): --  RR: 18 (2018 04:28) (13 - 22)  SpO2: 95% (2018 04:28) (95% - 100%)    REVIEW OF SYSTEMS:    CONSTITUTIONAL:  As per HPI.  HEENT:  Eyes:  No diplopia or blurred vision. ENT:  No earache, sore throat or runny nose.  CARDIOVASCULAR:  No pressure, squeezing, tightness, heaviness or aching about the chest, neck, axilla or epigastrium.  RESPIRATORY:  see above.  GASTROINTESTINAL:  No nausea, vomiting or diarrhea.  GENITOURINARY:  No dysuria, frequency or urgency.  MUSCULOSKELETAL:  As per HPI.  SKIN:  No change in skin, hair or nails.  NEUROLOGIC:  No paresthesias, fasciculations, seizures or weakness.  PSYCHIATRIC:  No disorder of thought or mood.  ENDOCRINE:  No heat or cold intolerance, polyuria or polydipsia.  HEMATOLOGICAL:  No easy bruising or bleedings:  .     PHYSICAL EXAMINATION:    GENERAL APPEARANCE:  Pt. is not currently dyspneic, in no distress. Pt. is alert, oriented, and pleasant.  HEENT:  Pupils are normal and react normally. No icterus. Mucous membranes well colored.  NECK:  Supple. No lymphadenopathy. Jugular venous pressure not elevated. Carotids equal.   HEART:   The cardiac impulse has a normal quality. Regular. Normal S1 and S2.   CHEST:  Clear to A.   ABDOMEN:  Soft and nontender.   EXTREMITIES:  There is no cyanosis, clubbing or edema.   SKIN:  No rash or significant lesions are noted.  Neuro: Alert, awake, and O x 3.      LABS:                        15.0   26.8  )-----------( 382      ( 2018 07:30 )             45.7     01-12    131<L>  |  95<L>  |  16  ----------------------------<  139<H>  3.8   |  27  |  1.03    Ca    10.2<H>      2018 07:30    TPro  8.4<H>  /  Alb  3.3  /  TBili  0.8  /  DBili  x   /  AST  34  /  ALT  83<H>  /  AlkPhos  122<H>      LIVER FUNCTIONS - ( 2018 11:19 )  Alb: 3.3 g/dL / Pro: 8.4 gm/dL / ALK PHOS: 122 U/L / ALT: 83 U/L / AST: 34 U/L / GGT: x           PTT - ( 2018 11:19 )  PTT:27.3 sec  CARDIAC MARKERS ( 2018 11:19 )  0.019 ng/mL / x     / x     / x     / x          Urinalysis Basic - ( 2018 12:33 )    Color: Yellow / Appearance: Clear / S.015 / pH: x  Gluc: x / Ketone: Negative  / Bili: Negative / Urobili: Negative mg/dL   Blood: x / Protein: 30 mg/dL / Nitrite: Negative   Leuk Esterase: Negative / RBC: 0-2 /HPF / WBC 0-2   Sq Epi: x / Non Sq Epi: Occasional / Bacteria: Occasional          RADIOLOGY & ADDITIONAL STUDIES:       EXAM:  XR CHEST AP OR PA 1V                            PROCEDURE DATE:  2018          INTERPRETATION:  Portable chest radiograph dated 2018.    COMPARISON: 2017.    CLINICAL INFORMATION: Fever.    FINDINGS:    The airway is midline.   Continued application of the right IJ line, distal tip is in the distal   superior vena cava.  The multi loculated left pleural effusion is  slightly smaller and the   left lower lobe is slightly better expanded at this time. The right lung   is clear.  The cardiac silhouette is normal size.   The bones are normal.     IMPRESSION:  A multiloculated left pleural effusion is slightly smaller and the left   lower lobe is slightly better expanded at this time.        PROCEDURE DATE:  2018          INTERPRETATION:  Exam Date: 2018 5:21 PM    CT chest without contrast    CLINICAL INFORMATION:  Fever    TECHNIQUE:  Contiguous axial 3 mm sections were obtained through the   chest using single helical acquisition.  In addition, 2D sagittal and   coronal reformatted images obtained.   This scan was performed using   automatic exposure control (radiation dose reduction software) to obtain   a diagnostic image quality scan with patient dose as low as reasonably   achievable.        FINDINGS: Comparison is made to report of PET CT of 2017.    The thyroid gland appears intact.    There are several scattered groundglass nodules and opacities withinthe   right upper lobe, suggestive of pneumonia given the clinical context of   fever. Small to right pleural effusion is present. Loculated left pleural   effusion along the left lateral pleura and within the fissures is again   identified. The trachea and major bronchi are patent.    No enlarged axillary lymph nodes are found.   No enlarged hilar lymph   nodes are recognized, allowing for the noncontrast technique.  Within the   mediastinum no pathologic lymph nodes are found.  The heart size is   normal.  There is a small pericardial effusion. Right ventral chest wall   port catheter is in place with tip in the SVC.    Limited evaluation of the upper abdomen is unremarkable.      IMPRESSION:     Several scattered groundglass nodules and opacities within the right   upper lobe, suggestive of pneumonia given the clinical context of fever.   Small to right pleural effusion is present. Loculated left pleural   effusion along the left lateral pleura and within the fissures is again   identified. Small pericardial effusion is present.    EXAM:  CT CHEST IC                            PROCEDURE DATE:  2018          INTERPRETATION:  Clinical information: Severe pulmonic stenosis on   echocardiogram. Evaluate for extrinsic compression or intrathoracic mass    COMPARISON:     PROCEDURE:   CT of the Chest was performed with intravenous contrast.  90 ml of Omnipaque 350 was injected intravenously. 10 ml were discarded.  Sagittal and coronal reformats were performed.    FINDINGS:    LOWER NECK: Within normal limits.  AXILLA, MEDIASTINUM AND PILO: No lymphadenopathy.Nonspecific mediastinal   calcification noted anteriorly.  VESSELS: Marked narrowing of the right ventricular outflow tract, just   below the pulmonic valves, measuring 1 cm. No extrinsic mass is   identified.  HEART: Heart size is normal. Small pericardial effusion and mild   pericardial thickening.   PLEURA: Loculated left pleural effusion with components in the major   fissure. Moderate right pleural effusion.  LUNGS AND LARGE AIRWAYS: Patent central airways. No pulmonary nodules.  VISUALIZED UPPER ABDOMEN: Fatty liver.  BONES: No acute abnormality.  CHEST WALL:  Implantable loop recorder in the left chest wall.    IMPRESSION: Marked right ventricular outflow tract narrowing just   proximal to the pulmonic valve. No extrinsic mass is visualized.  Pericardial thickening suggestive of pericarditis. The possibility that   this could be the cause of the right ventricular outflow tract narrowing   is entertained.       EXAM:  XR CHEST AP OR PA 1V                            PROCEDURE DATE:  2018          INTERPRETATION:    INDICATION: Postthoracentesis.    Single frontal view of chest dated 2018 12:46 PM, compared to prior   study of 2018.    FINDINGS /   IMPRESSION:     There is moderate left pleural effusion that appears to be partly   loculated.  Post thoracentesis, no pneumothorax. The right lung is clear.   There is borderline cardiomegaly. A loop recorder device unchanged in its   position. Port-A-Cath tip in SVC.

## 2018-01-19 NOTE — PROGRESS NOTE ADULT - SUBJECTIVE AND OBJECTIVE BOX
46 y/o gentleman with h/o Stage IV Lung CA with mets to brain 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  Patient completed 10 courses of  radiation treatment 1 day PTA. Patient complaint of  severe headache and an episode of vomiting 3 days prior to admission. The day of admission, the pt developed a fever 0f 103.6F and complained of rigors. He was also noted to be confused.  In ED pt found to have temp of 103.6 rectally. He received vanco IV and cefepime.     Patient is alert and responsive,  respond questions and follow commands. Still intermittently confused.   Patient still spiking fever 39.2oC T max.     ICU Vital Signs Last 24 Hrs  T(C): 37.8 (19 Jan 2018 09:10), Max: 38.6 (18 Jan 2018 21:34)  T(F): 100 (19 Jan 2018 09:10), Max: 101.5 (18 Jan 2018 21:34)  HR: 108 (19 Jan 2018 06:00) (79 - 112)  BP: 113/83 (19 Jan 2018 06:00) (99/75 - 135/107)  BP(mean): 91 (19 Jan 2018 06:00) (80 - 113)  ABP: --  ABP(mean): --  RR: 24 (19 Jan 2018 06:00) (21 - 34)  SpO2: 97% (19 Jan 2018 06:00) (69% - 100%)    General gentleman in NAD.  HEENT, ommaya site with stitches dry and intact.   Lungs bilaterally decrease breath sounds.  CVS S1 S2 regular, no M/R/G  Abdomen soft NT ND positive for BS, no organomegaly.  Extremities: No C/C/E. positive for bilateral LE abrasions, dry, healing.          Culture - Fungal, Body Fluid (01.18.18 @ 12:15)    Specimen Source: Pleural Fl None    Culture Results:   Testing in progress            Amylase, Body Fluid (01.18.18 @ 12:15)    Fluid Source: Pleural    Amylase, Body Fluid: 23: Reference Ranges have NOT been established for analytes in body fluids  because of variability in body fluid composition.  The  has not determined the efficacy of this test when  performed on fluid specimens. The performance characteristics of this  test were determined by [facility]. U/L      Protein Total, Fluid (01.18.18 @ 12:15)    Protein Total, Fluid: 3.3: Test Repeated  Reference Ranges have NOT been established for analytes in body fluids  because of variability in body fluid composition.  The  has not determined the efficacy of this test when  performed on fluid specimens. The performance characteristics of this  test were determined by [facility]. g/dL    pH, Fluid (01.18.18 @ 12:15)    pH, Fluid: 7.47    Fluid Source: pleural    Glucose, Fluid (01.18.18 @ 12:15)    Glucose, Fluid: 134: Reference Ranges have NOT  been established for analytes in body fluids  because of variability in body fluid composition.  The  has not determined the efficacy of this test when  performed on fluid specimens. The performance characteristics of this  test were determined by [facility]. mg/dL      Basic Metabolic Panel in AM (01.18.18 @ 04:43)    Sodium, Serum: 131 mmol/L    Potassium, Serum: 4.2 mmol/L    Chloride, Serum: 96 mmol/L    Carbon Dioxide, Serum: 27 mmol/L    Anion Gap, Serum: 8 mmol/L    Blood Urea Nitrogen, Serum: 16 mg/dL    Creatinine, Serum: 0.93 mg/dL    Glucose, Serum: 121 mg/dL    Calcium, Total Serum: 10.2 mg/dL    eGFR if Non : 97: Interpretative comment  The units for eGFR are ml/min/1.73m2 (normalized body surface area). The  eGFR is calculated from a serum creatinine using the CKD-EPI equation.  Other variables required for calculation are race, age and sex. Among  patients with chronic kidney disease (CKD), the eGFR is useful in  determining the stage of disease according to KDOQI CKD classification.  All eGFR results are reported numerically with the following  interpretation.          GFR                    With                 Without     (ml/min/1.73 m2)    Kidney Damage       Kidney Damage        >= 90                    Stage 1                     Normal        60-89                    Stage 2                     Decreased GFR        30-59     Stage 3                     Stage 3        15-29                    Stage 4                     Stage 4        < 15                      Stage 5                     Stage 5  Each stage of CKD assumes that the associated GFR level has been in  effect for at least 3 months. Determination of stages one and two (with  eGFR > 59 ml/min/m2) requires estimation of kidney damage for at least 3  months as defined by structural or functional abnormalities.  Limitations: All estimates of GFR will be less accurate for patients at  extremes of muscle mass (including but not limited to frail elderly,  critically ill, or cancer patients), those with unusual diets, and those  with conditions associated with reduced secretion or extrarenal  elimination of creatinine. The eGFR equation is not recommended for use  in patients with unstable creatinine levels. mL/min/1.73M2    eGFR if African American: 113 mL/min/1.73M2        Complete Blood Count (01.18.18 @ 04:43)    WBC Count: 20.5 K/uL    RBC Count: 4.69 M/uL    Hemoglobin: 14.4 g/dL    Hematocrit: 43.2 %    Mean Cell Volume: 92.1 fl    Mean Cell Hemoglobin: 30.6 pg    Mean Cell Hemoglobin Conc: 33.3 gm/dL    Red Cell Distrib Width: 18.2 %    Platelet Count - Automated: 367 K/uL      MEDICATIONS  (STANDING):  ALBUTerol    90 MICROgram(s) HFA Inhaler 1 Puff(s) Inhalation every 4 hours  cefepime  IVPB      cefepime  IVPB 2000 milliGRAM(s) IV Intermittent every 8 hours  colchicine 0.6 milliGRAM(s) Oral two times a day  dexamethasone  Injectable 2 milliGRAM(s) IV Push three times a day  folic acid 1 milliGRAM(s) Oral daily  heparin  Injectable 5000 Unit(s) SubCutaneous every 8 hours  metoprolol     tartrate 25 milliGRAM(s) Oral two times a day  mirtazapine 15 milliGRAM(s) Oral at bedtime  pantoprazole    Tablet 40 milliGRAM(s) Oral before breakfast  trimethoprim  160 mG/sulfamethoxazole 800 mG 1 Tablet(s) Oral daily

## 2018-01-19 NOTE — PROGRESS NOTE ADULT - SUBJECTIVE AND OBJECTIVE BOX
Patient is a 47y old  Male who presents with a chief complaint of "I have fluid in my lung and shortness of breath." (12 Jan 2018 19:53)    1/19- removal of LINQ yesterday , fever last night but no fever since     MEDICATIONS  (STANDING):  ALBUTerol    90 MICROgram(s) HFA Inhaler 1 Puff(s) Inhalation every 4 hours  cefepime  IVPB      cefepime  IVPB 2000 milliGRAM(s) IV Intermittent every 8 hours  colchicine 0.6 milliGRAM(s) Oral two times a day  dexamethasone  Injectable 2 milliGRAM(s) IV Push three times a day  folic acid 1 milliGRAM(s) Oral daily  heparin  Injectable 5000 Unit(s) SubCutaneous every 8 hours  metoprolol     tartrate 25 milliGRAM(s) Oral two times a day  mirtazapine 15 milliGRAM(s) Oral at bedtime  pantoprazole    Tablet 40 milliGRAM(s) Oral before breakfast  trimethoprim  160 mG/sulfamethoxazole 800 mG 1 Tablet(s) Oral daily    MEDICATIONS  (PRN):  acetaminophen   Tablet 650 milliGRAM(s) Oral every 6 hours PRN For Temp greater than 38 C (100.4 F)  acetaminophen  Suppository 650 milliGRAM(s) Rectal every 6 hours PRN For Temp greater than 38 C (100.4 F)  ALBUTerol    0.083% 2.5 milliGRAM(s) Nebulizer every 6 hours PRN Shortness of Breath and/or Wheezing  guaiFENesin    Syrup 100 milliGRAM(s) Oral every 6 hours PRN Cough  LORazepam   Injectable 1 milliGRAM(s) IV Push every 30 minutes PRN Agitation            Vital Signs Last 24 Hrs  T(C): 37.6 (19 Jan 2018 06:20), Max: 38.6 (18 Jan 2018 21:34)  T(F): 99.7 (19 Jan 2018 06:20), Max: 101.5 (18 Jan 2018 21:34)  HR: 108 (19 Jan 2018 06:00) (79 - 112)  BP: 113/83 (19 Jan 2018 06:00) (99/75 - 135/107)  BP(mean): 91 (19 Jan 2018 06:00) (80 - 113)  RR: 24 (19 Jan 2018 06:00) (15 - 34)  SpO2: 97% (19 Jan 2018 06:00) (69% - 100%)            INTERPRETATION OF TELEMETRY:    ECG:        LABS:                        14.4   20.5  )-----------( 367      ( 18 Jan 2018 04:43 )             43.2     01-18    131<L>  |  96  |  16  ----------------------------<  121<H>  4.2   |  27  |  0.93    Ca    10.2<H>      18 Jan 2018 04:43          PT/INR - ( 18 Jan 2018 11:06 )   PT: 12.1 sec;   INR: 1.12 ratio             I&O's Summary    18 Jan 2018 07:01  -  19 Jan 2018 07:00  --------------------------------------------------------  IN: 100 mL / OUT: 2 mL / NET: 98 mL      BNP  RADIOLOGY & ADDITIONAL STUDIES:

## 2018-01-19 NOTE — PROGRESS NOTE ADULT - SUBJECTIVE AND OBJECTIVE BOX
Patient is a 47y old  Male who presents with a chief complaint of fever, AMS (2018 16:20)    HPI:  46 y/o male with h/o Stage 4 Lung CA with mets to brain s/p Ommaya reservoir 2017 for leptomeningeal disease s/p chemo and radiation, anxiety, depression, h/o nephrolithiasis, h/o pericarditis was admitted on  for fever and AMS. Pt was lethargic and unable to give history. As per sister, pt completed 10 course radiation treatment 1 day PTA. 3 days ago he was complaining of severe headache and also had episode of vomiting. He was seen at oncologist office and was given morphine and valium. The day of admission, the pt developed a fever 0f 103.6F and complained of rigors. He was also noted to be confused.  In ED pt found to have temp of 103.6 rectally. He received vanco IV and cefepime.     Alert and verbal; confused at times  Has intermittent fever Tmax 101.5F  Comfortable  Denies HA  Underwent thoracentesis    MEDICATIONS  (STANDING):  ALBUTerol    90 MICROgram(s) HFA Inhaler 1 Puff(s) Inhalation every 4 hours  cefepime  IVPB      cefepime  IVPB 2000 milliGRAM(s) IV Intermittent every 8 hours  colchicine 0.6 milliGRAM(s) Oral two times a day  dexamethasone  Injectable 2 milliGRAM(s) IV Push three times a day  folic acid 1 milliGRAM(s) Oral daily  heparin  Injectable 5000 Unit(s) SubCutaneous every 8 hours  metoprolol     tartrate 25 milliGRAM(s) Oral two times a day  mirtazapine 15 milliGRAM(s) Oral at bedtime  pantoprazole    Tablet 40 milliGRAM(s) Oral before breakfast  trimethoprim  160 mG/sulfamethoxazole 800 mG 1 Tablet(s) Oral daily    MEDICATIONS  (PRN):  acetaminophen   Tablet 650 milliGRAM(s) Oral every 6 hours PRN For Temp greater than 38 C (100.4 F)  acetaminophen  Suppository 650 milliGRAM(s) Rectal every 6 hours PRN For Temp greater than 38 C (100.4 F)  ALBUTerol    0.083% 2.5 milliGRAM(s) Nebulizer every 6 hours PRN Shortness of Breath and/or Wheezing  guaiFENesin    Syrup 100 milliGRAM(s) Oral every 6 hours PRN Cough  LORazepam   Injectable 1 milliGRAM(s) IV Push every 30 minutes PRN Agitation      Vital Signs Last 24 Hrs  T(C): 37.8 (2018 09:10), Max: 38.6 (2018 21:34)  T(F): 100 (2018 09:10), Max: 101.5 (2018 21:34)  HR: 108 (2018 06:00) (79 - 112)  BP: 113/83 (2018 06:00) (99/75 - 135/107)  BP(mean): 91 (2018 06:00) (80 - 113)  RR: 24 (2018 06:00) (15 - 34)  SpO2: 97% (2018 06:00) (69% - 100%)    Physical Exam:      Constitutional: frail looking  HEENT: NC/AT, EOMI, PERRLA; postop scalp wound clean  Neck: supple  Back: no tenderness  Respiratory: crackles at bases; decreased BS  Cardiovascular: S1S2 regular, no murmurs  Abdomen: soft, not tender, not distended, positive BS  Genitourinary: no LN palpable  Rectal: deferred  Musculoskeletal: no muscle tenderness, no joint swelling or tenderness  Extremities: no pedal edema  Neurological: alert, moving all extremities; b/l calf abrasions, dry  Skin: no rashes    Labs:                                   14.4   20.5  )-----------( 367      ( 2018 04:43 )             43.2     -18    131<L>  |  96  |  16  ----------------------------<  121<H>  4.2   |  27  |  0.93    Ca    10.2<H>      2018 04:43  Phos  2.1     -17                        14.4   17.8  )-----------( 382      ( 2018 05:55 )             42.9     01-17    131<L>  |  96  |  17  ----------------------------<  105<H>  3.8   |  28  |  0.87    Ca    10.4<H>      2018 05:55  Phos  2.1     01-17  Mg     2.3     -16                        12.9   16.8  )-----------( 315      ( 15 Demetrius 2018 05:29 )             38.9     01-15    137  |  104  |  21  ----------------------------<  98  4.1   |  26  |  0.74    Ca    9.6      15 Demetrius 2018 05:29  Phos  2.8     01-15  Mg     2.4     -15                        15.3   19.6  )-----------( 291      ( 2018 05:26 )             53.0     01-13    132<L>  |  100  |  17  ----------------------------<  95  4.2   |  24  |  1.09    Ca    10.5<H>      2018 05:26  Phos  3.0     -13  Mg     2.5              Vancomycin Level, Trough: 11.4 ug/mL ( @ 05:26)                          15.0   26.8  )-----------( 382      ( 2018 07:30 )             45.7     01-12    131<L>  |  95<L>  |  16  ----------------------------<  139<H>  3.8   |  27  |  1.03    Ca    10.2<H>      2018 07:30    TPro  8.4<H>  /  Alb  3.3  /  TBili  0.8  /  DBili  x   /  AST  34  /  ALT  83<H>  /  AlkPhos  122<H>  11     LIVER FUNCTIONS - ( 2018 11:19 )  Alb: 3.3 g/dL / Pro: 8.4 gm/dL / ALK PHOS: 122 U/L / ALT: 83 U/L / AST: 34 U/L / GGT: x           Urinalysis Basic - ( 2018 12:33 )    Color: Yellow / Appearance: Clear / S.015 / pH: x  Gluc: x / Ketone: Negative  / Bili: Negative / Urobili: Negative mg/dL   Blood: x / Protein: 30 mg/dL / Nitrite: Negative   Leuk Esterase: Negative / RBC: 0-2 /HPF / WBC 0-2   Sq Epi: x / Non Sq Epi: Occasional / Bacteria: Occasional    Cerebrospinal Fluid Cell Count-1 (18 @ 21:14)    CSF Segmented Neutrophils: 96 %    Total Nucleated Cell Count, CSF: 744 /uL    CSF Appearance: Slightly Cloudy    CSF Lymphocytes: 3 %    CSF Monocytes/Macrophages: 1 %    CSF Color: See Note: slightly xanthrochromic      Culture - Body Fluid with Gram Stain (collected 2018 14:36)  Source: .Body Fluid scalp incision  Gram Stain (2018 06:33):    No polymorphonuclear cells seen    No organisms seen    by cytocentrifuge    Culture - Acid Fast - CSF (collected 2018 21:14)  Source: .CSF spinal fluid    Culture - Fungal, CSF (collected 2018 21:14)  Source: .CSF spinal fluid  Preliminary Report (2018 12:37):    Testing in progress    Culture - CSF with Gram Stain (collected 2018 21:14)  Source: .CSF  Gram Stain (2018 01:16):    polymorphonuclear leukocytes seen per low power field    Gram Variable Cocci seen per oil power field    by cytocentrifuge  Final Report (2018 07:36):    Numerous Staphylococcus aureus  Organism: Staphylococcus aureus (2018 07:36)  Organism: Staphylococcus aureus (2018 07:36)      -  Oxacillin: S 0.5      -  Penicillin: R >8      -  RIF- Rifampin: R >2      -  Trimethoprim/Sulfamethoxazole: S <=0.5/9.5      -  Vancomycin: S 2      Method Type: TAMMIE    Culture - Urine (collected 2018 12:33)  Source: .Urine None  Final Report (2018 18:38):    10,000 - 49,000 CFU/mL Coag Negative Staphylococcus    Culture - Blood (collected 2018 11:19)  Source: .Blood Blood-Peripheral  Preliminary Report (2018 15:01):    No growth to date.    Culture - Blood (collected 2018 11:19)  Source: .Blood Blood-Peripheral  Preliminary Report (2018 15:01):    No growth to date.    Culture - Body Fluid with Gram Stain (18 @ 12:15)    Gram Stain:   No polymorphonuclear leukocytes per low power field  Red blood cells per low power field  No organisms seen per oil power field  by cytocentrifuge    Specimen Source: .Body Fluid Pleural Fluid    pH, Fluid (18 @ 12:15)    pH, Fluid: 7.47    Fluid Source: pleural          Radiology:    < from: CT Chest No Cont (18 @ 17:21) >  Several scattered groundglass nodules and opacities within the right   upper lobe, suggestive of pneumonia given the clinical context of fever.   Small to right pleural effusion is present. Loculated left pleural   effusion along the left lateral pleura and within the fissures is again   identified. Small pericardial effusion is present.    < end of copied text >    < from: CT Head No Cont (18 @ 13:09) >   unchanged RIGHT frontal Ommaya catheter  in place within   the anterior horn of the RIGHT lateral ventricle. Faint hyperdense   lesions scattered throughout the brain consistent with patient's known   metastatic disease. IV contrast would be needed for complete evaluation.     < end of copied text >    < from: CT Head No Cont (01.15.18 @ 09:43) >  Frontal kenia hole with small amount of encephalomalacia/gliosis within   the right frontal lobe. Fluid collection measuring approximately 4.7 x   1.1 cm and minimal associated hemorrhage within the scalp overlying the   right frontal borehole. Ventricular size is unchanged since prior exam.   Punctate calcification in the right frontal lobe is unchanged. The   underlying neoplastic process is difficult to evaluate CT imaging.     < end of copied text >      Advanced directives addressed: full resuscitation

## 2018-01-19 NOTE — PROGRESS NOTE ADULT - ASSESSMENT
46 yo male with PMH Stage 4 Lung CA with mets to brain s/p Ommaya reservoir 11/2017 for leptomeningeal disease s/p chemo and radiation, anxiety, depression, h/o nephrolithiasis, h/o pericarditis s/p window presents to ER with acute meinigitis , he is s/p removal of Ommaya reservoir but still with perisitent temps , incidental finding on in hospital echo of subvalvular pulmonic stenossi of unclear etiology , He is asymptomatic from it at this time . HD stable and not hypoxic.   1) would treat conservatively for now . Spoke to family  about various options including right heart cath to r/o constrictive pericarditis , CONCEPCI[place]N to look at the RVOT in more detail and since both procedures would not  the family wishes not to pursue these tests. The least invasive way to assess etiology of RVOT obstruction would be a cardiac MRI as outpt if he  improves clinically .    2) for possible repeat LP to investigate fevers

## 2018-01-19 NOTE — PROGRESS NOTE ADULT - SUBJECTIVE AND OBJECTIVE BOX
Patient is a 47y old  Male who presents with a chief complaint of "I have fluid in my lung and shortness of breath." (12 Jan 2018 19:53)      HPI:  46 yo male known to our service with Stage 4 Lung CA with mets to brain s/p Ommaya reservoir 11/2017 for leptomeningeal disease s/p chemo and radiation. Pt presented on 1/11 with lethargy, confusion, and a fever of 103.6. [place] was tapped and he was found to have acute bacterial meningitis with MSSA and an Ommaya site infection as well as RUL pneumonia. Pt was started on ABX as per ID and the Ommaya was removed the next morning on 1/12. Pt remained intubated for 2 days postop. He had been weak in his L UE, but it is improving. Still with mild confusion/disorientation. Serial CT scans of the head show stable ventricles     1/18 - Pt seen and examined, in NAD. Looks overall improved from presentation, still confused, starts sentences then trails off. Admits to some visual changes, enaies HA    1/19 - MRI reviewed, no obvious infection / abscess. Pt seems more awake today, states he feels a little better. Denies HA      acetaminophen   Tablet 650 milliGRAM(s) Oral every 6 hours PRN  ALBUTerol    0.083% 2.5 milliGRAM(s) Nebulizer every 6 hours PRN  ALBUTerol    90 MICROgram(s) HFA Inhaler 1 Puff(s) Inhalation every 4 hours  cefepime  IVPB      cefepime  IVPB 2000 milliGRAM(s) IV Intermittent every 8 hours  colchicine 0.6 milliGRAM(s) Oral two times a day  dexamethasone  Injectable 2 milliGRAM(s) IV Push three times a day  folic acid 1 milliGRAM(s) Oral daily  guaiFENesin    Syrup 100 milliGRAM(s) Oral every 6 hours PRN  heparin  Injectable 5000 Unit(s) SubCutaneous every 8 hours  LORazepam   Injectable 1 milliGRAM(s) IV Push every 30 minutes PRN  metoprolol     tartrate 25 milliGRAM(s) Oral two times a day  mirtazapine 15 milliGRAM(s) Oral at bedtime  pantoprazole    Tablet 40 milliGRAM(s) Oral before breakfast  trimethoprim  160 mG/sulfamethoxazole 800 mG 1 Tablet(s) Oral daily      Vital Signs Last 24 Hrs  T(C): 37.8 (19 Jan 2018 09:10), Max: 38.6 (18 Jan 2018 21:34)  T(F): 100 (19 Jan 2018 09:10), Max: 101.5 (18 Jan 2018 21:34)  HR: 108 (19 Jan 2018 06:00) (79 - 112)  BP: 113/83 (19 Jan 2018 06:00) (99/75 - 135/107)  BP(mean): 91 (19 Jan 2018 06:00) (80 - 113)  RR: 24 (19 Jan 2018 06:00) (21 - 34)  SpO2: 97% (19 Jan 2018 06:00) (69% - 100%)                          14.4   20.5  )-----------( 367      ( 18 Jan 2018 04:43 )             43.2     131<L>  |  96  |  16  ----------------------------<  121<H>  4.2   |  27  |  0.93    Ca    10.2<H>      18 Jan 2018 04:43    PT/INR - ( 18 Jan 2018 11:06 )   PT: 12.1 sec;   INR: 1.12 ratio         Radiology:  MR Head w/wo IV Cont - Minimal decrease in size of the multiple intraparenchymal   and leptomeningeal metastatic lesions scattered throughout the brain. The   Ommaya shunt catheter has been removed. Edema and gliosis is seen about   the shunt catheter tract in the RIGHT frontal lobe.  Tiny RIGHT frontal   subdural hygroma is noted.          Physical Exam:  Constitutional: Awake / Alert  HEENT: PERRLA, EOMI, sutures / incision C/D/I  Neck: Supple  Respiratory: Breath sounds are coarse bilaterally  Cardiovascular: S1 and S2, regular rhythm  Gastrointestinal: Soft, NT/ND  Extremities:  no edema  Vascular: No carotid Bruit  Musculoskeletal: no joint swelling/tenderness, no abnormal movements  Skin: No rashes    Neurological Exam:  HF: A x O x 3, more verbal today, speech appropriate, able to name / repeat    CN: PERRL, EOMI, facial sensation normal, no NLFD, tongue midline  Motor: +L UE weakness 3-4/5, rest grossly antigravity  Sens: Intact to light touch  Reflexes: Symmetric and normal, downgoing toes b/l  Gait/Balance: Cannot test

## 2018-01-19 NOTE — PROGRESS NOTE ADULT - ASSESSMENT
Pt is a 47 year old man with Stage 4 Lung CA with mets to brain s/p Ommaya reservoir 11/2017 for leptomeningeal disease s/p chemo and radiation, he presented on 1/11 with lethargy, confusion, and a fever of 103.6, he was found to have acute bacterial meningitis with MSSA and an Ommaya site infection as well as RUL pneumonia.    Pt is now POD#7 s/p removal of Ommaya [place]     PLAN-  No acute neurosurgical intervention  Continue ABX as per ID  F/u fluid from thoracocentesis   LP ordered  SQ heparin for DVT PPX    Discussed with Dr Cee

## 2018-01-19 NOTE — PROGRESS NOTE ADULT - ASSESSMENT
Stage 4 lung cancer with brain mets.   Acute bacterial meningitis. Abx's as per ID. Neurosurg following.  Fever.  For repeat LP.    RV outflow tract obstruction.  Hemodynamically stable.  As per cardiology.    R sided mild multifocal PNA.  Improved on repeat CT chest.  s/p thoracentesis R yest.   exudate w/ predom of lymphocytes/monocytes.  c/w malignant effusion.   pH 7.47, no org on gr stain.  follow up on culture, cytology.    s/p L pleurodeisis 10/17 with pleural bx positive for adeno ca.     S/P pericardial window 11/15, 12/15.

## 2018-01-19 NOTE — PROGRESS NOTE ADULT - ASSESSMENT
48 y/o male with h/o Stage 4 Lung CA with mets to brain 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 Pt was lethargic and unable to give history. As per sister, pt completed 10 course radiation treatment 1 day PTA. 3 days ago he was complaining of severe headache and also had episode of vomiting. He was seen at oncologist office and was given morphine and valium. The day of admission, the pt developed a fever 0f 103.6F and complained of rigors. He was also noted to be confused.  In ED pt found to have temp of 103.6 rectally. He received vanco IV and cefepime.     1. Febrile syndrome persistent. Acute bacterial meningitis with MSSA. Right frontal lobe fluid collection ?postoperative ?abscess. Ommaya site infecton s/p port removal. RUL pneumonia with loculated left pleural effusion ?empyema. Lung CA stage 4 with brain metastasis. Immunocompromised host.   -thoracentesis shows pleural fluid with pH 7.47 - empyema is unlikely  -leukocytosis persistent  -encephalopathy is improved  -on cefepime 2 gm IV q12h # 8  -tolerating abx well so far; no side effects noted  -f/u repeat BC x 2  -for LP today  -continue abx coverage with cefepime  -respiratory care  -monitor temps  -f/u CBC  -supportive care  2. Other issues:   -care per medicine

## 2018-01-19 NOTE — PROGRESS NOTE ADULT - ASSESSMENT
46 y/o gentleman  with metastatic adenocarcinoma of lung, extensive brain mets and leptomeningeal carcinomatosis s/p systemic chemotherapy and intrathecal chemotherapy, s/p WBRT now  with sepsis, meningitis  with MSSA, RLL pneumonia.     Persistent fever, and increased in WBC.     Thoracentesis c/w exudate, malignant effusion, cultures and cytology pending.    CSF testing to be obtained today.    MRI of the brain reviewed with Dr Tran, no evidence of abscess or fluid collection.     S/p removal of Ommaya.  S/p extubation 1-14-18.    Antibiotics per ID. Cefipime 2 g q 8hrs.     Continue bactrim DS 1 tab M-W-F, PCP prophylaxis.   Continue supportive care.

## 2018-01-20 LAB
ANION GAP SERPL CALC-SCNC: 5 MMOL/L — SIGNIFICANT CHANGE UP (ref 5–17)
BASOPHILS # BLD AUTO: 0.1 K/UL — SIGNIFICANT CHANGE UP (ref 0–0.2)
BASOPHILS NFR BLD AUTO: 0.5 % — SIGNIFICANT CHANGE UP (ref 0–2)
BUN SERPL-MCNC: 20 MG/DL — SIGNIFICANT CHANGE UP (ref 7–23)
CALCIUM SERPL-MCNC: 10 MG/DL — SIGNIFICANT CHANGE UP (ref 8.5–10.1)
CHLORIDE SERPL-SCNC: 101 MMOL/L — SIGNIFICANT CHANGE UP (ref 96–108)
CO2 SERPL-SCNC: 26 MMOL/L — SIGNIFICANT CHANGE UP (ref 22–31)
CREAT SERPL-MCNC: 0.87 MG/DL — SIGNIFICANT CHANGE UP (ref 0.5–1.3)
EOSINOPHIL # BLD AUTO: 0 K/UL — SIGNIFICANT CHANGE UP (ref 0–0.5)
EOSINOPHIL NFR BLD AUTO: 0.1 % — SIGNIFICANT CHANGE UP (ref 0–6)
GLUCOSE SERPL-MCNC: 127 MG/DL — HIGH (ref 70–99)
HCT VFR BLD CALC: 43.4 % — SIGNIFICANT CHANGE UP (ref 39–50)
HGB BLD-MCNC: 14.5 G/DL — SIGNIFICANT CHANGE UP (ref 13–17)
LYMPHOCYTES # BLD AUTO: 1 K/UL — SIGNIFICANT CHANGE UP (ref 1–3.3)
LYMPHOCYTES # BLD AUTO: 4.9 % — LOW (ref 13–44)
MCHC RBC-ENTMCNC: 30.8 PG — SIGNIFICANT CHANGE UP (ref 27–34)
MCHC RBC-ENTMCNC: 33.3 GM/DL — SIGNIFICANT CHANGE UP (ref 32–36)
MCV RBC AUTO: 92.5 FL — SIGNIFICANT CHANGE UP (ref 80–100)
MONOCYTES # BLD AUTO: 1.4 K/UL — HIGH (ref 0–0.9)
MONOCYTES NFR BLD AUTO: 6.8 % — SIGNIFICANT CHANGE UP (ref 2–14)
NEUTROPHILS # BLD AUTO: 17.4 K/UL — HIGH (ref 1.8–7.4)
NEUTROPHILS NFR BLD AUTO: 87.8 % — HIGH (ref 43–77)
NIGHT BLUE STAIN TISS: SIGNIFICANT CHANGE UP
PLATELET # BLD AUTO: 346 K/UL — SIGNIFICANT CHANGE UP (ref 150–400)
POTASSIUM SERPL-MCNC: 4.3 MMOL/L — SIGNIFICANT CHANGE UP (ref 3.5–5.3)
POTASSIUM SERPL-SCNC: 4.3 MMOL/L — SIGNIFICANT CHANGE UP (ref 3.5–5.3)
RBC # BLD: 4.7 M/UL — SIGNIFICANT CHANGE UP (ref 4.2–5.8)
RBC # FLD: 18.5 % — HIGH (ref 10.3–14.5)
SODIUM SERPL-SCNC: 132 MMOL/L — LOW (ref 135–145)
SPECIMEN SOURCE: SIGNIFICANT CHANGE UP
WBC # BLD: 19.8 K/UL — HIGH (ref 3.8–10.5)
WBC # FLD AUTO: 19.8 K/UL — HIGH (ref 3.8–10.5)

## 2018-01-20 PROCEDURE — 99233 SBSQ HOSP IP/OBS HIGH 50: CPT

## 2018-01-20 RX ORDER — DIAZEPAM 5 MG
2 TABLET ORAL ONCE
Qty: 0 | Refills: 0 | Status: DISCONTINUED | OUTPATIENT
Start: 2018-01-20 | End: 2018-01-20

## 2018-01-20 RX ORDER — ACETAMINOPHEN 500 MG
1000 TABLET ORAL ONCE
Qty: 0 | Refills: 0 | Status: COMPLETED | OUTPATIENT
Start: 2018-01-20 | End: 2018-01-20

## 2018-01-20 RX ORDER — DIAZEPAM 5 MG
0.5 TABLET ORAL THREE TIMES A DAY
Qty: 0 | Refills: 0 | Status: DISCONTINUED | OUTPATIENT
Start: 2018-01-20 | End: 2018-01-25

## 2018-01-20 RX ORDER — HYDROMORPHONE HYDROCHLORIDE 2 MG/ML
2 INJECTION INTRAMUSCULAR; INTRAVENOUS; SUBCUTANEOUS
Qty: 0 | Refills: 0 | Status: DISCONTINUED | OUTPATIENT
Start: 2018-01-20 | End: 2018-01-25

## 2018-01-20 RX ADMIN — HEPARIN SODIUM 5000 UNIT(S): 5000 INJECTION INTRAVENOUS; SUBCUTANEOUS at 06:55

## 2018-01-20 RX ADMIN — Medication 2 MILLIGRAM(S): at 14:57

## 2018-01-20 RX ADMIN — Medication 0.6 MILLIGRAM(S): at 19:52

## 2018-01-20 RX ADMIN — Medication 2 MILLIGRAM(S): at 05:13

## 2018-01-20 RX ADMIN — HEPARIN SODIUM 5000 UNIT(S): 5000 INJECTION INTRAVENOUS; SUBCUTANEOUS at 14:58

## 2018-01-20 RX ADMIN — CEFEPIME 100 MILLIGRAM(S): 1 INJECTION, POWDER, FOR SOLUTION INTRAMUSCULAR; INTRAVENOUS at 05:14

## 2018-01-20 RX ADMIN — MIRTAZAPINE 15 MILLIGRAM(S): 45 TABLET, ORALLY DISINTEGRATING ORAL at 22:26

## 2018-01-20 RX ADMIN — CEFEPIME 100 MILLIGRAM(S): 1 INJECTION, POWDER, FOR SOLUTION INTRAMUSCULAR; INTRAVENOUS at 14:58

## 2018-01-20 RX ADMIN — CEFEPIME 100 MILLIGRAM(S): 1 INJECTION, POWDER, FOR SOLUTION INTRAMUSCULAR; INTRAVENOUS at 22:26

## 2018-01-20 RX ADMIN — Medication 1 MILLIGRAM(S): at 14:57

## 2018-01-20 RX ADMIN — Medication 2 MILLIGRAM(S): at 22:26

## 2018-01-20 RX ADMIN — Medication 25 MILLIGRAM(S): at 09:17

## 2018-01-20 RX ADMIN — Medication 650 MILLIGRAM(S): at 14:56

## 2018-01-20 RX ADMIN — Medication 400 MILLIGRAM(S): at 09:18

## 2018-01-20 RX ADMIN — Medication 0.6 MILLIGRAM(S): at 09:16

## 2018-01-20 RX ADMIN — Medication 25 MILLIGRAM(S): at 19:54

## 2018-01-20 RX ADMIN — Medication 1 TABLET(S): at 00:12

## 2018-01-20 RX ADMIN — HEPARIN SODIUM 5000 UNIT(S): 5000 INJECTION INTRAVENOUS; SUBCUTANEOUS at 22:26

## 2018-01-20 RX ADMIN — Medication 1 TABLET(S): at 14:56

## 2018-01-20 RX ADMIN — PANTOPRAZOLE SODIUM 40 MILLIGRAM(S): 20 TABLET, DELAYED RELEASE ORAL at 09:17

## 2018-01-20 RX ADMIN — Medication 2 MILLIGRAM(S): at 01:08

## 2018-01-20 RX ADMIN — MIRTAZAPINE 15 MILLIGRAM(S): 45 TABLET, ORALLY DISINTEGRATING ORAL at 00:11

## 2018-01-20 NOTE — PROGRESS NOTE ADULT - SUBJECTIVE AND OBJECTIVE BOX
46 y/o gentleman with h/o Stage IV Lung CA with mets to brain 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  Patient completed 10 courses of  radiation treatment 1 day PTA. Patient complaint of  severe headache and an episode of vomiting 3 days prior to admission. The day of admission, the pt developed a fever 0f 103.6F and complained of rigors. He was also noted to be confused.  In ED pt found to have temp of 103.6 rectally. He received vanco IV and cefepime.     Patient is alert and responsive,  respond questions and follow commands. Still intermittently confused.   Patient still spiking fever 39.2oC T max.     ICU Vital Signs Last 24 Hrs  T(C): 36.2 (20 Jan 2018 06:55), Max: 37.8 (19 Jan 2018 09:10)  T(F): 97.1 (20 Jan 2018 06:55), Max: 100 (19 Jan 2018 09:10)  HR: 91 (20 Jan 2018 06:00) (79 - 102)  BP: 106/65 (20 Jan 2018 06:00) (85/47 - 122/74)  BP(mean): 75 (20 Jan 2018 06:00) (57 - 98)  ABP: --  ABP(mean): --  RR: 21 (20 Jan 2018 06:00) (17 - 30)  SpO2: 97% (20 Jan 2018 06:00) (72% - 99%)    [facility] and [facility].   Lungs bilaterally decrease breath sounds.  CVS S1 S2 regular, no M/R/G  Abdomen soft NT ND positive for BS, no organomegaly.  Extremities: No C/C/E. positive for bilateral LE abrasions, dry, healing.      MEDICATIONS  (STANDING):  ALBUTerol    90 MICROgram(s) HFA Inhaler 1 Puff(s) Inhalation every 4 hours  cefepime  IVPB      cefepime  IVPB 2000 milliGRAM(s) IV Intermittent every 8 hours  colchicine 0.6 milliGRAM(s) Oral two times a day  dexamethasone  Injectable 2 milliGRAM(s) IV Push three times a day  folic acid 1 milliGRAM(s) Oral daily  heparin  Injectable 5000 Unit(s) SubCutaneous every 8 hours  metoprolol     tartrate 25 milliGRAM(s) Oral two times a day  mirtazapine 15 milliGRAM(s) Oral at bedtime  pantoprazole    Tablet 40 milliGRAM(s) Oral before breakfast  trimethoprim  160 mG/sulfamethoxazole 800 mG 1 Tablet(s) Oral daily      Culture - Fungal, Body Fluid (01.18.18 @ 12:15)    Specimen Source: Pleural Fl None    Culture Results:   Testing in progress      Amylase, Body Fluid (01.18.18 @ 12:15)    Fluid Source: Pleural    Amylase, Body Fluid: 23: Reference Ranges have NOT been established for analytes in body fluids  because of variability in body fluid composition.  The  has not determined the efficacy of this test when  performed on fluid specimens. The performance characteristics of this  test were determined by [facility] [employer] [facility] [employer]. U/L      Protein Total, Fluid (01.18.18 @ 12:15)    Protein Total, Fluid: 3.3: Test Repeated  Reference Ranges have NOT been established for analytes in body fluids  because of variability in body fluid composition.  The  has not determined the efficacy of this test when  performed on fluid specimens. The performance characteristics of this  test were determined by [facility]. g/dL    pH, Fluid (01.18.18 @ 12:15)    pH, Fluid: 7.47    Fluid Source: pleural    Glucose, Fluid (01.18.18 @ 12:15)    Glucose, Fluid: 134: Reference Ranges have NOT  been established for analytes in body fluids  because of variability in body fluid composition.  The  has not determined the efficacy of this test when  performed on fluid specimens. The performance characteristics of this  test were determined by [facility] [employer]. mg/dL      Basic Metabolic Panel in AM (01.20.18 @ 05:36)    Sodium, Serum: 132 mmol/L    Potassium, Serum: 4.3 mmol/L    Chloride, Serum: 101 mmol/L    Carbon Dioxide, Serum: 26 mmol/L    Anion Gap, Serum: 5 mmol/L    Blood Urea Nitrogen, Serum: 20 mg/dL    Creatinine, Serum: 0.87 mg/dL    Glucose, Serum: 127 mg/dL    Calcium, Total Serum: 10.0 mg/dL    eGFR if Non : 103: Interpretative comment  The units for eGFR are ml/min/1.73m2 (normalized body surface area). The  eGFR is calculated from a serum creatinine using the CKD-EPI equation.  Other variables required for calculation are race, age and sex. Among  patients with chronic kidney disease (CKD), the eGFR is useful in  determining the stage of disease according to KDOQI CKD classification.  All eGFR results are reported numerically with the following  interpretation.          GFR                    With                 Without     (ml/min/1.73 m2)    Kidney Damage       Kidney Damage        >= 90                    Stage 1                     Normal        60-89                    Stage 2                     Decreased GFR        30-59     Stage 3                     Stage 3        15-29                    Stage 4                     Stage 4        < 15                      Stage 5                     Stage 5  Each stage of CKD assumes that the associated GFR level has been in  effect for at least 3 months. Determination of stages one and two (with  eGFR > 59 ml/min/m2) requires estimation of kidney damage for at least 3  months as defined by structural or functional abnormalities.  Limitations: All estimates of GFR will be less accurate for patients at  extremes of muscle mass (including but not limited to frail elderly,  critically ill, or cancer patients), those with unusual diets, and those  with conditions associated with reduced secretion or extrarenal  elimination of creatinine. The eGFR equation is not recommended for use  in patients with unstable creatinine levels. mL/min/1.73M2    eGFR if African American: 119 mL/min/1.73M2      Complete Blood Count + Automated Diff in AM (01.20.18 @ 05:36)    WBC Count: 19.8 K/uL    RBC Count: 4.70 M/uL    Hemoglobin: 14.5 g/dL    Hematocrit: 43.4 %    Mean Cell Volume: 92.5 fl    Mean Cell Hemoglobin: 30.8 pg    Mean Cell Hemoglobin Conc: 33.3 gm/dL    Red Cell Distrib Width: 18.5 %    Platelet Count - Automated: 346 K/uL    Auto Neutrophil #: 17.4 K/uL    Auto Lymphocyte #: 1.0 K/uL    Auto Monocyte #: 1.4 K/uL    Auto Eosinophil #: 0.0 K/uL    Auto Basophil #: 0.1 K/uL    Auto Neutrophil %: 87.8: Differential percentages must be correlated with absolute numbers for  clinical significance. %    Auto Lymphocyte %: 4.9 %    Auto Monocyte %: 6.8 %    Auto Eosinophil %: 0.1 %    Auto Basophil %: 0.5 %    Protein, CSF (01.19.18 @ 16:45)    Protein, CSF: 192 mg/dL    Glucose, CSF (01.19.18 @ 16:45)    Glucose, CSF: 24 mg/dL    Cerebrospinal Fluid Cell Count-1 (01.11.18 @ 21:14)    Total Nucleated Cell Count, CSF: 744 /uL    RBC Count - Spinal Fluid: 62 /uL    Tube Type: Tube 3    CSF Color: See Note: slightly xanthrochromic    CSF Lymphocytes: 3 %    CSF Appearance: Slightly Cloudy    CSF Monocytes/Macrophages: 1 %    CSF Segmented Neutrophils: 96 %      MRI of the brain 1-19-18  FINDINGS:   MRI dated 12/15/2017 available for review.         The brain demonstrates minimal decrease in size of the multiple   intraparenchymal and leptomeningeal metastatic lesions scattered   throughout the brain. The Ommaya shunt catheter has been removed. Edema   and gliosis is seen about the shunt catheter tract in the RIGHT frontal   lobe. Tiny RIGHT subdural hygroma is noted. No acute cerebral cortical   infarct is found.   No intracranial hemorrhage is recognized.  No mass   effect is found in the brain.          The ventricles, sulci and basal cisterns appear unremarkable.    The vertebral and internal carotid arteries demonstrate expected flow   voids indicating their patency.         The orbits are unremarkable.  The paranasal sinuses are clear.  The nasal   cavity appears intact.  The nasopharynx is symmetric.  The central skull   base and petrous temporal bones are intact.  The calvarium appears   unremarkable.      IMPRESSION:  minimal decrease in size of the multiple intraparenchymal   and leptomeningeal metastatic lesions scattered throughout the brain. The   Ommaya shunt catheter has been removed. Edema and gliosis is seen about   the shunt catheter tract in the RIGHT frontal lobe.  Tiny RIGHT frontal   subdural hygroma is noted

## 2018-01-20 NOTE — PROVIDER CONTACT NOTE (OTHER) - ACTION/TREATMENT ORDERED:
MD assessed pt and ordered valium and iv tyelnol
consult called into dr rueda's service
per MD Liu she will reorder dilaudid 2 mg

## 2018-01-20 NOTE — PROGRESS NOTE ADULT - ASSESSMENT
48 y/o gentleman  with metastatic adenocarcinoma of lung, extensive brain mets and leptomeningeal carcinomatosis s/p systemic chemotherapy and intrathecal chemotherapy, s/p WBRT now  with sepsis, meningitis  with MSSA, RLL pneumonia.     Persistent fever, and increased in WBC.     Thoracentesis c/w exudate, malignant effusion, cultures pending.    CSF 96% neutrophils, glucose 24, protein 192; will follow final cultures.     MRI of the brain reviewed with Dr Tran, no evidence of abscess or fluid collection.     S/p removal of Ommaya.  S/p extubation 1-14-18.    Antibiotics per ID. Cefipime 2 g q 8hrs.     Continue bactrim DS 1 tab M-W-F, PCP prophylaxis.   Continue supportive care.

## 2018-01-20 NOTE — PROGRESS NOTE ADULT - SUBJECTIVE AND OBJECTIVE BOX
Patient is a 47y old  Male who presents with a chief complaint of "I have fluid in my lung and shortness of breath." (12 Jan 2018 19:53)      HPI:  46 yo male known to our service with Stage 4 Lung CA with mets to brain s/p Ommaya reservoir 11/2017 for leptomeningeal disease s/p chemo and radiation. Pt presented on 1/11 with lethargy, confusion, and a fever of 103.6. [place] was tapped and he was found to have acute bacterial meningitis with MSSA and an Ommaya site infection as well as RUL pneumonia. Pt was started on ABX as per ID and the Ommaya was removed the next morning on 1/12. Pt remained intubated for 2 days postop. He had been weak in his L UE, but it is improving. Still with mild confusion/disorientation. Serial CT scans of the head show stable ventricles     1/18 - Pt seen and examined, in NAD. Looks overall improved from presentation, still confused, starts sentences then trails off. Admits to some visual changes, denies HA    1/19 - MRI reviewed, no obvious infection / abscess. Pt seems more awake today, states he feels a little better. Denies HA    1/20- pt seen and examined, doing well no fevers overnight. sleeping comfortable     acetaminophen   Tablet 650 milliGRAM(s) Oral every 6 hours PRN  ALBUTerol    0.083% 2.5 milliGRAM(s) Nebulizer every 6 hours PRN  ALBUTerol    90 MICROgram(s) HFA Inhaler 1 Puff(s) Inhalation every 4 hours  cefepime  IVPB      cefepime  IVPB 2000 milliGRAM(s) IV Intermittent every 8 hours  colchicine 0.6 milliGRAM(s) Oral two times a day  dexamethasone  Injectable 2 milliGRAM(s) IV Push three times a day  folic acid 1 milliGRAM(s) Oral daily  guaiFENesin    Syrup 100 milliGRAM(s) Oral every 6 hours PRN  heparin  Injectable 5000 Unit(s) SubCutaneous every 8 hours  LORazepam   Injectable 1 milliGRAM(s) IV Push every 30 minutes PRN  metoprolol     tartrate 25 milliGRAM(s) Oral two times a day  mirtazapine 15 milliGRAM(s) Oral at bedtime  pantoprazole    Tablet 40 milliGRAM(s) Oral before breakfast  trimethoprim  160 mG/sulfamethoxazole 800 mG 1 Tablet(s) Oral daily      Vital Signs Last 24 Hrs  T(C): 37.8 (19 Jan 2018 09:10), Max: 38.6 (18 Jan 2018 21:34)  T(F): 100 (19 Jan 2018 09:10), Max: 101.5 (18 Jan 2018 21:34)  HR: 108 (19 Jan 2018 06:00) (79 - 112)  BP: 113/83 (19 Jan 2018 06:00) (99/75 - 135/107)  BP(mean): 91 (19 Jan 2018 06:00) (80 - 113)  RR: 24 (19 Jan 2018 06:00) (21 - 34)  SpO2: 97% (19 Jan 2018 06:00) (69% - 100%)                          14.4   20.5  )-----------( 367      ( 18 Jan 2018 04:43 )             43.2     131<L>  |  96  |  16  ----------------------------<  121<H>  4.2   |  27  |  0.93    Ca    10.2<H>      18 Jan 2018 04:43    PT/INR - ( 18 Jan 2018 11:06 )   PT: 12.1 sec;   INR: 1.12 ratio         Radiology:  MR Head w/wo IV Cont - Minimal decrease in size of the multiple intraparenchymal   and leptomeningeal metastatic lesions scattered throughout the brain. The   Ommaya shunt catheter has been removed. Edema and gliosis is seen about   the shunt catheter tract in the RIGHT frontal lobe.  Tiny RIGHT frontal   subdural hygroma is noted.          Physical Exam:  Constitutional: Awake / Alert  HEENT: PERRLA, EOMI, sutures / incision C/D/I  Neck: Supple  Respiratory: Breath sounds are coarse bilaterally  Cardiovascular: S1 and S2, regular rhythm  Gastrointestinal: Soft, NT/ND  Extremities:  no edema  Vascular: No carotid Bruit  Musculoskeletal: no joint swelling/tenderness, no abnormal movements  Skin: head incision clean, dry, intact, no erythema, no drainage, fluctuant collection that was there has resolved, sutures intact, healing well    Neurological Exam:  HF: A x O x 3, more verbal today, speech appropriate, able to name / repeat    CN: PERRL, EOMI, facial sensation normal, no NLFD, tongue midline  Motor: +L UE weakness 3-4/5, rest grossly antigravity  Sens: Intact to light touch  Reflexes: Symmetric and normal, downgoing toes b/l  Gait/Balance: Cannot test

## 2018-01-20 NOTE — PROVIDER CONTACT NOTE (OTHER) - REASON
Infectious Disease consult.
Neurosurgery consult.
Oncology consult.
Pulmonary consult.
consult
pt dilaudid po fell off the MAR
pt family concerned of pt pain/mental status

## 2018-01-20 NOTE — PROGRESS NOTE ADULT - ASSESSMENT
Pt is a 47 year old man with Stage 4 Lung CA with mets to brain s/p Ommaya reservoir 11/2017 for leptomeningeal disease s/p chemo and radiation, he presented on 1/11 with lethargy, confusion, and a fever of 103.6, he was found to have acute bacterial meningitis with MSSA and an Ommaya site infection as well as RUL pneumonia.    Pt is now POD#8 s/p removal of Ommaya [employer]     PLAN-  No acute neurosurgical intervention  Continue ABX as per ID  F/u fluid from thoracocentesis   LP ordered  SQ heparin for DVT PPX    Discussed with Dr Cee

## 2018-01-20 NOTE — PROVIDER CONTACT NOTE (OTHER) - DATE AND TIME:
11-Jan-2018 21:53
11-Jan-2018 22:02
11-Jan-2018 22:07
11-Jan-2018 22:13
20-Jan-2018 08:54
17-Jan-2018 15:20
20-Jan-2018 09:13

## 2018-01-20 NOTE — PROGRESS NOTE ADULT - ASSESSMENT
46 y/o male with h/o Stage 4 Lung CA with mets to brain 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 Pt was lethargic and unable to give history. As per sister, pt completed 10 course radiation treatment 1 day PTA. 3 days ago he was complaining of severe headache and also had episode of vomiting. He was seen at oncologist office and was given morphine and valium. The day of admission, the pt developed a fever 0f 103.6F and complained of rigors. He was also noted to be confused.  In ED pt found to have temp of 103.6 rectally. He received vanco IV and cefepime.     1. Febrile syndrome persistent. Acute bacterial meningitis with MSSA. Right frontal lobe fluid collection ?postoperative ?abscess. Ommaya site infecton s/p port removal. RUL pneumonia with loculated left pleural effusion ?empyema. Lung CA stage 4 with brain metastasis. Immunocompromised host.   -thoracentesis shows pleural fluid with pH 7.47 - empyema is unlikely  -leukocytosis persistent  -encephalopathy is improved  -on cefepime 2 gm IV q12h # 9  -tolerating abx well so far; no side effects noted  -f/u repeat BC x 2  -repeat csf results still consistent with ongoing infection  -continue abx coverage with cefepime  -respiratory care  -monitor temps  -f/u CBC  -supportive care  -discussed with neurosurgery, possible future imaging to rule out obstructive ventriculitis  2. Other issues:   -care per medicine

## 2018-01-20 NOTE — PROGRESS NOTE ADULT - SUBJECTIVE AND OBJECTIVE BOX
Subjective:  Awake, sl lethargic. Scant light yellow sputum. Generalized weakness.    MEDICATIONS  (STANDING):  ALBUTerol    90 MICROgram(s) HFA Inhaler 1 Puff(s) Inhalation every 4 hours  cefepime  IVPB      cefepime  IVPB 2000 milliGRAM(s) IV Intermittent every 8 hours  colchicine 0.6 milliGRAM(s) Oral two times a day  dexamethasone  Injectable 2 milliGRAM(s) IV Push three times a day  folic acid 1 milliGRAM(s) Oral daily  heparin  Injectable 5000 Unit(s) SubCutaneous every 8 hours  metoprolol     tartrate 25 milliGRAM(s) Oral two times a day  mirtazapine 15 milliGRAM(s) Oral at bedtime  pantoprazole    Tablet 40 milliGRAM(s) Oral before breakfast  trimethoprim  160 mG/sulfamethoxazole 800 mG 1 Tablet(s) Oral daily    MEDICATIONS  (PRN):  acetaminophen   Tablet 650 milliGRAM(s) Oral every 6 hours PRN For Temp greater than 38 C (100.4 F)  ALBUTerol    0.083% 2.5 milliGRAM(s) Nebulizer every 6 hours PRN Shortness of Breath and/or Wheezing  diazepam    Tablet 0.5 milliGRAM(s) Oral three times a day PRN agitation, anxiety  guaiFENesin    Syrup 100 milliGRAM(s) Oral every 6 hours PRN Cough  LORazepam   Injectable 1 milliGRAM(s) IV Push every 30 minutes PRN Agitation      Allergies    adhesives (Rash)  No Known Drug Allergies    Intolerances        Vital Signs Last 24 Hrs  T(C): 36.1 (20 Jan 2018 08:36), Max: 37.3 (19 Jan 2018 13:42)  T(F): 97 (20 Jan 2018 08:36), Max: 99.1 (19 Jan 2018 13:42)  HR: 91 (20 Jan 2018 06:00) (79 - 102)  BP: 106/65 (20 Jan 2018 06:00) (106/65 - 122/74)  BP(mean): 75 (20 Jan 2018 06:00) (75 - 98)  RR: 21 (20 Jan 2018 06:00) (19 - 30)  SpO2: 97% (20 Jan 2018 06:00) (72% - 99%)    PHYSICAL EXAMINATION:    NECK:  Supple. No lymphadenopathy. Jugular venous pressure not elevated. Carotids equal.   HEART:   The cardiac impulse has a normal quality. Reg., Nl S1 and S2.  There is a 1-2/6 GURDEEP  CHEST:  Chest with fair air entry. Few basilar crackles bilat with L>R.  Normal respiratory effort.  ABDOMEN:  Soft and nontender.   EXTREMITIES:  There is no edema.       LABS:                        14.5   19.8  )-----------( 346      ( 20 Jan 2018 05:36 )             43.4     01-20    132<L>  |  101  |  20  ----------------------------<  127<H>  4.3   |  26  |  0.87    Ca    10.0      20 Jan 2018 05:36      PT/INR - ( 18 Jan 2018 11:06 )   PT: 12.1 sec;   INR: 1.12 ratio               RADIOLOGY & ADDITIONAL TESTS:    Assessment and Plan:   · Assessment		  Stage 4 lung cancer with brain mets.   Acute bacterial meningitis. Abx's as per ID--Cefipime. Neurosurg following.  Afebrile x 24 Hrs    RV outflow tract obstruction.  Hemodynamically stable.  As per cardiology.    R sided mild multifocal PNA.  Improved on repeat CT chest.  s/p thoracentesis--exudate w/ predom of lymphocytes/monocytes.  c/w malignant effusion.   follow up on culture, cytology.    s/p L pleurodeisis 10/17 with pleural bx positive for adeno ca.     S/P pericardial window 11/15, 12/15.

## 2018-01-20 NOTE — PROGRESS NOTE ADULT - SUBJECTIVE AND OBJECTIVE BOX
Patient is a 47y old  Male who presents with a chief complaint of fever, AMS (2018 16:20)    HPI:  48 y/o male with h/o Stage 4 Lung CA with mets to brain s/p Ommaya reservoir 2017 for leptomeningeal disease s/p chemo and radiation, anxiety, depression, h/o nephrolithiasis, h/o pericarditis was admitted on  for fever and AMS. Pt was lethargic and unable to give history. As per sister, pt completed 10 course radiation treatment 1 day PTA. 3 days ago he was complaining of severe headache and also had episode of vomiting. He was seen at oncologist office and was given morphine and valium. The day of admission, the pt developed a fever 0f 103.6F and complained of rigors. He was also noted to be confused.  In ED pt found to have temp of 103.6 rectally. He received vanco IV and cefepime.     Alert and verbal; confused at times  Had lumbar puncture, sitting in chair; afebrile    MEDICATIONS  (STANDING):  ALBUTerol    90 MICROgram(s) HFA Inhaler 1 Puff(s) Inhalation every 4 hours  cefepime  IVPB      cefepime  IVPB 2000 milliGRAM(s) IV Intermittent every 8 hours  colchicine 0.6 milliGRAM(s) Oral two times a day  dexamethasone  Injectable 2 milliGRAM(s) IV Push three times a day  folic acid 1 milliGRAM(s) Oral daily  heparin  Injectable 5000 Unit(s) SubCutaneous every 8 hours  metoprolol     tartrate 25 milliGRAM(s) Oral two times a day  mirtazapine 15 milliGRAM(s) Oral at bedtime  pantoprazole    Tablet 40 milliGRAM(s) Oral before breakfast  trimethoprim  160 mG/sulfamethoxazole 800 mG 1 Tablet(s) Oral daily    MEDICATIONS  (PRN):  acetaminophen   Tablet 650 milliGRAM(s) Oral every 6 hours PRN For Temp greater than 38 C (100.4 F)  ALBUTerol    0.083% 2.5 milliGRAM(s) Nebulizer every 6 hours PRN Shortness of Breath and/or Wheezing  diazepam    Tablet 0.5 milliGRAM(s) Oral three times a day PRN agitation, anxiety  guaiFENesin    Syrup 100 milliGRAM(s) Oral every 6 hours PRN Cough  HYDROmorphone   Tablet 2 milliGRAM(s) Oral four times a day PRN Severe Pain (7 - 10)  LORazepam   Injectable 1 milliGRAM(s) IV Push every 30 minutes PRN Agitation      Vital Signs Last 24 Hrs  T(C): 36.1 (2018 08:36), Max: 37.3 (2018 13:42)  T(F): 97 (2018 08:36), Max: 99.1 (2018 13:42)  HR: 86 (2018 10:01) (79 - 110)  BP: 109/84 (2018 10:01) (106/65 - 121/85)  BP(mean): 88 (2018 10:01) (75 - 98)  RR: 17 (2018 10:01) (17 - 30)  SpO2: 96% (2018 10:01) (72% - 99%)    Physical Exam:        Constitutional: frail looking  HEENT: NC/AT, EOMI, PERRLA; postop scalp wound clean  Neck: supple  Back: no tenderness  Respiratory: crackles at bases; decreased BS  Cardiovascular: S1S2 regular, no murmurs  Abdomen: soft, not tender, not distended, positive BS  Rectal: deferred  Musculoskeletal: no muscle tenderness, no joint swelling or tenderness  Extremities: no pedal edema  Neurological: alert, moving all extremities; b/l calf abrasions, dry  Skin: no rashes    Labs:             Protein, CSF (18 @ 16:45)    Protein, CSF: 192 mg/dL    Glucose, CSF (18 @ 16:45)    Glucose, CSF: 24 mg/dL    Cerebrospinal Fluid Cell Count-1 (18 @ 16:45)    CSF Segmented Neutrophils: 43 %    Total Nucleated Cell Count, CSF: 114 /uL    CSF Color: Xanthochromic    CSF Appearance: Clear    CSF Lymphocytes: 41 %    CSF Monocytes/Macrophages: 16 %                                    14.4   20.5  )-----------( 367      ( 2018 04:43 )             43.2         131<L>  |  96  |  16  ----------------------------<  121<H>  4.2   |  27  |  0.93    Ca    10.2<H>      2018 04:43  Phos  2.1     01-17                        14.4   17.8  )-----------( 382      ( 2018 05:55 )             42.9     01-17    131<L>  |  96  |  17  ----------------------------<  105<H>  3.8   |  28  |  0.87    Ca    10.4<H>      2018 05:55  Phos  2.1     01-17  Mg     2.3     -16                        12.9   16.8  )-----------( 315      ( 15 Demetrius 2018 05:29 )             38.9     01-15    137  |  104  |  21  ----------------------------<  98  4.1   |  26  |  0.74    Ca    9.6      15 Demetrius 2018 05:29  Phos  2.8     01-15  Mg     2.4     01-15                        15.3   19.6  )-----------( 291      ( 2018 05:26 )             53.0     -    132<L>  |  100  |  17  ----------------------------<  95  4.2   |  24  |  1.09    Ca    10.5<H>      2018 05:26  Phos  3.0     -13  Mg     2.5              Vancomycin Level, Trough: 11.4 ug/mL ( @ 05:26)                          15.0   26.8  )-----------( 382      ( 2018 07:30 )             45.7     -12    131<L>  |  95<L>  |  16  ----------------------------<  139<H>  3.8   |  27  |  1.03    Ca    10.2<H>      2018 07:30    TPro  8.4<H>  /  Alb  3.3  /  TBili  0.8  /  DBili  x   /  AST  34  /  ALT  83<H>  /  AlkPhos  122<H>  -11     LIVER FUNCTIONS - ( 2018 11:19 )  Alb: 3.3 g/dL / Pro: 8.4 gm/dL / ALK PHOS: 122 U/L / ALT: 83 U/L / AST: 34 U/L / GGT: x           Urinalysis Basic - ( 2018 12:33 )    Color: Yellow / Appearance: Clear / S.015 / pH: x  Gluc: x / Ketone: Negative  / Bili: Negative / Urobili: Negative mg/dL   Blood: x / Protein: 30 mg/dL / Nitrite: Negative   Leuk Esterase: Negative / RBC: 0-2 /HPF / WBC 0-2   Sq Epi: x / Non Sq Epi: Occasional / Bacteria: Occasional    Cerebrospinal Fluid Cell Count-1 (18 @ 21:14)    CSF Segmented Neutrophils: 96 %    Total Nucleated Cell Count, CSF: 744 /uL    CSF Appearance: Slightly Cloudy    CSF Lymphocytes: 3 %    CSF Monocytes/Macrophages: 1 %    CSF Color: See Note: slightly xanthrochromic      Culture - Body Fluid with Gram Stain (collected 2018 14:36)  Source: .Body Fluid scalp incision  Gram Stain (2018 06:33):    No polymorphonuclear cells seen    No organisms seen    by cytocentrifuge    Culture - Acid Fast - CSF (collected 2018 21:14)  Source: .CSF spinal fluid    Culture - Fungal, CSF (collected 2018 21:14)  Source: .CSF spinal fluid  Preliminary Report (2018 12:37):    Testing in progress    Culture - CSF with Gram Stain (collected 2018 21:14)  Source: .CSF  Gram Stain (2018 01:16):    polymorphonuclear leukocytes seen per low power field    Gram Variable Cocci seen per oil power field    by cytocentrifuge  Final Report (2018 07:36):    Numerous Staphylococcus aureus  Organism: Staphylococcus aureus (2018 07:36)  Organism: Staphylococcus aureus (2018 07:36)      -  Oxacillin: S 0.5      -  Penicillin: R >8      -  RIF- Rifampin: R >2      -  Trimethoprim/Sulfamethoxazole: S <=0.5/9.5      -  Vancomycin: S 2      Method Type: TAMMIE    Culture - Urine (collected 2018 12:33)  Source: .Urine None  Final Report (2018 18:38):    10,000 - 49,000 CFU/mL Coag Negative Staphylococcus    Culture - Blood (collected 2018 11:19)  Source: .Blood Blood-Peripheral  Preliminary Report (2018 15:01):    No growth to date.    Culture - Blood (collected 2018 11:19)  Source: .Blood Blood-Peripheral  Preliminary Report (2018 15:):    No growth to date.    Culture - Body Fluid with Gram Stain (18 @ 12:15)    Gram Stain:   No polymorphonuclear leukocytes per low power field  Red blood cells per low power field  No organisms seen per oil power field  by cytocentrifuge    Specimen Source: .Body Fluid Pleural Fluid    pH, Fluid (18 @ 12:15)    pH, Fluid: 7.47    Fluid Source: pleural          Radiology:    < from: CT Chest No Cont (18 @ 17:21) >  Several scattered groundglass nodules and opacities within the right   upper lobe, suggestive of pneumonia given the clinical context of fever.   Small to right pleural effusion is present. Loculated left pleural   effusion along the left lateral pleura and within the fissures is again   identified. Small pericardial effusion is present.    < end of copied text >    < from: CT Head No Cont (18 @ 13:09) >   unchanged RIGHT frontal Ommaya catheter  in place within   the anterior horn of the RIGHT lateral ventricle. Faint hyperdense   lesions scattered throughout the brain consistent with patient's known   metastatic disease. IV contrast would be needed for complete evaluation.     < end of copied text >    < from: CT Head No Cont (01.15.18 @ 09:43) >  Frontal kenia hole with small amount of encephalomalacia/gliosis within   the right frontal lobe. Fluid collection measuring approximately 4.7 x   1.1 cm and minimal associated hemorrhage within the scalp overlying the   right frontal borehole. Ventricular size is unchanged since prior exam.   Punctate calcification in the right frontal lobe is unchanged. The   underlying neoplastic process is difficult to evaluate CT imaging.     < end of copied text >      Advanced directives addressed: full resuscitation

## 2018-01-21 LAB
HCT VFR BLD CALC: 47.4 % — SIGNIFICANT CHANGE UP (ref 39–50)
HGB BLD-MCNC: 15.4 G/DL — SIGNIFICANT CHANGE UP (ref 13–17)
MCHC RBC-ENTMCNC: 30.7 PG — SIGNIFICANT CHANGE UP (ref 27–34)
MCHC RBC-ENTMCNC: 32.6 GM/DL — SIGNIFICANT CHANGE UP (ref 32–36)
MCV RBC AUTO: 94 FL — SIGNIFICANT CHANGE UP (ref 80–100)
PLATELET # BLD AUTO: 357 K/UL — SIGNIFICANT CHANGE UP (ref 150–400)
RBC # BLD: 5.04 M/UL — SIGNIFICANT CHANGE UP (ref 4.2–5.8)
RBC # FLD: 18.6 % — HIGH (ref 10.3–14.5)
WBC # BLD: 21.1 K/UL — HIGH (ref 3.8–10.5)
WBC # FLD AUTO: 21.1 K/UL — HIGH (ref 3.8–10.5)

## 2018-01-21 PROCEDURE — 99233 SBSQ HOSP IP/OBS HIGH 50: CPT

## 2018-01-21 RX ADMIN — Medication 0.5 MILLIGRAM(S): at 09:40

## 2018-01-21 RX ADMIN — Medication 2 MILLIGRAM(S): at 21:47

## 2018-01-21 RX ADMIN — Medication 1 MILLIGRAM(S): at 12:43

## 2018-01-21 RX ADMIN — Medication 0.6 MILLIGRAM(S): at 18:29

## 2018-01-21 RX ADMIN — Medication 0.5 MILLIGRAM(S): at 18:38

## 2018-01-21 RX ADMIN — CEFEPIME 100 MILLIGRAM(S): 1 INJECTION, POWDER, FOR SOLUTION INTRAMUSCULAR; INTRAVENOUS at 06:12

## 2018-01-21 RX ADMIN — HEPARIN SODIUM 5000 UNIT(S): 5000 INJECTION INTRAVENOUS; SUBCUTANEOUS at 06:12

## 2018-01-21 RX ADMIN — CEFEPIME 100 MILLIGRAM(S): 1 INJECTION, POWDER, FOR SOLUTION INTRAMUSCULAR; INTRAVENOUS at 12:43

## 2018-01-21 RX ADMIN — Medication 25 MILLIGRAM(S): at 18:29

## 2018-01-21 RX ADMIN — HYDROMORPHONE HYDROCHLORIDE 2 MILLIGRAM(S): 2 INJECTION INTRAMUSCULAR; INTRAVENOUS; SUBCUTANEOUS at 08:52

## 2018-01-21 RX ADMIN — Medication 25 MILLIGRAM(S): at 06:12

## 2018-01-21 RX ADMIN — Medication 2 MILLIGRAM(S): at 12:46

## 2018-01-21 RX ADMIN — Medication 0.6 MILLIGRAM(S): at 06:12

## 2018-01-21 RX ADMIN — HEPARIN SODIUM 5000 UNIT(S): 5000 INJECTION INTRAVENOUS; SUBCUTANEOUS at 21:47

## 2018-01-21 RX ADMIN — Medication 1 TABLET(S): at 12:43

## 2018-01-21 RX ADMIN — HYDROMORPHONE HYDROCHLORIDE 2 MILLIGRAM(S): 2 INJECTION INTRAMUSCULAR; INTRAVENOUS; SUBCUTANEOUS at 01:15

## 2018-01-21 RX ADMIN — HEPARIN SODIUM 5000 UNIT(S): 5000 INJECTION INTRAVENOUS; SUBCUTANEOUS at 12:43

## 2018-01-21 RX ADMIN — HYDROMORPHONE HYDROCHLORIDE 2 MILLIGRAM(S): 2 INJECTION INTRAMUSCULAR; INTRAVENOUS; SUBCUTANEOUS at 00:25

## 2018-01-21 RX ADMIN — CEFEPIME 100 MILLIGRAM(S): 1 INJECTION, POWDER, FOR SOLUTION INTRAMUSCULAR; INTRAVENOUS at 21:47

## 2018-01-21 RX ADMIN — Medication 2 MILLIGRAM(S): at 06:12

## 2018-01-21 RX ADMIN — PANTOPRAZOLE SODIUM 40 MILLIGRAM(S): 20 TABLET, DELAYED RELEASE ORAL at 06:13

## 2018-01-21 RX ADMIN — MIRTAZAPINE 15 MILLIGRAM(S): 45 TABLET, ORALLY DISINTEGRATING ORAL at 21:47

## 2018-01-21 NOTE — PROGRESS NOTE ADULT - SUBJECTIVE AND OBJECTIVE BOX
CHIEF COMPLAINT/Diagnosis: Severe sepsis secondary to Acute bacterial meningitis with MSSA. Right frontal lobe fluid collection     SUBJECTIVE: no complaints    REVIEW OF SYSTEMS:    CONSTITUTIONAL: No weakness, fevers or chills  EYES/ENT: No visual changes;  No vertigo or throat pain   NECK: No pain or stiffness  RESPIRATORY: No cough, wheezing, hemoptysis; No shortness of breath  CARDIOVASCULAR: No chest pain or palpitations  GASTROINTESTINAL: No abdominal or epigastric pain. No nausea, vomiting, or hematemesis; No diarrhea or constipation. No melena or hematochezia.  GENITOURINARY: No dysuria, frequency or hematuria  NEUROLOGICAL: No numbness or weakness  SKIN: No itching, burning, rashes, or lesions   All other review of systems is negative unless indicated above    Vital Signs Last 24 Hrs  T(C): 36 (21 Jan 2018 11:00), Max: 37.6 (21 Jan 2018 00:00)  T(F): 96.8 (21 Jan 2018 11:00), Max: 99.6 (21 Jan 2018 00:00)  HR: 96 (21 Jan 2018 14:01) (82 - 113)  BP: 122/95 (21 Jan 2018 14:01) (110/90 - 145/59)  BP(mean): 92 (21 Jan 2018 11:01) (85 - 105)  RR: 30 (21 Jan 2018 14:01) (17 - 31)  SpO2: 98% (21 Jan 2018 09:01) (96% - 100%)    I&O's Summary    20 Jan 2018 07:01  -  21 Jan 2018 07:00  --------------------------------------------------------  IN: 150 mL / OUT: 1050 mL / NET: -900 mL        CAPILLARY BLOOD GLUCOSE          PHYSICAL EXAM:    Constitutional: NAD, awake and alert, well-developed  HEENT: PERR, EOMI, Normal Hearing, MMM  Neck: Soft and supple, No LAD, No JVD  Respiratory: Breath sounds are clear bilaterally, No wheezing, rales or rhonchi  Cardiovascular: S1 and S2, regular rate and rhythm, no Murmurs, gallops or rubs  Gastrointestinal: Bowel Sounds present, soft, nontender, nondistended, no guarding, no rebound  Extremities: No peripheral edema  Vascular: 2+ peripheral pulses  Neurological: A/O x 3, no focal deficits  Musculoskeletal: 5/5 strength b/l upper and lower extremities  Skin: No rashes    MEDICATIONS:  MEDICATIONS  (STANDING):  ALBUTerol    90 MICROgram(s) HFA Inhaler 1 Puff(s) Inhalation every 4 hours  cefepime  IVPB      cefepime  IVPB 2000 milliGRAM(s) IV Intermittent every 8 hours  colchicine 0.6 milliGRAM(s) Oral two times a day  dexamethasone  Injectable 2 milliGRAM(s) IV Push three times a day  folic acid 1 milliGRAM(s) Oral daily  heparin  Injectable 5000 Unit(s) SubCutaneous every 8 hours  metoprolol     tartrate 25 milliGRAM(s) Oral two times a day  mirtazapine 15 milliGRAM(s) Oral at bedtime  pantoprazole    Tablet 40 milliGRAM(s) Oral before breakfast  trimethoprim  160 mG/sulfamethoxazole 800 mG 1 Tablet(s) Oral daily      LABS: All Labs Reviewed:                        15.4   21.1  )-----------( 357      ( 21 Jan 2018 05:54 )             47.4     01-20    132<L>  |  101  |  20  ----------------------------<  127<H>  4.3   |  26  |  0.87    Ca    10.0      20 Jan 2018 05:36            Blood Culture: 01-19 @ 16:45  Organism --  Gram Stain Blood -- Gram Stain --  Specimen Source .CSF spinal fluid  Culture-Blood --    01-18 @ 12:15  Organism --  Gram Stain Blood -- Gram Stain   No polymorphonuclear leukocytes per low power field  Red blood cells per low power field  No organisms seen per oil power field  by cytocentrifuge  Specimen Source .Body Fluid Pleural Fluid  Culture-Blood --    01-17 @ 12:28  Organism --  Gram Stain Blood -- Gram Stain --  Specimen Source .Blood aerobic  Culture-Blood --      Assessment and Plan  	  46 y/o male with h/o Stage 4 Lung CA with mets to brain s/p Incentive Targeting reservoir 11/2017 for leptomeningeal disease s/p chemo and radiation, anxiety, depression, h/o nephrolithiasis, h/o pericarditis was admitted on 1/11 for fever and AMS.   Found to have 103.6F and complained of rigors. Found to be encephalopathic. Lactic acid 2.4    1. Severe sepsis secondary to Acute bacterial meningitis with MSSA. Right frontal lobe fluid collection ?postoperative ?abscess.   -Ommaya site infecton s/p port removal.  and s/p clean and wash out of the right frotnal lobe ommaya site.   -leukocytosis persistent  -encephalopathy is improved however, he is axox3  -has remained afebrile  -s/p LP >>  consistent with ongoing bacterial meningitis  -Dr. Cee to advice   -Infectious disease recco to c/w antibiotics >> cw/ cefepime and bactrim    2-RUL pneumonia with loculated left pleural effusion ?empyema.  -thoracentesis shows pleural fluid with pH 7.47 - empyema is unlikely    3- Lung CA stage 4 with brain metastasis. Immunocompromised host.     4-DVT proph- sc heparin

## 2018-01-21 NOTE — PROGRESS NOTE ADULT - SUBJECTIVE AND OBJECTIVE BOX
Subjective:  Awake, sl confusion. Scant sputum    MEDICATIONS  (STANDING):  ALBUTerol    90 MICROgram(s) HFA Inhaler 1 Puff(s) Inhalation every 4 hours  cefepime  IVPB      cefepime  IVPB 2000 milliGRAM(s) IV Intermittent every 8 hours  colchicine 0.6 milliGRAM(s) Oral two times a day  dexamethasone  Injectable 2 milliGRAM(s) IV Push three times a day  folic acid 1 milliGRAM(s) Oral daily  heparin  Injectable 5000 Unit(s) SubCutaneous every 8 hours  metoprolol     tartrate 25 milliGRAM(s) Oral two times a day  mirtazapine 15 milliGRAM(s) Oral at bedtime  pantoprazole    Tablet 40 milliGRAM(s) Oral before breakfast  trimethoprim  160 mG/sulfamethoxazole 800 mG 1 Tablet(s) Oral daily    MEDICATIONS  (PRN):  acetaminophen   Tablet 650 milliGRAM(s) Oral every 6 hours PRN For Temp greater than 38 C (100.4 F)  ALBUTerol    0.083% 2.5 milliGRAM(s) Nebulizer every 6 hours PRN Shortness of Breath and/or Wheezing  diazepam    Tablet 0.5 milliGRAM(s) Oral three times a day PRN agitation, anxiety  guaiFENesin    Syrup 100 milliGRAM(s) Oral every 6 hours PRN Cough  HYDROmorphone   Tablet 2 milliGRAM(s) Oral four times a day PRN Severe Pain (7 - 10)  LORazepam   Injectable 1 milliGRAM(s) IV Push every 30 minutes PRN Agitation      Allergies    adhesives (Rash)  No Known Drug Allergies    Intolerances        Vital Signs Last 24 Hrs  T(C): 36.2 (21 Jan 2018 06:00), Max: 37.6 (21 Jan 2018 00:00)  T(F): 97.1 (21 Jan 2018 06:00), Max: 99.6 (21 Jan 2018 00:00)  HR: 96 (21 Jan 2018 06:00) (81 - 113)  BP: 120/93 (21 Jan 2018 06:00) (109/84 - 145/59)  BP(mean): 100 (21 Jan 2018 06:00) (85 - 100)  RR: 22 (21 Jan 2018 06:00) (17 - 31)  SpO2: 97% (21 Jan 2018 06:00) (96% - 99%)    PHYSICAL EXAMINATION:    NECK:  Supple. No lymphadenopathy. Jugular venous pressure not elevated. Carotids equal.   HEART:   The cardiac impulse has a normal quality. Reg., Nl S1 and S2.    CHEST:  Chest with improved BS on right. Few crackles persist at left base. Normal respiratory effort.  ABDOMEN:  Soft and nontender.   EXTREMITIES:  There is no edema.       LABS:                        15.4   21.1  )-----------( 357      ( 21 Jan 2018 05:54 )             47.4     01-20    132<L>  |  101  |  20  ----------------------------<  127<H>  4.3   |  26  |  0.87    Ca    10.0      20 Jan 2018 05:36            RADIOLOGY & ADDITIONAL TESTS:      Assessment and Plan:   · Assessment		  Stage 4 lung cancer with brain mets.   Acute bacterial meningitis. Abx's as per ID--Cefipime. Neurosurg following.  Afebrile x 48 Hrs    RV outflow tract obstruction.  Hemodynamically stable.  As per cardiology.    R sided mild multifocal PNA.  Improved on repeat CT chest.  s/p thoracentesis--exudate w/ predom of lymphocytes/monocytes.  c/w malignant effusion.   follow up on culture, cytology.    s/p L pleurodeisis 10/17 with pleural bx positive for adeno ca.     S/P pericardial window 11/15, 12/15.

## 2018-01-22 LAB
COMMENT - FLUIDS: SIGNIFICANT CHANGE UP
COMMENT - FLUIDS: SIGNIFICANT CHANGE UP
CULTURE RESULTS: SIGNIFICANT CHANGE UP
GRAM STN FLD: SIGNIFICANT CHANGE UP
HCT VFR BLD CALC: 42.4 % — SIGNIFICANT CHANGE UP (ref 39–50)
HGB BLD-MCNC: 13.9 G/DL — SIGNIFICANT CHANGE UP (ref 13–17)
MCHC RBC-ENTMCNC: 30.8 PG — SIGNIFICANT CHANGE UP (ref 27–34)
MCHC RBC-ENTMCNC: 32.8 GM/DL — SIGNIFICANT CHANGE UP (ref 32–36)
MCV RBC AUTO: 93.7 FL — SIGNIFICANT CHANGE UP (ref 80–100)
NON-GYN CYTOLOGY SPEC: SIGNIFICANT CHANGE UP
PLATELET # BLD AUTO: 318 K/UL — SIGNIFICANT CHANGE UP (ref 150–400)
RBC # BLD: 4.52 M/UL — SIGNIFICANT CHANGE UP (ref 4.2–5.8)
RBC # FLD: 19 % — HIGH (ref 10.3–14.5)
SPECIMEN SOURCE: SIGNIFICANT CHANGE UP
SPECIMEN SOURCE: SIGNIFICANT CHANGE UP
WBC # BLD: 22.4 K/UL — HIGH (ref 3.8–10.5)
WBC # FLD AUTO: 22.4 K/UL — HIGH (ref 3.8–10.5)

## 2018-01-22 PROCEDURE — 99233 SBSQ HOSP IP/OBS HIGH 50: CPT

## 2018-01-22 RX ORDER — NAFCILLIN 10 G/100ML
2 INJECTION, POWDER, FOR SOLUTION INTRAVENOUS ONCE
Qty: 0 | Refills: 0 | Status: COMPLETED | OUTPATIENT
Start: 2018-01-22 | End: 2018-01-22

## 2018-01-22 RX ORDER — NAFCILLIN 10 G/100ML
2 INJECTION, POWDER, FOR SOLUTION INTRAVENOUS EVERY 4 HOURS
Qty: 0 | Refills: 0 | Status: DISCONTINUED | OUTPATIENT
Start: 2018-01-22 | End: 2018-01-23

## 2018-01-22 RX ORDER — NAFCILLIN 10 G/100ML
INJECTION, POWDER, FOR SOLUTION INTRAVENOUS
Qty: 0 | Refills: 0 | Status: DISCONTINUED | OUTPATIENT
Start: 2018-01-22 | End: 2018-01-23

## 2018-01-22 RX ADMIN — HYDROMORPHONE HYDROCHLORIDE 2 MILLIGRAM(S): 2 INJECTION INTRAMUSCULAR; INTRAVENOUS; SUBCUTANEOUS at 20:29

## 2018-01-22 RX ADMIN — Medication 100 MILLIGRAM(S): at 19:24

## 2018-01-22 RX ADMIN — HYDROMORPHONE HYDROCHLORIDE 2 MILLIGRAM(S): 2 INJECTION INTRAMUSCULAR; INTRAVENOUS; SUBCUTANEOUS at 19:26

## 2018-01-22 RX ADMIN — Medication 650 MILLIGRAM(S): at 22:08

## 2018-01-22 RX ADMIN — NAFCILLIN 200 GRAM(S): 10 INJECTION, POWDER, FOR SOLUTION INTRAVENOUS at 15:14

## 2018-01-22 RX ADMIN — Medication 0.6 MILLIGRAM(S): at 06:29

## 2018-01-22 RX ADMIN — MIRTAZAPINE 15 MILLIGRAM(S): 45 TABLET, ORALLY DISINTEGRATING ORAL at 21:36

## 2018-01-22 RX ADMIN — Medication 0.5 MILLIGRAM(S): at 22:08

## 2018-01-22 RX ADMIN — Medication 0.6 MILLIGRAM(S): at 18:51

## 2018-01-22 RX ADMIN — Medication 2 MILLIGRAM(S): at 21:35

## 2018-01-22 RX ADMIN — CEFEPIME 100 MILLIGRAM(S): 1 INJECTION, POWDER, FOR SOLUTION INTRAMUSCULAR; INTRAVENOUS at 06:28

## 2018-01-22 RX ADMIN — Medication 2 MILLIGRAM(S): at 06:28

## 2018-01-22 RX ADMIN — PANTOPRAZOLE SODIUM 40 MILLIGRAM(S): 20 TABLET, DELAYED RELEASE ORAL at 06:29

## 2018-01-22 RX ADMIN — Medication 1 MILLIGRAM(S): at 15:13

## 2018-01-22 RX ADMIN — Medication 2 MILLIGRAM(S): at 15:13

## 2018-01-22 RX ADMIN — Medication 1 TABLET(S): at 18:51

## 2018-01-22 RX ADMIN — Medication 25 MILLIGRAM(S): at 19:23

## 2018-01-22 RX ADMIN — Medication 25 MILLIGRAM(S): at 06:29

## 2018-01-22 RX ADMIN — NAFCILLIN 200 GRAM(S): 10 INJECTION, POWDER, FOR SOLUTION INTRAVENOUS at 21:35

## 2018-01-22 RX ADMIN — HEPARIN SODIUM 5000 UNIT(S): 5000 INJECTION INTRAVENOUS; SUBCUTANEOUS at 06:29

## 2018-01-22 RX ADMIN — NAFCILLIN 200 GRAM(S): 10 INJECTION, POWDER, FOR SOLUTION INTRAVENOUS at 18:52

## 2018-01-22 RX ADMIN — HEPARIN SODIUM 5000 UNIT(S): 5000 INJECTION INTRAVENOUS; SUBCUTANEOUS at 21:35

## 2018-01-22 NOTE — PROGRESS NOTE ADULT - SUBJECTIVE AND OBJECTIVE BOX
Subjective:  Awake, sl confusion. Scant sputum.    MEDICATIONS  (STANDING):  ALBUTerol    90 MICROgram(s) HFA Inhaler 1 Puff(s) Inhalation every 4 hours  cefepime  IVPB      cefepime  IVPB 2000 milliGRAM(s) IV Intermittent every 8 hours  colchicine 0.6 milliGRAM(s) Oral two times a day  dexamethasone  Injectable 2 milliGRAM(s) IV Push three times a day  folic acid 1 milliGRAM(s) Oral daily  heparin  Injectable 5000 Unit(s) SubCutaneous every 8 hours  metoprolol     tartrate 25 milliGRAM(s) Oral two times a day  mirtazapine 15 milliGRAM(s) Oral at bedtime  pantoprazole    Tablet 40 milliGRAM(s) Oral before breakfast  trimethoprim  160 mG/sulfamethoxazole 800 mG 1 Tablet(s) Oral daily    MEDICATIONS  (PRN):  acetaminophen   Tablet 650 milliGRAM(s) Oral every 6 hours PRN For Temp greater than 38 C (100.4 F)  ALBUTerol    0.083% 2.5 milliGRAM(s) Nebulizer every 6 hours PRN Shortness of Breath and/or Wheezing  diazepam    Tablet 0.5 milliGRAM(s) Oral three times a day PRN agitation, anxiety  guaiFENesin    Syrup 100 milliGRAM(s) Oral every 6 hours PRN Cough  HYDROmorphone   Tablet 2 milliGRAM(s) Oral four times a day PRN Severe Pain (7 - 10)  LORazepam   Injectable 1 milliGRAM(s) IV Push every 30 minutes PRN Agitation      Allergies    adhesives (Rash)  No Known Drug Allergies    Intolerances        Vital Signs Last 24 Hrs  T(C): 36.2 (21 Jan 2018 06:00), Max: 37.6 (21 Jan 2018 00:00)  T(F): 97.1 (21 Jan 2018 06:00), Max: 99.6 (21 Jan 2018 00:00)  HR: 96 (21 Jan 2018 06:00) (81 - 113)  BP: 120/93 (21 Jan 2018 06:00) (109/84 - 145/59)  BP(mean): 100 (21 Jan 2018 06:00) (85 - 100)  RR: 22 (21 Jan 2018 06:00) (17 - 31)  SpO2: 97% (21 Jan 2018 06:00) (96% - 99%)    PHYSICAL EXAMINATION:    NECK:  Supple. No lymphadenopathy. Jugular venous pressure not elevated. Carotids equal.   HEART:   The cardiac impulse has a normal quality. Reg., Nl S1 and S2.    CHEST:  some decreased aud BS's bilat.  ABDOMEN:  Soft and nontender.   EXTREMITIES:  There is no edema.       LABS:                        15.4   21.1  )-----------( 357      ( 21 Jan 2018 05:54 )             47.4     01-20    132<L>  |  101  |  20  ----------------------------<  127<H>  4.3   |  26  |  0.87    Ca    10.0      20 Jan 2018 05:36            RADIOLOGY & ADDITIONAL TESTS:      Assessment and Plan:   · Assessment		  Stage 4 lung cancer with brain mets.   Acute bacterial meningitis. Abx's as per ID.  Neurosurg following.    RV outflow tract obstruction.  Hemodynamically stable.  As per cardiology.    R sided mild multifocal PNA.  Improved on repeat CT chest.  s/p R thoracentesis--exudate w/ predom of lymphocytes/monocytes.  follow up on culture, cytology positive for adeno ca.    s/p L pleurodeisis 10/17 with pleural bx positive for adeno ca.     S/P pericardial window 11/15, 12/15.

## 2018-01-22 NOTE — PROGRESS NOTE ADULT - ASSESSMENT
Pt is a 47 year old man with Stage 4 Lung CA with mets to brain s/p Ommaya reservoir 11/2017 for leptomeningeal disease s/p chemo and radiation, he presented on 1/11 with lethargy, confusion, and a fever of 103.6, he was found to have acute bacterial meningitis with MSSA and an Ommaya site infection as well as RUL pneumonia.    Pt is now POD#10 s/p removal of Ommaya [place]     PLAN-  No acute neurosurgical intervention  Remove sutures  Continue ABX as per ID  Clear for discharge from neurosurgical perspective  ? when to resume intrathecal chemo - without Ommaya now pt would require via LP in IR  Will sign off for now  Reconsult as needed for additional concerns or for additional chemo    Discussed with Dr Cee

## 2018-01-22 NOTE — DIETITIAN INITIAL EVALUATION ADULT. - ORAL INTAKE PTA
good/Pt and partner reported good PO intake. Pt has slight decrease before admission, but otherwise intake good. Pt drinks a protein supplement at home (20g protein per shake)

## 2018-01-22 NOTE — DIETITIAN INITIAL EVALUATION ADULT. - OTHER INFO
Pt seen for length of stay. Pt's Pmhx reviewed and pt screened for nutritional risk. Pt denies chewing or swallowing difficulty. Pt c/o vomiting once during admission, denies n/c/d. PT states appetite is picking back up and is eating well. No h/x of weight changes over the past month or year (per spouse and pt) Pt appears well nourished, no physical signs of muscle or fat wasting. pt with wt loss due to fluid changes  Recommend continuing to promote PO intake. Will continue to monitor pt's nutritional status.

## 2018-01-22 NOTE — PROGRESS NOTE ADULT - SUBJECTIVE AND OBJECTIVE BOX
Patient is a 47y old  Male who presents with a chief complaint of fever, AMS (2018 16:20)    HPI:  46 y/o male with h/o Stage 4 Lung CA with mets to brain s/p Ommaya reservoir 2017 for leptomeningeal disease s/p chemo and radiation, anxiety, depression, h/o nephrolithiasis, h/o pericarditis was admitted on  for fever and AMS. Pt was lethargic and unable to give history. As per sister, pt completed 10 course radiation treatment 1 day PTA. 3 days ago he was complaining of severe headache and also had episode of vomiting. He was seen at oncologist office and was given morphine and valium. The day of admission, the pt developed a fever 0f 103.6F and complained of rigors. He was also noted to be confused.  In ED pt found to have temp of 103.6 rectally. He received vanco IV and cefepime.     Alert and verbal; confused at times  Comfortable  Ambulatory with help    MEDICATIONS  (STANDING):  ALBUTerol    90 MICROgram(s) HFA Inhaler 1 Puff(s) Inhalation every 4 hours  colchicine 0.6 milliGRAM(s) Oral two times a day  dexamethasone  Injectable 2 milliGRAM(s) IV Push three times a day  folic acid 1 milliGRAM(s) Oral daily  heparin  Injectable 5000 Unit(s) SubCutaneous every 8 hours  metoprolol     tartrate 25 milliGRAM(s) Oral two times a day  mirtazapine 15 milliGRAM(s) Oral at bedtime  nafcillin  IVPB 2 Gram(s) IV Intermittent once  nafcillin  IVPB 2 Gram(s) IV Intermittent every 4 hours  nafcillin  IVPB      pantoprazole    Tablet 40 milliGRAM(s) Oral before breakfast  trimethoprim  160 mG/sulfamethoxazole 800 mG 1 Tablet(s) Oral daily    MEDICATIONS  (PRN):  acetaminophen   Tablet 650 milliGRAM(s) Oral every 6 hours PRN For Temp greater than 38 C (100.4 F)  ALBUTerol    0.083% 2.5 milliGRAM(s) Nebulizer every 6 hours PRN Shortness of Breath and/or Wheezing  diazepam    Tablet 0.5 milliGRAM(s) Oral three times a day PRN agitation, anxiety  guaiFENesin    Syrup 100 milliGRAM(s) Oral every 6 hours PRN Cough  HYDROmorphone   Tablet 2 milliGRAM(s) Oral four times a day PRN Severe Pain (7 - 10)  LORazepam   Injectable 1 milliGRAM(s) IV Push every 30 minutes PRN Agitation      Vital Signs Last 24 Hrs  T(C): 36.6 (2018 09:14), Max: 36.6 (2018 06:00)  T(F): 97.9 (2018 09:14), Max: 97.9 (2018 09:14)  HR: 93 (2018 09:00) (78 - 110)  BP: 117/80 (2018 09:00) (103/72 - 122/95)  BP(mean): 88 (2018 09:00) (78 - 99)  RR: 18 (2018 09:00) (17 - 30)  SpO2: 99% (2018 09:00) (97% - 100%)    Physical Exam:      Constitutional: frail looking  HEENT: NC/AT, EOMI, PERRLA; postop scalp wound clean  Neck: supple  Back: no tenderness  Respiratory: crackles at bases; decreased BS  Cardiovascular: S1S2 regular, no murmurs  Abdomen: soft, not tender, not distended, positive BS  Rectal: deferred  Musculoskeletal: no muscle tenderness, no joint swelling or tenderness  Extremities: no pedal edema  Neurological: alert, moving all extremities; b/l calf abrasions, dry  Skin: no rashes    Labs:             Protein, CSF (18 @ 16:45)    Protein, CSF: 192 mg/dL    Glucose, CSF (18 @ 16:45)    Glucose, CSF: 24 mg/dL    Cerebrospinal Fluid Cell Count-1 (18 @ 16:45)    CSF Segmented Neutrophils: 43 %    Total Nucleated Cell Count, CSF: 114 /uL    CSF Color: Xanthochromic    CSF Appearance: Clear    CSF Lymphocytes: 41 %    CSF Monocytes/Macrophages: 16 %                 13.9   22.4  )-----------( 318      ( 2018 05:51 )             42.4                           14.4   20.5  )-----------( 367      ( 2018 04:43 )             43.2     18    131<L>  |  96  |  16  ----------------------------<  121<H>  4.2   |  27  |  0.93    Ca    10.2<H>      2018 04:43  Phos  2.1     01-17                        14.4   17.8  )-----------( 382      ( 2018 05:55 )             42.9     01-17    131<L>  |  96  |  17  ----------------------------<  105<H>  3.8   |  28  |  0.87    Ca    10.4<H>      2018 05:55  Phos  2.1     01-17  Mg     2.3     -16                        12.9   16.8  )-----------( 315      ( 15 Demetrius 2018 05:29 )             38.9     01-15    137  |  104  |  21  ----------------------------<  98  4.1   |  26  |  0.74    Ca    9.6      15 Demetrius 2018 05:29  Phos  2.8     01-15  Mg     2.4     -15                        15.3   19.6  )-----------( 291      ( 2018 05:26 )             53.0     -13    132<L>  |  100  |  17  ----------------------------<  95  4.2   |  24  |  1.09    Ca    10.5<H>      2018 05:26  Phos  3.0     -13  Mg     2.5              Vancomycin Level, Trough: 11.4 ug/mL ( @ 05:26)                          15.0   26.8  )-----------( 382      ( 2018 07:30 )             45.7     01-12    131<L>  |  95<L>  |  16  ----------------------------<  139<H>  3.8   |  27  |  1.03    Ca    10.2<H>      2018 07:30    TPro  8.4<H>  /  Alb  3.3  /  TBili  0.8  /  DBili  x   /  AST  34  /  ALT  83<H>  /  AlkPhos  122<H>  01-11     LIVER FUNCTIONS - ( 2018 11:19 )  Alb: 3.3 g/dL / Pro: 8.4 gm/dL / ALK PHOS: 122 U/L / ALT: 83 U/L / AST: 34 U/L / GGT: x           Urinalysis Basic - ( 2018 12:33 )    Color: Yellow / Appearance: Clear / S.015 / pH: x  Gluc: x / Ketone: Negative  / Bili: Negative / Urobili: Negative mg/dL   Blood: x / Protein: 30 mg/dL / Nitrite: Negative   Leuk Esterase: Negative / RBC: 0-2 /HPF / WBC 0-2   Sq Epi: x / Non Sq Epi: Occasional / Bacteria: Occasional    Cerebrospinal Fluid Cell Count-1 (18 @ 21:14)    CSF Segmented Neutrophils: 96 %    Total Nucleated Cell Count, CSF: 744 /uL    CSF Appearance: Slightly Cloudy    CSF Lymphocytes: 3 %    CSF Monocytes/Macrophages: 1 %    CSF Color: See Note: slightly xanthrochromic      Culture - Body Fluid with Gram Stain (collected 2018 14:36)  Source: .Body Fluid scalp incision  Gram Stain (2018 06:33):    No polymorphonuclear cells seen    No organisms seen    by cytocentrifuge    Culture - Acid Fast - CSF (collected 2018 21:14)  Source: .CSF spinal fluid    Culture - Fungal, CSF (collected 2018 21:14)  Source: .CSF spinal fluid  Preliminary Report (2018 12:37):    Testing in progress    Culture - CSF with Gram Stain (collected 2018 21:14)  Source: .CSF  Gram Stain (2018 01:16):    polymorphonuclear leukocytes seen per low power field    Gram Variable Cocci seen per oil power field    by cytocentrifuge  Final Report (2018 07:36):    Numerous Staphylococcus aureus  Organism: Staphylococcus aureus (2018 07:36)  Organism: Staphylococcus aureus (2018 07:36)      -  Oxacillin: S 0.5      -  Penicillin: R >8      -  RIF- Rifampin: R >2      -  Trimethoprim/Sulfamethoxazole: S <=0.5/9.5      -  Vancomycin: S 2      Method Type: TAMMIE    Culture - Urine (collected 2018 12:33)  Source: .Urine None  Final Report (2018 18:38):    10,000 - 49,000 CFU/mL Coag Negative Staphylococcus    Culture - Blood (collected 2018 11:19)  Culture - Body Fluid with Gram Stain (18 @ 12:15)    Gram Stain:   No polymorphonuclear leukocytes per low power field  Red blood cells per low power field  No organisms seen per oil power field  by cytocentrifuge    Specimen Source: .Body Fluid Pleural Fluid    Culture Results:   No growth    Source: .Blood Blood-Peripheral  Preliminary Report (2018 15:):    No growth to date.    Culture - Blood (collected 2018 11:19)  Source: .Blood Blood-Peripheral  Preliminary Report (2018 15:):    No growth to date.    Culture - Body Fluid with Gram Stain (18 @ 12:15)    Gram Stain:   No polymorphonuclear leukocytes per low power field  Red blood cells per low power field  No organisms seen per oil power field  by cytocentrifuge    Specimen Source: .Body Fluid Pleural Fluid    pH, Fluid (18 @ 12:15)    pH, Fluid: 7.47    Fluid Source: pleural          Radiology:    < from: CT Chest No Cont (18 @ 17:21) >  Several scattered groundglass nodules and opacities within the right   upper lobe, suggestive of pneumonia given the clinical context of fever.   Small to right pleural effusion is present. Loculated left pleural   effusion along the left lateral pleura and within the fissures is again   identified. Small pericardial effusion is present.    < end of copied text >    < from: CT Head No Cont (18 @ 13:09) >   unchanged RIGHT frontal Ommaya catheter  in place within   the anterior horn of the RIGHT lateral ventricle. Faint hyperdense   lesions scattered throughout the brain consistent with patient's known   metastatic disease. IV contrast would be needed for complete evaluation.     < end of copied text >    < from: CT Head No Cont (01.15.18 @ 09:43) >  Frontal kenia hole with small amount of encephalomalacia/gliosis within   the right frontal lobe. Fluid collection measuring approximately 4.7 x   1.1 cm and minimal associated hemorrhage within the scalp overlying the   right frontal borehole. Ventricular size is unchanged since prior exam.   Punctate calcification in the right frontal lobe is unchanged. The   underlying neoplastic process is difficult to evaluate CT imaging.     < end of copied text >      Advanced directives addressed: full resuscitation

## 2018-01-22 NOTE — PROGRESS NOTE ADULT - ASSESSMENT
46 y/o gentleman  with metastatic adenocarcinoma of lung, extensive brain mets and leptomeningeal carcinomatosis s/p systemic chemotherapy and intrathecal chemotherapy, s/p WBRT now  with sepsis, meningitis  with MSSA, RLL pneumonia.     Persistent  increased in WBC. No fevers but patient is on tylenol prn for pain.     Thoracentesis c/w exudate, malignant effusion, cultures pending.    CSF 96% neutrophils, glucose 24, protein 192; will follow final cultures.     MRI of the brain reviewed with Dr Tran, no evidence of abscess or fluid collection.     S/p removal of Ommaya.  S/p extubation 1-14-18.    Antibiotics per ID. Cefipime 2 g q 8hrs.     Continue bactrim DS 1 tab M-W-F, PCP prophylaxis.   Continue supportive care.     Will discuss with Dr Cee and Dr Floyd management.

## 2018-01-22 NOTE — PROGRESS NOTE ADULT - SUBJECTIVE AND OBJECTIVE BOX
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  Patient completed 10 courses of  radiation treatment 1 day PTA. Patient complaint of  severe headache and an episode of vomiting 3 days prior to admission. The day of admission, the pt developed a fever 0f 103.6F and complained of rigors. He was also noted to be confused.  In ED pt found to have temp of 103.6 rectally. He received vanco IV and cefepime.     Patient is alert and responsive,  respond questions and follow commands. Still intermittently confused.     WBC still increasing 22 .      ICU Vital Signs Last 24 Hrs  T(C): 36.6 (22 Jan 2018 06:00), Max: 36.6 (22 Jan 2018 06:00)  T(F): 97.8 (22 Jan 2018 06:00), Max: 97.8 (22 Jan 2018 06:00)  HR: 78 (22 Jan 2018 08:00) (78 - 110)  BP: 115/82 (22 Jan 2018 08:00) (103/72 - 122/95)  BP(mean): 89 (22 Jan 2018 08:00) (78 - 105)  ABP: --  ABP(mean): --  RR: 17 (22 Jan 2018 08:00) (17 - 30)  SpO2: 100% (22 Jan 2018 08:00) (97% - 100%)    General gentleman in NAD.  [facility] Critical access hospital site with stitches dry and intact.   Lungs bilaterally decrease breath sounds.  CVS S1 S2 regular, no M/R/G  Abdomen soft NT ND positive for BS, no organomegaly.  Extremities: No C/C/E. positive for bilateral LE abrasions, dry, healing.      MEDICATIONS  (STANDING):  ALBUTerol    90 MICROgram(s) HFA Inhaler 1 Puff(s) Inhalation every 4 hours  cefepime  IVPB      cefepime  IVPB 2000 milliGRAM(s) IV Intermittent every 8 hours  colchicine 0.6 milliGRAM(s) Oral two times a day  dexamethasone  Injectable 2 milliGRAM(s) IV Push three times a day  folic acid 1 milliGRAM(s) Oral daily  heparin  Injectable 5000 Unit(s) SubCutaneous every 8 hours  metoprolol     tartrate 25 milliGRAM(s) Oral two times a day  mirtazapine 15 milliGRAM(s) Oral at bedtime  pantoprazole    Tablet 40 milliGRAM(s) Oral before breakfast  trimethoprim  160 mG/sulfamethoxazole 800 mG 1 Tablet(s) Oral daily      Culture - Fungal, Body Fluid (01.18.18 @ 12:15)    Specimen Source: Pleural Fl None    Culture Results:   Testing in progress      Amylase, Body Fluid (01.18.18 @ 12:15)    Fluid Source: Pleural    Amylase, Body Fluid: 23: Reference Ranges have NOT been established for analytes in body fluids  because of variability in body fluid composition.  The  has not determined the efficacy of this test when  performed on fluid specimens. The performance characteristics of this  test were determined by [facility] [employer] [facility] [employer]. U/L      Protein Total, Fluid (01.18.18 @ 12:15)    Protein Total, Fluid: 3.3: Test Repeated  Reference Ranges have NOT been established for analytes in body fluids  because of variability in body fluid composition.  The  has not determined the efficacy of this test when  performed on fluid specimens. The performance characteristics of this  test were determined by [facility] [employer]. g/dL    pH, Fluid (01.18.18 @ 12:15)    pH, Fluid: 7.47    Fluid Source: pleural    Glucose, Fluid (01.18.18 @ 12:15)    Glucose, Fluid: 134: Reference Ranges have NOT  been established for analytes in body fluids  because of variability in body fluid composition.  The  has not determined the efficacy of this test when  performed on fluid specimens. The performance characteristics of this  test were determined by [facility] [employer]. mg/dL    Complete Blood Count Repeat Every 24 Hours X 30 Days (01.22.18 @ 05:51)    WBC Count: 22.4 K/uL    RBC Count: 4.52 M/uL    Hemoglobin: 13.9 g/dL    Hematocrit: 42.4 %    Mean Cell Volume: 93.7 fl    Mean Cell Hemoglobin: 30.8 pg    Mean Cell Hemoglobin Conc: 32.8 gm/dL    Red Cell Distrib Width: 19.0 %    Platelet Count - Automated: 318 K/uL      Basic Metabolic Panel in AM (01.20.18 @ 05:36)    Sodium, Serum: 132 mmol/L    Potassium, Serum: 4.3 mmol/L    Chloride, Serum: 101 mmol/L    Carbon Dioxide, Serum: 26 mmol/L    Anion Gap, Serum: 5 mmol/L    Blood Urea Nitrogen, Serum: 20 mg/dL    Creatinine, Serum: 0.87 mg/dL    Glucose, Serum: 127 mg/dL    Calcium, Total Serum: 10.0 mg/dL    eGFR if Non : 103: Interpretative comment  The units for eGFR are ml/min/1.73m2 (normalized body surface area). The  eGFR is calculated from a serum creatinine using the CKD-EPI equation.  Other variables required for calculation are race, age and sex. Among  patients with chronic kidney disease (CKD), the eGFR is useful in  determining the stage of disease according to KDOQI CKD classification.  All eGFR results are reported numerically with the following  interpretation.          GFR                    With                 Without     (ml/min/1.73 m2)    Kidney Damage       Kidney Damage        >= 90                    Stage 1                     Normal        60-89                    Stage 2                     Decreased GFR        30-59     Stage 3                     Stage 3        15-29                    Stage 4                     Stage 4        < 15                      Stage 5                     Stage 5  Each stage of CKD assumes that the associated GFR level has been in  effect for at least 3 months. Determination of stages one and two (with  eGFR > 59 ml/min/m2) requires estimation of kidney damage for at least 3  months as defined by structural or functional abnormalities.  Limitations: All estimates of GFR will be less accurate for patients at  extremes of muscle mass (including but not limited to frail elderly,  critically ill, or cancer patients), those with unusual diets, and those  with conditions associated with reduced secretion or extrarenal  elimination of creatinine. The eGFR equation is not recommended for use  in patients with unstable creatinine levels. mL/min/1.73M2    eGFR if African American: 119 mL/min/1.73M2          Protein, CSF (01.19.18 @ 16:45)    Protein, CSF: 192 mg/dL    Glucose, CSF (01.19.18 @ 16:45)    Glucose, CSF: 24 mg/dL    Cerebrospinal Fluid Cell Count-1 (01.11.18 @ 21:14)    Total Nucleated Cell Count, CSF: 744 /uL    RBC Count - Spinal Fluid: 62 /uL    Tube Type: Tube 3    CSF Color: See Note: slightly xanthrochromic    CSF Lymphocytes: 3 %    CSF Appearance: Slightly Cloudy    CSF Monocytes/Macrophages: 1 %    CSF Segmented Neutrophils: 96 %      MRI of the brain 1-19-18  FINDINGS:   MRI dated 12/15/2017 available for review.         The brain demonstrates minimal decrease in size of the multiple   intraparenchymal and leptomeningeal metastatic lesions scattered   throughout the brain. The Ommaya shunt catheter has been removed. Edema   and gliosis is seen about the shunt catheter tract in the RIGHT frontal   lobe. Tiny RIGHT subdural hygroma is noted. No acute cerebral cortical   infarct is found.   No intracranial hemorrhage is recognized.  No mass   effect is found in the brain.          The ventricles, sulci and basal cisterns appear unremarkable.    The vertebral and internal carotid arteries demonstrate expected flow   voids indicating their patency.         The orbits are unremarkable.  The paranasal sinuses are clear.  The nasal   cavity appears intact.  The nasopharynx is symmetric.  The central skull   base and petrous temporal bones are intact.  The calvarium appears   unremarkable.      IMPRESSION:  minimal decrease in size of the multiple intraparenchymal   and leptomeningeal metastatic lesions scattered throughout the brain. The   Ommaya shunt catheter has been removed. Edema and gliosis is seen about   the shunt catheter tract in the RIGHT frontal lobe.  Tiny RIGHT frontal   subdural hygroma is noted

## 2018-01-22 NOTE — PROGRESS NOTE ADULT - ASSESSMENT
46 y/o male with h/o Stage 4 Lung CA with mets to brain 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 Pt was lethargic and unable to give history. As per sister, pt completed 10 course radiation treatment 1 day PTA. 3 days ago he was complaining of severe headache and also had episode of vomiting. He was seen at oncologist office and was given morphine and valium. The day of admission, the pt developed a fever 0f 103.6F and complained of rigors. He was also noted to be confused.  In ED pt found to have temp of 103.6 rectally. He received vanco IV and cefepime.     1. Febrile syndrome persistent. Acute bacterial meningitis with MSSA. Right frontal lobe fluid collection ?postoperative ?abscess. Ommaya site infecton s/p port removal. RUL pneumonia with loculated left pleural effusion. Doubt empyema. Lung CA stage 4 with brain metastasis. Immunocompromised host.   -pleural fluid culture is negative to date  -leukocytosis persistent  -encephalopathy is improved  -on cefepime 2 gm IV q12h # 11  -tolerating abx well so far; no side effects noted  -repeat csf results is improved, but is still consistent with ongoing infection  -change abx to nafcillin 2 gm IV q4h  -reason for abx use and side effects reviewed with patient; monitor BMP   -neurosurgery evaluation ?need for further surgery to clean prior Ommaya site  -respiratory care  -monitor temps  -f/u CBC  -supportive care  -discussed with neurosurgery, possible future imaging to rule out obstructive ventriculitis  2. Other issues:   -care per medicine

## 2018-01-22 NOTE — PROGRESS NOTE ADULT - SUBJECTIVE AND OBJECTIVE BOX
Patient is a 47y old  Male who presents with a chief complaint of "I have fluid in my lung and shortness of breath." (12 Jan 2018 19:53)      HPI:  48 yo male known to our service with Stage 4 Lung CA with mets to brain s/p Ommaya reservoir 11/2017 for leptomeningeal disease s/p chemo and radiation. Pt presented on 1/11 with lethargy, confusion, and a fever of 103.6. [place] was tapped and he was found to have acute bacterial meningitis with MSSA and an Ommaya site infection as well as RUL pneumonia. Pt was started on ABX as per ID and the Ommaya was removed the next morning on 1/12. Pt remained intubated for 2 days postop. He had been weak in his L UE, but it is improving. Still with mild confusion/disorientation. Serial CT scans of the head show stable ventricles     1/18 - Pt seen and examined, in NAD. Looks overall improved from presentation, still confused, starts sentences then trails off. Admits to some visual changes, denies HA    1/19 - MRI reviewed, no obvious infection / abscess. Pt seems more awake today, states he feels a little better. Denies HA    1/20- pt seen and examined, doing well no fevers overnight. sleeping comfortable     1/22- Pt seen and examined earlier today with Dr Cee, in NAD. Appears comfortable, denies new complaints, no overnight events. WBC increased from prior, pt clinically unchanged       acetaminophen   Tablet 650 milliGRAM(s) Oral every 6 hours PRN  ALBUTerol    0.083% 2.5 milliGRAM(s) Nebulizer every 6 hours PRN  ALBUTerol    90 MICROgram(s) HFA Inhaler 1 Puff(s) Inhalation every 4 hours  colchicine 0.6 milliGRAM(s) Oral two times a day  dexamethasone  Injectable 2 milliGRAM(s) IV Push three times a day  diazepam    Tablet 0.5 milliGRAM(s) Oral three times a day PRN  folic acid 1 milliGRAM(s) Oral daily  guaiFENesin    Syrup 100 milliGRAM(s) Oral every 6 hours PRN  heparin  Injectable 5000 Unit(s) SubCutaneous every 8 hours  HYDROmorphone   Tablet 2 milliGRAM(s) Oral four times a day PRN  LORazepam   Injectable 1 milliGRAM(s) IV Push every 30 minutes PRN  metoprolol     tartrate 25 milliGRAM(s) Oral two times a day  mirtazapine 15 milliGRAM(s) Oral at bedtime  nafcillin  IVPB 2 Gram(s) IV Intermittent once  nafcillin  IVPB 2 Gram(s) IV Intermittent every 4 hours  nafcillin  IVPB      pantoprazole    Tablet 40 milliGRAM(s) Oral before breakfast  trimethoprim  160 mG/sulfamethoxazole 800 mG 1 Tablet(s) Oral daily      Vital Signs Last 24 Hrs  T(C): 36.6 (22 Jan 2018 09:14), Max: 36.6 (22 Jan 2018 06:00)  T(F): 97.9 (22 Jan 2018 09:14), Max: 97.9 (22 Jan 2018 09:14)  HR: 93 (22 Jan 2018 09:00) (78 - 110)  BP: 117/80 (22 Jan 2018 09:00) (103/72 - 122/95)  BP(mean): 88 (22 Jan 2018 09:00) (78 - 99)  RR: 18 (22 Jan 2018 09:00) (17 - 30)  SpO2: 99% (22 Jan 2018 09:00) (97% - 100%)                          13.9   22.4  )-----------( 318      ( 22 Jan 2018 05:51 )             42.4       Physical Exam:  Constitutional: Awake / Alert  HEENT: PERRLA, EOMI, sutures / incision C/D/I  Neck: Supple  Respiratory: Breath sounds are coarse bilaterally  Cardiovascular: S1 and S2, regular rhythm  Gastrointestinal: Soft, NT/ND  Extremities:  no edema  Vascular: No carotid Bruit  Musculoskeletal: no joint swelling/tenderness, no abnormal movements  Skin: head incision clean, dry, intact, no erythema, no drainage, sutures intact    Neurological Exam:  HF: A x O x 3, more verbal today, speech appropriate, able to name / repeat    CN: PERRL, EOMI, facial sensation normal, no NLFD, tongue midline  Motor: +L UE weakness 4-/5, rest grossly antigravity  Sens: Intact to light touch  Reflexes: Symmetric and normal, downgoing toes b/l  Gait/Balance: Cannot test

## 2018-01-22 NOTE — PROGRESS NOTE ADULT - SUBJECTIVE AND OBJECTIVE BOX
CHIEF COMPLAINT:    SUBJECTIVE:     REVIEW OF SYSTEMS:    CONSTITUTIONAL: No weakness, fevers or chills  EYES/ENT: No visual changes;  No vertigo or throat pain   NECK: No pain or stiffness  RESPIRATORY: No cough, wheezing, hemoptysis; No shortness of breath  CARDIOVASCULAR: No chest pain or palpitations  GASTROINTESTINAL: No abdominal or epigastric pain. No nausea, vomiting, or hematemesis; No diarrhea or constipation. No melena or hematochezia.  GENITOURINARY: No dysuria, frequency or hematuria  NEUROLOGICAL: No numbness or weakness  SKIN: No itching, burning, rashes, or lesions   All other review of systems is negative unless indicated above    Vital Signs Last 24 Hrs  T(C): 36.6 (22 Jan 2018 09:14), Max: 36.6 (22 Jan 2018 06:00)  T(F): 97.9 (22 Jan 2018 09:14), Max: 97.9 (22 Jan 2018 09:14)  HR: 93 (22 Jan 2018 09:00) (78 - 110)  BP: 117/80 (22 Jan 2018 09:00) (103/72 - 122/95)  BP(mean): 88 (22 Jan 2018 09:00) (78 - 105)  RR: 18 (22 Jan 2018 09:00) (17 - 30)  SpO2: 99% (22 Jan 2018 09:00) (97% - 100%)    I&O's Summary    21 Jan 2018 07:01  -  22 Jan 2018 07:00  --------------------------------------------------------  IN: 100 mL / OUT: 0 mL / NET: 100 mL        CAPILLARY BLOOD GLUCOSE          PHYSICAL EXAM:    Constitutional: NAD, awake and alert, well-developed  HEENT: PERR, EOMI, Normal Hearing, MMM  Neck: Soft and supple, No LAD, No JVD  Respiratory: Breath sounds are clear bilaterally, No wheezing, rales or rhonchi  Cardiovascular: S1 and S2, regular rate and rhythm, no Murmurs, gallops or rubs  Gastrointestinal: Bowel Sounds present, soft, nontender, nondistended, no guarding, no rebound  Extremities: No peripheral edema  Vascular: 2+ peripheral pulses  Neurological: A/O x 3, no focal deficits  Musculoskeletal: 5/5 strength b/l upper and lower extremities  Skin: No rashes    MEDICATIONS:  MEDICATIONS  (STANDING):  ALBUTerol    90 MICROgram(s) HFA Inhaler 1 Puff(s) Inhalation every 4 hours  cefepime  IVPB      cefepime  IVPB 2000 milliGRAM(s) IV Intermittent every 8 hours  colchicine 0.6 milliGRAM(s) Oral two times a day  dexamethasone  Injectable 2 milliGRAM(s) IV Push three times a day  folic acid 1 milliGRAM(s) Oral daily  heparin  Injectable 5000 Unit(s) SubCutaneous every 8 hours  metoprolol     tartrate 25 milliGRAM(s) Oral two times a day  mirtazapine 15 milliGRAM(s) Oral at bedtime  pantoprazole    Tablet 40 milliGRAM(s) Oral before breakfast  trimethoprim  160 mG/sulfamethoxazole 800 mG 1 Tablet(s) Oral daily      LABS: All Labs Reviewed:                        13.9   22.4  )-----------( 318      ( 22 Jan 2018 05:51 )             42.4                 Blood Culture: 01-19 @ 16:45  Organism --  Gram Stain Blood -- Gram Stain --  Specimen Source .CSF spinal fluid  Culture-Blood --    01-18 @ 12:15  Organism --  Gram Stain Blood -- Gram Stain   No polymorphonuclear leukocytes per low power field  Red blood cells per low power field  No organisms seen per oil power field  by cytocentrifuge  Specimen Source .Body Fluid Pleural Fluid  Culture-Blood --    01-17 @ 12:28  Organism --  Gram Stain Blood -- Gram Stain --  Specimen Source .Blood aerobic  Culture-Blood --      Assessment and Plan  	  48 y/o male with h/o Stage 4 Lung CA with mets to brain 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   Found to have 103.6F and complained of rigors. Found to be encephalopathic. Lactic acid 2.4    1. Severe sepsis secondary to Acute bacterial meningitis with MSSA. Right frontal lobe fluid collection ?postoperative ?abscess.   -Ommaya site infecton s/p port removal.  and s/p clean and wash out of the right frotnal lobe ommaya site.   -leukocytosis persistent  -encephalopathy is improved however, he is axox3  -has remained afebrile  -s/p LP >>  consistent with ongoing bacterial meningitis  -Dr. Cee to advice   -Infectious disease recco to c/w antibiotics >> cw/ cefepime and bactrim    2-RUL pneumonia with loculated left pleural effusion ?empyema.  -thoracentesis shows pleural fluid with pH 7.47 - empyema is unlikely    3- Lung CA stage 4 with brain metastasis. Immunocompromised host.     4-DVT proph- sc heparin CHIEF COMPLAINT/Diagnosis: lung Ca w/ METS to brain/ Rt frontal lobe fluid collection s/p washout/ meningitis/ommaya site infection    SUBJECTIVE: no complaints    REVIEW OF SYSTEMS:    CONSTITUTIONAL: No weakness, fevers or chills  EYES/ENT: No visual changes;  No vertigo or throat pain   NECK: No pain or stiffness  RESPIRATORY: No cough, wheezing, hemoptysis; No shortness of breath  CARDIOVASCULAR: No chest pain or palpitations  GASTROINTESTINAL: No abdominal or epigastric pain. No nausea, vomiting, or hematemesis; No diarrhea or constipation. No melena or hematochezia.  GENITOURINARY: No dysuria, frequency or hematuria  NEUROLOGICAL: No numbness or weakness  SKIN: No itching, burning, rashes, or lesions   All other review of systems is negative unless indicated above    Vital Signs Last 24 Hrs  T(C): 36.6 (22 Jan 2018 09:14), Max: 36.6 (22 Jan 2018 06:00)  T(F): 97.9 (22 Jan 2018 09:14), Max: 97.9 (22 Jan 2018 09:14)  HR: 93 (22 Jan 2018 09:00) (78 - 110)  BP: 117/80 (22 Jan 2018 09:00) (103/72 - 122/95)  BP(mean): 88 (22 Jan 2018 09:00) (78 - 105)  RR: 18 (22 Jan 2018 09:00) (17 - 30)  SpO2: 99% (22 Jan 2018 09:00) (97% - 100%)    I&O's Summary    21 Jan 2018 07:01  -  22 Jan 2018 07:00  --------------------------------------------------------  IN: 100 mL / OUT: 0 mL / NET: 100 mL        CAPILLARY BLOOD GLUCOSE          PHYSICAL EXAM:    Constitutional: NAD, awake and alert, well-developed  HEENT: PERR, EOMI, Normal Hearing, MMM  Neck: Soft and supple, No LAD, No JVD  Respiratory: Breath sounds are clear bilaterally, No wheezing, rales or rhonchi  Cardiovascular: S1 and S2, regular rate and rhythm, no Murmurs, gallops or rubs  Gastrointestinal: Bowel Sounds present, soft, nontender, nondistended, no guarding, no rebound  Extremities: No peripheral edema  Vascular: 2+ peripheral pulses  Neurological: A/O x 3, no focal deficits  Musculoskeletal: 5/5 strength b/l upper and lower extremities  Skin: No rashes    MEDICATIONS:  MEDICATIONS  (STANDING):  ALBUTerol    90 MICROgram(s) HFA Inhaler 1 Puff(s) Inhalation every 4 hours  cefepime  IVPB      cefepime  IVPB 2000 milliGRAM(s) IV Intermittent every 8 hours  colchicine 0.6 milliGRAM(s) Oral two times a day  dexamethasone  Injectable 2 milliGRAM(s) IV Push three times a day  folic acid 1 milliGRAM(s) Oral daily  heparin  Injectable 5000 Unit(s) SubCutaneous every 8 hours  metoprolol     tartrate 25 milliGRAM(s) Oral two times a day  mirtazapine 15 milliGRAM(s) Oral at bedtime  pantoprazole    Tablet 40 milliGRAM(s) Oral before breakfast  trimethoprim  160 mG/sulfamethoxazole 800 mG 1 Tablet(s) Oral daily      LABS: All Labs Reviewed:                        13.9   22.4  )-----------( 318      ( 22 Jan 2018 05:51 )             42.4                 Blood Culture: 01-19 @ 16:45  Organism --  Gram Stain Blood -- Gram Stain --  Specimen Source .CSF spinal fluid  Culture-Blood --    01-18 @ 12:15  Organism --  Gram Stain Blood -- Gram Stain   No polymorphonuclear leukocytes per low power field  Red blood cells per low power field  No organisms seen per oil power field  by cytocentrifuge  Specimen Source .Body Fluid Pleural Fluid  Culture-Blood --    01-17 @ 12:28  Organism --  Gram Stain Blood -- Gram Stain --  Specimen Source .Blood aerobic  Culture-Blood --      Assessment and Plan  	  48 y/o male with h/o Stage 4 Lung CA with mets to brain 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   Found to have 103.6F and complained of rigors. Found to be encephalopathic. Lactic acid 2.4    1. Severe sepsis secondary to Acute bacterial meningitis with MSSA. Right frontal lobe fluid collection ?postoperative ?abscess.   -Ommaya site infecton s/p port removal.  and s/p clean and wash out of the right frotnal lobe ommaya site.   -leukocytosis persistent- possibly secondary to known malignancy?  -tempurature spikes are improving; remains afebrile  -encephalopathy is improved , he is axox3  -s/p LP  1/18 >>  consistent with ongoing bacterial meningitis  ; GRam stain from 1/18 , no organisms seen (was able to speak with core lab, and add on the gram stain + culture to the acid fast sample)  -Dr. Cee  [facility] apprecaited  -Infectious disease recco to c/w antibiotics >> broaden coverage >> Nafcillin day 1 (previously on cefepime)   -c/w bactrim  -Hem/onc Dr. Arenas has ordred repeat LP today.   -Plan: will likely take this immunocomprimised patient longer for treatment. We will c/w Nafcillin and bactrim for now. He will need repeat LP to ensure improvement / resolution of the meningitis prior to discharge.     2-RUL pneumonia with loculated left pleural effusion ?empyema.  -thoracentesis shows pleural fluid with pH 7.47 - empyema is unlikely    3- Lung CA stage 4 with brain metastasis. Immunocompromised host.     4-DVT proph- sc heparin CHIEF COMPLAINT/Diagnosis: lung Ca w/ METS to brain/ Rt frontal lobe fluid collection s/p washout/ meningitis/ommaya site infection    SUBJECTIVE: no complaints    REVIEW OF SYSTEMS:    CONSTITUTIONAL: No weakness, fevers or chills  EYES/ENT: No visual changes;  No vertigo or throat pain   NECK: No pain or stiffness  RESPIRATORY: No cough, wheezing, hemoptysis; No shortness of breath  CARDIOVASCULAR: No chest pain or palpitations  GASTROINTESTINAL: No abdominal or epigastric pain. No nausea, vomiting, or hematemesis; No diarrhea or constipation. No melena or hematochezia.  GENITOURINARY: No dysuria, frequency or hematuria  NEUROLOGICAL: No numbness or weakness  SKIN: No itching, burning, rashes, or lesions   All other review of systems is negative unless indicated above    Vital Signs Last 24 Hrs  T(C): 36.6 (22 Jan 2018 09:14), Max: 36.6 (22 Jan 2018 06:00)  T(F): 97.9 (22 Jan 2018 09:14), Max: 97.9 (22 Jan 2018 09:14)  HR: 93 (22 Jan 2018 09:00) (78 - 110)  BP: 117/80 (22 Jan 2018 09:00) (103/72 - 122/95)  BP(mean): 88 (22 Jan 2018 09:00) (78 - 105)  RR: 18 (22 Jan 2018 09:00) (17 - 30)  SpO2: 99% (22 Jan 2018 09:00) (97% - 100%)    I&O's Summary    21 Jan 2018 07:01  -  22 Jan 2018 07:00  --------------------------------------------------------  IN: 100 mL / OUT: 0 mL / NET: 100 mL        CAPILLARY BLOOD GLUCOSE          PHYSICAL EXAM:    Constitutional: NAD, awake and alert, well-developed  HEENT: PERR, EOMI, Normal Hearing, MMM  Neck: Soft and supple, No LAD, No JVD  Respiratory: Breath sounds are clear bilaterally, No wheezing, rales or rhonchi  Cardiovascular: S1 and S2, regular rate and rhythm, no Murmurs, gallops or rubs  Gastrointestinal: Bowel Sounds present, soft, nontender, nondistended, no guarding, no rebound  Extremities: No peripheral edema  Vascular: 2+ peripheral pulses  Neurological: A/O x 3, no focal deficits  Musculoskeletal: 5/5 strength b/l upper and lower extremities  Skin: No rashes    MEDICATIONS:  MEDICATIONS  (STANDING):  ALBUTerol    90 MICROgram(s) HFA Inhaler 1 Puff(s) Inhalation every 4 hours  cefepime  IVPB      cefepime  IVPB 2000 milliGRAM(s) IV Intermittent every 8 hours  colchicine 0.6 milliGRAM(s) Oral two times a day  dexamethasone  Injectable 2 milliGRAM(s) IV Push three times a day  folic acid 1 milliGRAM(s) Oral daily  heparin  Injectable 5000 Unit(s) SubCutaneous every 8 hours  metoprolol     tartrate 25 milliGRAM(s) Oral two times a day  mirtazapine 15 milliGRAM(s) Oral at bedtime  pantoprazole    Tablet 40 milliGRAM(s) Oral before breakfast  trimethoprim  160 mG/sulfamethoxazole 800 mG 1 Tablet(s) Oral daily      LABS: All Labs Reviewed:                        13.9   22.4  )-----------( 318      ( 22 Jan 2018 05:51 )             42.4                 Blood Culture: 01-19 @ 16:45  Organism --  Gram Stain Blood -- Gram Stain --  Specimen Source .CSF spinal fluid  Culture-Blood --    01-18 @ 12:15  Organism --  Gram Stain Blood -- Gram Stain   No polymorphonuclear leukocytes per low power field  Red blood cells per low power field  No organisms seen per oil power field  by cytocentrifuge  Specimen Source .Body Fluid Pleural Fluid  Culture-Blood --    01-17 @ 12:28  Organism --  Gram Stain Blood -- Gram Stain --  Specimen Source .Blood aerobic  Culture-Blood --      Assessment and Plan  	  46 y/o male with h/o Stage 4 Lung CA with mets to brain 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   Found to have 103.6F and complained of rigors. Found to be encephalopathic. Lactic acid 2.4    1. Severe sepsis secondary to Acute bacterial meningitis with MSSA. Right frontal lobe fluid collection ?postoperative ?abscess.   -Ommaya site infecton s/p port removal.  and s/p clean and wash out of the right frotnal lobe ommaya site.   -leukocytosis persistent- possibly secondary to known malignancy?  -tempurature spikes are improving; remains afebrile  -encephalopathy is improved , he is axox3  -s/p LP  1/18 >>  consistent with ongoing bacterial meningitis  ; GRam stain from 1/18 , no organisms seen (was able to speak with core lab, and add on the gram stain + culture to the acid fast sample; reported 1/22 11:19 AM)  -Dr. Cee  [facility] apprecaited  -Infectious disease recco to c/w antibiotics >> broaden coverage >> Nafcillin day 1 (previously on cefepime)   -c/w bactrim  -Hem/onc Dr. Arenas has ordred repeat LP today.   -Plan: will likely take this immunocomprimised patient longer for treatment. We will c/w Nafcillin and bactrim for now. He will need repeat LP to ensure improvement / resolution of the meningitis prior to discharge.     2-RUL pneumonia with loculated left pleural effusion ?empyema.  -thoracentesis shows pleural fluid with pH 7.47 - empyema is unlikely    3- Lung CA stage 4 with brain metastasis. Immunocompromised host.     4-DVT proph- sc heparin CHIEF COMPLAINT/Diagnosis: lung Ca w/ METS to brain/ Rt frontal lobe fluid collection s/p washout/ meningitis/ommaya site infection    SUBJECTIVE: no complaints    REVIEW OF SYSTEMS:    CONSTITUTIONAL: No weakness, fevers or chills  EYES/ENT: No visual changes;  No vertigo or throat pain   NECK: No pain or stiffness  RESPIRATORY: No cough, wheezing, hemoptysis; No shortness of breath  CARDIOVASCULAR: No chest pain or palpitations  GASTROINTESTINAL: No abdominal or epigastric pain. No nausea, vomiting, or hematemesis; No diarrhea or constipation. No melena or hematochezia.  GENITOURINARY: No dysuria, frequency or hematuria  NEUROLOGICAL: No numbness or weakness  SKIN: No itching, burning, rashes, or lesions   All other review of systems is negative unless indicated above    Vital Signs Last 24 Hrs  T(C): 36.6 (22 Jan 2018 09:14), Max: 36.6 (22 Jan 2018 06:00)  T(F): 97.9 (22 Jan 2018 09:14), Max: 97.9 (22 Jan 2018 09:14)  HR: 93 (22 Jan 2018 09:00) (78 - 110)  BP: 117/80 (22 Jan 2018 09:00) (103/72 - 122/95)  BP(mean): 88 (22 Jan 2018 09:00) (78 - 105)  RR: 18 (22 Jan 2018 09:00) (17 - 30)  SpO2: 99% (22 Jan 2018 09:00) (97% - 100%)    I&O's Summary    21 Jan 2018 07:01  -  22 Jan 2018 07:00  --------------------------------------------------------  IN: 100 mL / OUT: 0 mL / NET: 100 mL        CAPILLARY BLOOD GLUCOSE          PHYSICAL EXAM:    Constitutional: NAD, awake and alert, well-developed  HEENT: PERR, EOMI, Normal Hearing, MMM  Neck: Soft and supple, No LAD, No JVD  Respiratory: Breath sounds are clear bilaterally, No wheezing, rales or rhonchi  Cardiovascular: S1 and S2, regular rate and rhythm, no Murmurs, gallops or rubs  Gastrointestinal: Bowel Sounds present, soft, nontender, nondistended, no guarding, no rebound  Extremities: No peripheral edema  Vascular: 2+ peripheral pulses  Neurological: A/O x 3, no focal deficits  Musculoskeletal: 5/5 strength b/l upper and lower extremities  Skin: No rashes    MEDICATIONS:  MEDICATIONS  (STANDING):  ALBUTerol    90 MICROgram(s) HFA Inhaler 1 Puff(s) Inhalation every 4 hours  cefepime  IVPB      cefepime  IVPB 2000 milliGRAM(s) IV Intermittent every 8 hours  colchicine 0.6 milliGRAM(s) Oral two times a day  dexamethasone  Injectable 2 milliGRAM(s) IV Push three times a day  folic acid 1 milliGRAM(s) Oral daily  heparin  Injectable 5000 Unit(s) SubCutaneous every 8 hours  metoprolol     tartrate 25 milliGRAM(s) Oral two times a day  mirtazapine 15 milliGRAM(s) Oral at bedtime  pantoprazole    Tablet 40 milliGRAM(s) Oral before breakfast  trimethoprim  160 mG/sulfamethoxazole 800 mG 1 Tablet(s) Oral daily      LABS: All Labs Reviewed:                        13.9   22.4  )-----------( 318      ( 22 Jan 2018 05:51 )             42.4                 Blood Culture: 01-19 @ 16:45  Organism --  Gram Stain Blood -- Gram Stain --  Specimen Source .CSF spinal fluid  Culture-Blood --    01-18 @ 12:15  Organism --  Gram Stain Blood -- Gram Stain   No polymorphonuclear leukocytes per low power field  Red blood cells per low power field  No organisms seen per oil power field  by cytocentrifuge  Specimen Source .Body Fluid Pleural Fluid  Culture-Blood --    01-17 @ 12:28  Organism --  Gram Stain Blood -- Gram Stain --  Specimen Source .Blood aerobic  Culture-Blood --      Assessment and Plan  	  46 y/o male with h/o Stage 4 Lung CA with mets to brain 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   Found to have 103.6F and complained of rigors. Found to be encephalopathic. Lactic acid 2.4    1. Severe sepsis secondary to Acute bacterial meningitis with MSSA. Right frontal lobe fluid collection ?postoperative ?abscess.   -Ommaya site infecton s/p port removal.  and s/p clean and wash out of the right frotnal lobe ommaya site.   -leukocytosis persistent- possibly secondary to known malignancy?  -tempurature spikes are improving; remains afebrile  -encephalopathy is improved , he is axox3  -s/p LP  1/18 >>  consistent with ongoing bacterial meningitis however improved CSF cell count since LP from 1/11 ; GRam stain from 1/18 , no organisms seen (was able to speak with core lab, and add on the gram stain + culture to the acid fast sample; reported 1/22 11:19 AM)  -Dr. Cee  [facility] apprecaited  -Infectious disease recco to c/w antibiotics >> broaden coverage >> Nafcillin day 1 (previously on cefepime)   -c/w bactrim  -Hem/onc Dr. Arenas has ordred repeat LP today.   -Plan: will likely take this immunocomprimised patient longer for treatment. We will c/w Nafcillin and bactrim for now. He will need repeat LP to ensure improvement / resolution of the meningitis prior to discharge.     2-RUL pneumonia with loculated left pleural effusion ?empyema.  -thoracentesis shows pleural fluid with pH 7.47 - empyema is unlikely    3- Lung CA stage 4 with brain metastasis. Immunocompromised host.     4-DVT proph- sc heparin

## 2018-01-22 NOTE — DIETITIAN INITIAL EVALUATION ADULT. - PERTINENT MEDS FT
heparin, BActrim, Maxipime, Valium, Dilaudid, Ativan, Protonix, Robitussin, Lopressor, Albuterol, tylenol, Remeron, Folic acid, Colcrys,

## 2018-01-23 LAB
ANION GAP SERPL CALC-SCNC: 8 MMOL/L — SIGNIFICANT CHANGE UP (ref 5–17)
BUN SERPL-MCNC: 16 MG/DL — SIGNIFICANT CHANGE UP (ref 7–23)
CALCIUM SERPL-MCNC: 9.8 MG/DL — SIGNIFICANT CHANGE UP (ref 8.5–10.1)
CHLORIDE SERPL-SCNC: 99 MMOL/L — SIGNIFICANT CHANGE UP (ref 96–108)
CO2 SERPL-SCNC: 26 MMOL/L — SIGNIFICANT CHANGE UP (ref 22–31)
CREAT SERPL-MCNC: 0.85 MG/DL — SIGNIFICANT CHANGE UP (ref 0.5–1.3)
CULTURE RESULTS: SIGNIFICANT CHANGE UP
GLUCOSE SERPL-MCNC: 133 MG/DL — HIGH (ref 70–99)
HCT VFR BLD CALC: 39.3 % — SIGNIFICANT CHANGE UP (ref 39–50)
HGB BLD-MCNC: 13 G/DL — SIGNIFICANT CHANGE UP (ref 13–17)
MCHC RBC-ENTMCNC: 31 PG — SIGNIFICANT CHANGE UP (ref 27–34)
MCHC RBC-ENTMCNC: 33.1 GM/DL — SIGNIFICANT CHANGE UP (ref 32–36)
MCV RBC AUTO: 93.6 FL — SIGNIFICANT CHANGE UP (ref 80–100)
PLATELET # BLD AUTO: 282 K/UL — SIGNIFICANT CHANGE UP (ref 150–400)
POTASSIUM SERPL-MCNC: 4.1 MMOL/L — SIGNIFICANT CHANGE UP (ref 3.5–5.3)
POTASSIUM SERPL-SCNC: 4.1 MMOL/L — SIGNIFICANT CHANGE UP (ref 3.5–5.3)
RBC # BLD: 4.2 M/UL — SIGNIFICANT CHANGE UP (ref 4.2–5.8)
RBC # FLD: 18.9 % — HIGH (ref 10.3–14.5)
SODIUM SERPL-SCNC: 133 MMOL/L — LOW (ref 135–145)
SPECIMEN SOURCE: SIGNIFICANT CHANGE UP
WBC # BLD: 17.9 K/UL — HIGH (ref 3.8–10.5)
WBC # FLD AUTO: 17.9 K/UL — HIGH (ref 3.8–10.5)

## 2018-01-23 PROCEDURE — 99233 SBSQ HOSP IP/OBS HIGH 50: CPT

## 2018-01-23 RX ORDER — NAFCILLIN 10 G/100ML
2 INJECTION, POWDER, FOR SOLUTION INTRAVENOUS EVERY 4 HOURS
Qty: 0 | Refills: 0 | Status: DISCONTINUED | OUTPATIENT
Start: 2018-01-23 | End: 2018-01-31

## 2018-01-23 RX ADMIN — Medication 100 MILLIGRAM(S): at 13:14

## 2018-01-23 RX ADMIN — NAFCILLIN 200 GRAM(S): 10 INJECTION, POWDER, FOR SOLUTION INTRAVENOUS at 21:12

## 2018-01-23 RX ADMIN — MIRTAZAPINE 15 MILLIGRAM(S): 45 TABLET, ORALLY DISINTEGRATING ORAL at 21:12

## 2018-01-23 RX ADMIN — PANTOPRAZOLE SODIUM 40 MILLIGRAM(S): 20 TABLET, DELAYED RELEASE ORAL at 05:20

## 2018-01-23 RX ADMIN — NAFCILLIN 200 GRAM(S): 10 INJECTION, POWDER, FOR SOLUTION INTRAVENOUS at 11:14

## 2018-01-23 RX ADMIN — NAFCILLIN 200 GRAM(S): 10 INJECTION, POWDER, FOR SOLUTION INTRAVENOUS at 07:29

## 2018-01-23 RX ADMIN — Medication 25 MILLIGRAM(S): at 05:19

## 2018-01-23 RX ADMIN — Medication 2 MILLIGRAM(S): at 05:19

## 2018-01-23 RX ADMIN — HYDROMORPHONE HYDROCHLORIDE 2 MILLIGRAM(S): 2 INJECTION INTRAMUSCULAR; INTRAVENOUS; SUBCUTANEOUS at 19:50

## 2018-01-23 RX ADMIN — Medication 0.5 MILLIGRAM(S): at 19:57

## 2018-01-23 RX ADMIN — HEPARIN SODIUM 5000 UNIT(S): 5000 INJECTION INTRAVENOUS; SUBCUTANEOUS at 21:12

## 2018-01-23 RX ADMIN — NAFCILLIN 200 GRAM(S): 10 INJECTION, POWDER, FOR SOLUTION INTRAVENOUS at 14:59

## 2018-01-23 RX ADMIN — NAFCILLIN 200 GRAM(S): 10 INJECTION, POWDER, FOR SOLUTION INTRAVENOUS at 04:02

## 2018-01-23 RX ADMIN — Medication 25 MILLIGRAM(S): at 18:13

## 2018-01-23 RX ADMIN — HYDROMORPHONE HYDROCHLORIDE 2 MILLIGRAM(S): 2 INJECTION INTRAMUSCULAR; INTRAVENOUS; SUBCUTANEOUS at 18:21

## 2018-01-23 RX ADMIN — Medication 100 MILLIGRAM(S): at 05:19

## 2018-01-23 RX ADMIN — Medication 1 TABLET(S): at 12:47

## 2018-01-23 RX ADMIN — Medication 2 MILLIGRAM(S): at 15:03

## 2018-01-23 RX ADMIN — NAFCILLIN 200 GRAM(S): 10 INJECTION, POWDER, FOR SOLUTION INTRAVENOUS at 18:52

## 2018-01-23 RX ADMIN — Medication 2 MILLIGRAM(S): at 21:12

## 2018-01-23 RX ADMIN — Medication 0.6 MILLIGRAM(S): at 05:19

## 2018-01-23 RX ADMIN — Medication 1 MILLIGRAM(S): at 12:46

## 2018-01-23 RX ADMIN — Medication 0.6 MILLIGRAM(S): at 18:13

## 2018-01-23 NOTE — PROGRESS NOTE ADULT - SUBJECTIVE AND OBJECTIVE BOX
46 y/o gentleman with h/o Stage IV Lung CA with mets to brain 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  Patient completed 10 courses of  radiation treatment 1 day PTA. Patient complaint of  severe headache and an episode of vomiting 3 days prior to admission. The day of admission, the pt developed a fever 0f 103.6F and complained of rigors. He was also noted to be confused.  In ED pt found to have temp of 103.6 rectally. He received vanco IV and cefepime.     Patient is alert and responsive,  respond questions and follow commands.     WBC decreasing to 17.9.       ICU Vital Signs Last 24 Hrs  T(C): 36.2 (23 Jan 2018 13:36), Max: 36.3 (22 Jan 2018 21:37)  T(F): 97.2 (23 Jan 2018 13:36), Max: 97.3 (22 Jan 2018 21:37)  HR: 96 (23 Jan 2018 10:20) (85 - 109)  BP: 125/86 (23 Jan 2018 10:20) (104/82 - 134/86)  BP(mean): 96 (23 Jan 2018 10:20) (40 - 99)  ABP: --  ABP(mean): --  RR: 20 (23 Jan 2018 10:20) (0 - 27)  SpO2: 99% (23 Jan 2018 10:20) (97% - 100%)    General gentleman in NAD.  HEENT, [facility] site with stitches dry and intact.   Lungs bilaterally decrease breath sounds.  CVS S1 S2 regular, no M/R/G  Abdomen soft NT ND positive for BS, no organomegaly.  Extremities: No C/C/E. positive for bilateral LE abrasions, dry, healing.      MEDICATIONS  (STANDING):  ALBUTerol    90 MICROgram(s) HFA Inhaler 1 Puff(s) Inhalation every 4 hours  colchicine 0.6 milliGRAM(s) Oral two times a day  dexamethasone  Injectable 2 milliGRAM(s) IV Push three times a day  folic acid 1 milliGRAM(s) Oral daily  heparin  Injectable 5000 Unit(s) SubCutaneous every 8 hours  metoprolol     tartrate 25 milliGRAM(s) Oral two times a day  mirtazapine 15 milliGRAM(s) Oral at bedtime  nafcillin  IVPB 2 Gram(s) IV Intermittent every 4 hours  pantoprazole    Tablet 40 milliGRAM(s) Oral before breakfast  trimethoprim  160 mG/sulfamethoxazole 800 mG 1 Tablet(s) Oral daily      Culture - Fungal, Body Fluid (01.18.18 @ 12:15)    Specimen Source: Pleural Fl None    Culture Results:   Testing in progress      Amylase, Body Fluid (01.18.18 @ 12:15)    Fluid Source: Pleural    Amylase, Body Fluid: 23: Reference Ranges have NOT been established for analytes in body fluids  because of variability in body fluid composition.  The  has not determined the efficacy of this test when  performed on fluid specimens. The performance characteristics of this  test were determined by [facility] [employer] [facility] [employer]. U/L      Protein Total, Fluid (01.18.18 @ 12:15)    Protein Total, Fluid: 3.3: Test Repeated  Reference Ranges have NOT been established for analytes in body fluids  because of variability in body fluid composition.  The  has not determined the efficacy of this test when  performed on fluid specimens. The performance characteristics of this  test were determined by [facility] [employer] [facility] [employer]. g/dL    pH, Fluid (01.18.18 @ 12:15)    pH, Fluid: 7.47    Fluid Source: pleural    Glucose, Fluid (01.18.18 @ 12:15)    Glucose, Fluid: 134: Reference Ranges have NOT  been established for analytes in body fluids  because of variability in body fluid composition.  The  has not determined the efficacy of this test when  performed on fluid specimens. The performance characteristics of this  test were determined by [facility] [employer]. mg/dL        Protein, CSF (01.19.18 @ 16:45)    Protein, CSF: 192 mg/dL    Glucose, CSF (01.19.18 @ 16:45)    Glucose, CSF: 24 mg/dL    Cerebrospinal Fluid Cell Count-1 (01.11.18 @ 21:14)    Total Nucleated Cell Count, CSF: 744 /uL    RBC Count - Spinal Fluid: 62 /uL    Tube Type: Tube 3    CSF Color: See Note: slightly xanthrochromic    CSF Lymphocytes: 3 %    CSF Appearance: Slightly Cloudy    CSF Monocytes/Macrophages: 1 %    CSF Segmented Neutrophils: 96 %      Basic Metabolic Panel in AM (01.23.18 @ 05:01)    Sodium, Serum: 133 mmol/L    Potassium, Serum: 4.1 mmol/L    Chloride, Serum: 99 mmol/L    Carbon Dioxide, Serum: 26 mmol/L    Anion Gap, Serum: 8 mmol/L    Blood Urea Nitrogen, Serum: 16 mg/dL    Creatinine, Serum: 0.85 mg/dL    Glucose, Serum: 133 mg/dL    Calcium, Total Serum: 9.8 mg/dL    eGFR if Non : 104: Interpretative comment  The units for eGFR are ml/min/1.73m2 (normalized body surface area). The  eGFR is calculated from a serum creatinine using the CKD-EPI equation.  Other variables required for calculation are race, age and sex. Among  patients with chronic kidney disease (CKD), the eGFR is useful in  determining the stage of disease according to KDOQI CKD classification.  All eGFR results are reported numerically with the following  interpretation.          GFR                    With                 Without     (ml/min/1.73 m2)    Kidney Damage       Kidney Damage        >= 90                    Stage 1                     Normal        60-89                    Stage 2                     Decreased GFR        30-59     Stage 3                     Stage 3        15-29                    Stage 4                     Stage 4        < 15                      Stage 5                     Stage 5  Each stage of CKD assumes that the associated GFR level has been in  effect for at least 3 months. Determination of stages one and two (with  eGFR > 59 ml/min/m2) requires estimation of kidney damage for at least 3  months as defined by structural or functional abnormalities.  Limitations: All estimates of GFR will be less accurate for patients at  extremes of muscle mass (including but not limited to frail elderly,  critically ill, or cancer patients), those with unusual diets, and those  with conditions associated with reduced secretion or extrarenal  elimination of creatinine. The eGFR equation is not recommended for use  in patients with unstable creatinine levels. mL/min/1.73M2    eGFR if African American: 120 mL/min/1.73M2        Complete Blood Count in AM (01.23.18 @ 05:01)    WBC Count: 17.9 K/uL    RBC Count: 4.20 M/uL    Hemoglobin: 13.0 g/dL    Hematocrit: 39.3 %    Mean Cell Volume: 93.6 fl    Mean Cell Hemoglobin: 31.0 pg    Mean Cell Hemoglobin Conc: 33.1 gm/dL    Red Cell Distrib Width: 18.9 %    Platelet Count - Automated: 282 K/uL          MRI of the brain 1-19-18  FINDINGS:   MRI dated 12/15/2017 available for review.         The brain demonstrates minimal decrease in size of the multiple   intraparenchymal and leptomeningeal metastatic lesions scattered   throughout the brain. The Ommaya shunt catheter has been removed. Edema   and gliosis is seen about the shunt catheter tract in the RIGHT frontal   lobe. Tiny RIGHT subdural hygroma is noted. No acute cerebral cortical   infarct is found.   No intracranial hemorrhage is recognized.  No mass   effect is found in the brain.          The ventricles, sulci and basal cisterns appear unremarkable.    The vertebral and internal carotid arteries demonstrate expected flow   voids indicating their patency.         The orbits are unremarkable.  The paranasal sinuses are clear.  The nasal   cavity appears intact.  The nasopharynx is symmetric.  The central skull   base and petrous temporal bones are intact.  The calvarium appears   unremarkable.      IMPRESSION:  minimal decrease in size of the multiple intraparenchymal   and leptomeningeal metastatic lesions scattered throughout the brain. The   Ommaya shunt catheter has been removed. Edema and gliosis is seen about   the shunt catheter tract in the RIGHT frontal lobe.  Tiny RIGHT frontal   subdural hygroma is noted

## 2018-01-23 NOTE — PROGRESS NOTE ADULT - SUBJECTIVE AND OBJECTIVE BOX
Patient is a 47y old  Male who presents with a chief complaint of fever, AMS (2018 16:20)    HPI:  48 y/o male with h/o Stage 4 Lung CA with mets to brain s/p Ommaya reservoir 2017 for leptomeningeal disease s/p chemo and radiation, anxiety, depression, h/o nephrolithiasis, h/o pericarditis was admitted on  for fever and AMS. Pt was lethargic and unable to give history. As per sister, pt completed 10 course radiation treatment 1 day PTA. 3 days ago he was complaining of severe headache and also had episode of vomiting. He was seen at oncologist office and was given morphine and valium. The day of admission, the pt developed a fever 0f 103.6F and complained of rigors. He was also noted to be confused.  In ED pt found to have temp of 103.6 rectally. He received vanco IV and cefepime.     Alert and verbal; confused at times according to sister  Comfortable  Ambulatory with help  No fever or chills    MEDICATIONS  (STANDING):  ALBUTerol    90 MICROgram(s) HFA Inhaler 1 Puff(s) Inhalation every 4 hours  colchicine 0.6 milliGRAM(s) Oral two times a day  dexamethasone  Injectable 2 milliGRAM(s) IV Push three times a day  folic acid 1 milliGRAM(s) Oral daily  heparin  Injectable 5000 Unit(s) SubCutaneous every 8 hours  metoprolol     tartrate 25 milliGRAM(s) Oral two times a day  mirtazapine 15 milliGRAM(s) Oral at bedtime  nafcillin  IVPB 2 Gram(s) IV Intermittent every 4 hours  nafcillin  IVPB      pantoprazole    Tablet 40 milliGRAM(s) Oral before breakfast  trimethoprim  160 mG/sulfamethoxazole 800 mG 1 Tablet(s) Oral daily    MEDICATIONS  (PRN):  acetaminophen   Tablet 650 milliGRAM(s) Oral every 6 hours PRN For Temp greater than 38 C (100.4 F)  ALBUTerol    0.083% 2.5 milliGRAM(s) Nebulizer every 6 hours PRN Shortness of Breath and/or Wheezing  diazepam    Tablet 0.5 milliGRAM(s) Oral three times a day PRN agitation, anxiety  guaiFENesin    Syrup 100 milliGRAM(s) Oral every 6 hours PRN Cough  HYDROmorphone   Tablet 2 milliGRAM(s) Oral four times a day PRN Severe Pain (7 - 10)  LORazepam   Injectable 1 milliGRAM(s) IV Push every 30 minutes PRN Agitation      Vital Signs Last 24 Hrs  T(C): 36.1 (2018 06:14), Max: 36.3 (2018 21:37)  T(F): 97 (2018 06:14), Max: 97.3 (2018 21:37)  HR: 99 (2018 06:00) (85 - 118)  BP: 125/90 (2018 05:00) (104/82 - 134/86)  BP(mean): 99 (2018 05:00) (40 - 99)  RR: 18 (2018 06:00) (0 - 30)  SpO2: 99% (2018 06:00) (97% - 100%)    Physical Exam:      Constitutional: frail looking  HEENT: NC/AT, EOMI, PERRLA; postop scalp wound clean  Neck: supple  Back: no tenderness  Respiratory: crackles at bases; decreased BS  Cardiovascular: S1S2 regular, no murmurs  Abdomen: soft, not tender, not distended, positive BS  Rectal: deferred  Musculoskeletal: no muscle tenderness, no joint swelling or tenderness  Extremities: no pedal edema  Neurological: alert, moving all extremities; b/l calf abrasions, dry  Skin: no rashes    Labs:             Protein, CSF (18 @ 16:45)    Protein, CSF: 192 mg/dL    Glucose, CSF (18 @ 16:45)    Glucose, CSF: 24 mg/dL    Cerebrospinal Fluid Cell Count-1 (18 @ 16:45)    CSF Segmented Neutrophils: 43 %    Total Nucleated Cell Count, CSF: 114 /uL    CSF Color: Xanthochromic    CSF Appearance: Clear    CSF Lymphocytes: 41 %    CSF Monocytes/Macrophages: 16 %                 13.9   22.4  )-----------( 318      ( 2018 05:51 )             42.4                           14.4   20.5  )-----------( 367      ( 2018 04:43 )             43.2     01-18    131<L>  |  96  |  16  ----------------------------<  121<H>  4.2   |  27  |  0.93    Ca    10.2<H>      2018 04:43  Phos  2.1     01-17                        14.4   17.8  )-----------( 382      ( 2018 05:55 )             42.9     01-17    131<L>  |  96  |  17  ----------------------------<  105<H>  3.8   |  28  |  0.87    Ca    10.4<H>      2018 05:55  Phos  2.1     01-17  Mg     2.3     -16                        12.9   16.8  )-----------( 315      ( 15 Demetrius 2018 05:29 )             38.9     -15    137  |  104  |  21  ----------------------------<  98  4.1   |  26  |  0.74    Ca    9.6      15 Demetrius 2018 05:29  Phos  2.8     01-15  Mg     2.4     -15                        15.3   19.6  )-----------( 291      ( 2018 05:26 )             53.0     -13    132<L>  |  100  |  17  ----------------------------<  95  4.2   |  24  |  1.09    Ca    10.5<H>      2018 05:26  Phos  3.0     -13  Mg     2.5              Vancomycin Level, Trough: 11.4 ug/mL ( @ 05:26)                          15.0   26.8  )-----------( 382      ( 2018 07:30 )             45.7     -12    131<L>  |  95<L>  |  16  ----------------------------<  139<H>  3.8   |  27  |  1.03    Ca    10.2<H>      2018 07:30    TPro  8.4<H>  /  Alb  3.3  /  TBili  0.8  /  DBili  x   /  AST  34  /  ALT  83<H>  /  AlkPhos  122<H>  01-11     LIVER FUNCTIONS - ( 2018 11:19 )  Alb: 3.3 g/dL / Pro: 8.4 gm/dL / ALK PHOS: 122 U/L / ALT: 83 U/L / AST: 34 U/L / GGT: x           Urinalysis Basic - ( 2018 12:33 )    Color: Yellow / Appearance: Clear / S.015 / pH: x  Gluc: x / Ketone: Negative  / Bili: Negative / Urobili: Negative mg/dL   Blood: x / Protein: 30 mg/dL / Nitrite: Negative   Leuk Esterase: Negative / RBC: 0-2 /HPF / WBC 0-2   Sq Epi: x / Non Sq Epi: Occasional / Bacteria: Occasional    Cerebrospinal Fluid Cell Count-1 (18 @ 21:14)    CSF Segmented Neutrophils: 96 %    Total Nucleated Cell Count, CSF: 744 /uL    CSF Appearance: Slightly Cloudy    CSF Lymphocytes: 3 %    CSF Monocytes/Macrophages: 1 %    CSF Color: See Note: slightly xanthrochromic      Culture - Body Fluid with Gram Stain (collected 2018 14:36)  Source: .Body Fluid scalp incision  Gram Stain (2018 06:33):    No polymorphonuclear cells seen    No organisms seen    by cytocentrifuge    Culture - Acid Fast - CSF (collected 2018 21:14)  Source: .CSF spinal fluid    Culture - Fungal, CSF (collected 2018 21:14)  Source: .CSF spinal fluid  Preliminary Report (2018 12:37):    Testing in progress    Culture - CSF with Gram Stain (collected 2018 21:14)  Source: .CSF  Gram Stain (2018 01:16):    polymorphonuclear leukocytes seen per low power field    Gram Variable Cocci seen per oil power field    by cytocentrifuge  Final Report (2018 07:36):    Numerous Staphylococcus aureus  Organism: Staphylococcus aureus (2018 07:36)  Organism: Staphylococcus aureus (2018 07:36)      -  Oxacillin: S 0.5      -  Penicillin: R >8      -  RIF- Rifampin: R >2      -  Trimethoprim/Sulfamethoxazole: S <=0.5/9.5      -  Vancomycin: S 2      Method Type: TAMMIE    Culture - Urine (collected 2018 12:33)  Source: .Urine None  Final Report (2018 18:38):    10,000 - 49,000 CFU/mL Coag Negative Staphylococcus    Culture - Blood (collected 2018 11:19)  Culture - Body Fluid with Gram Stain (18 @ 12:15)    Gram Stain:   No polymorphonuclear leukocytes per low power field  Red blood cells per low power field  No organisms seen per oil power field  by cytocentrifuge    Specimen Source: .Body Fluid Pleural Fluid    Culture Results:   No growth    Source: .Blood Blood-Peripheral  Preliminary Report (2018 15:):    No growth to date.    Culture - Blood (collected 2018 11:19)  Source: .Blood Blood-Peripheral  Preliminary Report (2018 15:01):    No growth to date.    Culture - Body Fluid with Gram Stain (18 @ 12:15)    Gram Stain:   No polymorphonuclear leukocytes per low power field  Red blood cells per low power field  No organisms seen per oil power field  by cytocentrifuge    Specimen Source: .Body Fluid Pleural Fluid    pH, Fluid (18 @ 12:15)    pH, Fluid: 7.47    Fluid Source: pleural          Radiology:    < from: CT Chest No Cont (18 @ 17:21) >  Several scattered groundglass nodules and opacities within the right   upper lobe, suggestive of pneumonia given the clinical context of fever.   Small to right pleural effusion is present. Loculated left pleural   effusion along the left lateral pleura and within the fissures is again   identified. Small pericardial effusion is present.    < end of copied text >    < from: CT Head No Cont (18 @ 13:09) >   unchanged RIGHT frontal Ommaya catheter  in place within   the anterior horn of the RIGHT lateral ventricle. Faint hyperdense   lesions scattered throughout the brain consistent with patient's known   metastatic disease. IV contrast would be needed for complete evaluation.     < end of copied text >    < from: CT Head No Cont (01.15.18 @ 09:43) >  Frontal kenia hole with small amount of encephalomalacia/gliosis within   the right frontal lobe. Fluid collection measuring approximately 4.7 x   1.1 cm and minimal associated hemorrhage within the scalp overlying the   right frontal borehole. Ventricular size is unchanged since prior exam.   Punctate calcification in the right frontal lobe is unchanged. The   underlying neoplastic process is difficult to evaluate CT imaging.     < end of copied text >      Advanced directives addressed: full resuscitation

## 2018-01-23 NOTE — PROGRESS NOTE ADULT - SUBJECTIVE AND OBJECTIVE BOX
CHIEF COMPLAINT/Diagnosis:  METS/ Lung CA/ Ommaya site infection/ abcess , washouut/ meningitis    SUBJECTIVE: no complaints    REVIEW OF SYSTEMS:    CONSTITUTIONAL: No weakness, fevers or chills  EYES/ENT: No visual changes;  No vertigo or throat pain   NECK: No pain or stiffness  RESPIRATORY: No cough, wheezing, hemoptysis; No shortness of breath  CARDIOVASCULAR: No chest pain or palpitations  GASTROINTESTINAL: No abdominal or epigastric pain. No nausea, vomiting, or hematemesis; No diarrhea or constipation. No melena or hematochezia.  GENITOURINARY: No dysuria, frequency or hematuria  NEUROLOGICAL: No numbness or weakness  SKIN: No itching, burning, rashes, or lesions   All other review of systems is negative unless indicated above    Vital Signs Last 24 Hrs  T(C): 36.3 (23 Jan 2018 21:28), Max: 36.3 (23 Jan 2018 21:28)  T(F): 97.4 (23 Jan 2018 21:28), Max: 97.4 (23 Jan 2018 21:28)  HR: 92 (23 Jan 2018 19:00) (85 - 124)  BP: 115/65 (23 Jan 2018 17:00) (102/78 - 134/86)  BP(mean): 78 (23 Jan 2018 17:00) (78 - 99)  RR: 23 (23 Jan 2018 19:00) (0 - 26)  SpO2: 98% (23 Jan 2018 19:00) (97% - 100%)    I&O's Summary    23 Jan 2018 07:01  -  23 Jan 2018 21:40  --------------------------------------------------------  IN: 300 mL / OUT: 0 mL / NET: 300 mL        CAPILLARY BLOOD GLUCOSE          PHYSICAL EXAM:    Constitutional: NAD, awake and alert, well-developed  HEENT: PERR, EOMI, Normal Hearing, MMM  Neck: Soft and supple, No LAD, No JVD  Respiratory: Breath sounds are clear bilaterally, No wheezing, rales or rhonchi  Cardiovascular: S1 and S2, regular rate and rhythm, no Murmurs, gallops or rubs  Gastrointestinal: Bowel Sounds present, soft, nontender, nondistended, no guarding, no rebound  Extremities: No peripheral edema  Vascular: 2+ peripheral pulses  Neurological: A/O x 3, no focal deficits  Musculoskeletal: 5/5 strength b/l upper and lower extremities  Skin: No rashes    MEDICATIONS:  MEDICATIONS  (STANDING):  ALBUTerol    90 MICROgram(s) HFA Inhaler 1 Puff(s) Inhalation every 4 hours  colchicine 0.6 milliGRAM(s) Oral two times a day  dexamethasone  Injectable 2 milliGRAM(s) IV Push three times a day  folic acid 1 milliGRAM(s) Oral daily  heparin  Injectable 5000 Unit(s) SubCutaneous every 8 hours  metoprolol     tartrate 25 milliGRAM(s) Oral two times a day  mirtazapine 15 milliGRAM(s) Oral at bedtime  nafcillin  IVPB 2 Gram(s) IV Intermittent every 4 hours  pantoprazole    Tablet 40 milliGRAM(s) Oral before breakfast  trimethoprim  160 mG/sulfamethoxazole 800 mG 1 Tablet(s) Oral daily      LABS: All Labs Reviewed:                        13.0   17.9  )-----------( 282      ( 23 Jan 2018 05:01 )             39.3     01-23    133<L>  |  99  |  16  ----------------------------<  133<H>  4.1   |  26  |  0.85    Ca    9.8      23 Jan 2018 05:01            Blood Culture: 01-22 @ 11:19  Organism --  Gram Stain Blood -- Gram Stain   No polymorphonuclear leukocytes per low power field  No organisms seen per oil power field  by cytocentrifuge  Specimen Source .CSF process spec as per doctor yana thank you  Culture-Blood --    01-19 @ 16:45  Organism --  Gram Stain Blood -- Gram Stain --  Specimen Source .CSF spinal fluid  Culture-Blood --        Assessment and Plan  	  48 y/o male with h/o Stage 4 Lung CA with [facility] for leptomeningeal disease s/p chemo and radiation, anxiety, depression, h/o nephrolithiasis, h/o pericarditis was admitted on 1/11 for fever and AMS.   Found to have 103.6F and complained of rigors. Found to be encephalopathic. Lactic acid 2.4    1. Severe sepsis secondary to Acute bacterial meningitis with MSSA. Right frontal lobe fluid collection and postoperative abscess s/p washout  -Ommaya site infecton s/p port removal.  and s/p clean and wash out of the right frotnal lobe ommaya site. Now on tx for meningitis.  -leukocytosis persistent- possibly secondary to known malignancy?  -tempurature spikes are improving; remains afebrile  -encephalopathy is improved , he is axox3  -s/p LP  1/18 >>  consistent with ongoing bacterial meningitis however improved CSF cell count since LP from 1/11 ; GRam stain from 1/18 , no organisms seen (was able to speak with core lab, and add on the gram stain + culture to the acid fast sample; reported 1/22 11:19 AM)  -Dr. Cee  [facility] apprecaited  -Infectious disease recco to c/w antibiotics >> broaden coverage >> Nafcillin day 2 (previously on cefepime)   -c/w bactrim  -Hem/onc Dr. Arenas has ordred repeat LP 1/22.   -Plan: will likely take this immunocomprimised patient longer for treatment. We will c/w Nafcillin and bactrim for now. He will need repeat LP to ensure improvement / resolution of the meningitis prior to discharge.     2-RUL pneumonia with loculated left pleural effusion ?empyema.  -thoracentesis shows pleural fluid with pH 7.47 - empyema is unlikely    3- Lung CA stage 4 with brain metastasis. Immunocompromised host.   -pleural fluid cytollgy done inpatient >> adenocarcinoma of the lung    4-DVT proph- sc heparin

## 2018-01-23 NOTE — PROGRESS NOTE ADULT - ASSESSMENT
46 y/o male with h/o Stage 4 Lung CA with mets to brain 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 Pt was lethargic and unable to give history. As per sister, pt completed 10 course radiation treatment 1 day PTA. 3 days ago he was complaining of severe headache and also had episode of vomiting. He was seen at oncologist office and was given morphine and valium. The day of admission, the pt developed a fever 0f 103.6F and complained of rigors. He was also noted to be confused.  In ED pt found to have temp of 103.6 rectally. He received vanco IV and cefepime.     1. Febrile syndrome resolved. Acute bacterial meningitis with MSSA improving. Ommaya site infecton s/p port removal. Lung CA stage 4 with brain metastasis. Immunocompromised host.   -pleural fluid culture is negative to date  -leukocytosis improving  -encephalopathy is improved  -s/p cefepime 2 gm IV q12h # 11  -on nafcillin IV # 2  -tolerating abx well so far; no side effects noted  -repeat CSF results is improved, but is still consistent with ongoing infection  -neurosurgery evaluation ?need for further surgery to clean prior Ommaya site  -continue abx coverage  -for repeat LP today  -respiratory care  -monitor temps  -f/u CBC  -supportive care  -discussed with neurosurgery, possible future imaging to rule out obstructive ventriculitis  2. Other issues:   -care per medicine

## 2018-01-23 NOTE — PROGRESS NOTE ADULT - ASSESSMENT
48 y/o gentleman  with metastatic adenocarcinoma of lung, extensive brain mets and leptomeningeal carcinomatosis s/p systemic chemotherapy and intrathecal chemotherapy, s/p WBRT now  with sepsis, meningitis  with MSSA, RLL pneumonia.     WBC has started to decreased 17.9.     Thoracentesis c/w exudate, malignant effusion, cultures pending.    MRI of the brain reviewed with Dr Tran, no evidence of abscess or fluid collection.     LP will be scheduled for 1-29-18 as per conversation with Dr Hardy.     Antibiotics per ID. Nafcillin q 4hrs.     Continue bactrim DS 1 tab M-W-F, PCP prophylaxis.   Continue supportive care.

## 2018-01-24 LAB
GRAM STN FLD: SIGNIFICANT CHANGE UP
HCT VFR BLD CALC: 41.5 % — SIGNIFICANT CHANGE UP (ref 39–50)
HGB BLD-MCNC: 14 G/DL — SIGNIFICANT CHANGE UP (ref 13–17)
MCHC RBC-ENTMCNC: 31.8 PG — SIGNIFICANT CHANGE UP (ref 27–34)
MCHC RBC-ENTMCNC: 33.8 GM/DL — SIGNIFICANT CHANGE UP (ref 32–36)
MCV RBC AUTO: 94.1 FL — SIGNIFICANT CHANGE UP (ref 80–100)
PLATELET # BLD AUTO: 264 K/UL — SIGNIFICANT CHANGE UP (ref 150–400)
RBC # BLD: 4.41 M/UL — SIGNIFICANT CHANGE UP (ref 4.2–5.8)
RBC # FLD: 19 % — HIGH (ref 10.3–14.5)
SPECIMEN SOURCE: SIGNIFICANT CHANGE UP
WBC # BLD: 20.8 K/UL — HIGH (ref 3.8–10.5)
WBC # FLD AUTO: 20.8 K/UL — HIGH (ref 3.8–10.5)

## 2018-01-24 PROCEDURE — 71045 X-RAY EXAM CHEST 1 VIEW: CPT | Mod: 26

## 2018-01-24 PROCEDURE — 99233 SBSQ HOSP IP/OBS HIGH 50: CPT

## 2018-01-24 RX ORDER — DIPHENHYDRAMINE HYDROCHLORIDE AND LIDOCAINE HYDROCHLORIDE AND ALUMINUM HYDROXIDE AND MAGNESIUM HYDRO
20 KIT
Qty: 0 | Refills: 0 | Status: DISCONTINUED | OUTPATIENT
Start: 2018-01-24 | End: 2018-01-31

## 2018-01-24 RX ORDER — DEXAMETHASONE 0.5 MG/5ML
2 ELIXIR ORAL
Qty: 0 | Refills: 0 | Status: DISCONTINUED | OUTPATIENT
Start: 2018-01-24 | End: 2018-01-31

## 2018-01-24 RX ADMIN — Medication 100 MILLIGRAM(S): at 14:38

## 2018-01-24 RX ADMIN — HEPARIN SODIUM 5000 UNIT(S): 5000 INJECTION INTRAVENOUS; SUBCUTANEOUS at 22:47

## 2018-01-24 RX ADMIN — NAFCILLIN 200 GRAM(S): 10 INJECTION, POWDER, FOR SOLUTION INTRAVENOUS at 20:28

## 2018-01-24 RX ADMIN — Medication 25 MILLIGRAM(S): at 22:48

## 2018-01-24 RX ADMIN — HYDROMORPHONE HYDROCHLORIDE 2 MILLIGRAM(S): 2 INJECTION INTRAMUSCULAR; INTRAVENOUS; SUBCUTANEOUS at 16:40

## 2018-01-24 RX ADMIN — HYDROMORPHONE HYDROCHLORIDE 2 MILLIGRAM(S): 2 INJECTION INTRAMUSCULAR; INTRAVENOUS; SUBCUTANEOUS at 14:05

## 2018-01-24 RX ADMIN — Medication 1 MILLIGRAM(S): at 11:10

## 2018-01-24 RX ADMIN — Medication 0.6 MILLIGRAM(S): at 19:04

## 2018-01-24 RX ADMIN — Medication 1 TABLET(S): at 11:10

## 2018-01-24 RX ADMIN — NAFCILLIN 200 GRAM(S): 10 INJECTION, POWDER, FOR SOLUTION INTRAVENOUS at 02:10

## 2018-01-24 RX ADMIN — Medication 25 MILLIGRAM(S): at 11:11

## 2018-01-24 RX ADMIN — HEPARIN SODIUM 5000 UNIT(S): 5000 INJECTION INTRAVENOUS; SUBCUTANEOUS at 06:06

## 2018-01-24 RX ADMIN — HEPARIN SODIUM 5000 UNIT(S): 5000 INJECTION INTRAVENOUS; SUBCUTANEOUS at 13:35

## 2018-01-24 RX ADMIN — Medication 0.6 MILLIGRAM(S): at 11:08

## 2018-01-24 RX ADMIN — MIRTAZAPINE 15 MILLIGRAM(S): 45 TABLET, ORALLY DISINTEGRATING ORAL at 22:48

## 2018-01-24 RX ADMIN — DIPHENHYDRAMINE HYDROCHLORIDE AND LIDOCAINE HYDROCHLORIDE AND ALUMINUM HYDROXIDE AND MAGNESIUM HYDRO 20 MILLILITER(S): KIT at 11:10

## 2018-01-24 RX ADMIN — NAFCILLIN 200 GRAM(S): 10 INJECTION, POWDER, FOR SOLUTION INTRAVENOUS at 23:05

## 2018-01-24 RX ADMIN — NAFCILLIN 200 GRAM(S): 10 INJECTION, POWDER, FOR SOLUTION INTRAVENOUS at 16:40

## 2018-01-24 RX ADMIN — NAFCILLIN 200 GRAM(S): 10 INJECTION, POWDER, FOR SOLUTION INTRAVENOUS at 12:33

## 2018-01-24 RX ADMIN — HYDROMORPHONE HYDROCHLORIDE 2 MILLIGRAM(S): 2 INJECTION INTRAMUSCULAR; INTRAVENOUS; SUBCUTANEOUS at 13:36

## 2018-01-24 RX ADMIN — Medication 2 MILLIGRAM(S): at 19:04

## 2018-01-24 RX ADMIN — Medication 2 MILLIGRAM(S): at 06:05

## 2018-01-24 RX ADMIN — Medication 100 MILLIGRAM(S): at 23:04

## 2018-01-24 RX ADMIN — NAFCILLIN 200 GRAM(S): 10 INJECTION, POWDER, FOR SOLUTION INTRAVENOUS at 06:05

## 2018-01-24 RX ADMIN — HYDROMORPHONE HYDROCHLORIDE 2 MILLIGRAM(S): 2 INJECTION INTRAMUSCULAR; INTRAVENOUS; SUBCUTANEOUS at 17:10

## 2018-01-24 RX ADMIN — PANTOPRAZOLE SODIUM 40 MILLIGRAM(S): 20 TABLET, DELAYED RELEASE ORAL at 11:09

## 2018-01-24 RX ADMIN — DIPHENHYDRAMINE HYDROCHLORIDE AND LIDOCAINE HYDROCHLORIDE AND ALUMINUM HYDROXIDE AND MAGNESIUM HYDRO 20 MILLILITER(S): KIT at 23:04

## 2018-01-24 NOTE — PROGRESS NOTE ADULT - ASSESSMENT
Assessment and Plan  	  46 y/o male with h/o Stage 4 Lung CA with mets to brain 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   Found to have 103.6F and complained of rigors. Found to be encephalopathic. Lactic acid 2.4  * Toxic metabolic encephalopathy , resolving  *  Severe sepsis secondary to Acute bacterial meningitis with MSSA. Right frontal lobe fluid collection and postoperative abscess s/p washout  -Ommaya site infecton s/p port removal.  and s/p clean and wash out of the right frotnal lobe ommaya site. Now on tx for meningitis.  -leukocytosis persistent- possibly secondary to known malignancy?  temperature spikes are improving; remains afebrile  -encephalopathy is improved   -s/p LP  1/18 >>  consistent with ongoing bacterial meningitis however improved CSF cell count since LP from 1/11 ; GRam stain from 1/18 , no organisms seen (was able to speak with core lab, and add on the gram stain + culture to the acid fast sample; reported 1/22 11:19 AM)  -Dr. Cee  [facility] apprecaited  -Infectious disease recco to c/w antibiotics >> broaden coverage >> Nafcillin day 2 (previously on cefepime)   -c/w bactrim for PCP proph  -Hem/onc Dr. Arenas has ordred repeat LP 1/22. , plan to repeat on 1/29/18  -Plan: will likely take this immunocompromised patient longer for treatment. We will c/w Nafcillin and bactrim for now. He will need repeat LP to ensure improvement / resolution of the meningitis prior to discharge.     * -RUL pneumonia with loculated left pleural effusion ?empyema.  -thoracentesis shows pleural fluid with pH 7.47 - empyema is unlikely, consistent with exudate, malignant effusion  - repeat CXR 1/24 - unchanged small loculated pleural effusion  - supportive care - mucolytics, magic mouth wash    * - Metastatic adenocarcinoma of Lung ( stage 4 with brain metastasis and leptomeningeal carcinomatosis) . Immunocompromised host.   - s/p systemic chemo and intrathecal chemo, s/p WBRT  -pleural fluid cytology done inpatient >> adenocarcinoma of the lung  - c/w steroids as per oncology    * Leukocytosis due to above and steroids induced    -DVT proph- sc heparin

## 2018-01-24 NOTE — PROGRESS NOTE ADULT - SUBJECTIVE AND OBJECTIVE BOX
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  Patient completed 10 courses of  radiation treatment 1 day PTA. Patient complaint of  severe headache and an episode of vomiting 3 days prior to admission. The day of admission, the pt developed a fever 0f 103.6F and complained of rigors. He was also noted to be confused.  In ED pt found to have temp of 103.6 rectally. He received vanco IV and cefepime.     Patient is alert and responsive,  respond questions and follow commands.     WBC decreasing to 20K. Patient complaints of sore throat, and productive cough. Afebrile.      ICU Vital Signs Last 24 Hrs  T(C): 35.9 (24 Jan 2018 06:00), Max: 36.3 (23 Jan 2018 21:28)  T(F): 96.7 (24 Jan 2018 06:00), Max: 97.4 (23 Jan 2018 21:28)  HR: 103 (24 Jan 2018 06:00) (92 - 124)  BP: 115/71 (24 Jan 2018 06:00) (102/78 - 125/86)  BP(mean): 81 (24 Jan 2018 06:00) (78 - 96)  ABP: --  ABP(mean): --  RR: 23 (24 Jan 2018 06:00) (20 - 29)  SpO2: 98% (24 Jan 2018 04:00) (97% - 99%)      General gentleman in NAD.  [facility] site with stitches dry and intact.   Lungs bilaterally decrease breath sounds.  CVS S1 S2 regular, no M/R/G  Abdomen soft NT ND positive for BS, no organomegaly.  Extremities: No C/C/E. positive for bilateral LE abrasions, dry, healing.      MEDICATIONS  (STANDING):  ALBUTerol    90 MICROgram(s) HFA Inhaler 1 Puff(s) Inhalation every 4 hours  colchicine 0.6 milliGRAM(s) Oral two times a day  dexamethasone  Injectable 2 milliGRAM(s) IV Push three times a day  FIRST- Mouthwash  BLM 20 milliLiter(s) Swish and Spit four times a day  folic acid 1 milliGRAM(s) Oral daily  heparin  Injectable 5000 Unit(s) SubCutaneous every 8 hours  metoprolol     tartrate 25 milliGRAM(s) Oral two times a day  mirtazapine 15 milliGRAM(s) Oral at bedtime  nafcillin  IVPB 2 Gram(s) IV Intermittent every 4 hours  pantoprazole    Tablet 40 milliGRAM(s) Oral before breakfast  trimethoprim  160 mG/sulfamethoxazole 800 mG 1 Tablet(s) Oral daily    Culture - Fungal, Body Fluid (01.18.18 @ 12:15)    Specimen Source: Pleural Fl None    Culture Results:   Testing in progress      Amylase, Body Fluid (01.18.18 @ 12:15)    Fluid Source: Pleural    Amylase, Body Fluid: 23: Reference Ranges have NOT been established for analytes in body fluids  because of variability in body fluid composition.  The  has not determined the efficacy of this test when  performed on fluid specimens. The performance characteristics of this  test were determined by [email or web address]. U/L      Protein Total, Fluid (01.18.18 @ 12:15)    Protein Total, Fluid: 3.3: Test Repeated  Reference Ranges have NOT been established for analytes in body fluids  because of variability in body fluid composition.  The  has not determined the efficacy of this test when  performed on fluid specimens. The performance characteristics of this  test were determined by [employer]. g/dL    pH, Fluid (01.18.18 @ 12:15)    pH, Fluid: 7.47    Fluid Source: pleural    Glucose, Fluid (01.18.18 @ 12:15)    Glucose, Fluid: 134: Reference Ranges have NOT  been established for analytes in body fluids  because of variability in body fluid composition.  The  has not determined the efficacy of this test when  performed on fluid specimens. The performance characteristics of this  test were determined by [facility]. mg/dL        Protein, CSF (01.19.18 @ 16:45)    Protein, CSF: 192 mg/dL    Glucose, CSF (01.19.18 @ 16:45)    Glucose, CSF: 24 mg/dL    Cerebrospinal Fluid Cell Count-1 (01.11.18 @ 21:14)    Total Nucleated Cell Count, CSF: 744 /uL      RBC Count - Spinal Fluid: 62 /uL    Tube Type: Tube 3    CSF Color: See Note: slightly xanthrochromic    CSF Lymphocytes: 3 %    CSF Appearance: Slightly Cloudy    CSF Monocytes/Macrophages: 1 %    CSF Segmented Neutrophils: 96 %         Complete Blood Count Repeat Every 24 Hours X 30 Days (01.24.18 @ 06:31)    WBC Count: 20.8 K/uL    RBC Count: 4.41 M/uL    Hemoglobin: 14.0 g/dL    Hematocrit: 41.5 %    Mean Cell Volume: 94.1 fl    Mean Cell Hemoglobin: 31.8 pg    Mean Cell Hemoglobin Conc: 33.8 gm/dL    Red Cell Distrib Width: 19.0 %    Platelet Count - Automated: 264 K/uL    Basic Metabolic Panel in AM (01.23.18 @ 05:01)    Sodium, Serum: 133 mmol/L    Potassium, Serum: 4.1 mmol/L    Chloride, Serum: 99 mmol/L    Carbon Dioxide, Serum: 26 mmol/L    Anion Gap, Serum: 8 mmol/L    Blood Urea Nitrogen, Serum: 16 mg/dL    Creatinine, Serum: 0.85 mg/dL    Glucose, Serum: 133 mg/dL    Calcium, Total Serum: 9.8 mg/dL    eGFR if Non : 104: Interpretative comment  The units for eGFR are ml/min/1.73m2 (normalized body surface area). The  eGFR is calculated from a serum creatinine using the CKD-EPI equation.  Other variables required for calculation are race, age and sex. Among  patients with chronic kidney disease (CKD), the eGFR is useful in  determining the stage of disease according to KDOQI CKD classification.  All eGFR results are reported numerically with the following  interpretation.          GFR                    With                 Without     (ml/min/1.73 m2)    Kidney Damage       Kidney Damage        >= 90                    Stage 1                     Normal        60-89                    Stage 2                     Decreased GFR        30-59     Stage 3                     Stage 3        15-29                    Stage 4                     Stage 4        < 15                      Stage 5                     Stage 5  Each stage of CKD assumes that the associated GFR level has been in  effect for at least 3 months. Determination of stages one and two (with  eGFR > 59 ml/min/m2) requires estimation of kidney damage for at least 3  months as defined by structural or functional abnormalities.  Limitations: All estimates of GFR will be less accurate for patients at  extremes of muscle mass (including but not limited to frail elderly,  critically ill, or cancer patients), those with unusual diets, and those  with conditions associated with reduced secretion or extrarenal  elimination of creatinine. The eGFR equation is not recommended for use  in patients with unstable creatinine levels. mL/min/1.73M2    eGFR if African American: 120 mL/min/1.73M2              MRI of the brain 1-19-18  FINDINGS:   MRI dated 12/15/2017 available for review.         The brain demonstrates minimal decrease in size of the multiple   intraparenchymal and leptomeningeal metastatic lesions scattered   throughout the brain. The Ommaya shunt catheter has been removed. Edema   and gliosis is seen about the shunt catheter tract in the RIGHT frontal   lobe. Tiny RIGHT subdural hygroma is noted. No acute cerebral cortical   infarct is found.   No intracranial hemorrhage is recognized.  No mass   effect is found in the brain.          The ventricles, sulci and basal cisterns appear unremarkable.    The vertebral and internal carotid arteries demonstrate expected flow   voids indicating their patency.         The orbits are unremarkable.  The paranasal sinuses are clear.  The nasal   cavity appears intact.  The nasopharynx is symmetric.  The central skull   base and petrous temporal bones are intact.  The calvarium appears   unremarkable.      IMPRESSION:  minimal decrease in size of the multiple intraparenchymal   and leptomeningeal metastatic lesions scattered throughout the brain. The   Ommaya shunt catheter has been removed. Edema and gliosis is seen about   the shunt catheter tract in the RIGHT frontal lobe.  Tiny RIGHT frontal   subdural hygroma is noted

## 2018-01-24 NOTE — PROGRESS NOTE ADULT - RESPIRATORY
Breath Sounds equal & clear to percussion & auscultation, no accessory muscle use
Breath Sounds equal & clear to percussion & auscultation, no accessory muscle use
detailed exam
Breath Sounds equal & clear to percussion & auscultation, no accessory muscle use
detailed exam

## 2018-01-24 NOTE — PROGRESS NOTE ADULT - ASSESSMENT
46 y/o male with h/o Stage 4 Lung CA with mets to brain 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 Pt was lethargic and unable to give history. As per sister, pt completed 10 course radiation treatment 1 day PTA. 3 days ago he was complaining of severe headache and also had episode of vomiting. He was seen at oncologist office and was given morphine and valium. The day of admission, the pt developed a fever 0f 103.6F and complained of rigors. He was also noted to be confused.  In ED pt found to have temp of 103.6 rectally. He received vanco IV and cefepime.     1. Acute bacterial meningitis with MSSA improving. Ommaya site infecton s/p port removal. Lung CA stage 4 with brain metastasis. Immunocompromised host.   -encephalopathy is much improved, but he still has episodes of confusion  -pleural fluid culture is negative to date  -leukocytosis is persistent  -s/p cefepime 2 gm IV q12h # 11  -on nafcillin IV # 3  -tolerating abx well so far; no side effects noted  -repeat CSF fluid cell count is improved  -neurosurgery evaluation appreciated  -continue abx coverage  -plan to repeat LP in a few days  -respiratory care  -monitor temps  -f/u CBC  -supportive care  -discussed with neurosurgery, possible future imaging to rule out obstructive ventriculitis  2. Other issues:   -care per medicine

## 2018-01-24 NOTE — PROGRESS NOTE ADULT - SUBJECTIVE AND OBJECTIVE BOX
Patient is a 47y old  Male who presents with a chief complaint of "I have fluid in my lung and shortness of breath." (12 Jan 2018 19:53)  1/24- denies SOB , still with cough       MEDICATIONS  (STANDING):  ALBUTerol    90 MICROgram(s) HFA Inhaler 1 Puff(s) Inhalation every 4 hours  colchicine 0.6 milliGRAM(s) Oral two times a day  dexamethasone  Injectable 2 milliGRAM(s) IV Push three times a day  folic acid 1 milliGRAM(s) Oral daily  heparin  Injectable 5000 Unit(s) SubCutaneous every 8 hours  metoprolol     tartrate 25 milliGRAM(s) Oral two times a day  mirtazapine 15 milliGRAM(s) Oral at bedtime  nafcillin  IVPB 2 Gram(s) IV Intermittent every 4 hours  pantoprazole    Tablet 40 milliGRAM(s) Oral before breakfast  trimethoprim  160 mG/sulfamethoxazole 800 mG 1 Tablet(s) Oral daily    MEDICATIONS  (PRN):  acetaminophen   Tablet 650 milliGRAM(s) Oral every 6 hours PRN For Temp greater than 38 C (100.4 F)  ALBUTerol    0.083% 2.5 milliGRAM(s) Nebulizer every 6 hours PRN Shortness of Breath and/or Wheezing  diazepam    Tablet 0.5 milliGRAM(s) Oral three times a day PRN agitation, anxiety  guaiFENesin    Syrup 100 milliGRAM(s) Oral every 6 hours PRN Cough  HYDROmorphone   Tablet 2 milliGRAM(s) Oral four times a day PRN Severe Pain (7 - 10)  LORazepam   Injectable 1 milliGRAM(s) IV Push every 30 minutes PRN Agitation            Vital Signs Last 24 Hrs  T(C): 35.9 (24 Jan 2018 06:00), Max: 36.3 (23 Jan 2018 21:28)  T(F): 96.7 (24 Jan 2018 06:00), Max: 97.4 (23 Jan 2018 21:28)  HR: 103 (24 Jan 2018 06:00) (92 - 124)  BP: 115/71 (24 Jan 2018 06:00) (102/78 - 125/86)  BP(mean): 81 (24 Jan 2018 06:00) (78 - 96)  RR: 23 (24 Jan 2018 06:00) (20 - 29)  SpO2: 98% (24 Jan 2018 04:00) (97% - 99%)            INTERPRETATION OF TELEMETRY:  SR  ECG:        LABS:                        14.0   20.8  )-----------( 264      ( 24 Jan 2018 06:31 )             41.5     01-23    133<L>  |  99  |  16  ----------------------------<  133<H>  4.1   |  26  |  0.85    Ca    9.8      23 Jan 2018 05:01              I&O's Summary    23 Jan 2018 07:01  -  24 Jan 2018 07:00  --------------------------------------------------------  IN: 500 mL / OUT: 400 mL / NET: 100 mL      BNP  RADIOLOGY & ADDITIONAL STUDIES:

## 2018-01-24 NOTE — PROGRESS NOTE ADULT - SUBJECTIVE AND OBJECTIVE BOX
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 Pt was lethargic and unable to give history. As per sister, pt completed 10 course radiation treatment 1 day PTA. 3 days ago he was complaining of severe headache and also had episode of vomiting. He was seen at oncologist office and was given morphine and valium. The day of admission, the pt developed a fever 0f 103.6F and complained of rigors. He was also noted to be confused.  In ED pt found to have temp of 103.6 rectally. He received vanco IV and cefepime.     1/24 - pt seen and examined earlier today, reports sore throat, cough, denies CP, abd pain, denies other new sx, OOB in the chair  REVIEW OF SYSTEMS:  All other review of systems is negative unless indicated above  T(C): 35.8 (01-24-18 @ 12:00), Max: 36.3 (01-23-18 @ 21:28)  T(F): 96.5 (01-24-18 @ 12:00), Max: 97.4 (01-23-18 @ 21:28)  HR: 100 (01-24-18 @ 15:00) (92 - 117)  BP: 113/77 (01-24-18 @ 13:00) (113/77 - 134/64)  RR: 28 (01-24-18 @ 15:00) (22 - 29)  SpO2: 98% (01-24-18 @ 15:00) (97% - 99%)  Wt(kg): --  CAPILLARY BLOOD GLUCOSE  PHYSICAL EXAM:    Constitutional: NAD, awake and alert, well-developed  HEENT: PERR, EOMI, Normal Hearing, MMM, post-op scalp wound clean  Neck: Soft and supple, No LAD, No JVD  Respiratory: Breath sounds are clear bilaterally, No wheezing, rales or rhonchi  Cardiovascular: S1 and S2, regular rate and rhythm, no Murmurs, gallops or rubs  Gastrointestinal: Bowel Sounds present, soft, nontender, nondistended, no guarding, no rebound  Extremities: No peripheral edema  Vascular: 2+ peripheral pulses  Neurological: A/O x 3, no focal deficits  Musculoskeletal: 5/5 strength b/l upper and lower extremities  Skin: No rashes    LABS: All Labs Reviewed:  01-23    133<L>  |  99  |  16  ----------------------------<  133<H>  4.1   |  26  |  0.85    Ca    9.8      23 Jan 2018 05:01                          14.0   20.8  )-----------( 264      ( 24 Jan 2018 06:31 )             41.5                          13.0   17.9  )-----------( 282      ( 23 Jan 2018 05:01 )             39.3     01-23    133<L>  |  99  |  16  ----------------------------<  133<H>  4.1   |  26  |  0.85    Ca    9.8      23 Jan 2018 05:01    Blood Culture: 01-22 @ 11:19  Organism --  Gram Stain Blood -- Gram Stain   No polymorphonuclear leukocytes per low power field  No organisms seen per oil power field  by cytocentrifuge  Specimen Source .CSF process spec as per doctor bear and ten thank you  Culture-Blood --    01-19 @ 16:45  Organism --  Gram Stain Blood -- Gram Stain --  Specimen Source .CSF spinal fluid  Culture-Blood --      < from: Xray Chest 1 View AP-PORTABLE IMMEDIATE (01.24.18 @ 10:00) >  INDINGS: Right sided central venous CT compatible port with tip in the   superior vena cava.    Small loculated left pleural effusion, unchanged. No opacity to suggest   pneumonia. No pneumothorax.    IMPRESSION:    Small loculated left pleural effusion, unchanged.        < end of copied text >  < from: MR Head w/wo IV Cont (01.18.18 @ 20:19) >    minimal decrease in size of the multiple intraparenchymal   and leptomeningeal metastatic lesions scattered throughout the brain. The   Ommaya shunt catheter has been removed. Edema and gliosis is seen about   the shunt catheter tract in the RIGHT frontal lobe.  Tiny RIGHT frontal   subdural hygroma is noted.          < end of copied text >  < from: CT Chest w/ IV Cont (01.17.18 @ 11:29) >  : Marked right ventricular outflow tract narrowing just   proximal to the pulmonic valve. No extrinsic mass is visualized.  Pericardial thickening suggestive of pericarditis. The possibility that   this could be the cause of the right ventricular outflow tract narrowing   is entertained.          < end of copied text >  < from: Xray Chest 1 View AP/PA. (01.11.18 @ 13:23) >  IMPRESSION:  A multiloculated left pleural effusion is slightly smaller and the left   lower lobe is slightly better expanded at this time.      < end of copied text >

## 2018-01-24 NOTE — PROGRESS NOTE ADULT - ASSESSMENT
46 y/o gentleman  with metastatic adenocarcinoma of lung, extensive brain mets and leptomeningeal carcinomatosis s/p systemic chemotherapy and intrathecal chemotherapy, s/p WBRT now  with sepsis, meningitis  with MSSA, RLL pneumonia.     Patient complaints of productive cough and sore throat, CxR, start magic mouth wash. Sputum culture.     Will decrease steroids to 2 mg q 12 hrs.      Thoracentesis c/w exudate, malignant effusion, cultures pending.    MRI of the brain reviewed with Dr Tran, no evidence of abscess or fluid collection.     LP will be scheduled for 1-29-18 as per conversation with Dr Hardy.     Antibiotics per ID. Nafcillin q 4hrs.     Continue bactrim DS 1 tab M-W-F, PCP prophylaxis.   Continue supportive care.

## 2018-01-24 NOTE — PROGRESS NOTE ADULT - EXTREMITIES
No cyanosis, clubbing or edema

## 2018-01-24 NOTE — PROGRESS NOTE ADULT - SUBJECTIVE AND OBJECTIVE BOX
Patient is a 47y old  Male who presents with a chief complaint of fever, AMS (2018 16:20)    HPI:  48 y/o male with h/o Stage 4 Lung CA with mets to brain s/p Ommaya reservoir 2017 for leptomeningeal disease s/p chemo and radiation, anxiety, depression, h/o nephrolithiasis, h/o pericarditis was admitted on  for fever and AMS. Pt was lethargic and unable to give history. As per sister, pt completed 10 course radiation treatment 1 day PTA. 3 days ago he was complaining of severe headache and also had episode of vomiting. He was seen at oncologist office and was given morphine and valium. The day of admission, the pt developed a fever 0f 103.6F and complained of rigors. He was also noted to be confused.  In ED pt found to have temp of 103.6 rectally. He received vanco IV and cefepime.     Alert and verbal; confused at times  Comfortable  Ambulatory with help  No fever or chills  Has cough    MEDICATIONS  (STANDING):  ALBUTerol    90 MICROgram(s) HFA Inhaler 1 Puff(s) Inhalation every 4 hours  colchicine 0.6 milliGRAM(s) Oral two times a day  dexamethasone  Injectable 2 milliGRAM(s) IV Push three times a day  FIRST- Mouthwash  BLM 20 milliLiter(s) Swish and Spit four times a day  folic acid 1 milliGRAM(s) Oral daily  heparin  Injectable 5000 Unit(s) SubCutaneous every 8 hours  metoprolol     tartrate 25 milliGRAM(s) Oral two times a day  mirtazapine 15 milliGRAM(s) Oral at bedtime  nafcillin  IVPB 2 Gram(s) IV Intermittent every 4 hours  pantoprazole    Tablet 40 milliGRAM(s) Oral before breakfast  trimethoprim  160 mG/sulfamethoxazole 800 mG 1 Tablet(s) Oral daily    MEDICATIONS  (PRN):  acetaminophen   Tablet 650 milliGRAM(s) Oral every 6 hours PRN For Temp greater than 38 C (100.4 F)  ALBUTerol    0.083% 2.5 milliGRAM(s) Nebulizer every 6 hours PRN Shortness of Breath and/or Wheezing  diazepam    Tablet 0.5 milliGRAM(s) Oral three times a day PRN agitation, anxiety  guaiFENesin    Syrup 100 milliGRAM(s) Oral every 6 hours PRN Cough  HYDROmorphone   Tablet 2 milliGRAM(s) Oral four times a day PRN Severe Pain (7 - 10)  LORazepam   Injectable 1 milliGRAM(s) IV Push every 30 minutes PRN Agitation      Vital Signs Last 24 Hrs  T(C): 35.9 (2018 06:00), Max: 36.3 (2018 21:28)  T(F): 96.7 (2018 06:00), Max: 97.4 (2018 21:28)  HR: 103 (2018 06:00) (92 - 124)  BP: 115/71 (2018 06:00) (102/78 - 125/86)  BP(mean): 81 (2018 06:00) (78 - 96)  RR: 23 (2018 06:00) (20 - 29)  SpO2: 98% (2018 04:00) (97% - 99%)    Physical Exam:      Vital Signs Last 24 Hrs  T(C): 36.1 (2018 06:14), Max: 36.3 (2018 21:37)  T(F): 97 (2018 06:14), Max: 97.3 (2018 21:37)  HR: 99 (2018 06:00) (85 - 118)  BP: 125/90 (2018 05:00) (104/82 - 134/86)  BP(mean): 99 (2018 05:00) (40 - 99)  RR: 18 (2018 06:00) (0 - 30)  SpO2: 99% (2018 06:00) (97% - 100%)    Physical Exam:      Constitutional: frail looking  HEENT: NC/AT, EOMI, PERRLA; postop scalp wound clean  Neck: supple  Back: no tenderness  Respiratory: crackles at bases; decreased BS  Cardiovascular: S1S2 regular, no murmurs  Abdomen: soft, not tender, not distended, positive BS  Rectal: deferred  Musculoskeletal: no muscle tenderness, no joint swelling or tenderness  Extremities: no pedal edema  Neurological: alert, moving all extremities; b/l calf abrasions, dry  Skin: no rashes    Labs:             Protein, CSF (18 @ 16:45)    Protein, CSF: 192 mg/dL    Glucose, CSF (18 @ 16:45)    Glucose, CSF: 24 mg/dL    Cerebrospinal Fluid Cell Count-1 (18 @ 16:45)    CSF Segmented Neutrophils: 43 %    Total Nucleated Cell Count, CSF: 114 /uL    CSF Color: Xanthochromic    CSF Appearance: Clear    CSF Lymphocytes: 41 %    CSF Monocytes/Macrophages: 16 %                 13.9   22.4  )-----------( 318      ( 2018 05:51 )             42.4                           14.4   20.5  )-----------( 367      ( 2018 04:43 )             43.2     01-    131<L>  |  96  |  16  ----------------------------<  121<H>  4.2   |  27  |  0.93    Ca    10.2<H>      2018 04:43  Phos  2.1     -17                        14.4   17.8  )-----------( 382      ( 2018 05:55 )             42.9     01-17    131<L>  |  96  |  17  ----------------------------<  105<H>  3.8   |  28  |  0.87    Ca    10.4<H>      2018 05:55  Phos  2.1     01-17  Mg     2.3     -16                        12.9   16.8  )-----------( 315      ( 15 Demetrius 2018 05:29 )             38.9     01-15    137  |  104  |  21  ----------------------------<  98  4.1   |  26  |  0.74    Ca    9.6      15 Demetrius 2018 05:29  Phos  2.8     01-15  Mg     2.4     -15                        15.3   19.6  )-----------( 291      ( 2018 05:26 )             53.0     01-13    132<L>  |  100  |  17  ----------------------------<  95  4.2   |  24  |  1.09    Ca    10.5<H>      2018 05:26  Phos  3.0       Mg     2.5              Vancomycin Level, Trough: 11.4 ug/mL ( @ 05:26)                          15.0   26.8  )-----------( 382      ( 2018 07:30 )             45.7     12    131<L>  |  95<L>  |  16  ----------------------------<  139<H>  3.8   |  27  |  1.03    Ca    10.2<H>      2018 07:30    TPro  8.4<H>  /  Alb  3.3  /  TBili  0.8  /  DBili  x   /  AST  34  /  ALT  83<H>  /  AlkPhos  122<H>       LIVER FUNCTIONS - ( 2018 11:19 )  Alb: 3.3 g/dL / Pro: 8.4 gm/dL / ALK PHOS: 122 U/L / ALT: 83 U/L / AST: 34 U/L / GGT: x           Urinalysis Basic - ( 2018 12:33 )    Color: Yellow / Appearance: Clear / S.015 / pH: x  Gluc: x / Ketone: Negative  / Bili: Negative / Urobili: Negative mg/dL   Blood: x / Protein: 30 mg/dL / Nitrite: Negative   Leuk Esterase: Negative / RBC: 0-2 /HPF / WBC 0-2   Sq Epi: x / Non Sq Epi: Occasional / Bacteria: Occasional    Cerebrospinal Fluid Cell Count-1 (18 @ 21:14)    CSF Segmented Neutrophils: 96 %    Total Nucleated Cell Count, CSF: 744 /uL    CSF Appearance: Slightly Cloudy    CSF Lymphocytes: 3 %    CSF Monocytes/Macrophages: 1 %    CSF Color: See Note: slightly xanthrochromic      Culture - Body Fluid with Gram Stain (collected 2018 14:36)  Source: .Body Fluid scalp incision  Gram Stain (2018 06:33):    No polymorphonuclear cells seen    No organisms seen    by cytocentrifuge    Culture - Acid Fast - CSF (collected 2018 21:14)  Source: .CSF spinal fluid    Culture - Fungal, CSF (collected 2018 21:14)  Source: .CSF spinal fluid  Preliminary Report (2018 12:37):    Testing in progress    Culture - CSF with Gram Stain (collected 2018 21:14)  Source: .CSF  Gram Stain (2018 01:16):    polymorphonuclear leukocytes seen per low power field    Gram Variable Cocci seen per oil power field    by cytocentrifuge  Final Report (2018 07:36):    Numerous Staphylococcus aureus  Organism: Staphylococcus aureus (2018 07:36)  Organism: Staphylococcus aureus (2018 07:36)      -  Oxacillin: S 0.5      -  Penicillin: R >8      -  RIF- Rifampin: R >2      -  Trimethoprim/Sulfamethoxazole: S <=0.5/9.5      -  Vancomycin: S 2      Method Type: TAMMIE    Culture - Urine (collected 2018 12:33)  Source: .Urine None  Final Report (2018 18:38):    10,000 - 49,000 CFU/mL Coag Negative Staphylococcus    Culture - Blood (collected 2018 11:19)  Culture - Body Fluid with Gram Stain (18 @ 12:15)    Gram Stain:   No polymorphonuclear leukocytes per low power field  Red blood cells per low power field  No organisms seen per oil power field  by cytocentrifuge    Specimen Source: .Body Fluid Pleural Fluid    Culture Results:   No growth    Source: .Blood Blood-Peripheral  Preliminary Report (2018 15:01):    No growth to date.    Culture - Blood (collected 2018 11:19)  Source: .Blood Blood-Peripheral  Preliminary Report (2018 15:01):    No growth to date.    Culture - Body Fluid with Gram Stain (18 @ 12:15)    Gram Stain:   No polymorphonuclear leukocytes per low power field  Red blood cells per low power field  No organisms seen per oil power field  by cytocentrifuge    Specimen Source: .Body Fluid Pleural Fluid    pH, Fluid (18 @ 12:15)    pH, Fluid: 7.47    Fluid Source: pleural          Radiology:    < from: CT Chest No Cont (18 @ 17:21) >  Several scattered groundglass nodules and opacities within the right   upper lobe, suggestive of pneumonia given the clinical context of fever.   Small to right pleural effusion is present. Loculated left pleural   effusion along the left lateral pleura and within the fissures is again   identified. Small pericardial effusion is present.    < end of copied text >    < from: CT Head No Cont (18 @ 13:09) >   unchanged RIGHT frontal Ommaya catheter  in place within   the anterior horn of the RIGHT lateral ventricle. Faint hyperdense   lesions scattered throughout the brain consistent with patient's known   metastatic disease. IV contrast would be needed for complete evaluation.     < end of copied text >    < from: CT Head No Cont (01.15.18 @ 09:43) >  Frontal kenia hole with small amount of encephalomalacia/gliosis within   the right frontal lobe. Fluid collection measuring approximately 4.7 x   1.1 cm and minimal associated hemorrhage within the scalp overlying the   right frontal borehole. Ventricular size is unchanged since prior exam.   Punctate calcification in the right frontal lobe is unchanged. The   underlying neoplastic process is difficult to evaluate CT imaging.     < end of copied text >      Advanced directives addressed: full resuscitation

## 2018-01-24 NOTE — PROGRESS NOTE ADULT - ASSESSMENT
48 yo male with PMH Stage 4 Lung CA with mets to brain s/p Ommaya reservoir 11/2017 for leptomeningeal disease s/p chemo and radiation, anxiety, depression, h/o nephrolithiasis, h/o pericarditis s/p window presents to ER with acute meinigitis , he is s/p removal of Ommaya reservoir but still with perisitent temps , incidental finding on in hospital echo of subvalvular pulmonic stenossi of unclear etiology , He is asymptomatic from it at this time . HD stable and not hypoxic.   1) would treat conservatively for now . Spoke to family  about various options including right heart cath to r/o constrictive pericarditis , CONCEPCIÓN to look at the RVOT in more detail and since both procedures would not  the family wishes not to pursue these tests. The least invasive way to assess etiology of RVOT obstruction would be a cardiac MRI as outpt if he  improves clinically .    2) for possible repeat LP to investigate fevers furhter on monday , WBC still elevated but patient is afebrile   3) will sign off, please call for further questions . Thank You

## 2018-01-25 ENCOUNTER — RX RENEWAL (OUTPATIENT)
Age: 48
End: 2018-01-25

## 2018-01-25 LAB
ANION GAP SERPL CALC-SCNC: 8 MMOL/L — SIGNIFICANT CHANGE UP (ref 5–17)
BUN SERPL-MCNC: 11 MG/DL — SIGNIFICANT CHANGE UP (ref 7–23)
CALCIUM SERPL-MCNC: 9.9 MG/DL — SIGNIFICANT CHANGE UP (ref 8.5–10.1)
CHLORIDE SERPL-SCNC: 97 MMOL/L — SIGNIFICANT CHANGE UP (ref 96–108)
CO2 SERPL-SCNC: 28 MMOL/L — SIGNIFICANT CHANGE UP (ref 22–31)
CREAT SERPL-MCNC: 1.06 MG/DL — SIGNIFICANT CHANGE UP (ref 0.5–1.3)
CULTURE RESULTS: SIGNIFICANT CHANGE UP
GLUCOSE SERPL-MCNC: 81 MG/DL — SIGNIFICANT CHANGE UP (ref 70–99)
HCT VFR BLD CALC: 44.9 % — SIGNIFICANT CHANGE UP (ref 39–50)
HGB BLD-MCNC: 15 G/DL — SIGNIFICANT CHANGE UP (ref 13–17)
MCHC RBC-ENTMCNC: 31.6 PG — SIGNIFICANT CHANGE UP (ref 27–34)
MCHC RBC-ENTMCNC: 33.4 GM/DL — SIGNIFICANT CHANGE UP (ref 32–36)
MCV RBC AUTO: 94.7 FL — SIGNIFICANT CHANGE UP (ref 80–100)
PLATELET # BLD AUTO: 280 K/UL — SIGNIFICANT CHANGE UP (ref 150–400)
POTASSIUM SERPL-MCNC: 3.8 MMOL/L — SIGNIFICANT CHANGE UP (ref 3.5–5.3)
POTASSIUM SERPL-SCNC: 3.8 MMOL/L — SIGNIFICANT CHANGE UP (ref 3.5–5.3)
RAPID RVP RESULT: SIGNIFICANT CHANGE UP
RBC # BLD: 4.74 M/UL — SIGNIFICANT CHANGE UP (ref 4.2–5.8)
RBC # FLD: 18.7 % — HIGH (ref 10.3–14.5)
SODIUM SERPL-SCNC: 133 MMOL/L — LOW (ref 135–145)
SPECIMEN SOURCE: SIGNIFICANT CHANGE UP
VIT B12 SERPL-MCNC: 1180 PG/ML — SIGNIFICANT CHANGE UP (ref 232–1245)
WBC # BLD: 19.2 K/UL — HIGH (ref 3.8–10.5)
WBC # FLD AUTO: 19.2 K/UL — HIGH (ref 3.8–10.5)

## 2018-01-25 PROCEDURE — 99233 SBSQ HOSP IP/OBS HIGH 50: CPT

## 2018-01-25 RX ORDER — OSIMERTINIB 80 1/1
80 TABLET, FILM COATED ORAL DAILY
Qty: 90 | Refills: 0 | Status: ACTIVE | COMMUNITY
Start: 2018-01-25 | End: 1900-01-01

## 2018-01-25 RX ADMIN — Medication 1 MILLIGRAM(S): at 13:32

## 2018-01-25 RX ADMIN — NAFCILLIN 200 GRAM(S): 10 INJECTION, POWDER, FOR SOLUTION INTRAVENOUS at 18:12

## 2018-01-25 RX ADMIN — Medication 0.5 MILLIGRAM(S): at 13:32

## 2018-01-25 RX ADMIN — DIPHENHYDRAMINE HYDROCHLORIDE AND LIDOCAINE HYDROCHLORIDE AND ALUMINUM HYDROXIDE AND MAGNESIUM HYDRO 20 MILLILITER(S): KIT at 13:32

## 2018-01-25 RX ADMIN — HYDROMORPHONE HYDROCHLORIDE 2 MILLIGRAM(S): 2 INJECTION INTRAMUSCULAR; INTRAVENOUS; SUBCUTANEOUS at 01:45

## 2018-01-25 RX ADMIN — MIRTAZAPINE 15 MILLIGRAM(S): 45 TABLET, ORALLY DISINTEGRATING ORAL at 21:34

## 2018-01-25 RX ADMIN — Medication 0.6 MILLIGRAM(S): at 18:11

## 2018-01-25 RX ADMIN — NAFCILLIN 200 GRAM(S): 10 INJECTION, POWDER, FOR SOLUTION INTRAVENOUS at 05:02

## 2018-01-25 RX ADMIN — Medication 0.6 MILLIGRAM(S): at 05:01

## 2018-01-25 RX ADMIN — DIPHENHYDRAMINE HYDROCHLORIDE AND LIDOCAINE HYDROCHLORIDE AND ALUMINUM HYDROXIDE AND MAGNESIUM HYDRO 20 MILLILITER(S): KIT at 05:01

## 2018-01-25 RX ADMIN — NAFCILLIN 200 GRAM(S): 10 INJECTION, POWDER, FOR SOLUTION INTRAVENOUS at 21:34

## 2018-01-25 RX ADMIN — NAFCILLIN 200 GRAM(S): 10 INJECTION, POWDER, FOR SOLUTION INTRAVENOUS at 14:21

## 2018-01-25 RX ADMIN — HEPARIN SODIUM 5000 UNIT(S): 5000 INJECTION INTRAVENOUS; SUBCUTANEOUS at 21:34

## 2018-01-25 RX ADMIN — NAFCILLIN 200 GRAM(S): 10 INJECTION, POWDER, FOR SOLUTION INTRAVENOUS at 10:19

## 2018-01-25 RX ADMIN — Medication 2 MILLIGRAM(S): at 18:12

## 2018-01-25 RX ADMIN — DIPHENHYDRAMINE HYDROCHLORIDE AND LIDOCAINE HYDROCHLORIDE AND ALUMINUM HYDROXIDE AND MAGNESIUM HYDRO 20 MILLILITER(S): KIT at 21:34

## 2018-01-25 RX ADMIN — HYDROMORPHONE HYDROCHLORIDE 2 MILLIGRAM(S): 2 INJECTION INTRAMUSCULAR; INTRAVENOUS; SUBCUTANEOUS at 01:01

## 2018-01-25 RX ADMIN — Medication 25 MILLIGRAM(S): at 05:01

## 2018-01-25 RX ADMIN — HEPARIN SODIUM 5000 UNIT(S): 5000 INJECTION INTRAVENOUS; SUBCUTANEOUS at 05:01

## 2018-01-25 RX ADMIN — Medication 2 MILLIGRAM(S): at 05:01

## 2018-01-25 RX ADMIN — Medication 25 MILLIGRAM(S): at 18:12

## 2018-01-25 RX ADMIN — PANTOPRAZOLE SODIUM 40 MILLIGRAM(S): 20 TABLET, DELAYED RELEASE ORAL at 07:09

## 2018-01-25 RX ADMIN — HEPARIN SODIUM 5000 UNIT(S): 5000 INJECTION INTRAVENOUS; SUBCUTANEOUS at 14:21

## 2018-01-25 NOTE — PROGRESS NOTE ADULT - SUBJECTIVE AND OBJECTIVE BOX
46 y/o gentleman with h/o Stage IV Lung CA with mets to brain 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  Patient completed 10 courses of  radiation treatment 1 day PTA. Patient complaint of  severe headache and an episode of vomiting 3 days prior to admission. The day of admission, the pt developed a fever 0f 103.6F and complained of rigors. He was also noted to be confused.  In ED pt found to have temp of 103.6 rectally. He received vanco IV and cefepime.     Patient is alert and responsive,  respond questions and follow commands.     WBC decreasing to 19K. Patient complaints of sore throat, and productive cough. Afebrile.      ICU Vital Signs Last 24 Hrs  T(C): 36.8 (25 Jan 2018 08:08), Max: 36.8 (25 Jan 2018 08:08)  T(F): 98.3 (25 Jan 2018 08:08), Max: 98.3 (25 Jan 2018 08:08)  HR: 100 (25 Jan 2018 10:00) (89 - 109)  BP: 130/83 (25 Jan 2018 10:00) (94/78 - 130/83)  BP(mean): 94 (25 Jan 2018 10:00) (77 - 96)  ABP: --  ABP(mean): --  RR: 23 (25 Jan 2018 10:00) (16 - 31)  SpO2: 98% (25 Jan 2018 10:00) (95% - 100%)      General gentleman in NAD.  HEENT, [facility] site, dry and intact.   Lungs bilaterally decrease breath sounds.  CVS S1 S2 regular, no M/R/G  Abdomen soft NT ND positive for BS, no organomegaly.  Extremities: No C/C/E. positive for bilateral LE abrasions, dry, healing.      MEDICATIONS  (STANDING):  ALBUTerol    90 MICROgram(s) HFA Inhaler 1 Puff(s) Inhalation every 4 hours  colchicine 0.6 milliGRAM(s) Oral two times a day  dexamethasone     Tablet 2 milliGRAM(s) Oral two times a day  FIRST- Mouthwash  BLM 20 milliLiter(s) Swish and Spit four times a day  folic acid 1 milliGRAM(s) Oral daily  heparin  Injectable 5000 Unit(s) SubCutaneous every 8 hours  metoprolol     tartrate 25 milliGRAM(s) Oral two times a day  mirtazapine 15 milliGRAM(s) Oral at bedtime  nafcillin  IVPB 2 Gram(s) IV Intermittent every 4 hours  pantoprazole    Tablet 40 milliGRAM(s) Oral before breakfast  trimethoprim  160 mG/sulfamethoxazole 800 mG 1 Tablet(s) Oral daily    Culture - Fungal, Body Fluid (01.18.18 @ 12:15)    Specimen Source: Pleural Fl None    Culture Results:   Testing in progress      Amylase, Body Fluid (01.18.18 @ 12:15)    Fluid Source: Pleural    Amylase, Body Fluid: 23: Reference Ranges have NOT been established for analytes in body fluids  because of variability in body fluid composition.  The  has not determined the efficacy of this test when  performed on fluid specimens. The performance characteristics of this  test were determined by [facility]. U/L      Protein Total, Fluid (01.18.18 @ 12:15)    Protein Total, Fluid: 3.3: Test Repeated  Reference Ranges have NOT been established for analytes in body fluids  because of variability in body fluid composition.  The  has not determined the efficacy of this test when  performed on fluid specimens. The performance characteristics of this  test were determined by [facility]. g/dL    pH, Fluid (01.18.18 @ 12:15)    pH, Fluid: 7.47    Fluid Source: pleural    Glucose, Fluid (01.18.18 @ 12:15)    Glucose, Fluid: 134: Reference Ranges have NOT  been established for analytes in body fluids  because of variability in body fluid composition.  The  has not determined the efficacy of this test when  performed on fluid specimens. The performance characteristics of this  test were determined by [facility] [employer]. mg/dL        Protein, CSF (01.19.18 @ 16:45)    Protein, CSF: 192 mg/dL    Glucose, CSF (01.19.18 @ 16:45)    Glucose, CSF: 24 mg/dL    Cerebrospinal Fluid Cell Count-1 (01.11.18 @ 21:14)    Total Nucleated Cell Count, CSF: 744 /uL      RBC Count - Spinal Fluid: 62 /uL    Tube Type: Tube 3    CSF Color: See Note: slightly xanthrochromic    CSF Lymphocytes: 3 %    CSF Appearance: Slightly Cloudy    CSF Monocytes/Macrophages: 1 %    CSF Segmented Neutrophils: 96 %    Rapid Respiratory Viral Panel (01.25.18 @ 09:30)    Rapid RVP Result: [facility]: The [employer] RVP Rapid uses polymerase chain reaction (PCR) and melt  curve analysis to screen for adenovirus; coronavirus HKU1, NL63, 229E,  OC43; human metapneumovirus (hMPV); human enterovirus/rhinovirus  (Entero/RV); influenza A; influenza A/H1;influenza A/H3; influenza  A/H1-2009; influenza B; parainfluenza viruses 1, 2, 3, 4; respiratory  syncytial virus; Bordetella pertussis; Mycoplasma pneumoniae; and  Chlamydophila pneumoniae.    Basic Metabolic Panel in AM (01.25.18 @ 05:01)    Sodium, Serum: 133 mmol/L    Potassium, Serum: 3.8 mmol/L    Chloride, Serum: 97 mmol/L    Carbon Dioxide, Serum: 28 mmol/L    Anion Gap, Serum: 8 mmol/L    Blood Urea Nitrogen, Serum: 11 mg/dL    Creatinine, Serum: 1.06 mg/dL    Glucose, Serum: 81 mg/dL    Calcium, Total Serum: 9.9 mg/dL    eGFR if Non : 83: Interpretative comment  The units for eGFR are ml/min/1.73m2 (normalized body surface area). The  eGFR is calculated from a serum creatinine using the CKD-EPI equation.  Other variables required for calculation are race, age and sex. Among      patients with chronic kidney disease (CKD), the eGFR is useful in  determining the stage of disease according to KDOQI CKD classification.  All eGFR results are reported numerically with the following  interpretation.          GFR                    With                 Without     (ml/min/1.73 m2)    Kidney Damage       Kidney Damage        >= 90                    Stage 1                     Normal        60-89                    Stage 2                     Decreased GFR        30-59     Stage 3                     Stage 3        15-29                    Stage 4                     Stage 4        < 15                      Stage 5                     Stage 5  Each stage of CKD assumes that the associated GFR level has been in  effect for at least 3 months. Determination of stages one and two (with  eGFR > 59 ml/min/m2) requires estimation of kidney damage for at least 3  months as defined by structural or functional abnormalities.  Limitations: All estimates of GFR will be less accurate for patients at  extremes of muscle mass (including but not limited to frail elderly,  critically ill, or cancer patients), those with unusual diets, and those  with conditions associated with reduced secretion or extrarenal  elimination of creatinine. The eGFR equation is not recommended for use  in patients with unstable creatinine levels. mL/min/1.73M2    eGFR if African American: 96 mL/min/1.73M2          Complete Blood Count in AM (01.25.18 @ 05:01)    WBC Count: 19.2 K/uL    RBC Count: 4.74 M/uL    Hemoglobin: 15.0 g/dL    Hematocrit: 44.9 %    Mean Cell Volume: 94.7 fl    Mean Cell Hemoglobin: 31.6 pg    Mean Cell Hemoglobin Conc: 33.4 gm/dL    Red Cell Distrib Width: 18.7 %    Platelet Count - Automated: 280 K/uL      Culture - Sputum . (01.24.18 @ 14:45)    Gram Stain:   Few polymorphonuclear leukocytes per low power field  Few Squamous epithelial cells per low power field  Moderate Gram positive cocci in pairs per oil power field  Rare Yeast like cells per oil power field  Rare Gram Negative Rods per oil power field    Specimen Source: .Sputum Sputum      MRI of the brain 1-19-18  FINDINGS:   MRI dated 12/15/2017 available for review.         The brain demonstrates minimal decrease in size of the multiple   intraparenchymal and leptomeningeal metastatic lesions scattered   throughout the brain. The Ommaya shunt catheter has been removed. Edema   and gliosis is seen about the shunt catheter tract in the RIGHT frontal   lobe. Tiny RIGHT subdural hygroma is noted. No acute cerebral cortical   infarct is found.   No intracranial hemorrhage is recognized.  No mass   effect is found in the brain.          The ventricles, sulci and basal cisterns appear unremarkable.    The vertebral and internal carotid arteries demonstrate expected flow   voids indicating their patency.         The orbits are unremarkable.  The paranasal sinuses are clear.  The nasal   cavity appears intact.  The nasopharynx is symmetric.  The central skull   base and petrous temporal bones are intact.  The calvarium appears   unremarkable.      IMPRESSION:  minimal decrease in size of the multiple intraparenchymal   and leptomeningeal metastatic lesions scattered throughout the brain. The   Ommaya shunt catheter has been removed. Edema and gliosis is seen about   the shunt catheter tract in the RIGHT frontal lobe.  Tiny RIGHT frontal   subdural hygroma is noted

## 2018-01-25 NOTE — PROGRESS NOTE ADULT - ASSESSMENT
48 y/o male with h/o Stage 4 Lung CA with mets to brain 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 Pt was lethargic and unable to give history. As per sister, pt completed 10 course radiation treatment 1 day PTA. 3 days ago he was complaining of severe headache and also had episode of vomiting. He was seen at oncologist office and was given morphine and valium. The day of admission, the pt developed a fever 0f 103.6F and complained of rigors. He was also noted to be confused.  In ED pt found to have temp of 103.6 rectally. He received vanco IV and cefepime.     1. URI ?viral. Acute bacterial meningitis with MSSA improving. Ommaya site infecton s/p port removal. Lung CA stage 4 with brain metastasis. Immunocompromised host.   -new symptoms suggestive of URI; the patient was visited by family who had upper respiratory symptoms  -encephalopathy is much improved, but he still has episodes of confusion  -pleural fluid culture is negative to date  -leukocytosis is persistent  -s/p cefepime 2 gm IV q12h # 11  -on nafcillin IV # 4  -tolerating abx well so far; no side effects noted  -repeat CSF fluid cell count is improved  -neurosurgery evaluation appreciated  -continue abx coverage  -plan to repeat LP early next week  -respiratory care  -monitor temps  -f/u CBC  -supportive care  -discussed with neurosurgery, possible future imaging to rule out obstructive ventriculitis  2. Other issues:   -care per medicine

## 2018-01-25 NOTE — PROGRESS NOTE ADULT - ASSESSMENT
46 y/o gentleman  with metastatic adenocarcinoma of lung, extensive brain mets and leptomeningeal carcinomatosis s/p systemic chemotherapy and intrathecal chemotherapy, s/p WBRT now  with sepsis, meningitis  with MSSA, RLL pneumonia.     Patient complaints of productive cough and sore throat, CxR no evidence of PNA. Viral panel negative. Sputum culture pending.     Continue dexamethasone   2 mg q 12 hrs.      Thoracentesis c/w exudate, malignant effusion, cultures negative    MRI of the brain reviewed with Dr Tran, no evidence of abscess or fluid collection.     LP  scheduled for 1-29-18 as per conversation with Dr Hardy.     Antibiotics per ID. Nafcillin q 4hrs.     Continue bactrim DS 1 tab M-W-F, PCP prophylaxis.   Continue supportive care. 48 y/o gentleman  with metastatic adenocarcinoma of lung, extensive brain mets and leptomeningeal carcinomatosis s/p systemic chemotherapy and intrathecal chemotherapy, s/p WBRT now  with sepsis, meningitis  with MSSA, RLL pneumonia.     Patient complaints of productive cough and sore throat, CxR no evidence of PNA. Viral panel negative. Sputum culture pending.     Continue dexamethasone   2 mg q 12 hrs.      Thoracentesis c/w exudate, malignant effusion, cultures negative    MRI of the brain reviewed with Dr Tran, no evidence of abscess or fluid collection.     LP  scheduled for 1-29-18 as per conversation with Dr Hardy.     Antibiotics per ID. Nafcillin q 4hrs.     Continue bactrim DS 1 tab M-W-F, PCP prophylaxis.     Continue supportive care.  Please DC Dilaudid, clouds his mentation. No opiates. Tylenol for pain.

## 2018-01-25 NOTE — PROGRESS NOTE ADULT - SUBJECTIVE AND OBJECTIVE BOX
Patient is a 47y old  Male who presents with a chief complaint of fever, AMS (2018 16:20)    HPI:  48 y/o male with h/o Stage 4 Lung CA with mets to brain s/p Ommaya reservoir 2017 for leptomeningeal disease s/p chemo and radiation, anxiety, depression, h/o nephrolithiasis, h/o pericarditis was admitted on  for fever and AMS. Pt was lethargic and unable to give history. As per sister, pt completed 10 course radiation treatment 1 day PTA. 3 days ago he was complaining of severe headache and also had episode of vomiting. He was seen at oncologist office and was given morphine and valium. The day of admission, the pt developed a fever 0f 103.6F and complained of rigors. He was also noted to be confused.  In ED pt found to have temp of 103.6 rectally. He received vanco IV and cefepime.     Complaining of increased weakness  Has upper respiratory congestions  No fever or chills  Has cough    MEDICATIONS  (STANDING):  ALBUTerol    90 MICROgram(s) HFA Inhaler 1 Puff(s) Inhalation every 4 hours  colchicine 0.6 milliGRAM(s) Oral two times a day  dexamethasone     Tablet 2 milliGRAM(s) Oral two times a day  FIRST- Mouthwash  BLM 20 milliLiter(s) Swish and Spit four times a day  folic acid 1 milliGRAM(s) Oral daily  heparin  Injectable 5000 Unit(s) SubCutaneous every 8 hours  metoprolol     tartrate 25 milliGRAM(s) Oral two times a day  mirtazapine 15 milliGRAM(s) Oral at bedtime  nafcillin  IVPB 2 Gram(s) IV Intermittent every 4 hours  pantoprazole    Tablet 40 milliGRAM(s) Oral before breakfast  trimethoprim  160 mG/sulfamethoxazole 800 mG 1 Tablet(s) Oral daily    MEDICATIONS  (PRN):  acetaminophen   Tablet 650 milliGRAM(s) Oral every 6 hours PRN For Temp greater than 38 C (100.4 F)  ALBUTerol    0.083% 2.5 milliGRAM(s) Nebulizer every 6 hours PRN Shortness of Breath and/or Wheezing  diazepam    Tablet 0.5 milliGRAM(s) Oral three times a day PRN agitation, anxiety  guaiFENesin    Syrup 100 milliGRAM(s) Oral every 6 hours PRN Cough  HYDROmorphone   Tablet 2 milliGRAM(s) Oral four times a day PRN Severe Pain (7 - 10)  LORazepam   Injectable 1 milliGRAM(s) IV Push every 30 minutes PRN Agitation      Vital Signs Last 24 Hrs  T(C): 36.3 (2018 06:01), Max: 36.3 (2018 20:59)  T(F): 97.4 (2018 06:01), Max: 97.4 (2018 20:59)  HR: 95 (2018 09:00) (89 - 111)  BP: 124/84 (2018 08:00) (94/78 - 134/64)  BP(mean): 92 (2018 08:00) (77 - 96)  RR: 19 (2018 09:00) (16 - 31)  SpO2: 100% (2018 09:00) (95% - 100%)    Physical Exam:      Vital Signs Last 24 Hrs  T(C): 36.1 (2018 06:14), Max: 36.3 (2018 21:37)  T(F): 97 (2018 06:14), Max: 97.3 (2018 21:37)  HR: 99 (2018 06:00) (85 - 118)  BP: 125/90 (2018 05:00) (104/82 - 134/86)  BP(mean): 99 (2018 05:00) (40 - 99)  RR: 18 (2018 06:00) (0 - 30)  SpO2: 99% (2018 06:00) (97% - 100%)    Physical Exam:      Constitutional: frail looking  HEENT: NC/AT, EOMI, PERRLA; postop scalp wound clean  Neck: supple  Back: no tenderness  Respiratory: clear; decreased BS  Cardiovascular: S1S2 regular, no murmurs  Abdomen: soft, not tender, not distended, positive BS  Rectal: deferred  Musculoskeletal: no muscle tenderness, no joint swelling or tenderness  Extremities: no pedal edema  Neurological: alert, moving all extremities; b/l calf abrasions, dry  Skin: no rashes    Labs:             Protein, CSF (18 @ 16:45)    Protein, CSF: 192 mg/dL    Glucose, CSF (18 @ 16:45)    Glucose, CSF: 24 mg/dL    Cerebrospinal Fluid Cell Count-1 (18 @ 16:45)    CSF Segmented Neutrophils: 43 %    Total Nucleated Cell Count, CSF: 114 /uL    CSF Color: Xanthochromic    CSF Appearance: Clear    CSF Lymphocytes: 41 %    CSF Monocytes/Macrophages: 16 %    Labs:                        15.0   19.2  )-----------( 280      ( 2018 05:01 )             44.9     01-    133<L>  |  97  |  11  ----------------------------<  81  3.8   |  28  |  1.06    Ca    9.9      2018 05:01                   13.9   22.4  )-----------( 318      ( 2018 05:51 )             42.4                           14.4   20.5  )-----------( 367      ( 2018 04:43 )             43.2     -    131<L>  |  96  |  16  ----------------------------<  121<H>  4.2   |  27  |  0.93    Ca    10.2<H>      2018 04:43  Phos  2.1     -17                        14.4   17.8  )-----------( 382      ( 2018 05:55 )             42.9     -    131<L>  |  96  |  17  ----------------------------<  105<H>  3.8   |  28  |  0.87    Ca    10.4<H>      2018 05:55  Phos  2.1     -  Mg     2.3     -                                   15.3   19.6  )-----------( 291      ( 2018 05:26 )             53.0     01-13    132<L>  |  100  |  17  ----------------------------<  95  4.2   |  24  |  1.09    Ca    10.5<H>      2018 05:26  Phos  3.0     -  Mg     2.5             Ca    10.2<H>      2018 07:30    TPro  8.4<H>  /  Alb  3.3  /  TBili  0.8  /  DBili  x   /  AST  34  /  ALT  83<H>  /  AlkPhos  122<H>  11     LIVER FUNCTIONS - ( 2018 11:19 )  Alb: 3.3 g/dL / Pro: 8.4 gm/dL / ALK PHOS: 122 U/L / ALT: 83 U/L / AST: 34 U/L / GGT: x           Urinalysis Basic - ( 2018 12:33 )    Color: Yellow / Appearance: Clear / S.015 / pH: x  Gluc: x / Ketone: Negative  / Bili: Negative / Urobili: Negative mg/dL   Blood: x / Protein: 30 mg/dL / Nitrite: Negative   Leuk Esterase: Negative / RBC: 0-2 /HPF / WBC 0-2   Sq Epi: x / Non Sq Epi: Occasional / Bacteria: Occasional    Cerebrospinal Fluid Cell Count-1 (18 @ 21:14)    CSF Segmented Neutrophils: 96 %    Total Nucleated Cell Count, CSF: 744 /uL    CSF Appearance: Slightly Cloudy    CSF Lymphocytes: 3 %    CSF Monocytes/Macrophages: 1 %    CSF Color: See Note: slightly xanthrochromic      Culture - Body Fluid with Gram Stain (collected 2018 14:36)  Source: .Body Fluid scalp incision  Gram Stain (2018 06:33):    No polymorphonuclear cells seen    No organisms seen    by cytocentrifuge    Culture - Acid Fast - CSF (collected 2018 21:14)  Source: .CSF spinal fluid    Culture - Fungal, CSF (collected 2018 21:14)  Source: .CSF spinal fluid  Preliminary Report (2018 12:37):    Testing in progress    Culture - CSF with Gram Stain (collected 2018 21:14)  Source: .CSF  Gram Stain (2018 01:16):    polymorphonuclear leukocytes seen per low power field    Gram Variable Cocci seen per oil power field    by cytocentrifuge  Final Report (2018 07:36):    Numerous Staphylococcus aureus  Organism: Staphylococcus aureus (2018 07:36)  Organism: Staphylococcus aureus (2018 07:36)      -  Oxacillin: S 0.5      -  Penicillin: R >8      -  RIF- Rifampin: R >2      -  Trimethoprim/Sulfamethoxazole: S <=0.5/9.5      -  Vancomycin: S 2      Method Type: TAMMIE    Culture - Urine (collected 2018 12:33)  Source: .Urine None  Final Report (2018 18:38):    10,000 - 49,000 CFU/mL Coag Negative Staphylococcus    Culture - Blood (collected 2018 11:19)  Culture - Body Fluid with Gram Stain (18 @ 12:15)    Gram Stain:   No polymorphonuclear leukocytes per low power field  Red blood cells per low power field  No organisms seen per oil power field  by cytocentrifuge    Specimen Source: .Body Fluid Pleural Fluid    Culture Results:   No growth    Source: .Blood Blood-Peripheral  Preliminary Report (2018 15:01):    No growth to date.    Culture - Blood (collected 2018 11:19)  Source: .Blood Blood-Peripheral  Preliminary Report (2018 15:01):    No growth to date.    Culture - Body Fluid with Gram Stain (18 @ 12:15)    Gram Stain:   No polymorphonuclear leukocytes per low power field  Red blood cells per low power field  No organisms seen per oil power field  by cytocentrifuge    Specimen Source: .Body Fluid Pleural Fluid    pH, Fluid (18 @ 12:15)    pH, Fluid: 7.47    Fluid Source: pleural    Culture - Sputum . (18 @ 14:45)    Gram Stain:   Few polymorphonuclear leukocytes per low power field  Few Squamous epithelial cells per low power field  Moderate Gram positive cocci in pairs per oil power field  Rare Yeast like cells per oil power field  Rare Gram Negative Rods per oil power field    Specimen Source: .Sputum Sputum          Radiology:    < from: CT Chest No Cont (18 @ 17:21) >  Several scattered groundglass nodules and opacities within the right   upper lobe, suggestive of pneumonia given the clinical context of fever.   Small to right pleural effusion is present. Loculated left pleural   effusion along the left lateral pleura and within the fissures is again   identified. Small pericardial effusion is present.    < end of copied text >    < from: CT Head No Cont (18 @ 13:09) >   unchanged RIGHT frontal Ommaya catheter  in place within   the anterior horn of the RIGHT lateral ventricle. Faint hyperdense   lesions scattered throughout the brain consistent with patient's known   metastatic disease. IV contrast would be needed for complete evaluation.     < end of copied text >    < from: CT Head No Cont (01.15.18 @ 09:43) >  Frontal kenia hole with small amount of encephalomalacia/gliosis within   the right frontal lobe. Fluid collection measuring approximately 4.7 x   1.1 cm and minimal associated hemorrhage within the scalp overlying the   right frontal borehole. Ventricular size is unchanged since prior exam.   Punctate calcification in the right frontal lobe is unchanged. The   underlying neoplastic process is difficult to evaluate CT imaging.     < end of copied text >      Advanced directives addressed: full resuscitation

## 2018-01-25 NOTE — PROGRESS NOTE ADULT - ASSESSMENT
Assessment and Plan  	  46 y/o male with h/o Stage 4 Lung CA with mets to brain 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   Found to have 103.6F and complained of rigors. Found to be encephalopathic. Lactic acid 2.4    * Toxic metabolic encephalopathy , resolving  *  Severe sepsis secondary to Acute bacterial meningitis with MSSA. Right frontal lobe fluid collection and postoperative abscess s/p washout  -Ommaya site infecton s/p port removal.  and s/p clean and wash out of the right frotnal lobe ommaya site. Now on tx for meningitis.  -leukocytosis persistent- possibly secondary to known malignancy?  temperature spikes are improving; remains afebrile  -encephalopathy is improved   -s/p LP  1/18 >>  consistent with ongoing bacterial meningitis however improved CSF cell count since LP from 1/11 ; GRam stain from 1/18 , no organisms seen (was able to speak with core lab, and add on the gram stain + culture to the acid fast sample; reported 1/22 11:19 AM)  -Dr. Cee  [facility] apprecaited  -Infectious disease recco to c/w antibiotics >> broaden coverage >> Nafcillin day 2 (previously on cefepime)   -c/w bactrim for PCP proph  -Hem/onc Dr. Arenas has ordred repeat LP 1/22. , plan to repeat on 1/29/18  -Plan: will likely take this immunocompromised patient longer for treatment. We will c/w Nafcillin and bactrim for now. He will need repeat LP to ensure improvement / resolution of the meningitis prior to discharge.   - RVP panel negative    * -RUL pneumonia with loculated left pleural effusion ?empyema.  -thoracentesis shows pleural fluid with pH 7.47 - empyema is unlikely, consistent with exudate, malignant effusion  - repeat CXR 1/24 - unchanged small loculated pleural effusion  - supportive care - mucolytics, magic mouth wash    * - Metastatic adenocarcinoma of Lung ( stage 4 with brain metastasis and leptomeningeal carcinomatosis) . Immunocompromised host.   - s/p systemic chemo and intrathecal chemo, s/p WBRT  -pleural fluid cytology done inpatient >> adenocarcinoma of the lung  - c/w steroids as per oncology    * Leukocytosis due to above and steroids induced, improving    * Sore throat  - viral panel neg  - CXR - unchanged left pleural effusion, small    -DVT proph- sc heparin

## 2018-01-25 NOTE — PROGRESS NOTE ADULT - SUBJECTIVE AND OBJECTIVE BOX
CHIEF COMPLAINT/Diagnosis: fever, cough      48 y/o male with h/o Stage 4 Lung CA with mets to brain s/p Ommaya reservoir 11/2017 for leptomeningeal disease s/p chemo and radiation, anxiety, depression, h/o nephrolithiasis, h/o pericarditis was admitted on 1/11/18 for fever and AMS. Pt was lethargic and unable to give history. As per sister, pt completed 10 course radiation treatment 1 day PTA. 3 days ago he was complaining of severe headache and also had episode of vomiting. He was seen at oncologist office and was given morphine and valium. The day of admission, the pt developed a fever 0f 103.6F and complained of rigors. He was also noted to be confused.  In ED pt found to have temp of 103.6 rectally. He received vanco IV and cefepime.     1/24 - pt seen and examined earlier today, reports sore throat, cough, denies CP, abd pain, denies other new sx, OOB in the chair  1/25 - pt seen and examined, + gen weakness, more lethargic, + neck stiffness, + body aches, + cough, denies dyspnea, CP, abd pain, afebrile  REVIEW OF SYSTEMS:  All other review of systems is negative unless indicated above  T(C): 36.1 (01-25-18 @ 14:22), Max: 36.8 (01-25-18 @ 08:08)  T(F): 97 (01-25-18 @ 14:22), Max: 98.3 (01-25-18 @ 08:08)  HR: 117 (01-25-18 @ 16:00) (89 - 121)  BP: 99/57 (01-25-18 @ 15:00) (94/78 - 136/77)  RR: 23 (01-25-18 @ 16:00) (16 - 31)  SpO2: 98% (01-25-18 @ 16:00) (95% - 100%)  Wt(kg): --  CAPILLARY BLOOD GLUCOSE  PHYSICAL EXAM:    Constitutional: NAD, awake and alert, well-developed  HEENT: PERR, EOMI, Normal Hearing, MMM, post-op scalp wound clean  Neck: Soft and supple, No LAD, No JVD  Respiratory: Breath sounds are clear bilaterally, No wheezing, rales or rhonchi  Cardiovascular: S1 and S2, regular rate and rhythm, no Murmurs, gallops or rubs  Gastrointestinal: Bowel Sounds present, soft, nontender, nondistended, no guarding, no rebound  Extremities: No peripheral edema  Vascular: 2+ peripheral pulses  Neurological: A/O x 3, no focal deficits  Musculoskeletal: 5/5 strength b/l upper and lower extremities  Skin: No rashes    LABS: All Labs Reviewed:  01-25    133<L>  |  97  |  11  ----------------------------<  81  3.8   |  28  |  1.06    Ca    9.9      25 Jan 2018 05:01                          15.0   19.2  )-----------( 280      ( 25 Jan 2018 05:01 )             44.9                 01-23    133<L>  |  99  |  16  ----------------------------<  133<H>  4.1   |  26  |  0.85    Ca    9.8      23 Jan 2018 05:01                          14.0   20.8  )-----------( 264      ( 24 Jan 2018 06:31 )             41.5                          13.0   17.9  )-----------( 282      ( 23 Jan 2018 05:01 )             39.3     01-23    133<L>  |  99  |  16  ----------------------------<  133<H>  4.1   |  26  |  0.85    Ca    9.8      23 Jan 2018 05:01  Rapid Respiratory Viral Panel (01.25.18 @ 09:30)    Rapid RVP Result: [facility]: The [employer] RVP Rapid uses polymerase chain reaction (PCR) and melt  curve analysis to screen for adenovirus; coronavirus HKU1, NL63, 229E,  OC43; human metapneumovirus (hMPV); human enterovirus/rhinovirus  (Entero/RV); influenza A; influenza A/H1;influenza A/H3; influenza  A/H1-2009; influenza B; parainfluenza viruses 1, 2, 3, 4; respiratory  syncytial virus; Bordetella pertussis; Mycoplasma pneumoniae; and  Chlamydophila pneumoniae.        Blood Culture: 01-22 @ 11:19  Organism --  Gram Stain Blood -- Gram Stain   No polymorphonuclear leukocytes per low power field  No organisms seen per oil power field  by cytocentrifuge  Specimen Source .CSF process spec as per doctor marianela and ten thank you  Culture-Blood --    01-19 @ 16:45  Organism --  Gram Stain Blood -- Gram Stain --  Specimen Source .CSF spinal fluid  Culture-Blood --      < from: Xray Chest 1 View AP-PORTABLE IMMEDIATE (01.24.18 @ 10:00) >  INDINGS: Right sided central venous CT compatible port with tip in the   superior vena cava.    Small loculated left pleural effusion, unchanged. No opacity to suggest   pneumonia. No pneumothorax.    IMPRESSION:    Small loculated left pleural effusion, unchanged.        < end of copied text >  < from: MR Head w/wo IV Cont (01.18.18 @ 20:19) >    minimal decrease in size of the multiple intraparenchymal   and leptomeningeal metastatic lesions scattered throughout the brain. The   Ommaya shunt catheter has been removed. Edema and gliosis is seen about   the shunt catheter tract in the RIGHT frontal lobe.  Tiny RIGHT frontal   subdural hygroma is noted.          < end of copied text >  < from: CT Chest w/ IV Cont (01.17.18 @ 11:29) >  : Marked right ventricular outflow tract narrowing just   proximal to the pulmonic valve. No extrinsic mass is visualized.  Pericardial thickening suggestive of pericarditis. The possibility that   this could be the cause of the right ventricular outflow tract narrowing   is entertained.          < end of copied text >  < from: Xray Chest 1 View AP/PA. (01.11.18 @ 13:23) >  IMPRESSION:  A multiloculated left pleural effusion is slightly smaller and the left   lower lobe is slightly better expanded at this time.      < end of copied text >

## 2018-01-26 LAB
ANION GAP SERPL CALC-SCNC: 8 MMOL/L — SIGNIFICANT CHANGE UP (ref 5–17)
BUN SERPL-MCNC: 10 MG/DL — SIGNIFICANT CHANGE UP (ref 7–23)
CALCIUM SERPL-MCNC: 9.5 MG/DL — SIGNIFICANT CHANGE UP (ref 8.5–10.1)
CHLORIDE SERPL-SCNC: 98 MMOL/L — SIGNIFICANT CHANGE UP (ref 96–108)
CO2 SERPL-SCNC: 27 MMOL/L — SIGNIFICANT CHANGE UP (ref 22–31)
CREAT SERPL-MCNC: 0.83 MG/DL — SIGNIFICANT CHANGE UP (ref 0.5–1.3)
CULTURE RESULTS: SIGNIFICANT CHANGE UP
GLUCOSE SERPL-MCNC: 115 MG/DL — HIGH (ref 70–99)
HCT VFR BLD CALC: 42.7 % — SIGNIFICANT CHANGE UP (ref 39–50)
HGB BLD-MCNC: 13.9 G/DL — SIGNIFICANT CHANGE UP (ref 13–17)
MCHC RBC-ENTMCNC: 30.9 PG — SIGNIFICANT CHANGE UP (ref 27–34)
MCHC RBC-ENTMCNC: 32.6 GM/DL — SIGNIFICANT CHANGE UP (ref 32–36)
MCV RBC AUTO: 94.8 FL — SIGNIFICANT CHANGE UP (ref 80–100)
PLATELET # BLD AUTO: 228 K/UL — SIGNIFICANT CHANGE UP (ref 150–400)
POTASSIUM SERPL-MCNC: 3.8 MMOL/L — SIGNIFICANT CHANGE UP (ref 3.5–5.3)
POTASSIUM SERPL-SCNC: 3.8 MMOL/L — SIGNIFICANT CHANGE UP (ref 3.5–5.3)
RBC # BLD: 4.5 M/UL — SIGNIFICANT CHANGE UP (ref 4.2–5.8)
RBC # FLD: 19.1 % — HIGH (ref 10.3–14.5)
SODIUM SERPL-SCNC: 133 MMOL/L — LOW (ref 135–145)
SPECIMEN SOURCE: SIGNIFICANT CHANGE UP
WBC # BLD: 14.4 K/UL — HIGH (ref 3.8–10.5)
WBC # FLD AUTO: 14.4 K/UL — HIGH (ref 3.8–10.5)

## 2018-01-26 RX ORDER — HYDROMORPHONE HYDROCHLORIDE 2 MG/ML
2 INJECTION INTRAMUSCULAR; INTRAVENOUS; SUBCUTANEOUS EVERY 4 HOURS
Qty: 0 | Refills: 0 | Status: DISCONTINUED | OUTPATIENT
Start: 2018-01-26 | End: 2018-01-31

## 2018-01-26 RX ADMIN — Medication 100 MILLIGRAM(S): at 13:20

## 2018-01-26 RX ADMIN — MIRTAZAPINE 15 MILLIGRAM(S): 45 TABLET, ORALLY DISINTEGRATING ORAL at 22:24

## 2018-01-26 RX ADMIN — DIPHENHYDRAMINE HYDROCHLORIDE AND LIDOCAINE HYDROCHLORIDE AND ALUMINUM HYDROXIDE AND MAGNESIUM HYDRO 20 MILLILITER(S): KIT at 01:41

## 2018-01-26 RX ADMIN — NAFCILLIN 200 GRAM(S): 10 INJECTION, POWDER, FOR SOLUTION INTRAVENOUS at 19:54

## 2018-01-26 RX ADMIN — DIPHENHYDRAMINE HYDROCHLORIDE AND LIDOCAINE HYDROCHLORIDE AND ALUMINUM HYDROXIDE AND MAGNESIUM HYDRO 20 MILLILITER(S): KIT at 22:23

## 2018-01-26 RX ADMIN — Medication 25 MILLIGRAM(S): at 05:18

## 2018-01-26 RX ADMIN — NAFCILLIN 200 GRAM(S): 10 INJECTION, POWDER, FOR SOLUTION INTRAVENOUS at 16:20

## 2018-01-26 RX ADMIN — Medication 25 MILLIGRAM(S): at 19:54

## 2018-01-26 RX ADMIN — PANTOPRAZOLE SODIUM 40 MILLIGRAM(S): 20 TABLET, DELAYED RELEASE ORAL at 05:18

## 2018-01-26 RX ADMIN — DIPHENHYDRAMINE HYDROCHLORIDE AND LIDOCAINE HYDROCHLORIDE AND ALUMINUM HYDROXIDE AND MAGNESIUM HYDRO 20 MILLILITER(S): KIT at 12:55

## 2018-01-26 RX ADMIN — Medication 1 MILLIGRAM(S): at 12:55

## 2018-01-26 RX ADMIN — HYDROMORPHONE HYDROCHLORIDE 2 MILLIGRAM(S): 2 INJECTION INTRAMUSCULAR; INTRAVENOUS; SUBCUTANEOUS at 16:17

## 2018-01-26 RX ADMIN — Medication 2 MILLIGRAM(S): at 19:54

## 2018-01-26 RX ADMIN — NAFCILLIN 200 GRAM(S): 10 INJECTION, POWDER, FOR SOLUTION INTRAVENOUS at 01:40

## 2018-01-26 RX ADMIN — Medication 0.6 MILLIGRAM(S): at 19:54

## 2018-01-26 RX ADMIN — DIPHENHYDRAMINE HYDROCHLORIDE AND LIDOCAINE HYDROCHLORIDE AND ALUMINUM HYDROXIDE AND MAGNESIUM HYDRO 20 MILLILITER(S): KIT at 05:18

## 2018-01-26 RX ADMIN — Medication 650 MILLIGRAM(S): at 12:54

## 2018-01-26 RX ADMIN — HEPARIN SODIUM 5000 UNIT(S): 5000 INJECTION INTRAVENOUS; SUBCUTANEOUS at 16:18

## 2018-01-26 RX ADMIN — HEPARIN SODIUM 5000 UNIT(S): 5000 INJECTION INTRAVENOUS; SUBCUTANEOUS at 22:24

## 2018-01-26 RX ADMIN — NAFCILLIN 200 GRAM(S): 10 INJECTION, POWDER, FOR SOLUTION INTRAVENOUS at 05:18

## 2018-01-26 RX ADMIN — HEPARIN SODIUM 5000 UNIT(S): 5000 INJECTION INTRAVENOUS; SUBCUTANEOUS at 05:19

## 2018-01-26 RX ADMIN — NAFCILLIN 200 GRAM(S): 10 INJECTION, POWDER, FOR SOLUTION INTRAVENOUS at 22:57

## 2018-01-26 RX ADMIN — Medication 0.6 MILLIGRAM(S): at 05:18

## 2018-01-26 RX ADMIN — Medication 1 TABLET(S): at 12:54

## 2018-01-26 RX ADMIN — Medication 2 MILLIGRAM(S): at 05:18

## 2018-01-26 RX ADMIN — NAFCILLIN 200 GRAM(S): 10 INJECTION, POWDER, FOR SOLUTION INTRAVENOUS at 12:55

## 2018-01-26 NOTE — PROGRESS NOTE ADULT - SUBJECTIVE AND OBJECTIVE BOX
Patient is a 47y old  Male who presents with a chief complaint of fever, AMS (2018 16:20)    HPI:  48 y/o male with h/o Stage 4 Lung CA with mets to brain s/p Ommaya reservoir 2017 for leptomeningeal disease s/p chemo and radiation, anxiety, depression, h/o nephrolithiasis, h/o pericarditis was admitted on  for fever and AMS. Pt was lethargic and unable to give history. As per sister, pt completed 10 course radiation treatment 1 day PTA. 3 days ago he was complaining of severe headache and also had episode of vomiting. He was seen at oncologist office and was given morphine and valium. The day of admission, the pt developed a fever 0f 103.6F and complained of rigors. He was also noted to be confused.  In ED pt found to have temp of 103.6 rectally. He received vanco IV and cefepime.     Feels better  Mild dry cough  No fever or chills  Denies HA    MEDICATIONS  (STANDING):  ALBUTerol    90 MICROgram(s) HFA Inhaler 1 Puff(s) Inhalation every 4 hours  colchicine 0.6 milliGRAM(s) Oral two times a day  dexamethasone     Tablet 2 milliGRAM(s) Oral two times a day  FIRST- Mouthwash  BLM 20 milliLiter(s) Swish and Spit four times a day  folic acid 1 milliGRAM(s) Oral daily  heparin  Injectable 5000 Unit(s) SubCutaneous every 8 hours  metoprolol     tartrate 25 milliGRAM(s) Oral two times a day  mirtazapine 15 milliGRAM(s) Oral at bedtime  nafcillin  IVPB 2 Gram(s) IV Intermittent every 4 hours  pantoprazole    Tablet 40 milliGRAM(s) Oral before breakfast  trimethoprim  160 mG/sulfamethoxazole 800 mG 1 Tablet(s) Oral <User Schedule>    MEDICATIONS  (PRN):  acetaminophen   Tablet 650 milliGRAM(s) Oral every 6 hours PRN For Temp greater than 38 C (100.4 F)  ALBUTerol    0.083% 2.5 milliGRAM(s) Nebulizer every 6 hours PRN Shortness of Breath and/or Wheezing  diazepam    Tablet 0.5 milliGRAM(s) Oral three times a day PRN agitation, anxiety  guaiFENesin    Syrup 100 milliGRAM(s) Oral every 6 hours PRN Cough      Vital Signs Last 24 Hrs  T(C): 36.6 (2018 06:02), Max: 36.6 (2018 18:13)  T(F): 97.9 (2018 06:02), Max: 97.9 (2018 06:02)  HR: 102 (2018 06:00) (98 - 121)  BP: 131/80 (2018 05:00) (99/57 - 136/77)  BP(mean): 93 (2018 05:00) (65 - 107)  RR: 16 (2018 06:00) (16 - 32)  SpO2: 98% (2018 06:00) (96% - 100%)    Physical Exam:      Vital Signs Last 24 Hrs  T(C): 36.1 (2018 06:14), Max: 36.3 (2018 21:37)  T(F): 97 (2018 06:14), Max: 97.3 (2018 21:37)  HR: 99 (2018 06:00) (85 - 118)  BP: 125/90 (2018 05:00) (104/82 - 134/86)  BP(mean): 99 (2018 05:00) (40 - 99)  RR: 18 (2018 06:00) (0 - 30)  SpO2: 99% (2018 06:00) (97% - 100%)    Physical Exam:      Constitutional: frail looking  HEENT: NC/AT, EOMI, PERRLA; postop scalp wound clean  Neck: supple  Back: no tenderness  Respiratory: clear; decreased BS  Cardiovascular: S1S2 regular, no murmurs  Abdomen: soft, not tender, not distended, positive BS  Rectal: deferred  Musculoskeletal: no muscle tenderness, no joint swelling or tenderness  Extremities: no pedal edema  Neurological: alert, moving all extremities  Skin: no rashes    Labs:                          13.9   14.4  )-----------( 228      ( 2018 06:19 )             42.7         133<L>  |  98  |  10  ----------------------------<  115<H>  3.8   |  27  |  0.83    Ca    9.5      2018 06:19               Protein, CSF (18 @ 16:45)    Protein, CSF: 192 mg/dL    Glucose, CSF (18 @ 16:45)    Glucose, CSF: 24 mg/dL    Cerebrospinal Fluid Cell Count-1 (18 @ 16:45)    CSF Segmented Neutrophils: 43 %    Total Nucleated Cell Count, CSF: 114 /uL    CSF Color: Xanthochromic    CSF Appearance: Clear    CSF Lymphocytes: 41 %    CSF Monocytes/Macrophages: 16 %    Labs:                        15.0   19.2  )-----------( 280      ( 2018 05:01 )             44.9     01-    133<L>  |  97  |  11  ----------------------------<  81  3.8   |  28  |  1.06    Ca    9.9      2018 05:01                   13.9   22.4  )-----------( 318      ( 2018 05:51 )             42.4                           14.4   20.5  )-----------( 367      ( 2018 04:43 )             43.2     01-    131<L>  |  96  |  16  ----------------------------<  121<H>  4.2   |  27  |  0.93    Ca    10.2<H>      2018 04:43  Phos  2.1     -17                        14.4   17.8  )-----------( 382      ( 2018 05:55 )             42.9     -17    131<L>  |  96  |  17  ----------------------------<  105<H>  3.8   |  28  |  0.87    Ca    10.4<H>      2018 05:55  Phos  2.1     -17  Mg     2.3     -                                   15.3   19.6  )-----------( 291      ( 2018 05:26 )             53.0     01-13    132<L>  |  100  |  17  ----------------------------<  95  4.2   |  24  |  1.09    Ca    10.5<H>      2018 05:26  Phos  3.0       Mg     2.5             Ca    10.2<H>      2018 07:30    TPro  8.4<H>  /  Alb  3.3  /  TBili  0.8  /  DBili  x   /  AST  34  /  ALT  83<H>  /  AlkPhos  122<H>       LIVER FUNCTIONS - ( 2018 11:19 )  Alb: 3.3 g/dL / Pro: 8.4 gm/dL / ALK PHOS: 122 U/L / ALT: 83 U/L / AST: 34 U/L / GGT: x           Urinalysis Basic - ( 2018 12:33 )    Color: Yellow / Appearance: Clear / S.015 / pH: x  Gluc: x / Ketone: Negative  / Bili: Negative / Urobili: Negative mg/dL   Blood: x / Protein: 30 mg/dL / Nitrite: Negative   Leuk Esterase: Negative / RBC: 0-2 /HPF / WBC 0-2   Sq Epi: x / Non Sq Epi: Occasional / Bacteria: Occasional    Cerebrospinal Fluid Cell Count-1 (18 @ 21:14)    CSF Segmented Neutrophils: 96 %    Total Nucleated Cell Count, CSF: 744 /uL    CSF Appearance: Slightly Cloudy    CSF Lymphocytes: 3 %    CSF Monocytes/Macrophages: 1 %    CSF Color: See Note: slightly xanthrochromic      Culture - Body Fluid with Gram Stain (collected 2018 14:36)  Source: .Body Fluid scalp incision  Gram Stain (2018 06:33):    No polymorphonuclear cells seen    No organisms seen    by cytocentrifuge    Culture - Acid Fast - CSF (collected 2018 21:14)  Source: .CSF spinal fluid    Culture - Fungal, CSF (collected 2018 21:14)  Source: .CSF spinal fluid  Preliminary Report (2018 12:37):    Testing in progress    Culture - CSF with Gram Stain (collected 2018 21:14)  Source: .CSF  Gram Stain (2018 01:16):    polymorphonuclear leukocytes seen per low power field    Gram Variable Cocci seen per oil power field    by cytocentrifuge  Final Report (2018 07:36):    Numerous Staphylococcus aureus  Organism: Staphylococcus aureus (2018 07:36)  Organism: Staphylococcus aureus (2018 07:36)      -  Oxacillin: S 0.5      -  Penicillin: R >8      -  RIF- Rifampin: R >2      -  Trimethoprim/Sulfamethoxazole: S <=0.5/9.5      -  Vancomycin: S 2      Method Type: TAMMIE    Culture - Urine (collected 2018 12:33)  Source: .Urine None  Final Report (2018 18:38):    10,000 - 49,000 CFU/mL Coag Negative Staphylococcus    Culture - Blood (collected 2018 11:19)  Culture - Body Fluid with Gram Stain (18 @ 12:15)    Gram Stain:   No polymorphonuclear leukocytes per low power field  Red blood cells per low power field  No organisms seen per oil power field  by cytocentrifuge    Specimen Source: .Body Fluid Pleural Fluid    Culture Results:   No growth    Source: .Blood Blood-Peripheral  Preliminary Report (2018 15:01):    No growth to date.    Culture - Blood (collected 2018 11:19)  Source: .Blood Blood-Peripheral  Preliminary Report (2018 15:01):    No growth to date.    Culture - Body Fluid with Gram Stain (18 @ 12:15)    Gram Stain:   No polymorphonuclear leukocytes per low power field  Red blood cells per low power field  No organisms seen per oil power field  by cytocentrifuge    Specimen Source: .Body Fluid Pleural Fluid    pH, Fluid (18 @ 12:15)    pH, Fluid: 7.47    Fluid Source: pleural    Culture - Sputum . (18 @ 14:45)    Gram Stain:   Few polymorphonuclear leukocytes per low power field  Few Squamous epithelial cells per low power field  Moderate Gram positive cocci in pairs per oil power field  Rare Yeast like cells per oil power field  Rare Gram Negative Rods per oil power field    Specimen Source: .Sputum Sputum    Rapid Respiratory Viral Panel (18 @ 09:30)    Rapid RVP Result: [employer]: The [employer] RVP Rapid uses polymerase chain reaction (PCR) and melt  curve analysis to screen for adenovirus; coronavirus HKU1, NL63, 229E,  OC43; human metapneumovirus (hMPV); human enterovirus/rhinovirus  (Entero/RV); influenza A; influenza A/H1;influenza A/H3; influenza  A/H1-2009; influenza B; parainfluenza viruses 1, 2, 3, 4; respiratory  syncytial virus; Bordetella pertussis; Mycoplasma pneumoniae; and  Chlamydophila pneumoniae.          Radiology:    < from: CT Chest No Cont (18 @ 17:21) >  Several scattered groundglass nodules and opacities within the right   upper lobe, suggestive of pneumonia given the clinical context of fever.   Small to right pleural effusion is present. Loculated left pleural   effusion along the left lateral pleura and within the fissures is again   identified. Small pericardial effusion is present.    < end of copied text >    < from: CT Head No Cont (18 @ 13:09) >   unchanged RIGHT frontal Ommaya catheter  in place within   the anterior horn of the RIGHT lateral ventricle. Faint hyperdense   lesions scattered throughout the brain consistent with patient's known   metastatic disease. IV contrast would be needed for complete evaluation.     < end of copied text >    < from: CT Head No Cont (01.15.18 @ 09:43) >  Frontal kenia hole with small amount of encephalomalacia/gliosis within   the right frontal lobe. Fluid collection measuring approximately 4.7 x   1.1 cm and minimal associated hemorrhage within the scalp overlying the   right frontal borehole. Ventricular size is unchanged since prior exam.   Punctate calcification in the right frontal lobe is unchanged. The   underlying neoplastic process is difficult to evaluate CT imaging.     < end of copied text >      Advanced directives addressed: full resuscitation

## 2018-01-26 NOTE — PROGRESS NOTE ADULT - ASSESSMENT
46 y/o male with h/o Stage 4 Lung CA with mets to brain 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 Pt was lethargic and unable to give history. As per sister, pt completed 10 course radiation treatment 1 day PTA. 3 days ago he was complaining of severe headache and also had episode of vomiting. He was seen at oncologist office and was given morphine and valium. The day of admission, the pt developed a fever 0f 103.6F and complained of rigors. He was also noted to be confused.  In ED pt found to have temp of 103.6 rectally. He received vanco IV and cefepime.     1. URI ?viral. Acute bacterial meningitis with MSSA improving. Ommaya site infecton s/p port removal. Lung CA stage 4 with brain metastasis. Immunocompromised host.   -URI symptoms are improving  -encephalopathy is much improved, but he still has episodes of confusion  -pleural fluid culture is negative to date  -leukocytosis is improving  -s/p cefepime 2 gm IV q12h # 11  -on nafcillin IV # 5  -tolerating abx well so far; no side effects noted  -repeat CSF fluid cell count is improved  -neurosurgery evaluation appreciated  -continue abx coverage  -plan to repeat LP early next week  -respiratory care  -monitor temps  -f/u CBC  -supportive care  -discussed with neurosurgery, possible future imaging to rule out obstructive ventriculitis  2. Other issues:   -care per medicine

## 2018-01-26 NOTE — PROGRESS NOTE ADULT - SUBJECTIVE AND OBJECTIVE BOX
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 Pt was lethargic and unable to give history. As per sister, pt completed 10 course radiation treatment 1 day PTA. 3 days ago he was complaining of severe headache and also had episode of vomiting. He was seen at oncologist office and was given morphine and valium. The day of admission, the pt developed a fever 0f 103.6F and complained of rigors. He was also noted to be confused.  In ED pt found to have temp of 103.6 rectally. He received vanco IV and cefepime.     1/24 - pt seen and examined earlier today, reports sore throat, cough, denies CP, abd pain, denies other new sx, OOB in the chair  1/25 - pt seen and examined, + gen weakness, more lethargic, + neck stiffness, + body aches, + cough, denies dyspnea, CP, abd pain, afebrile  1/26 - pt seen and examined, feels better, denies CP, dyspnea, slight cough, some sore throat, tolerates PO intake  REVIEW OF SYSTEMS:  All other review of systems is negative unless indicated above  T(C): 36.1 (01-26-18 @ 14:51), Max: 36.7 (01-26-18 @ 09:31)  T(F): 97 (01-26-18 @ 14:51), Max: 98.1 (01-26-18 @ 09:31)  HR: 109 (01-26-18 @ 15:00) (86 - 119)  BP: 130/88 (01-26-18 @ 15:00) (102/74 - 133/91)  RR: 20 (01-26-18 @ 15:00) (16 - 32)  SpO2: 97% (01-26-18 @ 15:00) (95% - 99%)  Wt(kg): --  CAPILLARY BLOOD GLUCOSE  PHYSICAL EXAM:    Constitutional: NAD, awake and alert, well-developed  HEENT: PERR, EOMI, Normal Hearing, MMM, post-op scalp wound clean  Neck: Soft and supple, No LAD, No JVD  Respiratory: Breath sounds are clear bilaterally, No wheezing, rales or rhonchi  Cardiovascular: S1 and S2, regular rate and rhythm, no Murmurs, gallops or rubs  Gastrointestinal: Bowel Sounds present, soft, nontender, nondistended, no guarding, no rebound  Extremities: No peripheral edema  Vascular: 2+ peripheral pulses  Neurological: A/O x 3, no focal deficits  Musculoskeletal: 5/5 strength b/l upper and lower extremities  Skin: No rashes    LABS: All Labs Reviewed:  01-26    133<L>  |  98  |  10  ----------------------------<  115<H>  3.8   |  27  |  0.83    Ca    9.5      26 Jan 2018 06:19                          13.9   14.4  )-----------( 228      ( 26 Jan 2018 06:19 )             42.7     01-25    133<L>  |  97  |  11  ----------------------------<  81  3.8   |  28  |  1.06    Ca    9.9      25 Jan 2018 05:01                          15.0   19.2  )-----------( 280      ( 25 Jan 2018 05:01 )             44.9                 01-23    133<L>  |  99  |  16  ----------------------------<  133<H>  4.1   |  26  |  0.85    Ca    9.8      23 Jan 2018 05:01                          14.0   20.8  )-----------( 264      ( 24 Jan 2018 06:31 )             41.5                          13.0   17.9  )-----------( 282      ( 23 Jan 2018 05:01 )             39.3     01-23    133<L>  |  99  |  16  ----------------------------<  133<H>  4.1   |  26  |  0.85    Ca    9.8      23 Jan 2018 05:01  Rapid Respiratory Viral Panel (01.25.18 @ 09:30)    Rapid RVP Result: [facility]: The [employer] RVP Rapid uses polymerase chain reaction (PCR) and melt  curve analysis to screen for adenovirus; coronavirus HKU1, NL63, 229E,  OC43; human metapneumovirus (hMPV); human enterovirus/rhinovirus  (Entero/RV); influenza A; influenza A/H1;influenza A/H3; influenza  A/H1-2009; influenza B; parainfluenza viruses 1, 2, 3, 4; respiratory  syncytial virus; Bordetella pertussis; Mycoplasma pneumoniae; and  Chlamydophila pneumoniae.        Blood Culture: 01-22 @ 11:19  Organism --  Gram Stain Blood -- Gram Stain   No polymorphonuclear leukocytes per low power field  No organisms seen per oil power field  by cytocentrifuge  Specimen Source .CSF process spec as per doctor millan thank you  Culture-Blood --    01-19 @ 16:45  Organism --  Gram Stain Blood -- Gram Stain --  Specimen Source .CSF spinal fluid  Culture-Blood --      < from: Xray Chest 1 View AP-PORTABLE IMMEDIATE (01.24.18 @ 10:00) >  INDINGS: Right sided central venous CT compatible port with tip in the   superior vena cava.    Small loculated left pleural effusion, unchanged. No opacity to suggest   pneumonia. No pneumothorax.    IMPRESSION:    Small loculated left pleural effusion, unchanged.        < end of copied text >  < from: MR Head w/wo IV Cont (01.18.18 @ 20:19) >    minimal decrease in size of the multiple intraparenchymal   and leptomeningeal metastatic lesions scattered throughout the brain. The   Ommaya shunt catheter has been removed. Edema and gliosis is seen about   the shunt catheter tract in the RIGHT frontal lobe.  Tiny RIGHT frontal   subdural hygroma is noted.          < end of copied text >  < from: CT Chest w/ IV Cont (01.17.18 @ 11:29) >  : Marked right ventricular outflow tract narrowing just   proximal to the pulmonic valve. No extrinsic mass is visualized.  Pericardial thickening suggestive of pericarditis. The possibility that   this could be the cause of the right ventricular outflow tract narrowing   is entertained.          < end of copied text >  < from: Xray Chest 1 View AP/PA. (01.11.18 @ 13:23) >  IMPRESSION:  A multiloculated left pleural effusion is slightly smaller and the left   lower lobe is slightly better expanded at this time.      < end of copied text >

## 2018-01-26 NOTE — PROGRESS NOTE ADULT - ASSESSMENT
Assessment and Plan  	  46 y/o male with h/o Stage 4 Lung CA with mets to brain 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   Found to have 103.6F and complained of rigors. Found to be encephalopathic. Lactic acid 2.4    * Toxic metabolic encephalopathy , resolving  *  Severe sepsis secondary to Acute bacterial meningitis with MSSA. Right frontal lobe fluid collection and postoperative abscess s/p washout  -Ommaya site infecton s/p port removal.  and s/p clean and wash out of the right frotnal lobe ommaya site. Now on tx for meningitis.  -leukocytosis persistent- possibly secondary to known malignancy?  temperature spikes are improving; remains afebrile  -encephalopathy is improved   -s/p LP  1/18 >>  consistent with ongoing bacterial meningitis however improved CSF cell count since LP from 1/11 ; GRam stain from 1/18 , no organisms seen (was able to speak with core lab, and add on the gram stain + culture to the acid fast sample; reported 1/22 11:19 AM)  -Dr. Cee  [facility] apprecaited  -Infectious disease recco to c/w antibiotics >> broaden coverage >> Nafcillin day 2 (previously on cefepime)   -c/w bactrim for PCP proph  -Hem/onc Dr. Arenas has ordred repeat LP 1/22. , plan to repeat on 1/29/18  -Plan: will likely take this immunocompromised patient longer for treatment. We will c/w Nafcillin and bactrim for now. He will need repeat LP to ensure improvement / resolution of the meningitis prior to discharge.   - RVP panel negative    * -RUL pneumonia with loculated left pleural effusion ?empyema.  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 Immunocompromised host.   - s/p systemic chemo and intrathecal chemo, s/p WBRT  -pleural fluid cytology done inpatient >> adenocarcinoma of the lung  - c/w steroids as per oncology    * Leukocytosis due to above and steroids induced, improving    * Sore throat, improving  - viral panel neg  - CXR - unchanged left pleural effusion, small    -DVT proph- sc heparin

## 2018-01-27 LAB
HCT VFR BLD CALC: 43.3 % — SIGNIFICANT CHANGE UP (ref 39–50)
HGB BLD-MCNC: 14.2 G/DL — SIGNIFICANT CHANGE UP (ref 13–17)
MCHC RBC-ENTMCNC: 31.1 PG — SIGNIFICANT CHANGE UP (ref 27–34)
MCHC RBC-ENTMCNC: 32.8 GM/DL — SIGNIFICANT CHANGE UP (ref 32–36)
MCV RBC AUTO: 94.8 FL — SIGNIFICANT CHANGE UP (ref 80–100)
PLATELET # BLD AUTO: 250 K/UL — SIGNIFICANT CHANGE UP (ref 150–400)
RBC # BLD: 4.57 M/UL — SIGNIFICANT CHANGE UP (ref 4.2–5.8)
RBC # FLD: 18.9 % — HIGH (ref 10.3–14.5)
WBC # BLD: 16.7 K/UL — HIGH (ref 3.8–10.5)
WBC # FLD AUTO: 16.7 K/UL — HIGH (ref 3.8–10.5)

## 2018-01-27 PROCEDURE — 99233 SBSQ HOSP IP/OBS HIGH 50: CPT

## 2018-01-27 RX ORDER — SENNA PLUS 8.6 MG/1
2 TABLET ORAL DAILY
Qty: 0 | Refills: 0 | Status: DISCONTINUED | OUTPATIENT
Start: 2018-01-27 | End: 2018-01-31

## 2018-01-27 RX ORDER — MIRTAZAPINE 45 MG/1
30 TABLET, ORALLY DISINTEGRATING ORAL AT BEDTIME
Qty: 0 | Refills: 0 | Status: DISCONTINUED | OUTPATIENT
Start: 2018-01-27 | End: 2018-01-31

## 2018-01-27 RX ADMIN — Medication 25 MILLIGRAM(S): at 06:00

## 2018-01-27 RX ADMIN — Medication 0.6 MILLIGRAM(S): at 07:02

## 2018-01-27 RX ADMIN — Medication 0.6 MILLIGRAM(S): at 18:25

## 2018-01-27 RX ADMIN — NAFCILLIN 200 GRAM(S): 10 INJECTION, POWDER, FOR SOLUTION INTRAVENOUS at 05:59

## 2018-01-27 RX ADMIN — Medication 2 MILLIGRAM(S): at 18:26

## 2018-01-27 RX ADMIN — DIPHENHYDRAMINE HYDROCHLORIDE AND LIDOCAINE HYDROCHLORIDE AND ALUMINUM HYDROXIDE AND MAGNESIUM HYDRO 20 MILLILITER(S): KIT at 18:26

## 2018-01-27 RX ADMIN — DIPHENHYDRAMINE HYDROCHLORIDE AND LIDOCAINE HYDROCHLORIDE AND ALUMINUM HYDROXIDE AND MAGNESIUM HYDRO 20 MILLILITER(S): KIT at 06:14

## 2018-01-27 RX ADMIN — Medication 650 MILLIGRAM(S): at 19:12

## 2018-01-27 RX ADMIN — Medication 100 MILLIGRAM(S): at 18:29

## 2018-01-27 RX ADMIN — NAFCILLIN 200 GRAM(S): 10 INJECTION, POWDER, FOR SOLUTION INTRAVENOUS at 10:54

## 2018-01-27 RX ADMIN — Medication 1 MILLIGRAM(S): at 13:37

## 2018-01-27 RX ADMIN — HEPARIN SODIUM 5000 UNIT(S): 5000 INJECTION INTRAVENOUS; SUBCUTANEOUS at 14:03

## 2018-01-27 RX ADMIN — Medication 25 MILLIGRAM(S): at 19:51

## 2018-01-27 RX ADMIN — NAFCILLIN 200 GRAM(S): 10 INJECTION, POWDER, FOR SOLUTION INTRAVENOUS at 14:32

## 2018-01-27 RX ADMIN — NAFCILLIN 200 GRAM(S): 10 INJECTION, POWDER, FOR SOLUTION INTRAVENOUS at 21:37

## 2018-01-27 RX ADMIN — DIPHENHYDRAMINE HYDROCHLORIDE AND LIDOCAINE HYDROCHLORIDE AND ALUMINUM HYDROXIDE AND MAGNESIUM HYDRO 20 MILLILITER(S): KIT at 11:00

## 2018-01-27 RX ADMIN — PANTOPRAZOLE SODIUM 40 MILLIGRAM(S): 20 TABLET, DELAYED RELEASE ORAL at 06:05

## 2018-01-27 RX ADMIN — Medication 2 MILLIGRAM(S): at 06:05

## 2018-01-27 RX ADMIN — NAFCILLIN 200 GRAM(S): 10 INJECTION, POWDER, FOR SOLUTION INTRAVENOUS at 19:01

## 2018-01-27 RX ADMIN — SENNA PLUS 2 TABLET(S): 8.6 TABLET ORAL at 13:36

## 2018-01-27 RX ADMIN — HEPARIN SODIUM 5000 UNIT(S): 5000 INJECTION INTRAVENOUS; SUBCUTANEOUS at 21:30

## 2018-01-27 RX ADMIN — NAFCILLIN 200 GRAM(S): 10 INJECTION, POWDER, FOR SOLUTION INTRAVENOUS at 01:11

## 2018-01-27 RX ADMIN — HEPARIN SODIUM 5000 UNIT(S): 5000 INJECTION INTRAVENOUS; SUBCUTANEOUS at 06:01

## 2018-01-27 NOTE — PROGRESS NOTE ADULT - ASSESSMENT
48 y/o gentleman  with metastatic adenocarcinoma of lung, extensive brain mets and leptomeningeal carcinomatosis s/p systemic chemotherapy and intrathecal chemotherapy, s/p WBRT now  with sepsis, meningitis  with MSSA, RLL pneumonia.      Continue dexamethasone   2 mg q 12 hrs.  Increased Mirtazapine to 30 mg at bed time.     Thoracentesis c/w exudate, malignant effusion, cultures negative    MRI of the brain reviewed with Dr Tran, no evidence of abscess or fluid collection.     LP  scheduled for 1-29-18 as per conversation with Dr Hardy.     Antibiotics per ID. Nafcillin q 4hrs.     Continue bactrim DS 1 tab M-W-F, PCP prophylaxis.     Continue supportive care.   No opiates. Tylenol for pain.

## 2018-01-27 NOTE — PROGRESS NOTE ADULT - SUBJECTIVE AND OBJECTIVE BOX
CHIEF COMPLAINT/Diagnosis: fever, cough      48 y/o male with h/o Stage 4 Lung CA with mets to brain s/p Ommaya reservoir 11/2017 for leptomeningeal disease s/p chemo and radiation, anxiety, depression, h/o nephrolithiasis, h/o pericarditis was admitted on 1/11/18 for fever and AMS. Pt was lethargic and unable to give history. As per sister, pt completed 10 course radiation treatment 1 day PTA. 3 days ago he was complaining of severe headache and also had episode of vomiting. He was seen at oncologist office and was given morphine and valium. The day of admission, the pt developed a fever 0f 103.6F and complained of rigors. He was also noted to be confused.  In ED pt found to have temp of 103.6 rectally. He received vanco IV and cefepime.     1/24 - pt seen and examined earlier today, reports sore throat, cough, denies CP, abd pain, denies other new sx, OOB in the chair  1/25 - pt seen and examined, + gen weakness, more lethargic, + neck stiffness, + body aches, + cough, denies dyspnea, CP, abd pain, afebrile  1/26 - pt seen and examined, feels better, denies CP, dyspnea, slight cough, some sore throat, tolerates PO intake  1/27 - overall feels better, + constipation >2 days, denies abd pain, cough minimal, sore throat better, denies CP , palpitations  REVIEW OF SYSTEMS:  All other review of systems is negative unless indicated above  T(C): 36.1 (01-27-18 @ 08:34), Max: 36.6 (01-26-18 @ 18:03)  T(F): 97 (01-27-18 @ 08:34), Max: 97.9 (01-26-18 @ 18:03)  HR: 98 (01-27-18 @ 11:00) (91 - 119)  BP: 118/77 (01-27-18 @ 09:00) (108/82 - 130/88)  RR: 21 (01-27-18 @ 11:00) (15 - 25)  SpO2: 98% (01-27-18 @ 11:00) (96% - 99%)  Wt(kg): --    PHYSICAL EXAM:    Constitutional: NAD, awake and alert, well-developed  HEENT: PERR, EOMI, Normal Hearing, MMM, post-op scalp wound clean  Neck: Soft and supple, No LAD, No JVD  Respiratory: Breath sounds are clear bilaterally, No wheezing, rales or rhonchi  Cardiovascular: S1 and S2, regular rate and rhythm, no Murmurs, gallops or rubs  Gastrointestinal: Bowel Sounds present, soft, nontender, nondistended, no guarding, no rebound  Extremities: No peripheral edema  Vascular: 2+ peripheral pulses  Neurological: A/O x 3, no focal deficits  Musculoskeletal: 5/5 strength b/l upper and lower extremities  Skin: No rashes    LABS: All Labs Reviewed:  01-26    133<L>  |  98  |  10  ----------------------------<  115<H>  3.8   |  27  |  0.83    Ca    9.5      26 Jan 2018 06:19                         14.2   16.7  )-----------( 250      ( 27 Jan 2018 05:16 )             43.3       01-26    133<L>  |  98  |  10  ----------------------------<  115<H>  3.8   |  27  |  0.83    Ca    9.5      26 Jan 2018 06:19                          13.9   14.4  )-----------( 228      ( 26 Jan 2018 06:19 )             42.7     01-25    133<L>  |  97  |  11  ----------------------------<  81  3.8   |  28  |  1.06    Ca    9.9      25 Jan 2018 05:01                          15.0   19.2  )-----------( 280      ( 25 Jan 2018 05:01 )             44.9       01-23    133<L>  |  99  |  16  ----------------------------<  133<H>  4.1   |  26  |  0.85    Ca    9.8      23 Jan 2018 05:01                          14.0   20.8  )-----------( 264      ( 24 Jan 2018 06:31 )             41.5                          13.0   17.9  )-----------( 282      ( 23 Jan 2018 05:01 )             39.3     01-23    133<L>  |  99  |  16  ----------------------------<  133<H>  4.1   |  26  |  0.85    Ca    9.8      23 Jan 2018 05:01  Rapid Respiratory Viral Panel (01.25.18 @ 09:30)    Rapid RVP Result: NotDetec: The    Culture - CSF with Gram Stain (01.11.18 @ 21:14)    -  Trimethoprim/Sulfamethoxazole: S <=0.5/9.5    -  Vancomycin: S 2    Gram Stain:   polymorphonuclear leukocytes seen per low power field  Gram Variable Cocci seen per oil power field  by cytocentrifuge    -  Oxacillin: S 0.5    -  Penicillin: R >8    -  RIF- Rifampin: R >2    Specimen Source: .CSF    Culture Results:   Numerous Staphylococcus aureus    Organism Identification: Staphylococcus aureus    Organism: Staphylococcus aureus    Method Type: TAMMIE    Culture - CSF with Gram Stain (01.22.18 @ 11:19)    Gram Stain:   No polymorphonuclear leukocytes per low power field  No organisms seen per oil power field  by cytocentrifuge    Specimen Source: .CSF process spec as per doctor marianela and ten thank you    Culture Results:   No growth  Culture added as per physician. Refrigerated specimen used. Evaluate  results with caution. Results may be false negative.    Culture - Blood (01.11.18 @ 11:19)    Specimen Source: .Blood Blood-Peripheral    Culture Results:   No growth at 5 days.        < from: Xray Chest 1 View AP-PORTABLE IMMEDIATE (01.24.18 @ 10:00) >  INDINGS: Right sided central venous CT compatible port with tip in the   superior vena cava.    Small loculated left pleural effusion, unchanged. No opacity to suggest   pneumonia. No pneumothorax.    IMPRESSION:    Small loculated left pleural effusion, unchanged.        < end of copied text >  < from: MR Head w/wo IV Cont (01.18.18 @ 20:19) >    minimal decrease in size of the multiple intraparenchymal   and leptomeningeal metastatic lesions scattered throughout the brain. The   Ommaya shunt catheter has been removed. Edema and gliosis is seen about   the shunt catheter tract in the RIGHT frontal lobe.  Tiny RIGHT frontal   subdural hygroma is noted.          < end of copied text >  < from: CT Chest w/ IV Cont (01.17.18 @ 11:29) >  : Marked right ventricular outflow tract narrowing just   proximal to the pulmonic valve. No extrinsic mass is visualized.  Pericardial thickening suggestive of pericarditis. The possibility that   this could be the cause of the right ventricular outflow tract narrowing   is entertained.          < end of copied text >  < from: Xray Chest 1 View AP/PA. (01.11.18 @ 13:23) >  IMPRESSION:  A multiloculated left pleural effusion is slightly smaller and the left   lower lobe is slightly better expanded at this time.      < end of copied text >  < from: Transthoracic Echocardiogram (01.12.18 @ 14:59) >  Fibrocalcific changes noted to the mitral valve leaflets with preserved   leaflet excursion.   Trace mitral regurgitation is present.   Severe pulmonic stenosis is present, may not be valvular. This was not   seen in prior study on 8/30/16.   The level of the obstruction is not well seen . This could be subvalvular   , at the level of the valve or supravalvular or even extrinsic   compression   from a intrathoracic mass. Would recommend a CTA to further evaluate.   Intracardiac thrombus id also in the differential   The left ventricle is normal in size, wall thickness, wall motion and   contractility.   Estimated left ventricular ejection fraction is 55-60 %.   IVC is not collapsing with inspiration.    < end of copied text >

## 2018-01-27 NOTE — PROGRESS NOTE ADULT - ASSESSMENT
Assessment and Plan  	  46 y/o male with h/o Stage 4 Lung CA with mets to brain 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   Found to have 103.6F and complained of rigors. Found to be encephalopathic. Lactic acid 2.4    * Toxic metabolic encephalopathy , resolving  *  Severe sepsis secondary to Acute bacterial meningitis with MSSA. Right frontal lobe fluid collection and postoperative abscess s/p washout  -Ommaya site infecton s/p port removal.  and s/p clean and wash out of the right frotnal lobe ommaya site. Now on tx for meningitis.  -leukocytosis persistent- possibly secondary to known malignancy?  temperature spikes are improving; remains afebrile  -encephalopathy is improved   -s/p LP  1/18 >>  consistent with ongoing bacterial meningitis however improved CSF cell count since LP from 1/11 ; GRam stain from 1/18 , no organisms seen (was able to speak with core lab, and add on the gram stain + culture to the acid fast sample; reported 1/22 11:19 AM)  -Dr. Cee  [facility] apprecaited  -Infectious disease recco to c/w antibiotics >> broaden coverage >> Nafcillin day 2 (previously on cefepime)   -c/w bactrim for PCP proph  -Hem/onc Dr. Arenas has ordred repeat LP 1/22. , plan to repeat on 1/29/18  -Plan: will likely take this immunocompromised patient longer for treatment. We will c/w Nafcillin and bactrim for now. He will need repeat LP to ensure improvement / resolution of the meningitis prior to discharge.   - RVP panel negative    * Subvalvular pulmonic stenosis on ECHO incidental finding  - cardiology consult Dr. King - Right heart cath, CONCEPCIÓN discussed with family, because that would not change the management family wishes not to persue  - less invasive test cardiac MRI as o/p      * -RUL pneumonia with loculated left pleural effusion ?empyema.  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 Immunocompromised host.   - s/p systemic chemo and intrathecal chemo, s/p WBRT  -pleural fluid cytology done inpatient >> adenocarcinoma of the lung  - c/w steroids as per oncology    * Leukocytosis due to above and steroids induced, due to meningitis and underlying malignancy  - WBC 14-->16  - continue monitor  - afebrile    * Sore throat, improving  - viral panel neg  - CXR - unchanged left pleural effusion, small    -DVT proph- sc heparin

## 2018-01-27 NOTE — PROGRESS NOTE ADULT - SUBJECTIVE AND OBJECTIVE BOX
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  Patient completed 10 courses of  radiation treatment 1 day PTA. Patient complaint of  severe headache and an episode of vomiting 3 days prior to admission. The day of admission, the pt developed a fever 0f 103.6F and complained of rigors. He was also noted to be confused.  In ED pt found to have temp of 103.6 rectally. He received vanco IV and cefepime.     Patient is alert and responsive,  respond questions and follow commands.     WBC decreasing to 16K. Afebrile.      ICU Vital Signs Last 24 Hrs  T(C): 36.1 (26 Jan 2018 21:34), Max: 36.7 (26 Jan 2018 09:31)  T(F): 97 (26 Jan 2018 21:34), Max: 98.1 (26 Jan 2018 09:31)  HR: 91 (27 Jan 2018 07:00) (91 - 119)  BP: 115/82 (27 Jan 2018 05:00) (111/75 - 130/88)  BP(mean): 90 (27 Jan 2018 05:00) (78 - 97)  ABP: --  ABP(mean): --  RR: 21 (27 Jan 2018 07:00) (16 - 25)  SpO2: 97% (27 Jan 2018 07:00) (95% - 99%)      General gentleman in NAD.  HEENT, ommaya site, dry and intact.   Lungs bilaterally decrease breath sounds.  CVS S1 S2 regular, no M/R/G  Abdomen soft NT ND positive for BS, no organomegaly.  Extremities: No C/C/E. positive for bilateral LE abrasions, dry, healing.      MEDICATIONS  (STANDING):  ALBUTerol    90 MICROgram(s) HFA Inhaler 1 Puff(s) Inhalation every 4 hours  colchicine 0.6 milliGRAM(s) Oral two times a day  dexamethasone     Tablet 2 milliGRAM(s) Oral two times a day  FIRST- Mouthwash  BLM 20 milliLiter(s) Swish and Spit four times a day  folic acid 1 milliGRAM(s) Oral daily  heparin  Injectable 5000 Unit(s) SubCutaneous every 8 hours  metoprolol     tartrate 25 milliGRAM(s) Oral two times a day  mirtazapine 15 milliGRAM(s) Oral at bedtime  nafcillin  IVPB 2 Gram(s) IV Intermittent every 4 hours  pantoprazole    Tablet 40 milliGRAM(s) Oral before breakfast  trimethoprim  160 mG/sulfamethoxazole 800 mG 1 Tablet(s) Oral <User Schedule>    Culture - Fungal, Body Fluid (01.18.18 @ 12:15)    Specimen Source: Pleural Fl None    Culture Results:   Testing in progress      Amylase, Body Fluid (01.18.18 @ 12:15)    Fluid Source: Pleural    Amylase, Body Fluid: 23: Reference Ranges have NOT been established for analytes in body fluids  because of variability in body fluid composition.  The  has not determined the efficacy of this test when  performed on fluid specimens. The performance characteristics of this  test were determined by [facility] [employer]. U/L      Protein Total, Fluid (01.18.18 @ 12:15)    Protein Total, Fluid: 3.3: Test Repeated  Reference Ranges have NOT been established for analytes in body fluids  because of variability in body fluid composition.  The  has not determined the efficacy of this test when  performed on fluid specimens. The performance characteristics of this  test were determined by [facility] [employer]. g/dL    pH, Fluid (01.18.18 @ 12:15)    pH, Fluid: 7.47    Fluid Source: pleural    Glucose, Fluid (01.18.18 @ 12:15)    Glucose, Fluid: 134: Reference Ranges have NOT  been established for analytes in body fluids  because of variability in body fluid composition.  The  has not determined the efficacy of this test when  performed on fluid specimens. The performance characteristics of this  test were determined by [facility]. mg/dL        Protein, CSF (01.19.18 @ 16:45)    Protein, CSF: 192 mg/dL    Glucose, CSF (01.19.18 @ 16:45)    Glucose, CSF: 24 mg/dL    Cerebrospinal Fluid Cell Count-1 (01.11.18 @ 21:14)    Total Nucleated Cell Count, CSF: 744 /uL      RBC Count - Spinal Fluid: 62 /uL    Tube Type: Tube 3    CSF Color: See Note: slightly xanthrochromic    CSF Lymphocytes: 3 %    CSF Appearance: Slightly Cloudy    CSF Monocytes/Macrophages: 1 %    CSF Segmented Neutrophils: 96 %    Rapid Respiratory Viral Panel (01.25.18 @ 09:30)    Rapid RVP Result: NotDetec: The [employer] RVP Rapid uses polymerase chain reaction (PCR) and melt  curve analysis to screen for adenovirus; coronavirus HKU1, NL63, 229E,  OC43; human metapneumovirus (hMPV); human enterovirus/rhinovirus  (Entero/RV); influenza A; influenza A/H1;influenza A/H3; influenza  A/H1-2009; influenza B; parainfluenza viruses 1, 2, 3, 4; respiratory  syncytial virus; Bordetella pertussis; Mycoplasma pneumoniae; and  Chlamydophila pneumoniae.      Culture - Sputum . (01.24.18 @ 14:45)    Gram Stain:   Few polymorphonuclear leukocytes per low power field  Few Squamous epithelial cells per low power field  Moderate Gram positive cocci in pairs per oil power field  Rare Yeast like cells per oil power field  Rare Gram Negative Rods per oil power field    Specimen Source: .Sputum Sputum    Complete Blood Count Repeat Every 24 Hours X 30 Days (01.27.18 @ 05:16)    WBC Count: 16.7 K/uL    RBC Count: 4.57 M/uL    Hemoglobin: 14.2 g/dL    Hematocrit: 43.3 %    Mean Cell Volume: 94.8 fl    Mean Cell Hemoglobin: 31.1 pg    Mean Cell Hemoglobin Conc: 32.8 gm/dL    Red Cell Distrib Width: 18.9 %    Platelet Count - Automated: 250 K/uL        Basic Metabolic Panel in AM (01.26.18 @ 06:19)    Sodium, Serum: 133 mmol/L    Potassium, Serum: 3.8 mmol/L    Chloride, Serum: 98 mmol/L    Carbon Dioxide, Serum: 27 mmol/L    Anion Gap, Serum: 8 mmol/L    Blood Urea Nitrogen, Serum: 10 mg/dL    Creatinine, Serum: 0.83 mg/dL    Glucose, Serum: 115 mg/dL    Calcium, Total Serum: 9.5 mg/dL    eGFR if Non : 105: Interpretative comment  The units for eGFR are ml/min/1.73m2 (normalized body surface area). The  eGFR is calculated from a serum creatinine using the CKD-EPI equation.  Other variables required for calculation are race, age and sex. Among  patients with chronic kidney disease (CKD), the eGFR is useful in  determining the stage of disease according to KDOQI CKD classification.  All eGFR results are reported numerically with the following  interpretation.          GFR                    With                 Without     (ml/min/1.73 m2)    Kidney Damage       Kidney Damage        >= 90                    Stage 1                     Normal        60-89                    Stage 2                     Decreased GFR        30-59     Stage 3                     Stage 3        15-29                    Stage 4                     Stage 4        < 15                      Stage 5                     Stage 5  Each stage of CKD assumes that the associated GFR level has been in  effect for at least 3 months. Determination of stages one and two (with  eGFR > 59 ml/min/m2) requires estimation of kidney damage for at least 3  months as defined by structural or functional abnormalities.  Limitations: All estimates of GFR will be less accurate for patients at  extremes of muscle mass (including but not limited to frail elderly,  critically ill, or cancer patients), those with unusual diets, and those  with conditions associated with reduced secretion or extrarenal  elimination of creatinine. The eGFR equation is not recommended for use  in patients with unstable creatinine levels. mL/min/1.73M2    eGFR if African American: 121 mL/min/1.73M2      MRI of the brain 1-19-18  FINDINGS:   MRI dated 12/15/2017 available for review.         The brain demonstrates minimal decrease in size of the multiple   intraparenchymal and leptomeningeal metastatic lesions scattered   throughout the brain. The Ommaya shunt catheter has been removed. Edema   and gliosis is seen about the shunt catheter tract in the RIGHT frontal   lobe. Tiny RIGHT subdural hygroma is noted. No acute cerebral cortical   infarct is found.   No intracranial hemorrhage is recognized.  No mass   effect is found in the brain.          The ventricles, sulci and basal cisterns appear unremarkable.    The vertebral and internal carotid arteries demonstrate expected flow   voids indicating their patency.         The orbits are unremarkable.  The paranasal sinuses are clear.  The nasal   cavity appears intact.  The nasopharynx is symmetric.  The central skull   base and petrous temporal bones are intact.  The calvarium appears   unremarkable.      IMPRESSION:  minimal decrease in size of the multiple intraparenchymal   and leptomeningeal metastatic lesions scattered throughout the brain. The   Ommaya shunt catheter has been removed. Edema and gliosis is seen about   the shunt catheter tract in the RIGHT frontal lobe.  Tiny RIGHT frontal   subdural hygroma is noted

## 2018-01-27 NOTE — PROGRESS NOTE ADULT - ASSESSMENT
46 y/o male with h/o Stage 4 Lung CA with mets to brain 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 Pt was lethargic and unable to give history. As per sister, pt completed 10 course radiation treatment 1 day PTA. 3 days ago he was complaining of severe headache and also had episode of vomiting. He was seen at oncologist office and was given morphine and valium. The day of admission, the pt developed a fever 0f 103.6F and complained of rigors. He was also noted to be confused.  In ED pt found to have temp of 103.6 rectally. He received vanco IV and cefepime.     1. URI ?viral resolving. Acute bacterial meningitis with MSSA improving. Ommaya site infecton s/p port removal. Lung CA stage 4 with brain metastasis. Immunocompromised host.   -URI symptoms are improving  -encephalopathy is much improved, episodes of confusion are improved as well  -pleural fluid culture is negative to date  -leukocytosis   -s/p cefepime 2 gm IV q12h # 11  -on nafcillin IV # 6  -tolerating abx well so far; no side effects noted  -repeat CSF fluid cell count is improved  -neurosurgery evaluation appreciated  -continue abx coverage  -plan to repeat LP early next week  -respiratory care  -monitor temps  -f/u CBC  -supportive care  -discussed with neurosurgery, possible future imaging to rule out obstructive ventriculitis  2. Other issues:   -care per medicine

## 2018-01-27 NOTE — PROGRESS NOTE ADULT - SUBJECTIVE AND OBJECTIVE BOX
Patient is a 47y old  Male who presents with a chief complaint of fever, AMS (2018 16:20)    HPI:  48 y/o male with h/o Stage 4 Lung CA with mets to brain s/p Ommaya reservoir 2017 for leptomeningeal disease s/p chemo and radiation, anxiety, depression, h/o nephrolithiasis, h/o pericarditis was admitted on  for fever and AMS. Pt was lethargic and unable to give history. As per sister, pt completed 10 course radiation treatment 1 day PTA. 3 days ago he was complaining of severe headache and also had episode of vomiting. He was seen at oncologist office and was given morphine and valium. The day of admission, the pt developed a fever 0f 103.6F and complained of rigors. He was also noted to be confused.  In ED pt found to have temp of 103.6 rectally. He received vanco IV and cefepime.     Feels stronger  No fever or chills  Denies HA    MEDICATIONS  (STANDING):  ALBUTerol    90 MICROgram(s) HFA Inhaler 1 Puff(s) Inhalation every 4 hours  colchicine 0.6 milliGRAM(s) Oral two times a day  dexamethasone     Tablet 2 milliGRAM(s) Oral two times a day  FIRST- Mouthwash  BLM 20 milliLiter(s) Swish and Spit four times a day  folic acid 1 milliGRAM(s) Oral daily  heparin  Injectable 5000 Unit(s) SubCutaneous every 8 hours  metoprolol     tartrate 25 milliGRAM(s) Oral two times a day  mirtazapine 30 milliGRAM(s) Oral at bedtime  nafcillin  IVPB 2 Gram(s) IV Intermittent every 4 hours  pantoprazole    Tablet 40 milliGRAM(s) Oral before breakfast  trimethoprim  160 mG/sulfamethoxazole 800 mG 1 Tablet(s) Oral <User Schedule>    MEDICATIONS  (PRN):  acetaminophen   Tablet 650 milliGRAM(s) Oral every 6 hours PRN For Temp greater than 38 C (100.4 F)  ALBUTerol    0.083% 2.5 milliGRAM(s) Nebulizer every 6 hours PRN Shortness of Breath and/or Wheezing  diazepam    Tablet 0.5 milliGRAM(s) Oral three times a day PRN agitation, anxiety  guaiFENesin    Syrup 100 milliGRAM(s) Oral every 6 hours PRN Cough  HYDROcodone/homatropine Syrup 5 milliLiter(s) Oral every 4 hours PRN Cough  HYDROmorphone   Tablet 2 milliGRAM(s) Oral every 4 hours PRN Severe Pain (7 - 10)      Vital Signs Last 24 Hrs  T(C): 36.1 (2018 08:34), Max: 36.6 (2018 18:03)  T(F): 97 (2018 08:34), Max: 97.9 (2018 18:03)  HR: 97 (2018 09:00) (91 - 119)  BP: 118/77 (2018 09:00) (108/82 - 130/88)  BP(mean): 87 (2018 09:00) (78 - 97)  RR: 19 (2018 09:00) (16 - 25)  SpO2: 99% (2018 09:00) (96% - 99%)    Physical Exam:      Constitutional: frail looking  HEENT: NC/AT, EOMI, PERRLA; postop scalp wound clean  Neck: supple  Back: no tenderness  Respiratory: clear; decreased BS  Cardiovascular: S1S2 regular, no murmurs  Abdomen: soft, not tender, not distended, positive BS  Rectal: deferred  Musculoskeletal: no muscle tenderness, no joint swelling or tenderness  Extremities: no pedal edema  Neurological: alert, moving all extremities  Skin: no rashes    Labs:                        14.2   16.7  )-----------( 250      ( 2018 05:16 )             43.3         133<L>  |  98  |  10  ----------------------------<  115<H>  3.8   |  27  |  0.83    Ca    9.5      2018 06:19                        13.9   14.4  )-----------( 228      ( 2018 06:19 )             42.7         133<L>  |  98  |  10  ----------------------------<  115<H>  3.8   |  27  |  0.83    Ca    9.5      2018 06:19               Protein, CSF (18 @ 16:45)    Protein, CSF: 192 mg/dL    Glucose, CSF (18 @ 16:45)    Glucose, CSF: 24 mg/dL    Cerebrospinal Fluid Cell Count-1 (18 @ 16:45)    CSF Segmented Neutrophils: 43 %    Total Nucleated Cell Count, CSF: 114 /uL    CSF Color: Xanthochromic    CSF Appearance: Clear    CSF Lymphocytes: 41 %    CSF Monocytes/Macrophages: 16 %    Labs:                        15.0   19.2  )-----------( 280      ( 2018 05:01 )             44.9     -    133<L>  |  97  |  11  ----------------------------<  81  3.8   |  28  |  1.06    Ca    9.9      2018 05:01                   13.9   22.4  )-----------( 318      ( 2018 05:51 )             42.4                           14.4   20.5  )-----------( 367      ( 2018 04:43 )             43.2     -    131<L>  |  96  |  16  ----------------------------<  121<H>  4.2   |  27  |  0.93    Ca    10.2<H>      2018 04:43  Phos  2.1     -                        14.4   17.8  )-----------( 382      ( 2018 05:55 )             42.9     -    131<L>  |  96  |  17  ----------------------------<  105<H>  3.8   |  28  |  0.87    Ca    10.4<H>      2018 05:55  Phos  2.1     -  Mg     2.3     -                                   15.3   19.6  )-----------( 291      ( 2018 05:26 )             53.0     01-13    132<L>  |  100  |  17  ----------------------------<  95  4.2   |  24  |  1.09    Ca    10.5<H>      2018 05:26  Phos  3.0       Mg     2.5             Ca    10.2<H>      2018 07:30    TPro  8.4<H>  /  Alb  3.3  /  TBili  0.8  /  DBili  x   /  AST  34  /  ALT  83<H>  /  AlkPhos  122<H>  11     LIVER FUNCTIONS - ( 2018 11:19 )  Alb: 3.3 g/dL / Pro: 8.4 gm/dL / ALK PHOS: 122 U/L / ALT: 83 U/L / AST: 34 U/L / GGT: x           Urinalysis Basic - ( 2018 12:33 )    Color: Yellow / Appearance: Clear / S.015 / pH: x  Gluc: x / Ketone: Negative  / Bili: Negative / Urobili: Negative mg/dL   Blood: x / Protein: 30 mg/dL / Nitrite: Negative   Leuk Esterase: Negative / RBC: 0-2 /HPF / WBC 0-2   Sq Epi: x / Non Sq Epi: Occasional / Bacteria: Occasional    Cerebrospinal Fluid Cell Count-1 (18 @ 21:14)    CSF Segmented Neutrophils: 96 %    Total Nucleated Cell Count, CSF: 744 /uL    CSF Appearance: Slightly Cloudy    CSF Lymphocytes: 3 %    CSF Monocytes/Macrophages: 1 %    CSF Color: See Note: slightly xanthrochromic      Culture - Body Fluid with Gram Stain (collected 2018 14:36)  Source: .Body Fluid scalp incision  Gram Stain (2018 06:33):    No polymorphonuclear cells seen    No organisms seen    by cytocentrifuge    Culture - Acid Fast - CSF (collected 2018 21:14)  Source: .CSF spinal fluid    Culture - Fungal, CSF (collected 2018 21:14)  Source: .CSF spinal fluid  Preliminary Report (2018 12:37):    Testing in progress    Culture - CSF with Gram Stain (collected 2018 21:14)  Source: .CSF  Gram Stain (2018 01:16):    polymorphonuclear leukocytes seen per low power field    Gram Variable Cocci seen per oil power field    by cytocentrifuge  Final Report (2018 07:36):    Numerous Staphylococcus aureus  Organism: Staphylococcus aureus (2018 07:36)  Organism: Staphylococcus aureus (2018 07:36)      -  Oxacillin: S 0.5      -  Penicillin: R >8      -  RIF- Rifampin: R >2      -  Trimethoprim/Sulfamethoxazole: S <=0.5/9.5      -  Vancomycin: S 2      Method Type: TAMMIE    Culture - Urine (collected 2018 12:33)  Source: .Urine None  Final Report (2018 18:38):    10,000 - 49,000 CFU/mL Coag Negative Staphylococcus    Culture - Blood (collected 2018 11:19)  Culture - Body Fluid with Gram Stain (18 @ 12:15)    Gram Stain:   No polymorphonuclear leukocytes per low power field  Red blood cells per low power field  No organisms seen per oil power field  by cytocentrifuge    Specimen Source: .Body Fluid Pleural Fluid    Culture Results:   No growth    Source: .Blood Blood-Peripheral  Preliminary Report (2018 15:01):    No growth to date.    Culture - Blood (collected 2018 11:19)  Source: .Blood Blood-Peripheral  Preliminary Report (2018 15:01):    No growth to date.    Culture - Body Fluid with Gram Stain (18 @ 12:15)    Gram Stain:   No polymorphonuclear leukocytes per low power field  Red blood cells per low power field  No organisms seen per oil power field  by cytocentrifuge    Specimen Source: .Body Fluid Pleural Fluid    pH, Fluid (18 @ 12:15)    pH, Fluid: 7.47    Fluid Source: pleural    Culture - Sputum . (18 @ 14:45)    Gram Stain:   Few polymorphonuclear leukocytes per low power field  Few Squamous epithelial cells per low power field  Moderate Gram positive cocci in pairs per oil power field  Rare Yeast like cells per oil power field  Rare Gram Negative Rods per oil power field    Specimen Source: .Sputum Sputum    Rapid Respiratory Viral Panel (18 @ 09:30)    Rapid RVP Result: NotDetec: The [employer] RVP Rapid uses polymerase chain reaction (PCR) and melt  curve analysis to screen for adenovirus; coronavirus HKU1, NL63, 229E,  OC43; human metapneumovirus (hMPV); human enterovirus/rhinovirus  (Entero/RV); influenza A; influenza A/H1;influenza A/H3; influenza  A/H1-2009; influenza B; parainfluenza viruses 1, 2, 3, 4; respiratory  syncytial virus; Bordetella pertussis; Mycoplasma pneumoniae; and  Chlamydophila pneumoniae.          Radiology:    < from: CT Chest No Cont (18 @ 17:21) >  Several scattered groundglass nodules and opacities within the right   upper lobe, suggestive of pneumonia given the clinical context of fever.   Small to right pleural effusion is present. Loculated left pleural   effusion along the left lateral pleura and within the fissures is again   identified. Small pericardial effusion is present.    < end of copied text >    < from: CT Head No Cont (18 @ 13:09) >   unchanged RIGHT frontal Ommaya catheter  in place within   the anterior horn of the RIGHT lateral ventricle. Faint hyperdense   lesions scattered throughout the brain consistent with patient's known   metastatic disease. IV contrast would be needed for complete evaluation.     < end of copied text >    < from: CT Head No Cont (01.15.18 @ 09:43) >  Frontal kenia hole with small amount of encephalomalacia/gliosis within   the right frontal lobe. Fluid collection measuring approximately 4.7 x   1.1 cm and minimal associated hemorrhage within the scalp overlying the   right frontal borehole. Ventricular size is unchanged since prior exam.   Punctate calcification in the right frontal lobe is unchanged. The   underlying neoplastic process is difficult to evaluate CT imaging.     < end of copied text >      Advanced directives addressed: full resuscitation

## 2018-01-28 LAB
HCT VFR BLD CALC: 40.2 % — SIGNIFICANT CHANGE UP (ref 39–50)
HGB BLD-MCNC: 13.3 G/DL — SIGNIFICANT CHANGE UP (ref 13–17)
MCHC RBC-ENTMCNC: 31.3 PG — SIGNIFICANT CHANGE UP (ref 27–34)
MCHC RBC-ENTMCNC: 33.1 GM/DL — SIGNIFICANT CHANGE UP (ref 32–36)
MCV RBC AUTO: 94.6 FL — SIGNIFICANT CHANGE UP (ref 80–100)
PLATELET # BLD AUTO: 234 K/UL — SIGNIFICANT CHANGE UP (ref 150–400)
RBC # BLD: 4.25 M/UL — SIGNIFICANT CHANGE UP (ref 4.2–5.8)
RBC # FLD: 18.8 % — HIGH (ref 10.3–14.5)
WBC # BLD: 15.4 K/UL — HIGH (ref 3.8–10.5)
WBC # FLD AUTO: 15.4 K/UL — HIGH (ref 3.8–10.5)

## 2018-01-28 RX ADMIN — NAFCILLIN 200 GRAM(S): 10 INJECTION, POWDER, FOR SOLUTION INTRAVENOUS at 18:05

## 2018-01-28 RX ADMIN — NAFCILLIN 200 GRAM(S): 10 INJECTION, POWDER, FOR SOLUTION INTRAVENOUS at 21:06

## 2018-01-28 RX ADMIN — HEPARIN SODIUM 5000 UNIT(S): 5000 INJECTION INTRAVENOUS; SUBCUTANEOUS at 05:45

## 2018-01-28 RX ADMIN — Medication 650 MILLIGRAM(S): at 08:11

## 2018-01-28 RX ADMIN — HEPARIN SODIUM 5000 UNIT(S): 5000 INJECTION INTRAVENOUS; SUBCUTANEOUS at 14:48

## 2018-01-28 RX ADMIN — Medication 1 MILLIGRAM(S): at 12:37

## 2018-01-28 RX ADMIN — Medication 0.6 MILLIGRAM(S): at 18:05

## 2018-01-28 RX ADMIN — Medication 2 MILLIGRAM(S): at 18:06

## 2018-01-28 RX ADMIN — PANTOPRAZOLE SODIUM 40 MILLIGRAM(S): 20 TABLET, DELAYED RELEASE ORAL at 08:12

## 2018-01-28 RX ADMIN — NAFCILLIN 200 GRAM(S): 10 INJECTION, POWDER, FOR SOLUTION INTRAVENOUS at 10:13

## 2018-01-28 RX ADMIN — Medication 650 MILLIGRAM(S): at 18:06

## 2018-01-28 RX ADMIN — NAFCILLIN 200 GRAM(S): 10 INJECTION, POWDER, FOR SOLUTION INTRAVENOUS at 05:45

## 2018-01-28 RX ADMIN — DIPHENHYDRAMINE HYDROCHLORIDE AND LIDOCAINE HYDROCHLORIDE AND ALUMINUM HYDROXIDE AND MAGNESIUM HYDRO 20 MILLILITER(S): KIT at 12:37

## 2018-01-28 RX ADMIN — MIRTAZAPINE 30 MILLIGRAM(S): 45 TABLET, ORALLY DISINTEGRATING ORAL at 22:28

## 2018-01-28 RX ADMIN — Medication 2 MILLIGRAM(S): at 08:12

## 2018-01-28 RX ADMIN — MIRTAZAPINE 30 MILLIGRAM(S): 45 TABLET, ORALLY DISINTEGRATING ORAL at 00:11

## 2018-01-28 RX ADMIN — NAFCILLIN 200 GRAM(S): 10 INJECTION, POWDER, FOR SOLUTION INTRAVENOUS at 01:50

## 2018-01-28 RX ADMIN — DIPHENHYDRAMINE HYDROCHLORIDE AND LIDOCAINE HYDROCHLORIDE AND ALUMINUM HYDROXIDE AND MAGNESIUM HYDRO 20 MILLILITER(S): KIT at 08:12

## 2018-01-28 RX ADMIN — NAFCILLIN 200 GRAM(S): 10 INJECTION, POWDER, FOR SOLUTION INTRAVENOUS at 14:48

## 2018-01-28 RX ADMIN — Medication 25 MILLIGRAM(S): at 08:12

## 2018-01-28 RX ADMIN — SENNA PLUS 2 TABLET(S): 8.6 TABLET ORAL at 12:37

## 2018-01-28 RX ADMIN — Medication 0.6 MILLIGRAM(S): at 08:12

## 2018-01-28 RX ADMIN — Medication 25 MILLIGRAM(S): at 18:05

## 2018-01-28 NOTE — PROGRESS NOTE ADULT - ASSESSMENT
Assessment and Plan  	  48 y/o male with h/o Stage 4 Lung CA with mets to brain 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   Found to have 103.6F and complained of rigors. Found to be encephalopathic. Lactic acid 2.4    * Toxic metabolic encephalopathy , resolving  *  Severe sepsis secondary to Acute bacterial meningitis with MSSA. Right frontal lobe fluid collection and postoperative abscess s/p washout  -Ommaya site infecton s/p port removal.  and s/p clean and wash out of the right frotnal lobe ommaya site. Now on tx for meningitis.  -leukocytosis persistent- possibly secondary to known malignancy?  temperature spikes are improving; remains afebrile  -encephalopathy is improved   -s/p LP  1/18 >>  consistent with ongoing bacterial meningitis however improved CSF cell count since LP from 1/11 ; GRam stain from 1/18 , no organisms seen (was able to speak with core lab, and add on the gram stain + culture to the acid fast sample; reported 1/22 11:19 AM)  -Dr. Cee  [facility] apprecaited  -Infectious disease recco to c/w antibiotics >> broaden coverage >> Nafcillin day 2 (previously on cefepime)   -c/w bactrim for PCP proph  -Hem/onc Dr. Arenas has ordred repeat LP 1/22. , plan to repeat on 1/29/18  -Plan: will likely take this immunocompromised patient longer for treatment. We will c/w Nafcillin and bactrim for now. He will need repeat LP to ensure improvement / resolution of the meningitis prior to discharge.   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 King - Right heart cath, CONCEPCIÓN discussed with family, because that would not change the management family wishes not to persue  - less invasive test cardiac MRI as o/p      * -RUL pneumonia with loculated left pleural effusion ?empyema.  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 Immunocompromised host.   - s/p systemic chemo and intrathecal chemo, s/p WBRT  -pleural fluid cytology done inpatient >> adenocarcinoma of the lung  - c/w steroids as per oncology    * Leukocytosis multifactorial due to meningitis and underlying malignancy  and steroids induced   - WBC 14-->16-->15  - continue monitor  - afebrile    * Sore throat, improving  - viral panel neg  - CXR - unchanged left pleural effusion, small    -DVT proph- sc heparin    Dispo - LP tomorrow

## 2018-01-28 NOTE — PROGRESS NOTE ADULT - SUBJECTIVE AND OBJECTIVE BOX
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 Pt was lethargic and unable to give history. As per sister, pt completed 10 course radiation treatment 1 day PTA. 3 days ago he was complaining of severe headache and also had episode of vomiting. He was seen at oncologist office and was given morphine and valium. The day of admission, the pt developed a fever 0f 103.6F and complained of rigors. He was also noted to be confused.  In ED pt found to have temp of 103.6 rectally. He received vanco IV and cefepime.     1/24 - pt seen and examined earlier today, reports sore throat, cough, denies CP, abd pain, denies other new sx, OOB in the chair  1/25 - pt seen and examined, + gen weakness, more lethargic, + neck stiffness, + body aches, + cough, denies dyspnea, CP, abd pain, afebrile  1/26 - pt seen and examined, feels better, denies CP, dyspnea, slight cough, some sore throat, tolerates PO intake  1/27 - overall feels better, + constipation >2 days, denies abd pain, cough minimal, sore throat better, denies CP , palpitations  1/28 - afebrile, ambulating with walker, denies CP, palpitations,  minimal HA, slight cough, intermittent left sided chest wall pain   REVIEW OF SYSTEMS:  All other review of systems is negative unless indicated above  T(C): 36.4 (01-28-18 @ 08:51), Max: 37 (01-27-18 @ 21:35)  T(F): 97.5 (01-28-18 @ 08:51), Max: 98.6 (01-27-18 @ 21:35)  HR: 104 (01-28-18 @ 11:00) (89 - 127)  BP: 106/70 (01-28-18 @ 11:00) (104/80 - 116/86)  RR: 23 (01-28-18 @ 11:00) (15 - 25)  SpO2: 98% (01-28-18 @ 11:00) (96% - 100%)  Wt(kg): --    PHYSICAL EXAM:    Constitutional: NAD, awake and alert, well-developed  HEENT: PERR, EOMI, Normal Hearing, MMM, post-op scalp wound clean  Neck: Soft and supple, No LAD, No JVD  Respiratory: Breath sounds are clear bilaterally, No wheezing, rales or rhonchi  Cardiovascular: S1 and S2, regular rate and rhythm, no Murmurs, gallops or rubs  Gastrointestinal: Bowel Sounds present, soft, nontender, nondistended, no guarding, no rebound  Extremities: No peripheral edema  Vascular: 2+ peripheral pulses  Neurological: A/O x 3, no focal deficits  Musculoskeletal: 5/5 strength b/l upper and lower extremities  Skin: No rashes    LABS: All Labs Reviewed:                       13.3   15.4  )-----------( 234      ( 28 Jan 2018 05:46 )             40.2       01-26    133<L>  |  98  |  10  ----------------------------<  115<H>  3.8   |  27  |  0.83    Ca    9.5      26 Jan 2018 06:19                         14.2   16.7  )-----------( 250      ( 27 Jan 2018 05:16 )             43.3       01-26    133<L>  |  98  |  10  ----------------------------<  115<H>  3.8   |  27  |  0.83    Ca    9.5      26 Jan 2018 06:19                          13.9   14.4  )-----------( 228      ( 26 Jan 2018 06:19 )             42.7     01-25    133<L>  |  97  |  11  ----------------------------<  81  3.8   |  28  |  1.06    Ca    9.9      25 Jan 2018 05:01                          15.0   19.2  )-----------( 280      ( 25 Jan 2018 05:01 )             44.9       01-23    133<L>  |  99  |  16  ----------------------------<  133<H>  4.1   |  26  |  0.85    Ca    9.8      23 Jan 2018 05:01                          14.0   20.8  )-----------( 264      ( 24 Jan 2018 06:31 )             41.5                          13.0   17.9  )-----------( 282      ( 23 Jan 2018 05:01 )             39.3     01-23    133<L>  |  99  |  16  ----------------------------<  133<H>  4.1   |  26  |  0.85    Ca    9.8      23 Jan 2018 05:01  Rapid Respiratory Viral Panel (01.25.18 @ 09:30)    Rapid RVP Result: NotDetec: The    Culture - CSF with Gram Stain (01.11.18 @ 21:14)    -  Trimethoprim/Sulfamethoxazole: S <=0.5/9.5    -  Vancomycin: S 2    Gram Stain:   polymorphonuclear leukocytes seen per low power field  Gram Variable Cocci seen per oil power field  by cytocentrifuge    -  Oxacillin: S 0.5    -  Penicillin: R >8    -  RIF- Rifampin: R >2    Specimen Source: .CSF    Culture Results:   Numerous Staphylococcus aureus    Organism Identification: Staphylococcus aureus    Organism: Staphylococcus aureus    Method Type: TAMMIE    Culture - CSF with Gram Stain (01.22.18 @ 11:19)    Gram Stain:   No polymorphonuclear leukocytes per low power field  No organisms seen per oil power field  by cytocentrifuge    Specimen Source: .CSF process spec as per doctor marianela and ten thank you    Culture Results:   No growth  Culture added as per physician. Refrigerated specimen used. Evaluate  results with caution. Results may be false negative.    Culture - Blood (01.11.18 @ 11:19)    Specimen Source: .Blood Blood-Peripheral    Culture Results:   No growth at 5 days.        < from: Xray Chest 1 View AP-PORTABLE IMMEDIATE (01.24.18 @ 10:00) >  INDINGS: Right sided central venous CT compatible port with tip in the   superior vena cava.    Small loculated left pleural effusion, unchanged. No opacity to suggest   pneumonia. No pneumothorax.    IMPRESSION:    Small loculated left pleural effusion, unchanged.        < end of copied text >  < from: MR Head w/wo IV Cont (01.18.18 @ 20:19) >    minimal decrease in size of the multiple intraparenchymal   and leptomeningeal metastatic lesions scattered throughout the brain. The   Ommaya shunt catheter has been removed. Edema and gliosis is seen about   the shunt catheter tract in the RIGHT frontal lobe.  Tiny RIGHT frontal   subdural hygroma is noted.          < end of copied text >  < from: CT Chest w/ IV Cont (01.17.18 @ 11:29) >  : Marked right ventricular outflow tract narrowing just   proximal to the pulmonic valve. No extrinsic mass is visualized.  Pericardial thickening suggestive of pericarditis. The possibility that   this could be the cause of the right ventricular outflow tract narrowing   is entertained.          < end of copied text >  < from: Xray Chest 1 View AP/PA. (01.11.18 @ 13:23) >  IMPRESSION:  A multiloculated left pleural effusion is slightly smaller and the left   lower lobe is slightly better expanded at this time.      < end of copied text >  < from: Transthoracic Echocardiogram (01.12.18 @ 14:59) >  Fibrocalcific changes noted to the mitral valve leaflets with preserved   leaflet excursion.   Trace mitral regurgitation is present.   Severe pulmonic stenosis is present, may not be valvular. This was not   seen in prior study on 8/30/16.   The level of the obstruction is not well seen . This could be subvalvular   , at the level of the valve or supravalvular or even extrinsic   compression   from a intrathoracic mass. Would recommend a CTA to further evaluate.   Intracardiac thrombus id also in the differential   The left ventricle is normal in size, wall thickness, wall motion and   contractility.   Estimated left ventricular ejection fraction is 55-60 %.   IVC is not collapsing with inspiration.    < end of copied text >

## 2018-01-29 LAB
ANION GAP SERPL CALC-SCNC: 8 MMOL/L — SIGNIFICANT CHANGE UP (ref 5–17)
APPEARANCE CSF: CLEAR — SIGNIFICANT CHANGE UP
BUN SERPL-MCNC: 10 MG/DL — SIGNIFICANT CHANGE UP (ref 7–23)
CALCIUM SERPL-MCNC: 9.3 MG/DL — SIGNIFICANT CHANGE UP (ref 8.5–10.1)
CHLORIDE SERPL-SCNC: 101 MMOL/L — SIGNIFICANT CHANGE UP (ref 96–108)
CO2 SERPL-SCNC: 27 MMOL/L — SIGNIFICANT CHANGE UP (ref 22–31)
COLOR CSF: SIGNIFICANT CHANGE UP
CREAT SERPL-MCNC: 0.86 MG/DL — SIGNIFICANT CHANGE UP (ref 0.5–1.3)
GLUCOSE CSF-MCNC: 36 MG/DL — LOW (ref 40–70)
GLUCOSE SERPL-MCNC: 122 MG/DL — HIGH (ref 70–99)
GRAM STN FLD: SIGNIFICANT CHANGE UP
HCT VFR BLD CALC: 34.5 % — LOW (ref 39–50)
HGB BLD-MCNC: 11.6 G/DL — LOW (ref 13–17)
INR BLD: 0.95 RATIO — SIGNIFICANT CHANGE UP (ref 0.88–1.16)
LYMPHOCYTES # CSF: 89 % — HIGH (ref 40–80)
MCHC RBC-ENTMCNC: 31.9 PG — SIGNIFICANT CHANGE UP (ref 27–34)
MCHC RBC-ENTMCNC: 33.7 GM/DL — SIGNIFICANT CHANGE UP (ref 32–36)
MCV RBC AUTO: 94.8 FL — SIGNIFICANT CHANGE UP (ref 80–100)
MONOS+MACROS NFR CSF: 7 % — LOW (ref 15–45)
NEUTROPHILS # CSF: 4 % — SIGNIFICANT CHANGE UP (ref 0–6)
NRBC NFR CSF: 48 /UL — HIGH (ref 0–5)
PLATELET # BLD AUTO: 230 K/UL — SIGNIFICANT CHANGE UP (ref 150–400)
POTASSIUM SERPL-MCNC: 3.7 MMOL/L — SIGNIFICANT CHANGE UP (ref 3.5–5.3)
POTASSIUM SERPL-SCNC: 3.7 MMOL/L — SIGNIFICANT CHANGE UP (ref 3.5–5.3)
PROT CSF-MCNC: 134 MG/DL — HIGH (ref 15–45)
PROTHROM AB SERPL-ACNC: 10.3 SEC — SIGNIFICANT CHANGE UP (ref 9.8–12.7)
RBC # BLD: 3.64 M/UL — LOW (ref 4.2–5.8)
RBC # CSF: 56 /UL — HIGH (ref 0–0)
RBC # FLD: 18.9 % — HIGH (ref 10.3–14.5)
SODIUM SERPL-SCNC: 136 MMOL/L — SIGNIFICANT CHANGE UP (ref 135–145)
SPECIMEN SOURCE: SIGNIFICANT CHANGE UP
TUBE TYPE: SIGNIFICANT CHANGE UP
VIRUS SPEC CULT: SIGNIFICANT CHANGE UP
WBC # BLD: 14.3 K/UL — HIGH (ref 3.8–10.5)
WBC # FLD AUTO: 14.3 K/UL — HIGH (ref 3.8–10.5)

## 2018-01-29 PROCEDURE — 77003 FLUOROGUIDE FOR SPINE INJECT: CPT | Mod: 26

## 2018-01-29 PROCEDURE — 99233 SBSQ HOSP IP/OBS HIGH 50: CPT

## 2018-01-29 PROCEDURE — 93971 EXTREMITY STUDY: CPT | Mod: 26,RT

## 2018-01-29 PROCEDURE — 62270 DX LMBR SPI PNXR: CPT

## 2018-01-29 RX ORDER — ENOXAPARIN SODIUM 100 MG/ML
80 INJECTION SUBCUTANEOUS EVERY 12 HOURS
Qty: 0 | Refills: 0 | Status: DISCONTINUED | OUTPATIENT
Start: 2018-01-29 | End: 2018-01-31

## 2018-01-29 RX ORDER — DIAZEPAM 5 MG
1 TABLET ORAL EVERY 8 HOURS
Qty: 0 | Refills: 0 | Status: DISCONTINUED | OUTPATIENT
Start: 2018-01-29 | End: 2018-01-31

## 2018-01-29 RX ADMIN — DIPHENHYDRAMINE HYDROCHLORIDE AND LIDOCAINE HYDROCHLORIDE AND ALUMINUM HYDROXIDE AND MAGNESIUM HYDRO 20 MILLILITER(S): KIT at 05:47

## 2018-01-29 RX ADMIN — NAFCILLIN 200 GRAM(S): 10 INJECTION, POWDER, FOR SOLUTION INTRAVENOUS at 18:40

## 2018-01-29 RX ADMIN — MIRTAZAPINE 30 MILLIGRAM(S): 45 TABLET, ORALLY DISINTEGRATING ORAL at 21:36

## 2018-01-29 RX ADMIN — SENNA PLUS 2 TABLET(S): 8.6 TABLET ORAL at 13:07

## 2018-01-29 RX ADMIN — ENOXAPARIN SODIUM 80 MILLIGRAM(S): 100 INJECTION SUBCUTANEOUS at 16:14

## 2018-01-29 RX ADMIN — NAFCILLIN 200 GRAM(S): 10 INJECTION, POWDER, FOR SOLUTION INTRAVENOUS at 02:52

## 2018-01-29 RX ADMIN — DIPHENHYDRAMINE HYDROCHLORIDE AND LIDOCAINE HYDROCHLORIDE AND ALUMINUM HYDROXIDE AND MAGNESIUM HYDRO 20 MILLILITER(S): KIT at 00:23

## 2018-01-29 RX ADMIN — Medication 2 MILLIGRAM(S): at 18:39

## 2018-01-29 RX ADMIN — Medication 25 MILLIGRAM(S): at 21:36

## 2018-01-29 RX ADMIN — Medication 1 MILLIGRAM(S): at 18:54

## 2018-01-29 RX ADMIN — Medication 1 TABLET(S): at 13:07

## 2018-01-29 RX ADMIN — NAFCILLIN 200 GRAM(S): 10 INJECTION, POWDER, FOR SOLUTION INTRAVENOUS at 09:37

## 2018-01-29 RX ADMIN — Medication 0.6 MILLIGRAM(S): at 18:39

## 2018-01-29 RX ADMIN — NAFCILLIN 200 GRAM(S): 10 INJECTION, POWDER, FOR SOLUTION INTRAVENOUS at 21:37

## 2018-01-29 RX ADMIN — NAFCILLIN 200 GRAM(S): 10 INJECTION, POWDER, FOR SOLUTION INTRAVENOUS at 14:26

## 2018-01-29 RX ADMIN — PANTOPRAZOLE SODIUM 40 MILLIGRAM(S): 20 TABLET, DELAYED RELEASE ORAL at 05:47

## 2018-01-29 RX ADMIN — Medication 0.6 MILLIGRAM(S): at 05:47

## 2018-01-29 RX ADMIN — Medication 25 MILLIGRAM(S): at 05:47

## 2018-01-29 RX ADMIN — Medication 2 MILLIGRAM(S): at 06:17

## 2018-01-29 RX ADMIN — Medication 1 MILLIGRAM(S): at 13:07

## 2018-01-29 NOTE — PROGRESS NOTE ADULT - ASSESSMENT
48 y/o male with h/o Stage 4 Lung CA with mets to brain 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 Pt was lethargic and unable to give history. As per sister, pt completed 10 course radiation treatment 1 day PTA. 3 days ago he was complaining of severe headache and also had episode of vomiting. He was seen at oncologist office and was given morphine and valium. The day of admission, the pt developed a fever 0f 103.6F and complained of rigors. He was also noted to be confused.  In ED pt found to have temp of 103.6 rectally. He received vanco IV and cefepime.     1. URI ?viral resolving. Acute bacterial meningitis with MSSA improving. Ommaya site infecton s/p port removal. Lung CA stage 4 with brain metastasis. Immunocompromised host.   -URI symptoms resolved  -f/u CSF analysis and culture  -encephalopathy is much improved, episodes of confusion are improved as well  -pleural fluid culture is negative to date  -leukocytosis   -s/p cefepime 2 gm IV q12h # 11  -on nafcillin IV # 8  -tolerating abx well so far; no side effects noted  -continue abx coverage  -monitor temps  -f/u CBC  -supportive care  -discussed with neurosurgery, possible future imaging to rule out obstructive ventriculitis  2. Other issues:   -care per medicine

## 2018-01-29 NOTE — PROGRESS NOTE ADULT - SUBJECTIVE AND OBJECTIVE BOX
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 Pt was lethargic and unable to give history. As per sister, pt completed 10 course radiation treatment 1 day PTA. 3 days ago he was complaining of severe headache and also had episode of vomiting. He was seen at oncologist office and was given morphine and valium. The day of admission, the pt developed a fever 0f 103.6F and complained of rigors. He was also noted to be confused.  In ED pt found to have temp of 103.6 rectally. He received vanco IV and cefepime.     1/24 - pt seen and examined earlier today, reports sore throat, cough, denies CP, abd pain, denies other new sx, OOB in the chair  1/25 - pt seen and examined, + gen weakness, more lethargic, + neck stiffness, + body aches, + cough, denies dyspnea, CP, abd pain, afebrile  1/26 - pt seen and examined, feels better, denies CP, dyspnea, slight cough, some sore throat, tolerates PO intake  1/27 - overall feels better, + constipation >2 days, denies abd pain, cough minimal, sore throat better, denies CP , palpitations  1/28 - afebrile, ambulating with walker, denies CP, palpitations,  minimal HA, slight cough, intermittent left sided chest wall pain   1/29 - afebrile, RUE swelling overnight due to PV line imfiltration, denies CP, palpitations, abd pain     REVIEW OF SYSTEMS:  All other review of systems is negative unless indicated above  T(C): 35.8 (01-29-18 @ 09:56), Max: 36.8 (01-28-18 @ 14:06)  T(F): 96.5 (01-29-18 @ 09:56), Max: 98.3 (01-28-18 @ 14:06)  HR: 106 (01-29-18 @ 12:00) (88 - 117)  BP: 120/79 (01-29-18 @ 12:00) (107/72 - 131/73)  RR: 19 (01-29-18 @ 12:00) (9 - 27)  SpO2: 97% (01-29-18 @ 12:00) (97% - 100%)  Wt(kg): --    PHYSICAL EXAM:    Constitutional: NAD, awake and alert, well-developed  HEENT: PERR, EOMI, Normal Hearing, MMM, post-op scalp wound clean  Neck: Soft and supple, No LAD, No JVD  Respiratory: Breath sounds are clear bilaterally, No wheezing, rales or rhonchi  Cardiovascular: S1 and S2, regular rate and rhythm, no Murmurs, gallops or rubs  Gastrointestinal: Bowel Sounds present, soft, nontender, nondistended, no guarding, no rebound  Extremities: No peripheral edema  Vascular: 2+ peripheral pulses, RUE pain on palpation, swelling minimal  Neurological: A/O x 3, no focal deficits  Musculoskeletal: 5/5 strength b/l upper and lower extremities  Skin: No rashes    LABS: All Labs Reviewed:  01-29    136  |  101  |  10  ----------------------------<  122<H>  3.7   |  27  |  0.86    Ca    9.3      29 Jan 2018 05:29                          11.6   14.3  )-----------( 230      ( 29 Jan 2018 05:29 )             34.5       PT/INR - ( 29 Jan 2018 05:29 )   PT: 10.3 sec;   INR: 0.95 ratio                           13.3   15.4  )-----------( 234      ( 28 Jan 2018 05:46 )             40.2       01-26    133<L>  |  98  |  10  ----------------------------<  115<H>  3.8   |  27  |  0.83    Ca    9.5      26 Jan 2018 06:19                         14.2   16.7  )-----------( 250      ( 27 Jan 2018 05:16 )             43.3       01-26    133<L>  |  98  |  10  ----------------------------<  115<H>  3.8   |  27  |  0.83    Ca    9.5      26 Jan 2018 06:19                          13.9   14.4  )-----------( 228      ( 26 Jan 2018 06:19 )             42.7     01-25    133<L>  |  97  |  11  ----------------------------<  81  3.8   |  28  |  1.06    Ca    9.9      25 Jan 2018 05:01                          15.0   19.2  )-----------( 280      ( 25 Jan 2018 05:01 )             44.9       01-23    133<L>  |  99  |  16  ----------------------------<  133<H>  4.1   |  26  |  0.85    Ca    9.8      23 Jan 2018 05:01                          14.0   20.8  )-----------( 264      ( 24 Jan 2018 06:31 )             41.5                          13.0   17.9  )-----------( 282      ( 23 Jan 2018 05:01 )             39.3     01-23    133<L>  |  99  |  16  ----------------------------<  133<H>  4.1   |  26  |  0.85    Ca    9.8      23 Jan 2018 05:01  Rapid Respiratory Viral Panel (01.25.18 @ 09:30)    Rapid RVP Result: NotDetec: The    Culture - CSF with Gram Stain (01.11.18 @ 21:14)    -  Trimethoprim/Sulfamethoxazole: S <=0.5/9.5    -  Vancomycin: S 2    Gram Stain:   polymorphonuclear leukocytes seen per low power field  Gram Variable Cocci seen per oil power field  by cytocentrifuge    -  Oxacillin: S 0.5    -  Penicillin: R >8    -  RIF- Rifampin: R >2    Specimen Source: .CSF    Culture Results:   Numerous Staphylococcus aureus    Organism Identification: Staphylococcus aureus    Organism: Staphylococcus aureus    Method Type: TAMMIE    Culture - CSF with Gram Stain (01.22.18 @ 11:19)    Gram Stain:   No polymorphonuclear leukocytes per low power field  No organisms seen per oil power field  by cytocentrifuge    Specimen Source: .CSF process spec as per doctor marianela and ten thank you    Culture Results:   No growth  Culture added as per physician. Refrigerated specimen used. Evaluate  results with caution. Results may be false negative.    Culture - Blood (01.11.18 @ 11:19)    Specimen Source: .Blood Blood-Peripheral    Culture Results:   No growth at 5 days.        < from: Xray Chest 1 View AP-PORTABLE IMMEDIATE (01.24.18 @ 10:00) >  INDINGS: Right sided central venous CT compatible port with tip in the   superior vena cava.    Small loculated left pleural effusion, unchanged. No opacity to suggest   pneumonia. No pneumothorax.    IMPRESSION:    Small loculated left pleural effusion, unchanged.        < end of copied text >  < from: MR Head w/wo IV Cont (01.18.18 @ 20:19) >    minimal decrease in size of the multiple intraparenchymal   and leptomeningeal metastatic lesions scattered throughout the brain. The   Ommaya shunt catheter has been removed. Edema and gliosis is seen about   the shunt catheter tract in the RIGHT frontal lobe.  Tiny RIGHT frontal   subdural hygroma is noted.          < end of copied text >  < from: CT Chest w/ IV Cont (01.17.18 @ 11:29) >  : Marked right ventricular outflow tract narrowing just   proximal to the pulmonic valve. No extrinsic mass is visualized.  Pericardial thickening suggestive of pericarditis. The possibility that   this could be the cause of the right ventricular outflow tract narrowing   is entertained.          < end of copied text >  < from: Xray Chest 1 View AP/PA. (01.11.18 @ 13:23) >  IMPRESSION:  A multiloculated left pleural effusion is slightly smaller and the left   lower lobe is slightly better expanded at this time.      < end of copied text >  < from: Transthoracic Echocardiogram (01.12.18 @ 14:59) >  Fibrocalcific changes noted to the mitral valve leaflets with preserved   leaflet excursion.   Trace mitral regurgitation is present.   Severe pulmonic stenosis is present, may not be valvular. This was not   seen in prior study on 8/30/16.   The level of the obstruction is not well seen . This could be subvalvular   , at the level of the valve or supravalvular or even extrinsic   compression   from a intrathoracic mass. Would recommend a CTA to further evaluate.   Intracardiac thrombus id also in the differential   The left ventricle is normal in size, wall thickness, wall motion and   contractility.   Estimated left ventricular ejection fraction is 55-60 %.   IVC is not collapsing with inspiration.    < end of copied text >

## 2018-01-29 NOTE — PROGRESS NOTE ADULT - ASSESSMENT
46 y/o gentleman  with metastatic adenocarcinoma of lung, extensive brain mets and leptomeningeal carcinomatosis s/p systemic chemotherapy and intrathecal chemotherapy, s/p WBRT now  with sepsis, meningitis  with MSSA, RLL pneumonia.      Continue dexamethasone   2 mg q 12 hrs.  Continue Mirtazapine to 30 mg at bed time.     Thoracentesis c/w exudate, malignant effusion, cultures negative    LP  scheduled was done today.     Antibiotics per ID. Nafcillin q 4hrs.     Continue bactrim DS 1 tab M-W-F, PCP prophylaxis.     Continue supportive care.   No opiates. Tylenol for pain.

## 2018-01-29 NOTE — PROGRESS NOTE ADULT - SUBJECTIVE AND OBJECTIVE BOX
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  Patient completed 10 courses of  radiation treatment 1 day PTA. Patient complaint of  severe headache and an episode of vomiting 3 days prior to admission. The day of admission, the pt developed a fever 0f 103.6F and complained of rigors. He was also noted to be confused.  In ED pt found to have temp of 103.6 rectally. He received vanco IV and cefepime.     Patient is alert and responsive,  respond questions and follow commands.     WBC decreasing to 14K. Afebrile.      ICU Vital Signs Last 24 Hrs  T(C): 35.8 (29 Jan 2018 09:56), Max: 36.8 (28 Jan 2018 14:06)  T(F): 96.5 (29 Jan 2018 09:56), Max: 98.3 (28 Jan 2018 14:06)  HR: 109 (29 Jan 2018 13:00) (88 - 117)  BP: 120/79 (29 Jan 2018 12:00) (107/72 - 131/73)  BP(mean): 89 (29 Jan 2018 12:00) (81 - 95)  ABP: --  ABP(mean): --  RR: 20 (29 Jan 2018 13:00) (9 - 27)  SpO2: 98% (29 Jan 2018 13:00) (97% - 100%)      General gentleman in NAD.  HEENT, [facility] site, dry and intact.   Lungs bilaterally decrease breath sounds.  CVS S1 S2 regular, no M/R/G  Abdomen soft NT ND positive for BS, no organomegaly.  Extremities: No C/C/E. positive for bilateral LE abrasions, dry, healing.        MEDICATIONS  (STANDING):  ALBUTerol    90 MICROgram(s) HFA Inhaler 1 Puff(s) Inhalation every 4 hours  colchicine 0.6 milliGRAM(s) Oral two times a day  dexamethasone     Tablet 2 milliGRAM(s) Oral two times a day  FIRST- Mouthwash  BLM 20 milliLiter(s) Swish and Spit four times a day  folic acid 1 milliGRAM(s) Oral daily  metoprolol     tartrate 25 milliGRAM(s) Oral two times a day  mirtazapine 30 milliGRAM(s) Oral at bedtime  nafcillin  IVPB 2 Gram(s) IV Intermittent every 4 hours  pantoprazole    Tablet 40 milliGRAM(s) Oral before breakfast  senna 2 Tablet(s) Oral daily  trimethoprim  160 mG/sulfamethoxazole 800 mG 1 Tablet(s) Oral <User Schedule>    Culture - Fungal, Body Fluid (01.18.18 @ 12:15)    Specimen Source: Pleural Fl None    Culture Results:   Testing in progress      Amylase, Body Fluid (01.18.18 @ 12:15)    Fluid Source: Pleural    Amylase, Body Fluid: 23: Reference Ranges have NOT been established for analytes in body fluids  because of variability in body fluid composition.  The  has not determined the efficacy of this test when  performed on fluid specimens. The performance characteristics of this  test were determined by [facility] [employer]. U/L      Protein Total, Fluid (01.18.18 @ 12:15)    Protein Total, Fluid: 3.3: Test Repeated  Reference Ranges have NOT been established for analytes in body fluids  because of variability in body fluid composition.  The  has not determined the efficacy of this test when  performed on fluid specimens. The performance characteristics of this  test were determined by [facility] [employer]. g/dL    pH, Fluid (01.18.18 @ 12:15)    pH, Fluid: 7.47    Fluid Source: pleural    Glucose, Fluid (01.18.18 @ 12:15)    Glucose, Fluid: 134: Reference Ranges have NOT  been established for analytes in body fluids  because of variability in body fluid composition.  The  has not determined the efficacy of this test when  performed on fluid specimens. The performance characteristics of this  test were determined by [facility] [employer]. mg/dL        Protein, CSF (01.19.18 @ 16:45)    Protein, CSF: 192 mg/dL    Glucose, CSF (01.19.18 @ 16:45)    Glucose, CSF: 24 mg/dL    Cerebrospinal Fluid Cell Count-1 (01.11.18 @ 21:14)    Total Nucleated Cell Count, CSF: 744 /uL      RBC Count - Spinal Fluid: 62 /uL    Tube Type: Tube 3    CSF Color: See Note: slightly xanthrochromic    CSF Lymphocytes: 3 %    CSF Appearance: Slightly Cloudy    CSF Monocytes/Macrophages: 1 %    CSF Segmented Neutrophils: 96 %    Rapid Respiratory Viral Panel (01.25.18 @ 09:30)    Rapid RVP Result: NotDete: The [employer] RVP Rapid uses polymerase chain reaction (PCR) and melt  curve analysis to screen for adenovirus; coronavirus HKU1, NL63, 229E,  OC43; human metapneumovirus (hMPV); human enterovirus/rhinovirus  (Entero/RV); influenza A; influenza A/H1;influenza A/H3; influenza  A/H1-2009; influenza B; parainfluenza viruses 1, 2, 3, 4; respiratory  syncytial virus; Bordetella pertussis; Mycoplasma pneumoniae; and  Chlamydophila pneumoniae.      Culture - Sputum . (01.24.18 @ 14:45)    Gram Stain:   Few polymorphonuclear leukocytes per low power field  Few Squamous epithelial cells per low power field  Moderate Gram positive cocci in pairs per oil power field  Rare Yeast like cells per oil power field  Rare Gram Negative Rods per oil power field    Specimen Source: .Sputum Sputum    Protein, CSF (01.29.18 @ 11:20)    Protein, CSF: 134 mg/dL    Glucose, CSF (01.29.18 @ 11:20)    Glucose, CSF: 36 mg/dL    Complete Blood Count Repeat Every 24 Hours X 30 Days (01.29.18 @ 05:29)    WBC Count: 14.3 K/uL    RBC Count: 3.64 M/uL    Hemoglobin: 11.6 g/dL    Hematocrit: 34.5 %    Mean Cell Volume: 94.8 fl    Mean Cell Hemoglobin: 31.9 pg    Mean Cell Hemoglobin Conc: 33.7 gm/dL    Red Cell Distrib Width: 18.9 %    Platelet Count - Automated: 230 K/uL        Basic Metabolic Panel in AM (01.29.18 @ 05:29)    Sodium, Serum: 136 mmol/L    Potassium, Serum: 3.7 mmol/L    Chloride, Serum: 101 mmol/L    Carbon Dioxide, Serum: 27 mmol/L    Anion Gap, Serum: 8 mmol/L    Blood Urea Nitrogen, Serum: 10 mg/dL    Creatinine, Serum: 0.86 mg/dL    Glucose, Serum: 122 mg/dL    Calcium, Total Serum: 9.3 mg/dL    eGFR if Non : 103: Interpretative comment  The units for eGFR are ml/min/1.73m2 (normalized body surface area). The  eGFR is calculated from a serum creatinine using the CKD-EPI equation.  Other variables required for calculation are race, age and sex. Among  patients with chronic kidney disease (CKD), the eGFR is useful in  determining the stage of disease according to KDOQI CKD classification.  All eGFR results are reported numerically with the following  interpretation.          GFR                    With                 Without     (ml/min/1.73 m2)    Kidney Damage       Kidney Damage        >= 90                    Stage 1                     Normal        60-89                    Stage 2                     Decreased GFR        30-59     Stage 3                     Stage 3        15-29                    Stage 4                     Stage 4        < 15                      Stage 5                     Stage 5  Each stage of CKD assumes that the associated GFR level has been in  effect for at least 3 months. Determination of stages one and two (with  eGFR > 59 ml/min/m2) requires estimation of kidney damage for at least 3  months as defined by structural or functional abnormalities.  Limitations: All estimates of GFR will be less accurate for patients at  extremes of muscle mass (including but not limited to frail elderly,  critically ill, or cancer patients), those with unusual diets, and those  with conditions associated with reduced secretion or extrarenal  elimination of creatinine. The eGFR equation is not recommended for use  in patients with unstable creatinine levels. mL/min/1.73M2    eGFR if African American: 120 mL/min/1.73M2        MRI of the brain 1-19-18  FINDINGS:   MRI dated 12/15/2017 available for review.         The brain demonstrates minimal decrease in size of the multiple   intraparenchymal and leptomeningeal metastatic lesions scattered   throughout the brain. The Ommaya shunt catheter has been removed. Edema   and gliosis is seen about the shunt catheter tract in the RIGHT frontal   lobe. Tiny RIGHT subdural hygroma is noted. No acute cerebral cortical   infarct is found.   No intracranial hemorrhage is recognized.  No mass   effect is found in the brain.          The ventricles, sulci and basal cisterns appear unremarkable.    The vertebral and internal carotid arteries demonstrate expected flow   voids indicating their patency.         The orbits are unremarkable.  The paranasal sinuses are clear.  The nasal   cavity appears intact.  The nasopharynx is symmetric.  The central skull   base and petrous temporal bones are intact.  The calvarium appears   unremarkable.      IMPRESSION:  minimal decrease in size of the multiple intraparenchymal   and leptomeningeal metastatic lesions scattered throughout the brain. The   Ommaya shunt catheter has been removed. Edema and gliosis is seen about   the shunt catheter tract in the RIGHT frontal lobe.  Tiny RIGHT frontal   subdural hygroma is noted

## 2018-01-29 NOTE — PROGRESS NOTE ADULT - SUBJECTIVE AND OBJECTIVE BOX
Patient is a 47y old  Male who presents with a chief complaint of fever, AMS (2018 16:20)    HPI:  46 y/o male with h/o Stage 4 Lung CA with mets to brain s/p Ommaya reservoir 2017 for leptomeningeal disease s/p chemo and radiation, anxiety, depression, h/o nephrolithiasis, h/o pericarditis was admitted on  for fever and AMS. Pt was lethargic and unable to give history. As per sister, pt completed 10 course radiation treatment 1 day PTA. 3 days ago he was complaining of severe headache and also had episode of vomiting. He was seen at oncologist office and was given morphine and valium. The day of admission, the pt developed a fever 0f 103.6F and complained of rigors. He was also noted to be confused.  In ED pt found to have temp of 103.6 rectally. He received vanco IV and cefepime.     Feels stronger  No fever or chills  Denies HA  s/p lumbar puncture    MEDICATIONS  (STANDING):  ALBUTerol    90 MICROgram(s) HFA Inhaler 1 Puff(s) Inhalation every 4 hours  colchicine 0.6 milliGRAM(s) Oral two times a day  dexamethasone     Tablet 2 milliGRAM(s) Oral two times a day  FIRST- Mouthwash  BLM 20 milliLiter(s) Swish and Spit four times a day  folic acid 1 milliGRAM(s) Oral daily  metoprolol     tartrate 25 milliGRAM(s) Oral two times a day  mirtazapine 30 milliGRAM(s) Oral at bedtime  nafcillin  IVPB 2 Gram(s) IV Intermittent every 4 hours  pantoprazole    Tablet 40 milliGRAM(s) Oral before breakfast  senna 2 Tablet(s) Oral daily  trimethoprim  160 mG/sulfamethoxazole 800 mG 1 Tablet(s) Oral <User Schedule>    MEDICATIONS  (PRN):  acetaminophen   Tablet 650 milliGRAM(s) Oral every 6 hours PRN For Temp greater than 38 C (100.4 F)  ALBUTerol    0.083% 2.5 milliGRAM(s) Nebulizer every 6 hours PRN Shortness of Breath and/or Wheezing  guaiFENesin    Syrup 100 milliGRAM(s) Oral every 6 hours PRN Cough  HYDROcodone/homatropine Syrup 5 milliLiter(s) Oral every 4 hours PRN Cough  HYDROmorphone   Tablet 2 milliGRAM(s) Oral every 4 hours PRN Severe Pain (7 - 10)      Vital Signs Last 24 Hrs  T(C): 35.8 (2018 09:56), Max: 36.8 (2018 14:06)  T(F): 96.5 (2018 09:56), Max: 98.3 (2018 14:06)  HR: 109 (2018 13:00) (88 - 117)  BP: 120/79 (2018 12:00) (107/72 - 131/73)  BP(mean): 89 (2018 12:00) (81 - 95)  RR: 20 (2018 13:00) (9 - 27)  SpO2: 98% (2018 13:00) (97% - 100%)    Physical Exam:      Constitutional:   HEENT: NC/AT, EOMI, PERRLA; postop scalp wound clean  Neck: supple  Back: no tenderness  Respiratory: clear; decreased BS  Cardiovascular: S1S2 regular, no murmurs  Abdomen: soft, not tender, not distended, positive BS  Rectal: deferred  Musculoskeletal: no muscle tenderness, no joint swelling or tenderness  Extremities: no pedal edema  Neurological: alert, moving all extremities  Skin: no rashes    Labs:                        11.6   14.3  )-----------( 230      ( 2018 05:29 )             34.5         136  |  101  |  10  ----------------------------<  122<H>  3.7   |  27  |  0.86    Ca    9.3      2018 05:29                        14.2   16.7  )-----------( 250      ( 2018 05:16 )             43.3         133<L>  |  98  |  10  ----------------------------<  115<H>  3.8   |  27  |  0.83    Ca    9.5      2018 06:19                        13.9   14.4  )-----------( 228      ( 2018 06:19 )             42.7         133<L>  |  98  |  10  ----------------------------<  115<H>  3.8   |  27  |  0.83    Ca    9.5      2018 06:19               Protein, CSF (18 @ 16:45)    Protein, CSF: 192 mg/dL    Glucose, CSF (18 @ 16:45)    Glucose, CSF: 24 mg/dL    Cerebrospinal Fluid Cell Count-1 (18 @ 16:45)    CSF Segmented Neutrophils: 43 %    Total Nucleated Cell Count, CSF: 114 /uL    CSF Color: Xanthochromic    CSF Appearance: Clear    CSF Lymphocytes: 41 %    CSF Monocytes/Macrophages: 16 %    Labs:                        15.0   19.2  )-----------( 280      ( 2018 05:01 )             44.9         133<L>  |  97  |  11  ----------------------------<  81  3.8   |  28  |  1.06    Ca    9.9      2018 05:01                   13.9   22.4  )-----------( 318      ( 2018 05:51 )             42.4                           14.4   20.5  )-----------( 367      ( 2018 04:43 )             43.2         131<L>  |  96  |  16  ----------------------------<  121<H>  4.2   |  27  |  0.93    Ca    10.2<H>      2018 04:43  Phos  2.1     -                        14.4   17.8  )-----------( 382      ( 2018 05:55 )             42.9     -    131<L>  |  96  |  17  ----------------------------<  105<H>  3.8   |  28  |  0.87    Ca    10.4<H>      2018 05:55  Phos  2.1     -  Mg     2.3     -16                                   15.3   19.6  )-----------( 291      ( 2018 05:26 )             53.0     -13    132<L>  |  100  |  17  ----------------------------<  95  4.2   |  24  |  1.09    Ca    10.5<H>      2018 05:26  Phos  3.0     -  Mg     2.5             Ca    10.2<H>      2018 07:30    TPro  8.4<H>  /  Alb  3.3  /  TBili  0.8  /  DBili  x   /  AST  34  /  ALT  83<H>  /  AlkPhos  122<H>       LIVER FUNCTIONS - ( 2018 11:19 )  Alb: 3.3 g/dL / Pro: 8.4 gm/dL / ALK PHOS: 122 U/L / ALT: 83 U/L / AST: 34 U/L / GGT: x           Urinalysis Basic - ( 2018 12:33 )    Color: Yellow / Appearance: Clear / S.015 / pH: x  Gluc: x / Ketone: Negative  / Bili: Negative / Urobili: Negative mg/dL   Blood: x / Protein: 30 mg/dL / Nitrite: Negative   Leuk Esterase: Negative / RBC: 0-2 /HPF / WBC 0-2   Sq Epi: x / Non Sq Epi: Occasional / Bacteria: Occasional    Cerebrospinal Fluid Cell Count-1 (18 @ 21:14)    CSF Segmented Neutrophils: 96 %    Total Nucleated Cell Count, CSF: 744 /uL    CSF Appearance: Slightly Cloudy    CSF Lymphocytes: 3 %    CSF Monocytes/Macrophages: 1 %    CSF Color: See Note: slightly xanthrochromic      Culture - Body Fluid with Gram Stain (collected 2018 14:36)  Source: .Body Fluid scalp incision  Gram Stain (2018 06:33):    No polymorphonuclear cells seen    No organisms seen    by cytocentrifuge    Culture - Acid Fast - CSF (collected 2018 21:14)  Source: .CSF spinal fluid    Culture - Fungal, CSF (collected 2018 21:14)  Source: .CSF spinal fluid  Preliminary Report (2018 12:37):    Testing in progress    Culture - CSF with Gram Stain (collected 2018 21:14)  Source: .CSF  Gram Stain (2018 01:16):    polymorphonuclear leukocytes seen per low power field    Gram Variable Cocci seen per oil power field    by cytocentrifuge  Final Report (2018 07:36):    Numerous Staphylococcus aureus  Organism: Staphylococcus aureus (2018 07:36)  Organism: Staphylococcus aureus (2018 07:36)      -  Oxacillin: S 0.5      -  Penicillin: R >8      -  RIF- Rifampin: R >2      -  Trimethoprim/Sulfamethoxazole: S <=0.5/9.5      -  Vancomycin: S 2      Method Type: TAMMIE    Culture - Urine (collected 2018 12:33)  Source: .Urine None  Final Report (2018 18:38):    10,000 - 49,000 CFU/mL Coag Negative Staphylococcus    Culture - Blood (collected 2018 11:19)  Culture - Body Fluid with Gram Stain (18 @ 12:15)    Gram Stain:   No polymorphonuclear leukocytes per low power field  Red blood cells per low power field  No organisms seen per oil power field  by cytocentrifuge    Specimen Source: .Body Fluid Pleural Fluid    Culture Results:   No growth    Source: .Blood Blood-Peripheral  Preliminary Report (2018 15:01):    No growth to date.    Culture - Blood (collected 2018 11:19)  Source: .Blood Blood-Peripheral  Preliminary Report (2018 15:01):    No growth to date.    Culture - Body Fluid with Gram Stain (18 @ 12:15)    Gram Stain:   No polymorphonuclear leukocytes per low power field  Red blood cells per low power field  No organisms seen per oil power field  by cytocentrifuge    Specimen Source: .Body Fluid Pleural Fluid    pH, Fluid (18 @ 12:15)    pH, Fluid: 7.47    Fluid Source: pleural    Culture - Sputum . (18 @ 14:45)    Gram Stain:   Few polymorphonuclear leukocytes per low power field  Few Squamous epithelial cells per low power field  Moderate Gram positive cocci in pairs per oil power field  Rare Yeast like cells per oil power field  Rare Gram Negative Rods per oil power field    Specimen Source: .Sputum Sputum    Cerebrospinal Fluid Cell Count-1 (18 @ 11:20)    Total Nucleated Cell Count, CSF: 48 /uL    CSF Color: No Color    CSF Appearance: Clear    CSF Lymphocytes: 89 %    CSF Monocytes/Macrophages: 7 %    CSF Segmented Neutrophils: 4 %              Radiology:    < from: CT Chest No Cont (18 @ 17:21) >  Several scattered groundglass nodules and opacities within the right   upper lobe, suggestive of pneumonia given the clinical context of fever.   Small to right pleural effusion is present. Loculated left pleural   effusion along the left lateral pleura and within the fissures is again   identified. Small pericardial effusion is present.    < end of copied text >    < from: CT Head No Cont (18 @ 13:09) >   unchanged RIGHT frontal Ommaya catheter  in place within   the anterior horn of the RIGHT lateral ventricle. Faint hyperdense   lesions scattered throughout the brain consistent with patient's known   metastatic disease. IV contrast would be needed for complete evaluation.     < end of copied text >    < from: CT Head No Cont (01.15.18 @ 09:43) >  Frontal kenia hole with small amount of encephalomalacia/gliosis within   the right frontal lobe. Fluid collection measuring approximately 4.7 x   1.1 cm and minimal associated hemorrhage within the scalp overlying the   right frontal borehole. Ventricular size is unchanged since prior exam.   Punctate calcification in the right frontal lobe is unchanged. The   underlying neoplastic process is difficult to evaluate CT imaging.     < end of copied text >      Advanced directives addressed: full resuscitation

## 2018-01-29 NOTE — PROGRESS NOTE ADULT - ASSESSMENT
Assessment and Plan  	  48 y/o male with h/o Stage 4 Lung CA with mets to brain 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   Found to have 103.6F and complained of rigors. Found to be encephalopathic. Lactic acid 2.4    * Toxic metabolic encephalopathy , resolving  *  Severe sepsis secondary to Acute bacterial meningitis with MSSA. Right frontal lobe fluid collection and postoperative abscess s/p washout  -Ommaya site infecton s/p port removal.  and s/p clean and wash out of the right frotnal lobe ommaya site. Now on tx for meningitis.  -leukocytosis persistent- possibly secondary to known malignancy?  temperature spikes are improving; remains afebrile  -encephalopathy is improved   -s/p LP  1/18 >>  consistent with ongoing bacterial meningitis however improved CSF cell count since LP from 1/11 ; GRam stain from 1/18 , no organisms seen (was able to speak with core lab, and add on the gram stain + culture to the acid fast sample; reported 1/22 11:19 AM)  -Dr. Cee  [facility] apprecaited  -Infectious disease recco to c/w antibiotics >> broaden coverage >> Nafcillin day 2 (previously on cefepime)   -c/w bactrim for PCP proph  -Hem/onc Dr. Arenas  -  plan to repeat LP on 1/29/18  -Plan: will likely take this immunocompromised patient longer for treatment. We will c/w Nafcillin and bactrim for now. He will need repeat LP to ensure improvement / resolution of the meningitis prior to discharge.   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 King - Right heart cath, CONCEPCIÓN discussed with family, because that would not change the management family wishes not to persue  - less invasive test cardiac MRI as o/p      * -RUL pneumonia with loculated left pleural effusion ?empyema.  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 Immunocompromised host.   - s/p systemic chemo and intrathecal chemo, s/p WBRT  -pleural fluid cytology done inpatient >> adenocarcinoma of the lung  - c/w steroids as per oncology    * Leukocytosis multifactorial due to meningitis and underlying malignancy  and steroids induced   - WBC 14-->16-->15--> 14  - continue monitor  - afebrile    * Sore throat, improving  - viral panel neg  - CXR - unchanged left pleural effusion, small    * RUE swelling likely due to IV infiltration, given underlying CA will r/o DVT  - doopler RUE ordered - pending    -DVT proph- sc heparin    Dispo - LP tomorrow

## 2018-01-30 LAB
ANION GAP SERPL CALC-SCNC: 9 MMOL/L — SIGNIFICANT CHANGE UP (ref 5–17)
BUN SERPL-MCNC: 9 MG/DL — SIGNIFICANT CHANGE UP (ref 7–23)
CALCIUM SERPL-MCNC: 9.6 MG/DL — SIGNIFICANT CHANGE UP (ref 8.5–10.1)
CHLORIDE SERPL-SCNC: 101 MMOL/L — SIGNIFICANT CHANGE UP (ref 96–108)
CO2 SERPL-SCNC: 26 MMOL/L — SIGNIFICANT CHANGE UP (ref 22–31)
CREAT SERPL-MCNC: 0.87 MG/DL — SIGNIFICANT CHANGE UP (ref 0.5–1.3)
GLUCOSE SERPL-MCNC: 92 MG/DL — SIGNIFICANT CHANGE UP (ref 70–99)
HCT VFR BLD CALC: 38.6 % — LOW (ref 39–50)
HGB BLD-MCNC: 13 G/DL — SIGNIFICANT CHANGE UP (ref 13–17)
MCHC RBC-ENTMCNC: 32.3 PG — SIGNIFICANT CHANGE UP (ref 27–34)
MCHC RBC-ENTMCNC: 33.7 GM/DL — SIGNIFICANT CHANGE UP (ref 32–36)
MCV RBC AUTO: 95.8 FL — SIGNIFICANT CHANGE UP (ref 80–100)
PLATELET # BLD AUTO: 242 K/UL — SIGNIFICANT CHANGE UP (ref 150–400)
POTASSIUM SERPL-MCNC: 4 MMOL/L — SIGNIFICANT CHANGE UP (ref 3.5–5.3)
POTASSIUM SERPL-SCNC: 4 MMOL/L — SIGNIFICANT CHANGE UP (ref 3.5–5.3)
RBC # BLD: 4.03 M/UL — LOW (ref 4.2–5.8)
RBC # FLD: 18.6 % — HIGH (ref 10.3–14.5)
SODIUM SERPL-SCNC: 136 MMOL/L — SIGNIFICANT CHANGE UP (ref 135–145)
WBC # BLD: 16.6 K/UL — HIGH (ref 3.8–10.5)
WBC # FLD AUTO: 16.6 K/UL — HIGH (ref 3.8–10.5)

## 2018-01-30 RX ADMIN — NAFCILLIN 200 GRAM(S): 10 INJECTION, POWDER, FOR SOLUTION INTRAVENOUS at 09:36

## 2018-01-30 RX ADMIN — Medication 2 MILLIGRAM(S): at 05:50

## 2018-01-30 RX ADMIN — Medication 25 MILLIGRAM(S): at 05:50

## 2018-01-30 RX ADMIN — NAFCILLIN 200 GRAM(S): 10 INJECTION, POWDER, FOR SOLUTION INTRAVENOUS at 21:25

## 2018-01-30 RX ADMIN — Medication 1 MILLIGRAM(S): at 22:36

## 2018-01-30 RX ADMIN — Medication 0.6 MILLIGRAM(S): at 17:40

## 2018-01-30 RX ADMIN — Medication 0.6 MILLIGRAM(S): at 05:50

## 2018-01-30 RX ADMIN — ENOXAPARIN SODIUM 80 MILLIGRAM(S): 100 INJECTION SUBCUTANEOUS at 04:50

## 2018-01-30 RX ADMIN — Medication 1 MILLIGRAM(S): at 13:00

## 2018-01-30 RX ADMIN — ENOXAPARIN SODIUM 80 MILLIGRAM(S): 100 INJECTION SUBCUTANEOUS at 17:40

## 2018-01-30 RX ADMIN — Medication 25 MILLIGRAM(S): at 17:40

## 2018-01-30 RX ADMIN — NAFCILLIN 200 GRAM(S): 10 INJECTION, POWDER, FOR SOLUTION INTRAVENOUS at 02:29

## 2018-01-30 RX ADMIN — SENNA PLUS 2 TABLET(S): 8.6 TABLET ORAL at 13:00

## 2018-01-30 RX ADMIN — NAFCILLIN 200 GRAM(S): 10 INJECTION, POWDER, FOR SOLUTION INTRAVENOUS at 05:50

## 2018-01-30 RX ADMIN — NAFCILLIN 200 GRAM(S): 10 INJECTION, POWDER, FOR SOLUTION INTRAVENOUS at 14:05

## 2018-01-30 RX ADMIN — PANTOPRAZOLE SODIUM 40 MILLIGRAM(S): 20 TABLET, DELAYED RELEASE ORAL at 05:52

## 2018-01-30 RX ADMIN — NAFCILLIN 200 GRAM(S): 10 INJECTION, POWDER, FOR SOLUTION INTRAVENOUS at 17:41

## 2018-01-30 RX ADMIN — Medication 2 MILLIGRAM(S): at 17:40

## 2018-01-30 NOTE — PROGRESS NOTE ADULT - SUBJECTIVE AND OBJECTIVE BOX
CHIEF COMPLAINT:    SUBJECTIVE:     REVIEW OF SYSTEMS:    CONSTITUTIONAL: No weakness, fevers or chills  EYES/ENT: No visual changes;  No vertigo or throat pain   NECK: No pain or stiffness  RESPIRATORY: No cough, wheezing, hemoptysis; No shortness of breath  CARDIOVASCULAR: No chest pain or palpitations  GASTROINTESTINAL: No abdominal or epigastric pain. No nausea, vomiting, or hematemesis; No diarrhea or constipation. No melena or hematochezia.  GENITOURINARY: No dysuria, frequency or hematuria  NEUROLOGICAL: No numbness or weakness  SKIN: No itching, burning, rashes, or lesions   All other review of systems is negative unless indicated above    Vital Signs Last 24 Hrs  T(C): 36.1 (30 Jan 2018 06:00), Max: 36.3 (29 Jan 2018 16:48)  T(F): 97 (30 Jan 2018 06:00), Max: 97.3 (29 Jan 2018 16:48)  HR: 101 (30 Jan 2018 08:00) (94 - 120)  BP: 118/78 (30 Jan 2018 08:00) (109/77 - 125/97)  BP(mean): 87 (30 Jan 2018 08:00) (85 - 103)  RR: 7 (30 Jan 2018 08:00) (7 - 23)  SpO2: 98% (30 Jan 2018 08:00) (96% - 100%)    I&O's Summary    29 Jan 2018 07:01  -  30 Jan 2018 07:00  --------------------------------------------------------  IN: 600 mL / OUT: 450 mL / NET: 150 mL        CAPILLARY BLOOD GLUCOSE          PHYSICAL EXAM:    Constitutional: NAD, awake and alert, well-developed  HEENT: PERR, EOMI, Normal Hearing, MMM  Neck: Soft and supple, No LAD, No JVD  Respiratory: Breath sounds are clear bilaterally, No wheezing, rales or rhonchi  Cardiovascular: S1 and S2, regular rate and rhythm, no Murmurs, gallops or rubs  Gastrointestinal: Bowel Sounds present, soft, nontender, nondistended, no guarding, no rebound  Extremities: No peripheral edema  Vascular: 2+ peripheral pulses  Neurological: A/O x 3, no focal deficits  Musculoskeletal: 5/5 strength b/l upper and lower extremities  Skin: No rashes    MEDICATIONS:  MEDICATIONS  (STANDING):  ALBUTerol    90 MICROgram(s) HFA Inhaler 1 Puff(s) Inhalation every 4 hours  colchicine 0.6 milliGRAM(s) Oral two times a day  dexamethasone     Tablet 2 milliGRAM(s) Oral two times a day  enoxaparin Injectable 80 milliGRAM(s) SubCutaneous every 12 hours  FIRST- Mouthwash  BLM 20 milliLiter(s) Swish and Spit four times a day  folic acid 1 milliGRAM(s) Oral daily  metoprolol     tartrate 25 milliGRAM(s) Oral two times a day  mirtazapine 30 milliGRAM(s) Oral at bedtime  nafcillin  IVPB 2 Gram(s) IV Intermittent every 4 hours  pantoprazole    Tablet 40 milliGRAM(s) Oral before breakfast  senna 2 Tablet(s) Oral daily  trimethoprim  160 mG/sulfamethoxazole 800 mG 1 Tablet(s) Oral <User Schedule>      LABS: All Labs Reviewed:                        11.6   14.3  )-----------( 230      ( 29 Jan 2018 05:29 )             34.5     01-29    136  |  101  |  10  ----------------------------<  122<H>  3.7   |  27  |  0.86    Ca    9.3      29 Jan 2018 05:29      PT/INR - ( 29 Jan 2018 05:29 )   PT: 10.3 sec;   INR: 0.95 ratio               Blood Culture: 01-29 @ 11:20  Organism --  Gram Stain Blood -- Gram Stain   polymorphonuclear leukocytes seen  No organisms seen  by cytocentrifuge  Specimen Source .CSF  Culture-Blood --          Assessment and Plan  	  48 y/o male with h/o Stage 4 Lung CA with mets to brain 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   Found to have 103.6F and complained of rigors. Found to be encephalopathic. Lactic acid 2.4    * Toxic metabolic encephalopathy , resolving  *  Severe sepsis secondary to Acute bacterial meningitis with MSSA. Right frontal lobe fluid collection and postoperative abscess s/p washout  -Ommaya site infecton s/p port removal.  and s/p clean and wash out of the right frotnal lobe ommaya site. Now on tx for meningitis.  -leukocytosis persistent- possibly secondary to known malignancy?  temperature spikes are improving; remains afebrile  -encephalopathy is improved   -s/p LP  1/18 >>  consistent with ongoing bacterial meningitis however improved CSF cell count since LP from 1/11 ; GRam stain from 1/18 , no organisms seen (was able to speak with core lab, and add on the gram stain + culture to the acid fast sample; reported 1/22 11:19 AM)  -Dr. Cee  [facility] apprecaited  -Infectious disease recco to c/w antibiotics >> broaden coverage >> Nafcillin day 2 (previously on cefepime)   -c/w bactrim for PCP proph  -Hem/onc Dr. Arenas  -  plan to repeat LP on 1/29/18  -Plan: will likely take this immunocompromised patient longer for treatment. We will c/w Nafcillin and bactrim for now. He will need repeat LP to ensure improvement / resolution of the meningitis prior to discharge.   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 King - Right heart cath, CONCEPCIÓN discussed with family, because that would not change the management family wishes not to persue  - less invasive test cardiac MRI as o/p      * -RUL pneumonia with loculated left pleural effusion ?empyema.  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 Immunocompromised host.   - s/p systemic chemo and intrathecal chemo, s/p WBRT  -pleural fluid cytology done inpatient >> adenocarcinoma of the lung  - c/w steroids as per oncology    * Leukocytosis multifactorial due to meningitis and underlying malignancy  and steroids induced   - WBC 14-->16-->15--> 14  - continue monitor  - afebrile    * Sore throat, improving  - viral panel neg  - CXR - unchanged left pleural effusion, small    * RUE swelling likely due to IV infiltration, given underlying CA will r/o DVT  - doopler RUE ordered - pending    -DVT proph- sc heparin    Dispo - LP tomorrow CHIEF COMPLAINT/Diagnosis: Meningitis/ Ommaya reservoir infection site/ s/p washout    SUBJECTIVE: no complaints    REVIEW OF SYSTEMS:    CONSTITUTIONAL: No weakness, fevers or chills  EYES/ENT: No visual changes;  No vertigo or throat pain   NECK: No pain or stiffness  RESPIRATORY: No cough, wheezing, hemoptysis; No shortness of breath  CARDIOVASCULAR: No chest pain or palpitations  GASTROINTESTINAL: No abdominal or epigastric pain. No nausea, vomiting, or hematemesis; No diarrhea or constipation. No melena or hematochezia.  GENITOURINARY: No dysuria, frequency or hematuria  NEUROLOGICAL: No numbness or weakness  SKIN: No itching, burning, rashes, or lesions   All other review of systems is negative unless indicated above    Vital Signs Last 24 Hrs  T(C): 36.1 (30 Jan 2018 06:00), Max: 36.3 (29 Jan 2018 16:48)  T(F): 97 (30 Jan 2018 06:00), Max: 97.3 (29 Jan 2018 16:48)  HR: 101 (30 Jan 2018 08:00) (94 - 120)  BP: 118/78 (30 Jan 2018 08:00) (109/77 - 125/97)  BP(mean): 87 (30 Jan 2018 08:00) (85 - 103)  RR: 7 (30 Jan 2018 08:00) (7 - 23)  SpO2: 98% (30 Jan 2018 08:00) (96% - 100%)    I&O's Summary    29 Jan 2018 07:01  -  30 Jan 2018 07:00  --------------------------------------------------------  IN: 600 mL / OUT: 450 mL / NET: 150 mL        CAPILLARY BLOOD GLUCOSE          PHYSICAL EXAM:    Constitutional: NAD, awake and alert, well-developed  HEENT: PERR, EOMI, Normal Hearing, MMM  Neck: Soft and supple, No LAD, No JVD  Respiratory: Breath sounds are clear bilaterally, No wheezing, rales or rhonchi  Cardiovascular: S1 and S2, regular rate and rhythm, no Murmurs, gallops or rubs  Gastrointestinal: Bowel Sounds present, soft, nontender, nondistended, no guarding, no rebound  Extremities: No peripheral edema  Vascular: 2+ peripheral pulses  Neurological: A/O x 3, no focal deficits  Musculoskeletal: 5/5 strength b/l upper and lower extremities  Skin: No rashes    MEDICATIONS:  MEDICATIONS  (STANDING):  ALBUTerol    90 MICROgram(s) HFA Inhaler 1 Puff(s) Inhalation every 4 hours  colchicine 0.6 milliGRAM(s) Oral two times a day  dexamethasone     Tablet 2 milliGRAM(s) Oral two times a day  enoxaparin Injectable 80 milliGRAM(s) SubCutaneous every 12 hours  FIRST- Mouthwash  BLM 20 milliLiter(s) Swish and Spit four times a day  folic acid 1 milliGRAM(s) Oral daily  metoprolol     tartrate 25 milliGRAM(s) Oral two times a day  mirtazapine 30 milliGRAM(s) Oral at bedtime  nafcillin  IVPB 2 Gram(s) IV Intermittent every 4 hours  pantoprazole    Tablet 40 milliGRAM(s) Oral before breakfast  senna 2 Tablet(s) Oral daily  trimethoprim  160 mG/sulfamethoxazole 800 mG 1 Tablet(s) Oral <User Schedule>      LABS: All Labs Reviewed:                        11.6   14.3  )-----------( 230      ( 29 Jan 2018 05:29 )             34.5     01-29    136  |  101  |  10  ----------------------------<  122<H>  3.7   |  27  |  0.86    Ca    9.3      29 Jan 2018 05:29      PT/INR - ( 29 Jan 2018 05:29 )   PT: 10.3 sec;   INR: 0.95 ratio               Blood Culture: 01-29 @ 11:20  Organism --  Gram Stain Blood -- Gram Stain   polymorphonuclear leukocytes seen  No organisms seen  by cytocentrifuge  Specimen Source .CSF  Culture-Blood --          Assessment and Plan  	  46 y/o male with h/o Stage 4 Lung CA with mets to brain [facility]2017 for leptomeningeal disease s/p chemo and radiation, anxiety, depression, h/o nephrolithiasis, h/o pericarditis was admitted on 1/11 for fever and AMS.   Found to have 103.6F and complained of rigors. Found to be encephalopathic. Lactic acid 2.4    1-Toxic metabolic encephalopathy  secondary to Severe sepsis secondary to Acute bacterial meningitis with MSSA. Right frontal lobe fluid collection and postoperative abscess s/p washout  -Ommaya site infecton s/p port removal.  and s/p clean and wash out of the right frotnal lobe ommaya site. Now on tx for meningitis.  -leukocytosis persistent- possibly secondary to known malignancy?  temperature spikes are improving; remains afebrile  -encephalopathy is improved   -s/p LP  1/18 >>  consistent with ongoing bacterial meningitis however improved CSF cell count since LP from 1/11 ; GRam stain from 1/18 , no organisms seen (was able to speak with core lab, and add on the gram stain + culture to the acid fast sample; reported 1/22 11:19 AM)  -Dr. Cee  [facility] apprecaited  -Infectious disease recco to c/w antibiotics >> broaden coverage >> Nafcillin  since 1/23  -c/w bactrim for PCP proph  -Hem/onc Dr. Arenas  -  plan to repeat LP on 1/29/18  - RVP panel negative  - WBC 16-->15--> 14 > 16  -LP 1/29 shows improved csf wbcs     2-Subvalvular pulmonic stenosis on ECHO incidental finding  - cardiology consult Dr. King - Right heart cath, CONCEPCIÓN discussed with family, because that would not change the management family wishes not to persue  - less invasive test cardiac MRI as o/p      3--RUL pneumonia with loculated left pleural effusion ?empyema.  -thoracentesis shows pleural fluid with pH 7.47 - empyema is unlikely, consistent with exudate, malignant effusion  - repeat CXR 1/24 - unchanged small loculated pleural effusion  - supportive care - mucolytics, magic mouth wash  - cough suppressants  - pain management    4-- Metastatic adenocarcinoma of Lung ( stage 4 with brain metastasis and leptomeningeal carcinomatosis) . Immunocompromised host.   - s/p systemic chemo and intrathecal chemo, s/p WBRT  -pleural fluid cytology done inpatient >> adenocarcinoma of the lung  - c/w steroids as per oncology    5-Leukocytosis multifactorial due to meningitis and underlying malignancy  and steroids induced   - WBC 14-->16-->15--> 14 > 16  - continue monitor  - afebrile    6-Sore throat, improving  - viral panel neg  - CXR - unchanged left pleural effusion, small    7- RUE swelling likely due to IV infiltration, given underlying CA will r/o DVT  - doopler RUE ordered - pending    8-DVT proph- sc heparin

## 2018-01-30 NOTE — ADVANCED PRACTICE NURSE CONSULT - RECOMMEDATIONS
Patient  is glad  I visited with him and speaks about continuing with some type of psychotherapy.  List of resources given to the patient including  complementary therapies and psychotherapists. Told both patient and his financee that any decisions  to start any kind of herbs or vitamins should be discussed with medical oncologist. Both patient and financee are aware may call for further resources.

## 2018-01-30 NOTE — PROGRESS NOTE ADULT - SUBJECTIVE AND OBJECTIVE BOX
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  Patient completed 10 courses of  radiation treatment 1 day PTA. Patient complaint of  severe headache and an episode of vomiting 3 days prior to admission. The day of admission, the pt developed a fever 0f 103.6F and complained of rigors. He was also noted to be confused.  In ED pt found to have temp of 103.6 rectally. He received vanco IV and cefepime.     Patient is alert and responsive,  respond questions and follow commands.     WBC decreasing to 14K. Afebrile.      ICU Vital Signs Last 24 Hrs  T(C): 36.1 (30 Jan 2018 06:00), Max: 36.3 (29 Jan 2018 16:48)  T(F): 97 (30 Jan 2018 06:00), Max: 97.3 (29 Jan 2018 16:48)  HR: 101 (30 Jan 2018 08:00) (95 - 120)  BP: 118/78 (30 Jan 2018 08:00) (109/77 - 125/97)  BP(mean): 87 (30 Jan 2018 08:00) (85 - 103)  ABP: --  ABP(mean): --  RR: 7 (30 Jan 2018 08:00) (7 - 23)  SpO2: 98% (30 Jan 2018 08:00) (96% - 100%)      General gentleman in NAD.  HEENT, ommaya site, dry and intact.   Lungs bilaterally decrease breath sounds.  CVS S1 S2 regular, no M/R/G  Abdomen soft NT ND positive for BS, no organomegaly.  Extremities: No C/C/E. positive for bilateral LE abrasions, dry, healing.        MEDICATIONS  (STANDING):  ALBUTerol    90 MICROgram(s) HFA Inhaler 1 Puff(s) Inhalation every 4 hours  colchicine 0.6 milliGRAM(s) Oral two times a day  dexamethasone     Tablet 2 milliGRAM(s) Oral two times a day  enoxaparin Injectable 80 milliGRAM(s) SubCutaneous every 12 hours  FIRST- Mouthwash  BLM 20 milliLiter(s) Swish and Spit four times a day  folic acid 1 milliGRAM(s) Oral daily  metoprolol     tartrate 25 milliGRAM(s) Oral two times a day  mirtazapine 30 milliGRAM(s) Oral at bedtime  nafcillin  IVPB 2 Gram(s) IV Intermittent every 4 hours  pantoprazole    Tablet 40 milliGRAM(s) Oral before breakfast  senna 2 Tablet(s) Oral daily  trimethoprim  160 mG/sulfamethoxazole 800 mG 1 Tablet(s) Oral <User Schedule>      Culture - Fungal, Body Fluid (01.18.18 @ 12:15)    Specimen Source: Pleural Fl None    Culture Results:   Testing in progress      Amylase, Body Fluid (01.18.18 @ 12:15)    Fluid Source: Pleural    Amylase, Body Fluid: 23: Reference Ranges have NOT been established for analytes in body fluids  because of variability in body fluid composition.  The  has not determined the efficacy of this test when  performed on fluid specimens. The performance characteristics of this  test were determined by [facility]. U/L      Protein Total, Fluid (01.18.18 @ 12:15)    Protein Total, Fluid: 3.3: Test Repeated  Reference Ranges have NOT been established for analytes in body fluids  because of variability in body fluid composition.  The  has not determined the efficacy of this test when  performed on fluid specimens. The performance characteristics of this  test were determined by [place] [place] [employer]: turn off the lights. g/dL    pH, Fluid (01.18.18 @ 12:15)    pH, Fluid: 7.47    Fluid Source: pleural    Glucose, Fluid (01.18.18 @ 12:15)    Glucose, Fluid: 134: Reference Ranges have NOT  been established for analytes in body fluids  because of variability in body fluid composition.  The  has not determined the efficacy of this test when  performed on fluid specimens. The performance characteristics of this  test were determined by [facility]. mg/dL        Protein, CSF (01.19.18 @ 16:45)    Protein, CSF: 192 mg/dL    Glucose, CSF (01.19.18 @ 16:45)    Glucose, CSF: 24 mg/dL    Cerebrospinal Fluid Cell Count-1 (01.11.18 @ 21:14)    Total Nucleated Cell Count, CSF: 744 /uL      RBC Count - Spinal Fluid: 62 /uL    Tube Type: Tube 3    CSF Color: See Note: slightly xanthrochromic    CSF Lymphocytes: 3 %    CSF Appearance: Slightly Cloudy    CSF Monocytes/Macrophages: 1 %    CSF Segmented Neutrophils: 96 %    Rapid Respiratory Viral Panel (01.25.18 @ 09:30)    Rapid RVP Result: NotDete: The [employer] RVP Rapid uses polymerase chain reaction (PCR) and melt  curve analysis to screen for adenovirus; coronavirus HKU1, NL63, 229E,  OC43; human metapneumovirus (hMPV); human enterovirus/rhinovirus  (Entero/RV); influenza A; influenza A/H1;influenza A/H3; influenza  A/H1-2009; influenza B; parainfluenza viruses 1, 2, 3, 4; respiratory  syncytial virus; Bordetella pertussis; Mycoplasma pneumoniae; and  Chlamydophila pneumoniae.      Culture - Sputum . (01.24.18 @ 14:45)    Gram Stain:   Few polymorphonuclear leukocytes per low power field  Few Squamous epithelial cells per low power field  Moderate Gram positive cocci in pairs per oil power field  Rare Yeast like cells per oil power field  Rare Gram Negative Rods per oil power field    Specimen Source: .Sputum Sputum    Protein, CSF (01.29.18 @ 11:20)    Protein, CSF: 134 mg/dL    Glucose, CSF (01.29.18 @ 11:20)    Glucose, CSF: 36 mg/dL    Culture - CSF with Gram Stain (01.29.18 @ 11:20)    Gram Stain:   polymorphonuclear leukocytes seen  No organisms seen  by cytocentrifuge    Specimen Source: .CSF    Cerebrospinal Fluid Cell Count-1 (01.29.18 @ 11:20)    CSF Segmented Neutrophils: 4 %    CSF Appearance: Clear    CSF Lymphocytes: 89 %    CSF Monocytes/Macrophages: 7 %    Total Nucleated Cell Count, CSF: 48 /uL    RBC Count - Spinal Fluid: 56 /uL    CSF Color: No Color    Tube Type: Tube 3                Basic Metabolic Panel in AM (01.30.18 @ 09:36)    Sodium, Serum: 136 mmol/L    Potassium, Serum: 4.0 mmol/L    Chloride, Serum: 101 mmol/L    Carbon Dioxide, Serum: 26 mmol/L    Anion Gap, Serum: 9 mmol/L    Blood Urea Nitrogen, Serum: 9 mg/dL    Creatinine, Serum: 0.87 mg/dL    Glucose, Serum: 92 mg/dL    Calcium, Total Serum: 9.6 mg/dL    eGFR if Non : 103: Interpretative comment  The units for eGFR are ml/min/1.73m2 (normalized body surface area). The  eGFR is calculated from a serum creatinine using the CKD-EPI equation.  Other variables required for calculation are race, age and sex. Among  patients with chronic kidney disease (CKD), the eGFR is useful in  determining the stage of disease according to KDOQI CKD classification.  All eGFR results are reported numerically with the following  interpretation.          GFR                    With                 Without     (ml/min/1.73 m2)    Kidney Damage       Kidney Damage        >= 90                    Stage 1                     Normal        60-89                    Stage 2                     Decreased GFR        30-59     Stage 3                     Stage 3        15-29                    Stage 4                     Stage 4        < 15                      Stage 5                     Stage 5  Each stage of CKD assumes that the associated GFR level has been in  effect for at least 3 months. Determination of stages one and two (with  eGFR > 59 ml/min/m2) requires estimation of kidney damage for at least 3  months as defined by structural or functional abnormalities.  Limitations: All estimates of GFR will be less accurate for patients at  extremes of muscle mass (including but not limited to frail elderly,  critically ill, or cancer patients), those with unusual diets, and those  with conditions associated with reduced secretion or extrarenal  elimination of creatinine. The eGFR equation is not recommended for use  in patients with unstable creatinine levels. mL/min/1.73M2    eGFR if African American: 119 mL/min/1.73M2        Complete Blood Count Repeat Every 24 Hours X 30 Days (01.30.18 @ 09:36)    WBC Count: 16.6 K/uL    RBC Count: 4.03 M/uL    Hemoglobin: 13.0 g/dL    Hematocrit: 38.6 %    Mean Cell Volume: 95.8 fl    Mean Cell Hemoglobin: 32.3 pg    Mean Cell Hemoglobin Conc: 33.7 gm/dL    Red Cell Distrib Width: 18.6 %    Platelet Count - Automated: 242 K/uL        MRI of the brain 1-19-18  FINDINGS:   MRI dated 12/15/2017 available for review.         The brain demonstrates minimal decrease in size of the multiple   intraparenchymal and leptomeningeal metastatic lesions scattered   throughout the brain. The Ommaya shunt catheter has been removed. Edema   and gliosis is seen about the shunt catheter tract in the RIGHT frontal   lobe. Tiny RIGHT subdural hygroma is noted. No acute cerebral cortical   infarct is found.   No intracranial hemorrhage is recognized.  No mass   effect is found in the brain.          The ventricles, sulci and basal cisterns appear unremarkable.    The vertebral and internal carotid arteries demonstrate expected flow   voids indicating their patency.         The orbits are unremarkable.  The paranasal sinuses are clear.  The nasal   cavity appears intact.  The nasopharynx is symmetric.  The central skull   base and petrous temporal bones are intact.  The calvarium appears   unremarkable.      IMPRESSION:  minimal decrease in size of the multiple intraparenchymal   and leptomeningeal metastatic lesions scattered throughout the brain. The   Ommaya shunt catheter has been removed. Edema and gliosis is seen about   the shunt catheter tract in the RIGHT frontal lobe.  Tiny RIGHT frontal   subdural hygroma is noted

## 2018-01-30 NOTE — PROGRESS NOTE ADULT - ASSESSMENT
46 y/o gentleman  with metastatic adenocarcinoma of lung, extensive brain mets and leptomeningeal carcinomatosis s/p systemic chemotherapy and intrathecal chemotherapy, s/p WBRT now  with sepsis, meningitis  with MSSA, RLL pneumonia.      Continue dexamethasone   2 mg q 12 hrs.  Continue Mirtazapine to 30 mg at bed time.     Thoracentesis c/w exudate, malignant effusion, cultures negative    LP  done 1-29-18, cultures pending.      Antibiotics per ID. Nafcillin q 4hrs. Will follow Dr Hardy's recommendations.     Continue bactrim DS 1 tab M-W-F, PCP prophylaxis.     Continue supportive care.   No opiates. Tylenol for pain.

## 2018-01-30 NOTE — ADVANCED PRACTICE NURSE CONSULT - ASSESSMENT
Patient able to relate his history of lung cancer diagnosis in 2015 and did well on Afatinib for 2 years and then had his cancer recur in October with brain mets and leptomeninges  spread and also had menigitis requiring his Ommaya reservoir to be removed. He has spent several weeks in the Step Down unit and is anxious to go home. he will be discharged if his spinal fluid culture come back negative for bacterial growth. he has worked as an  until October and now states he will have to go on disability. He is tearful when speaking about the events of the last 3 months and states "sometimes i just need to talk about all this". He also speaks about the death of his mother from colon cancer and how "I am sure I passed out on 2 occasions because my mother(after her death) was telling me there was something wrong". Patient is aware that Dr. Arenas is considering giving him a tyrosine kinase inhibitor once he is stronger. Patient is also upset by the loss of his hair. Recommend a wig from [facility].

## 2018-01-31 ENCOUNTER — TRANSCRIPTION ENCOUNTER (OUTPATIENT)
Age: 48
End: 2018-01-31

## 2018-01-31 VITALS — RESPIRATION RATE: 21 BRPM | SYSTOLIC BLOOD PRESSURE: 115 MMHG | HEART RATE: 110 BPM | DIASTOLIC BLOOD PRESSURE: 92 MMHG

## 2018-01-31 LAB
HCT VFR BLD CALC: 35.4 % — LOW (ref 39–50)
HGB BLD-MCNC: 11.8 G/DL — LOW (ref 13–17)
MCHC RBC-ENTMCNC: 32 PG — SIGNIFICANT CHANGE UP (ref 27–34)
MCHC RBC-ENTMCNC: 33.4 GM/DL — SIGNIFICANT CHANGE UP (ref 32–36)
MCV RBC AUTO: 95.6 FL — SIGNIFICANT CHANGE UP (ref 80–100)
PLATELET # BLD AUTO: 253 K/UL — SIGNIFICANT CHANGE UP (ref 150–400)
RBC # BLD: 3.7 M/UL — LOW (ref 4.2–5.8)
RBC # FLD: 18.8 % — HIGH (ref 10.3–14.5)
WBC # BLD: 11.5 K/UL — HIGH (ref 3.8–10.5)
WBC # FLD AUTO: 11.5 K/UL — HIGH (ref 3.8–10.5)

## 2018-01-31 RX ORDER — METOPROLOL TARTRATE 50 MG
1 TABLET ORAL
Qty: 60 | Refills: 1 | OUTPATIENT
Start: 2018-01-31 | End: 2018-03-31

## 2018-01-31 RX ORDER — FONDAPARINUX SODIUM 2.5 MG/.5ML
1 INJECTION, SOLUTION SUBCUTANEOUS
Qty: 42 | Refills: 0 | OUTPATIENT
Start: 2018-01-31 | End: 2018-02-20

## 2018-01-31 RX ORDER — MIRTAZAPINE 45 MG/1
1 TABLET, ORALLY DISINTEGRATING ORAL
Qty: 30 | Refills: 0 | OUTPATIENT
Start: 2018-01-31 | End: 2018-03-01

## 2018-01-31 RX ORDER — DEXAMETHASONE 0.5 MG/5ML
1 ELIXIR ORAL
Qty: 0 | Refills: 0 | COMMUNITY

## 2018-01-31 RX ORDER — DEXAMETHASONE 0.5 MG/5ML
1 ELIXIR ORAL
Qty: 0 | Refills: 1 | COMMUNITY
Start: 2018-01-31 | End: 2018-02-27

## 2018-01-31 RX ORDER — DEXAMETHASONE 0.5 MG/5ML
1 ELIXIR ORAL
Qty: 28 | Refills: 1 | OUTPATIENT
Start: 2018-01-31 | End: 2018-02-27

## 2018-01-31 RX ORDER — CEFUROXIME AXETIL 250 MG
1 TABLET ORAL
Qty: 60 | Refills: 0 | OUTPATIENT
Start: 2018-01-31 | End: 2018-03-01

## 2018-01-31 RX ORDER — MIRTAZAPINE 45 MG/1
1 TABLET, ORALLY DISINTEGRATING ORAL
Qty: 0 | Refills: 0 | COMMUNITY

## 2018-01-31 RX ADMIN — Medication 25 MILLIGRAM(S): at 05:18

## 2018-01-31 RX ADMIN — Medication 1 TABLET(S): at 13:48

## 2018-01-31 RX ADMIN — ENOXAPARIN SODIUM 80 MILLIGRAM(S): 100 INJECTION SUBCUTANEOUS at 05:17

## 2018-01-31 RX ADMIN — NAFCILLIN 200 GRAM(S): 10 INJECTION, POWDER, FOR SOLUTION INTRAVENOUS at 02:40

## 2018-01-31 RX ADMIN — Medication 2 MILLIGRAM(S): at 05:18

## 2018-01-31 RX ADMIN — NAFCILLIN 200 GRAM(S): 10 INJECTION, POWDER, FOR SOLUTION INTRAVENOUS at 05:17

## 2018-01-31 RX ADMIN — Medication 1 MILLIGRAM(S): at 13:47

## 2018-01-31 RX ADMIN — PANTOPRAZOLE SODIUM 40 MILLIGRAM(S): 20 TABLET, DELAYED RELEASE ORAL at 05:18

## 2018-01-31 RX ADMIN — Medication 0.6 MILLIGRAM(S): at 05:18

## 2018-01-31 RX ADMIN — MIRTAZAPINE 30 MILLIGRAM(S): 45 TABLET, ORALLY DISINTEGRATING ORAL at 00:09

## 2018-01-31 RX ADMIN — NAFCILLIN 200 GRAM(S): 10 INJECTION, POWDER, FOR SOLUTION INTRAVENOUS at 11:01

## 2018-01-31 NOTE — DISCHARGE NOTE ADULT - CARE PLAN
Principal Discharge DX:	Meningitis after procedure  Goal:	c/w oral Ceftin outpatient for at least one month and f/u outpatient with Infectious disease.  Assessment and plan of treatment:	c/w oral Ceftin outpatient for at least one month and f/u outpatient with Infectious disease.  Also f/u outpatient with primary care doctor in one week.   Also c/w oral Bactrim outpatient for prevention of  PCP in a immunocompromised patient.  Secondary Diagnosis:	Cancer with leptomeningeal spread  Goal:	follow up outpatient W/Dr. Arenas  Secondary Diagnosis:	History of pericarditis  Goal:	may need cardiac MRI outpatient with close cardiology followup. Please f/u with Dr. King outpatient w/ in one week  Assessment and plan of treatment:	may need cardiac MRI outpatient with close cardiology followup. Please f/u with Dr. King outpatient w/ in one week  Secondary Diagnosis:	Acute deep vein thrombosis (DVT) of right upper extremity, unspecified vein  Goal:	c/w oral Xarelto for 21 days. F/u outpatient with Dr. Rojas in one week , she will guide further therapy with the Xarelto

## 2018-01-31 NOTE — PROGRESS NOTE ADULT - ASSESSMENT
46 y/o gentleman  with metastatic adenocarcinoma of lung, extensive brain mets and leptomeningeal carcinomatosis s/p systemic chemotherapy and intrathecal chemotherapy, s/p WBRT now  with sepsis, meningitis  with MSSA, RLL pneumonia.      Continue dexamethasone   2 mg q 12 hrs.  Continue Mirtazapine to 30 mg at bed time.     Thoracentesis c/w exudate, malignant effusion, cultures negative    LP   CSF culture negative.    D/W Dr Hardy, patient will be dc home on Ceftin for 30 days.     Continue bactrim DS 1 tab M-W-F, PCP prophylaxis.     Follow up in Hematology/Oncology Clinic on 2-6-18.

## 2018-01-31 NOTE — PROGRESS NOTE ADULT - NSHPATTENDINGPLANDISCUSS_GEN_ALL_CORE
Dr. Arenas
sister at bedside and Dr. Kenyon, Dr. Cee and Dr. Patterson
 at bedside and Dr. Patterson
Dr. Arenas
patient and fiancee at bedside
patient and sister at bedside
pt, RN
pt, RN, wife at bedside

## 2018-01-31 NOTE — PROGRESS NOTE ADULT - SUBJECTIVE AND OBJECTIVE BOX
Patient is a 47y old  Male who presents with a chief complaint of fever, AMS (2018 16:20)    HPI:  48 y/o male with h/o Stage 4 Lung CA with mets to brain s/p Ommaya reservoir 2017 for leptomeningeal disease s/p chemo and radiation, anxiety, depression, h/o nephrolithiasis, h/o pericarditis was admitted on  for fever and AMS. Pt was lethargic and unable to give history. As per sister, pt completed 10 course radiation treatment 1 day PTA. 3 days ago he was complaining of severe headache and also had episode of vomiting. He was seen at oncologist office and was given morphine and valium. The day of admission, the pt developed a fever 0f 103.6F and complained of rigors. He was also noted to be confused.  In ED pt found to have temp of 103.6 rectally. He received vanco IV and cefepime.     Feels stronger  No fever or chills  Denies HA  s/p lumbar puncture  No new complaints    MEDICATIONS  (STANDING):  ALBUTerol    90 MICROgram(s) HFA Inhaler 1 Puff(s) Inhalation every 4 hours  colchicine 0.6 milliGRAM(s) Oral two times a day  dexamethasone     Tablet 2 milliGRAM(s) Oral two times a day  enoxaparin Injectable 80 milliGRAM(s) SubCutaneous every 12 hours  FIRST- Mouthwash  BLM 20 milliLiter(s) Swish and Spit four times a day  folic acid 1 milliGRAM(s) Oral daily  metoprolol     tartrate 25 milliGRAM(s) Oral two times a day  mirtazapine 30 milliGRAM(s) Oral at bedtime  nafcillin  IVPB 2 Gram(s) IV Intermittent every 4 hours  pantoprazole    Tablet 40 milliGRAM(s) Oral before breakfast  senna 2 Tablet(s) Oral daily  trimethoprim  160 mG/sulfamethoxazole 800 mG 1 Tablet(s) Oral <User Schedule>    MEDICATIONS  (PRN):  acetaminophen   Tablet 650 milliGRAM(s) Oral every 6 hours PRN For Temp greater than 38 C (100.4 F)  ALBUTerol    0.083% 2.5 milliGRAM(s) Nebulizer every 6 hours PRN Shortness of Breath and/or Wheezing  diazepam    Tablet 1 milliGRAM(s) Oral every 8 hours PRN anxiety  guaiFENesin    Syrup 100 milliGRAM(s) Oral every 6 hours PRN Cough  HYDROcodone/homatropine Syrup 5 milliLiter(s) Oral every 4 hours PRN Cough  HYDROmorphone   Tablet 2 milliGRAM(s) Oral every 4 hours PRN Severe Pain (7 - 10)      Vital Signs Last 24 Hrs  T(C): 36.4 (2018 09:30), Max: 36.6 (2018 17:36)  T(F): 97.5 (2018 09:30), Max: 97.9 (2018 17:36)  HR: 100 (2018 09:00) (92 - 120)  BP: 118/73 (2018 17:00) (112/80 - 118/73)  BP(mean): 29 (2018 00:00) (29 - 87)  RR: 16 (2018 09:00) (13 - 27)  SpO2: 98% (2018 08:00) (96% - 99%)    Physical Exam:      Constitutional:   HEENT: NC/AT, EOMI, PERRLA; postop scalp wound clean  Neck: supple  Back: no tenderness  Respiratory: clear; decreased BS  Cardiovascular: S1S2 regular, no murmurs  Abdomen: soft, not tender, not distended, positive BS  Rectal: deferred  Musculoskeletal: no muscle tenderness, no joint swelling or tenderness  Extremities: no pedal edema  Neurological: alert, moving all extremities  Skin: no rashes    Labs:                        11.8   11.5  )-----------( 253      ( 2018 07:39 )             35.4         136  |  101  |  9   ----------------------------<  92  4.0   |  26  |  0.87    Ca    9.6      2018 09:36                        11.6   14.3  )-----------( 230      ( 2018 05:29 )             34.5         136  |  101  |  10  ----------------------------<  122<H>  3.7   |  27  |  0.86    Ca    9.3      2018 05:29                        14.2   16.7  )-----------( 250      ( 2018 05:16 )             43.3         133<L>  |  98  |  10  ----------------------------<  115<H>  3.8   |  27  |  0.83    Ca    9.5      2018 06:19                        13.9   14.4  )-----------( 228      ( 2018 06:19 )             42.7         133<L>  |  98  |  10  ----------------------------<  115<H>  3.8   |  27  |  0.83    Ca    9.5      2018 06:19               Protein, CSF (18 @ 16:45)    Protein, CSF: 192 mg/dL    Glucose, CSF (18 @ 16:45)    Glucose, CSF: 24 mg/dL    Cerebrospinal Fluid Cell Count-1 (18 @ 16:45)    CSF Segmented Neutrophils: 43 %    Total Nucleated Cell Count, CSF: 114 /uL    CSF Color: Xanthochromic    CSF Appearance: Clear    CSF Lymphocytes: 41 %    CSF Monocytes/Macrophages: 16 %    Labs:                        15.0   19.2  )-----------( 280      ( 2018 05:01 )             44.9         133<L>  |  97  |  11  ----------------------------<  81  3.8   |  28  |  1.06    Ca    9.9      2018 05:01                   13.9   22.4  )-----------( 318      ( 2018 05:51 )             42.4                           14.4   20.5  )-----------( 367      ( 2018 04:43 )             43.2     -    131<L>  |  96  |  16  ----------------------------<  121<H>  4.2   |  27  |  0.93    Ca    10.2<H>      2018 04:43  Phos  2.1     -17                        14.4   17.8  )-----------( 382      ( 2018 05:55 )             42.9     -17    131<L>  |  96  |  17  ----------------------------<  105<H>  3.8   |  28  |  0.87    Ca    10.4<H>      2018 05:55  Phos  2.1     -17  Mg     2.3     -                                   15.3   19.6  )-----------( 291      ( 2018 05:26 )             53.0     -    132<L>  |  100  |  17  ----------------------------<  95  4.2   |  24  |  1.09    Ca    10.5<H>      2018 05:26  Phos  3.0     -  Mg     2.5             Ca    10.2<H>      2018 07:30    TPro  8.4<H>  /  Alb  3.3  /  TBili  0.8  /  DBili  x   /  AST  34  /  ALT  83<H>  /  AlkPhos  122<H>  11     LIVER FUNCTIONS - ( 2018 11:19 )  Alb: 3.3 g/dL / Pro: 8.4 gm/dL / ALK PHOS: 122 U/L / ALT: 83 U/L / AST: 34 U/L / GGT: x           Urinalysis Basic - ( 2018 12:33 )    Color: Yellow / Appearance: Clear / S.015 / pH: x  Gluc: x / Ketone: Negative  / Bili: Negative / Urobili: Negative mg/dL   Blood: x / Protein: 30 mg/dL / Nitrite: Negative   Leuk Esterase: Negative / RBC: 0-2 /HPF / WBC 0-2   Sq Epi: x / Non Sq Epi: Occasional / Bacteria: Occasional    Cerebrospinal Fluid Cell Count-1 (18 @ 21:14)    CSF Segmented Neutrophils: 96 %    Total Nucleated Cell Count, CSF: 744 /uL    CSF Appearance: Slightly Cloudy    CSF Lymphocytes: 3 %    CSF Monocytes/Macrophages: 1 %    CSF Color: See Note: slightly xanthrochromic      Culture - Body Fluid with Gram Stain (collected 2018 14:36)  Source: .Body Fluid scalp incision  Gram Stain (2018 06:33):    No polymorphonuclear cells seen    No organisms seen    by cytocentrifuge    Culture - Acid Fast - CSF (collected 2018 21:14)  Source: .CSF spinal fluid    Culture - Fungal, CSF (collected 2018 21:14)  Source: .CSF spinal fluid  Preliminary Report (2018 12:37):    Testing in progress    Culture - CSF with Gram Stain (collected 2018 21:14)  Source: .CSF  Gram Stain (2018 01:16):    polymorphonuclear leukocytes seen per low power field    Gram Variable Cocci seen per oil power field    by cytocentrifuge  Final Report (2018 07:36):    Numerous Staphylococcus aureus  Organism: Staphylococcus aureus (2018 07:36)  Organism: Staphylococcus aureus (2018 07:36)      -  Oxacillin: S 0.5      -  Penicillin: R >8      -  RIF- Rifampin: R >2      -  Trimethoprim/Sulfamethoxazole: S <=0.5/9.5      -  Vancomycin: S 2      Method Type: TAMMIE    Culture - Urine (collected 2018 12:33)  Source: .Urine None  Final Report (2018 18:38):    10,000 - 49,000 CFU/mL Coag Negative Staphylococcus    Culture - Blood (collected 2018 11:19)  Culture - Body Fluid with Gram Stain (18 @ 12:15)    Gram Stain:   No polymorphonuclear leukocytes per low power field  Red blood cells per low power field  No organisms seen per oil power field  by cytocentrifuge    Specimen Source: .Body Fluid Pleural Fluid    Culture Results:   No growth    Source: .Blood Blood-Peripheral  Preliminary Report (2018 15:01):    No growth to date.    Culture - Blood (collected 2018 11:19)  Source: .Blood Blood-Peripheral  Preliminary Report (2018 15:01):    No growth to date.    Culture - Body Fluid with Gram Stain (18 @ 12:15)    Gram Stain:   No polymorphonuclear leukocytes per low power field  Red blood cells per low power field  No organisms seen per oil power field  by cytocentrifuge    Specimen Source: .Body Fluid Pleural Fluid    pH, Fluid (18 @ 12:15)    pH, Fluid: 7.47    Fluid Source: pleural    Culture - Sputum . (18 @ 14:45)    Gram Stain:   Few polymorphonuclear leukocytes per low power field  Few Squamous epithelial cells per low power field  Moderate Gram positive cocci in pairs per oil power field  Rare Yeast like cells per oil power field  Rare Gram Negative Rods per oil power field    Specimen Source: .Sputum Sputum    Cerebrospinal Fluid Cell Count-1 (18 @ 11:20)    Total Nucleated Cell Count, CSF: 48 /uL    CSF Color: No Color    CSF Appearance: Clear    CSF Lymphocytes: 89 %    CSF Monocytes/Macrophages: 7 %    CSF Segmented Neutrophils: 4 %              Radiology:    < from: CT Chest No Cont (18 @ 17:21) >  Several scattered groundglass nodules and opacities within the right   upper lobe, suggestive of pneumonia given the clinical context of fever.   Small to right pleural effusion is present. Loculated left pleural   effusion along the left lateral pleura and within the fissures is again   identified. Small pericardial effusion is present.    < end of copied text >    < from: CT Head No Cont (18 @ 13:09) >   unchanged RIGHT frontal Ommaya catheter  in place within   the anterior horn of the RIGHT lateral ventricle. Faint hyperdense   lesions scattered throughout the brain consistent with patient's known   metastatic disease. IV contrast would be needed for complete evaluation.     < end of copied text >    < from: CT Head No Cont (01.15.18 @ 09:43) >  Frontal kenia hole with small amount of encephalomalacia/gliosis within   the right frontal lobe. Fluid collection measuring approximately 4.7 x   1.1 cm and minimal associated hemorrhage within the scalp overlying the   right frontal borehole. Ventricular size is unchanged since prior exam.   Punctate calcification in the right frontal lobe is unchanged. The   underlying neoplastic process is difficult to evaluate CT imaging.     < end of copied text >      Advanced directives addressed: full resuscitation

## 2018-01-31 NOTE — DISCHARGE NOTE ADULT - CARE PROVIDER_API CALL
Karin Arenas), Internal Medicine; Medical Oncology  [address] [place] [address]  Phone: [phone]  Fax: [phone]    Darrick King), Cardiovascular Disease; Nuclear Cardiology  [address] [place] [address]  Phone: [phone]  Fax: [phone]    Ramesh Bean), Internal Medicine; Pulmonary Disease  [address] [place] [address]  Phone: [phone]  Fax: [phone]    Collins Hardy), Infectious Disease; Internal Medicine  [address]  [address], [place] [address]  Phone: [phone]  Fax: [phone]

## 2018-01-31 NOTE — DISCHARGE NOTE ADULT - CARE PROVIDERS DIRECT ADDRESSES
,DirectAddress_Unknown,[email or web address],DirectAddress_Unknown

## 2018-01-31 NOTE — DISCHARGE NOTE ADULT - HOSPITAL COURSE
Vital Signs Last 24 Hrs  T(C): 36.4 (31 Jan 2018 09:30), Max: 36.6 (30 Jan 2018 17:36)  T(F): 97.5 (31 Jan 2018 09:30), Max: 97.9 (30 Jan 2018 17:36)  HR: 100 (31 Jan 2018 09:00) (97 - 120)  BP: 118/73 (30 Jan 2018 17:00) (112/80 - 118/73)  BP(mean): 29 (31 Jan 2018 00:00) (29 - 87)  RR: 16 (31 Jan 2018 09:00) (13 - 27)  SpO2: 98% (31 Jan 2018 08:00) (96% - 99%)    HEENT:   pupils equal and reactive, EOMI, no oropharyngeal lesions, erythema, exudates, oral thrush    NECK:   supple, no carotid bruits, no palpable lymph nodes, no thyromegaly    CV:  +S1, +S2, regular, no murmurs or rubs    RESP:   lungs clear to auscultation bilaterally, no wheezing, rales, rhonchi, good air entry bilaterally    BREAST:  not examined    GI:  abdomen soft, non-tender, non-distended, normal BS, no bruits, no abdominal masses, no palpable masses    RECTAL:  not examined    :  not examined    MSK:   normal muscle tone, no atrophy, no rigidity, no contractions    EXT:   no clubbing, no cyanosis, no edema, no calf pain, swelling or erythema    VASCULAR:  pulses equal and symmetric in the upper and lower extremities    NEURO:  AAOX3, no focal neurological deficits, follows all commands, able to move extremities spontaneously    SKIN:  no ulcers, lesions or rashes    30 Jan 2018 09:36    136    |  101    |  9      ----------------------------<  92     4.0     |  26     |  0.87     Ca    9.6        30 Jan 2018 09:36    CBC Full  -  ( 31 Jan 2018 07:39 )  WBC Count : 11.5 K/uL  Hemoglobin : 11.8 g/dL  Hematocrit : 35.4 %  Platelet Count - Automated : 253 K/uL  Mean Cell Volume : 95.6 fl  Mean Cell Hemoglobin : 32.0 pg  Mean Cell Hemoglobin Concentration : 33.4 gm/dL    Hospital Course:  	  48 y/o male with h/o Stage 4 Lung CA with mets to brain 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 Found to have 103.6F and complained of rigors. Found to be encephalopathic. Lactic acid 2.4  Had a long hospitilization. Please read below.     Admitted for Toxic metabolic encephalopathy  secondary to Severe sepsis secondary to Acute bacterial meningitis with MSSA that developed subsequent to Right frontal lobe fluid collection and postoperative abscess that was washed out.  -Ommaya site infecton s/p port removal.  and s/p clean and wash out of the right frotnal lobe ommaya site. Patient has completed several weeks inpatient of IV abx including nafcillin.  Patient has remained afebrile, his enceophalopathy has improved.  Patient has had multiple LP while inpatient all sshowing progressive decrease in the wbc in the CSF indicating improvement of the meninitis. He is advised to c/w PCP prophylaxis with oral Bactrim.     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 King - Right heart cath, CONCEPCIÓN discussed with family, because that would not change the management family wishes not to persue  - less invasive test cardiac MRI as o/p  . Family is recco to f/u outpatien with Dr. King    2-Possible RUL pneumonia   -thoracentesis shows pleural fluid with pH 7.47 - empyema is unlikely, consistent with exudate, malignant effusion  - repeat CXR 1/24 - unchanged small loculated pleural effusion  -has been on several weeks of iv abx. Has completed therapy for this.     3- Metastatic adenocarcinoma of Lung ( stage 4 with brain metastasis and leptomeningeal carcinomatosis) . Immunocompromised host.   - s/p systemic chemo and intrathecal chemo, s/p WBRT  -pleural fluid cytology done inpatient >> adenocarcinoma of the lung  - c/w steroids as per oncology  -They will f/u outpatient with Dr. Rojas whom is theier oncologist    4- RUE  DVT   -as per Dr. Arenas, can c/w oral Xarelto. She will guide furhter therapy outpatient

## 2018-01-31 NOTE — DISCHARGE NOTE ADULT - SECONDARY DIAGNOSIS.
Cancer with leptomeningeal spread History of pericarditis Acute deep vein thrombosis (DVT) of right upper extremity, unspecified vein

## 2018-01-31 NOTE — PROGRESS NOTE ADULT - ASSESSMENT
46 y/o male with h/o Stage 4 Lung CA with mets to brain 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 Pt was lethargic and unable to give history. As per sister, pt completed 10 course radiation treatment 1 day PTA. 3 days ago he was complaining of severe headache and also had episode of vomiting. He was seen at oncologist office and was given morphine and valium. The day of admission, the pt developed a fever 0f 103.6F and complained of rigors. He was also noted to be confused.  In ED pt found to have temp of 103.6 rectally. He received vanco IV and cefepime.     1. Acute bacterial meningitis with MSSA resolved. Ommaya site infecton s/p port removal. Lung CA stage 4 with brain metastasis. Immunocompromised host.   -URI symptoms resolved  -repeat CSF analysis shows less nucleated cells  -CSF culture is negative  -encephalopathy is resolved  -leukocytosis improving  -s/p cefepime 2 gm IV q12h # 11  -on nafcillin IV # 10  -tolerating abx well so far; no side effects noted  -may change abx to ceftin 500 mg PO q12h for 28 more days  -monitor temps  -f/u CBC  -supportive care  -discussed with oncology who is in agreement with plan  2. Other issues:   -care per medicine

## 2018-01-31 NOTE — PROGRESS NOTE ADULT - PROVIDER SPECIALTY LIST ADULT
Cardiology
Critical Care
Heme/Onc
Hospitalist
Infectious Disease
Neurosurgery
Pulmonology
Critical Care
Critical Care
Heme/Onc
Infectious Disease
Infectious Disease
Neurosurgery
Hospitalist
Hospitalist

## 2018-01-31 NOTE — PROGRESS NOTE ADULT - SUBJECTIVE AND OBJECTIVE BOX
46 y/o gentleman with h/o Stage IV Lung CA with mets to brain 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  Patient completed 10 courses of  radiation treatment 1 day PTA. Patient complaint of  severe headache and an episode of vomiting 3 days prior to admission. The day of admission, the pt developed a fever 0f 103.6F and complained of rigors. He was also noted to be confused.  In ED pt found to have temp of 103.6 rectally. He received vanco IV and cefepime.     Patient is alert and responsive,  respond questions and follow commands.     WBC decreasing to 14K. Afebrile.      ICU Vital Signs Last 24 Hrs  T(C): 35.8 (29 Jan 2018 09:56), Max: 36.8 (28 Jan 2018 14:06)  T(F): 96.5 (29 Jan 2018 09:56), Max: 98.3 (28 Jan 2018 14:06)  HR: 109 (29 Jan 2018 13:00) (88 - 117)  BP: 120/79 (29 Jan 2018 12:00) (107/72 - 131/73)  BP(mean): 89 (29 Jan 2018 12:00) (81 - 95)  ABP: --  ABP(mean): --  RR: 20 (29 Jan 2018 13:00) (9 - 27)  SpO2: 98% (29 Jan 2018 13:00) (97% - 100%)      General gentleman in NAD.  HEENT, Critical access hospital site, dry and intact.   Lungs bilaterally decrease breath sounds.  CVS S1 S2 regular, no M/R/G  Abdomen soft NT ND positive for BS, no organomegaly.  Extremities: No C/C/E. positive for bilateral LE abrasions, dry, healing.        MEDICATIONS  (STANDING):  ALBUTerol    90 MICROgram(s) HFA Inhaler 1 Puff(s) Inhalation every 4 hours  colchicine 0.6 milliGRAM(s) Oral two times a day  dexamethasone     Tablet 2 milliGRAM(s) Oral two times a day  FIRST- Mouthwash  BLM 20 milliLiter(s) Swish and Spit four times a day  folic acid 1 milliGRAM(s) Oral daily  metoprolol     tartrate 25 milliGRAM(s) Oral two times a day  mirtazapine 30 milliGRAM(s) Oral at bedtime  nafcillin  IVPB 2 Gram(s) IV Intermittent every 4 hours  pantoprazole    Tablet 40 milliGRAM(s) Oral before breakfast  senna 2 Tablet(s) Oral daily  trimethoprim  160 mG/sulfamethoxazole 800 mG 1 Tablet(s) Oral <User Schedule>    Culture - Fungal, Body Fluid (01.18.18 @ 12:15)    Specimen Source: Pleural Fl None    Culture Results:   Testing in progress      Amylase, Body Fluid (01.18.18 @ 12:15)    Fluid Source: Pleural    Amylase, Body Fluid: 23: Reference Ranges have NOT been established for analytes in body fluids  because of variability in body fluid composition.  The  has not determined the efficacy of this test when  performed on fluid specimens. The performance characteristics of this  test were determined by [facility]. U/L      Protein Total, Fluid (01.18.18 @ 12:15)    Protein Total, Fluid: 3.3: Test Repeated  Reference Ranges have NOT been established for analytes in body fluids  because of variability in body fluid composition.  The  has not determined the efficacy of this test when  performed on fluid specimens. The performance characteristics of this  test were determined by [facility]. g/dL    pH, Fluid (01.18.18 @ 12:15)    pH, Fluid: 7.47    Fluid Source: pleural    Glucose, Fluid (01.18.18 @ 12:15)    Glucose, Fluid: 134: Reference Ranges have NOT  been established for analytes in body fluids  because of variability in body fluid composition.  The  has not determined the efficacy of this test when  performed on fluid specimens. The performance characteristics of this  test were determined by [facility]. mg/dL        Protein, CSF (01.19.18 @ 16:45)    Protein, CSF: 192 mg/dL    Glucose, CSF (01.19.18 @ 16:45)    Glucose, CSF: 24 mg/dL    Cerebrospinal Fluid Cell Count-1 (01.11.18 @ 21:14)    Total Nucleated Cell Count, CSF: 744 /uL      RBC Count - Spinal Fluid: 62 /uL    Tube Type: Tube 3    CSF Color: See Note: slightly xanthrochromic    CSF Lymphocytes: 3 %    CSF Appearance: Slightly Cloudy    CSF Monocytes/Macrophages: 1 %    CSF Segmented Neutrophils: 96 %    Rapid Respiratory Viral Panel (01.25.18 @ 09:30)    Rapid RVP Result: NotDete: The [employer] RVP Rapid uses polymerase chain reaction (PCR) and melt  curve analysis to screen for adenovirus; coronavirus HKU1, NL63, 229E,  OC43; human metapneumovirus (hMPV); human enterovirus/rhinovirus  (Entero/RV); influenza A; influenza A/H1;influenza A/H3; influenza  A/H1-2009; influenza B; parainfluenza viruses 1, 2, 3, 4; respiratory  syncytial virus; Bordetella pertussis; Mycoplasma pneumoniae; and  Chlamydophila pneumoniae.      Culture - Sputum . (01.24.18 @ 14:45)    Gram Stain:   Few polymorphonuclear leukocytes per low power field  Few Squamous epithelial cells per low power field  Moderate Gram positive cocci in pairs per oil power field  Rare Yeast like cells per oil power field  Rare Gram Negative Rods per oil power field    Specimen Source: .Sputum Sputum    Protein, CSF (01.29.18 @ 11:20)    Protein, CSF: 134 mg/dL    Glucose, CSF (01.29.18 @ 11:20)    Glucose, CSF: 36 mg/dL    Complete Blood Count Repeat Every 24 Hours X 30 Days (01.29.18 @ 05:29)    WBC Count: 14.3 K/uL    RBC Count: 3.64 M/uL    Hemoglobin: 11.6 g/dL    Hematocrit: 34.5 %    Mean Cell Volume: 94.8 fl    Mean Cell Hemoglobin: 31.9 pg    Mean Cell Hemoglobin Conc: 33.7 gm/dL    Red Cell Distrib Width: 18.9 %    Platelet Count - Automated: 230 K/uL        Basic Metabolic Panel in AM (01.29.18 @ 05:29)    Sodium, Serum: 136 mmol/L    Potassium, Serum: 3.7 mmol/L    Chloride, Serum: 101 mmol/L    Carbon Dioxide, Serum: 27 mmol/L    Anion Gap, Serum: 8 mmol/L    Blood Urea Nitrogen, Serum: 10 mg/dL    Creatinine, Serum: 0.86 mg/dL    Glucose, Serum: 122 mg/dL    Calcium, Total Serum: 9.3 mg/dL    eGFR if Non : 103: Interpretative comment  The units for eGFR are ml/min/1.73m2 (normalized body surface area). The  eGFR is calculated from a serum creatinine using the CKD-EPI equation.  Other variables required for calculation are race, age and sex. Among  patients with chronic kidney disease (CKD), the eGFR is useful in  determining the stage of disease according to KDOQI CKD classification.  All eGFR results are reported numerically with the following  interpretation.          GFR                    With                 Without     (ml/min/1.73 m2)    Kidney Damage       Kidney Damage        >= 90                    Stage 1                     Normal        60-89                    Stage 2                     Decreased GFR        30-59     Stage 3                     Stage 3        15-29                    Stage 4                     Stage 4        < 15                      Stage 5                     Stage 5  Each stage of CKD assumes that the associated GFR level has been in  effect for at least 3 months. Determination of stages one and two (with  eGFR > 59 ml/min/m2) requires estimation of kidney damage for at least 3  months as defined by structural or functional abnormalities.  Limitations: All estimates of GFR will be less accurate for patients at  extremes of muscle mass (including but not limited to frail elderly,  critically ill, or cancer patients), those with unusual diets, and those  with conditions associated with reduced secretion or extrarenal  elimination of creatinine. The eGFR equation is not recommended for use  in patients with unstable creatinine levels. mL/min/1.73M2    eGFR if African American: 120 mL/min/1.73M2        MRI of the brain 1-19-18  FINDINGS:   MRI dated 12/15/2017 available for review.         The brain demonstrates minimal decrease in size of the multiple   intraparenchymal and leptomeningeal metastatic lesions scattered   throughout the brain. The Ommaya shunt catheter has been removed. Edema   and gliosis is seen about the shunt catheter tract in the RIGHT frontal   lobe. Tiny RIGHT subdural hygroma is noted. No acute cerebral cortical   infarct is found.   No intracranial hemorrhage is recognized.  No mass   effect is found in the brain.          The ventricles, sulci and basal cisterns appear unremarkable.    The vertebral and internal carotid arteries demonstrate expected flow   voids indicating their patency.         The orbits are unremarkable.  The paranasal sinuses are clear.  The nasal   cavity appears intact.  The nasopharynx is symmetric.  The central skull   base and petrous temporal bones are intact.  The calvarium appears   unremarkable.      IMPRESSION:  minimal decrease in size of the multiple intraparenchymal   and leptomeningeal metastatic lesions scattered throughout the brain. The   Ommaya shunt catheter has been removed. Edema and gliosis is seen about   the shunt catheter tract in the RIGHT frontal lobe.  Tiny RIGHT frontal   subdural hygroma is noted

## 2018-01-31 NOTE — DISCHARGE NOTE ADULT - PATIENT PORTAL LINK FT
“You can access the Follow[employer] Patient Portal, offered by [facility], by registering with the following website: [email or web address]”

## 2018-01-31 NOTE — DISCHARGE NOTE ADULT - MEDICATION SUMMARY - MEDICATIONS TO CHANGE
I will SWITCH the dose or number of times a day I take the medications listed below when I get home from the hospital:    dexamethasone 2 mg oral tablet  -- 1 tab(s) by mouth 3 times a day

## 2018-01-31 NOTE — DISCHARGE NOTE ADULT - MEDICATION SUMMARY - MEDICATIONS TO TAKE
I will START or STAY ON the medications listed below when I get home from the hospital:    dexamethasone 2 mg oral tablet  -- 1 tab(s) by mouth 2 times a day  -- Indication: For meningitis/ brain trauma/surgery    Dilaudid 4 mg oral tablet  -- 1 tab(s) by mouth 2 times a day, As Needed  -- Indication: For pain control PRN    Xarelto 15 mg oral tablet  -- 1 tab(s) by mouth 2 times a day   -- Check with your doctor before becoming pregnant.  It is very important that you take or use this exactly as directed.  Do not skip doses or discontinue unless directed by your doctor.  Obtain medical advice before taking any non-prescription drugs as some may affect the action of this medication.  Take with food.    -- Indication: For DVT Left arm    Valium 2 mg oral tablet  -- 1 tab(s) by mouth 3 times a day, As Needed  -- Indication: For anxiety/ depression    mirtazapine 30 mg oral tablet  -- 1 tab(s) by mouth once a day (at bedtime)  -- Indication: For anxiety/ depression    colchicine 0.6 mg oral tablet  -- 1 tab(s) by mouth 2 times a day  -- Indication: For gout    metoprolol tartrate 25 mg oral tablet  -- 1 tab(s) by mouth 2 times a day  -- Indication: For htn    cefuroxime 500 mg oral tablet  -- 1 tab(s) by mouth 2 times a day   -- Finish all this medication unless otherwise directed by prescriber.  Medication should be taken with plenty of water.  Take with food or milk.    -- Indication: For meningitis    furosemide 40 mg oral tablet  -- 1 tab(s) by mouth 2 times a day  -- Indication: For htn    Protonix 40 mg oral delayed release tablet  -- 1 tab(s) by mouth once a day  -- Indication: For gerd    sulfamethoxazole-trimethoprim 800 mg-160 mg oral tablet  -- 1 tab(s) by mouth Monday, Wednesday, and Friday   -- Indication: For PCP prophylaxis    folic acid 1 mg oral tablet  -- 1 tab(s) by mouth once a day  -- Indication: For prophylaxis

## 2018-01-31 NOTE — DISCHARGE NOTE ADULT - PLAN OF CARE
c/w oral Ceftin outpatient for at least one month and f/u outpatient with Infectious disease. c/w oral Ceftin outpatient for at least one month and f/u outpatient with Infectious disease.  Also f/u outpatient with primary care doctor in one week.   Also c/w oral Bactrim outpatient for prevention of  PCP in a immunocompromised patient. follow up outpatient W/Dr. Arenas may need cardiac MRI outpatient with close cardiology followup. Please f/u with Dr. King outpatient w/ in one week c/w oral Xarelto for 21 days. F/u outpatient with Dr. Rojas in one week , she will guide further therapy with the Xarelto

## 2018-02-01 LAB
CULTURE RESULTS: NO GROWTH — SIGNIFICANT CHANGE UP
GRAM STN FLD: SIGNIFICANT CHANGE UP
SPECIMEN SOURCE: SIGNIFICANT CHANGE UP

## 2018-02-06 ENCOUNTER — LABORATORY RESULT (OUTPATIENT)
Age: 48
End: 2018-02-06

## 2018-02-06 ENCOUNTER — APPOINTMENT (OUTPATIENT)
Dept: HEMATOLOGY ONCOLOGY | Facility: CLINIC | Age: 48
End: 2018-02-06
Payer: COMMERCIAL

## 2018-02-06 VITALS
TEMPERATURE: 98.3 F | SYSTOLIC BLOOD PRESSURE: 129 MMHG | HEART RATE: 116 BPM | WEIGHT: 174 LBS | DIASTOLIC BLOOD PRESSURE: 88 MMHG | HEIGHT: 67 IN | BODY MASS INDEX: 27.31 KG/M2

## 2018-02-06 DIAGNOSIS — C34.91 MALIGNANT NEOPLASM OF UNSPECIFIED PART OF RIGHT BRONCHUS OR LUNG: ICD-10-CM

## 2018-02-06 DIAGNOSIS — T85.735A: ICD-10-CM

## 2018-02-06 DIAGNOSIS — G00.3 STAPHYLOCOCCAL MENINGITIS: ICD-10-CM

## 2018-02-06 DIAGNOSIS — B95.61 METHICILLIN SUSCEPTIBLE STAPHYLOCOCCUS AUREUS INFECTION AS THE CAUSE OF DISEASES CLASSIFIED ELSEWHERE: ICD-10-CM
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
## 2018-02-06 DIAGNOSIS — Z92.21 PERSONAL HISTORY OF ANTINEOPLASTIC CHEMOTHERAPY: ICD-10-CM

## 2018-02-06 DIAGNOSIS — Z79.899 OTHER LONG TERM (CURRENT) DRUG THERAPY: ICD-10-CM

## 2018-02-06 DIAGNOSIS — J15.211 PNEUMONIA DUE TO METHICILLIN SUSCEPTIBLE STAPHYLOCOCCUS AUREUS: ICD-10-CM

## 2018-02-06 DIAGNOSIS — A41.01 SEPSIS DUE TO METHICILLIN SUSCEPTIBLE STAPHYLOCOCCUS AUREUS: ICD-10-CM

## 2018-02-06 DIAGNOSIS — C79.32 SECONDARY MALIGNANT NEOPLASM OF CEREBRAL MENINGES: ICD-10-CM

## 2018-02-06 DIAGNOSIS — R41.82 ALTERED MENTAL STATUS, UNSPECIFIED: ICD-10-CM

## 2018-02-06 DIAGNOSIS — Q24.3 PULMONARY INFUNDIBULAR STENOSIS: ICD-10-CM

## 2018-02-06 DIAGNOSIS — Z88.8 ALLERGY STATUS TO OTHER DRUGS, MEDICAMENTS AND BIOLOGICAL SUBSTANCES STATUS: ICD-10-CM

## 2018-02-06 DIAGNOSIS — F41.9 ANXIETY DISORDER, UNSPECIFIED: ICD-10-CM

## 2018-02-06 DIAGNOSIS — C79.31 SECONDARY MALIGNANT NEOPLASM OF BRAIN: ICD-10-CM

## 2018-02-06 DIAGNOSIS — J91.0 MALIGNANT PLEURAL EFFUSION: ICD-10-CM

## 2018-02-06 DIAGNOSIS — I82.621 ACUTE EMBOLISM AND THROMBOSIS OF DEEP VEINS OF RIGHT UPPER EXTREMITY: ICD-10-CM

## 2018-02-06 DIAGNOSIS — G93.41 METABOLIC ENCEPHALOPATHY: ICD-10-CM

## 2018-02-06 DIAGNOSIS — R65.20 SEVERE SEPSIS WITHOUT SEPTIC SHOCK: ICD-10-CM

## 2018-02-06 LAB
HCT VFR BLD CALC: 40.3 %
HGB BLD-MCNC: 13.7 G/DL
MCHC RBC-ENTMCNC: 33.3 PG
MCHC RBC-ENTMCNC: 33.9 GM/DL
MCV RBC AUTO: 98.4 FL
PLATELET # BLD AUTO: 390 K/UL
RBC # BLD: 4.1 M/UL
RBC # FLD: 17.8 %
WBC # FLD AUTO: 11.7 K/UL

## 2018-02-06 PROCEDURE — 99215 OFFICE O/P EST HI 40 MIN: CPT | Mod: 25

## 2018-02-06 PROCEDURE — 85025 COMPLETE CBC W/AUTO DIFF WBC: CPT

## 2018-02-06 PROCEDURE — 36415 COLL VENOUS BLD VENIPUNCTURE: CPT

## 2018-02-06 RX ORDER — RIVAROXABAN 15 MG/1
15 TABLET, FILM COATED ORAL
Refills: 0 | Status: ACTIVE | COMMUNITY
Start: 2018-02-06

## 2018-02-08 LAB
ALBUMIN SERPL ELPH-MCNC: 4.1 G/DL
ALP BLD-CCNC: 85 U/L
ALT SERPL-CCNC: 42 U/L
ANION GAP SERPL CALC-SCNC: 21 MMOL/L
AST SERPL-CCNC: 21 U/L
BILIRUB SERPL-MCNC: 0.6 MG/DL
BUN SERPL-MCNC: 10 MG/DL
CALCIUM SERPL-MCNC: 11 MG/DL
CHLORIDE SERPL-SCNC: 98 MMOL/L
CO2 SERPL-SCNC: 20 MMOL/L
CREAT SERPL-MCNC: 1.01 MG/DL
GLUCOSE SERPL-MCNC: 126 MG/DL
POTASSIUM SERPL-SCNC: 4 MMOL/L
PROT SERPL-MCNC: 6.9 G/DL
SODIUM SERPL-SCNC: 139 MMOL/L

## 2018-02-10 LAB
CULTURE RESULTS: SIGNIFICANT CHANGE UP
CULTURE RESULTS: SIGNIFICANT CHANGE UP
SPECIMEN SOURCE: SIGNIFICANT CHANGE UP
SPECIMEN SOURCE: SIGNIFICANT CHANGE UP

## 2018-02-13 ENCOUNTER — APPOINTMENT (OUTPATIENT)
Dept: NEUROSURGERY | Facility: CLINIC | Age: 48
End: 2018-02-13
Payer: COMMERCIAL

## 2018-02-13 PROCEDURE — 99024 POSTOP FOLLOW-UP VISIT: CPT

## 2018-02-17 LAB
CULTURE RESULTS: SIGNIFICANT CHANGE UP
SPECIMEN SOURCE: SIGNIFICANT CHANGE UP

## 2018-02-20 ENCOUNTER — INPATIENT (INPATIENT)
Facility: HOSPITAL | Age: 48
LOS: 0 days | Discharge: HOME | End: 2018-02-21
Attending: STUDENT IN AN ORGANIZED HEALTH CARE EDUCATION/TRAINING PROGRAM | Admitting: STUDENT IN AN ORGANIZED HEALTH CARE EDUCATION/TRAINING PROGRAM
Payer: COMMERCIAL

## 2018-02-20 ENCOUNTER — APPOINTMENT (OUTPATIENT)
Dept: NEUROSURGERY | Facility: CLINIC | Age: 48
End: 2018-02-20
Payer: COMMERCIAL

## 2018-02-20 VITALS
HEIGHT: 67 IN | RESPIRATION RATE: 18 BRPM | WEIGHT: 175.05 LBS | OXYGEN SATURATION: 95 % | DIASTOLIC BLOOD PRESSURE: 81 MMHG | SYSTOLIC BLOOD PRESSURE: 112 MMHG | HEART RATE: 75 BPM

## 2018-02-20 DIAGNOSIS — Z92.89 PERSONAL HISTORY OF OTHER MEDICAL TREATMENT: Chronic | ICD-10-CM

## 2018-02-20 DIAGNOSIS — Z98.890 OTHER SPECIFIED POSTPROCEDURAL STATES: Chronic | ICD-10-CM

## 2018-02-20 DIAGNOSIS — I30.9 ACUTE PERICARDITIS, UNSPECIFIED: Chronic | ICD-10-CM

## 2018-02-20 DIAGNOSIS — R07.81 PLEURODYNIA: Chronic | ICD-10-CM

## 2018-02-20 DIAGNOSIS — T14.8XXA OTHER INJURY OF UNSPECIFIED BODY REGION, INITIAL ENCOUNTER: ICD-10-CM

## 2018-02-20 DIAGNOSIS — C34.91 MALIGNANT NEOPLASM OF UNSPECIFIED PART OF RIGHT BRONCHUS OR LUNG: ICD-10-CM

## 2018-02-20 LAB
ALBUMIN SERPL ELPH-MCNC: 3.3 G/DL — SIGNIFICANT CHANGE UP (ref 3.3–5)
ALP SERPL-CCNC: 71 U/L — SIGNIFICANT CHANGE UP (ref 40–120)
ALT FLD-CCNC: 64 U/L — SIGNIFICANT CHANGE UP (ref 12–78)
ANION GAP SERPL CALC-SCNC: 7 MMOL/L — SIGNIFICANT CHANGE UP (ref 5–17)
APPEARANCE UR: CLEAR — SIGNIFICANT CHANGE UP
AST SERPL-CCNC: 25 U/L — SIGNIFICANT CHANGE UP (ref 15–37)
BASOPHILS # BLD AUTO: 0.1 K/UL — SIGNIFICANT CHANGE UP (ref 0–0.2)
BASOPHILS NFR BLD AUTO: 0.8 % — SIGNIFICANT CHANGE UP (ref 0–2)
BILIRUB SERPL-MCNC: 0.4 MG/DL — SIGNIFICANT CHANGE UP (ref 0.2–1.2)
BILIRUB UR-MCNC: NEGATIVE — SIGNIFICANT CHANGE UP
BUN SERPL-MCNC: 22 MG/DL — SIGNIFICANT CHANGE UP (ref 7–23)
CALCIUM SERPL-MCNC: 9.2 MG/DL — SIGNIFICANT CHANGE UP (ref 8.5–10.1)
CHLORIDE SERPL-SCNC: 100 MMOL/L — SIGNIFICANT CHANGE UP (ref 96–108)
CO2 SERPL-SCNC: 27 MMOL/L — SIGNIFICANT CHANGE UP (ref 22–31)
COLOR SPEC: YELLOW — SIGNIFICANT CHANGE UP
COMMENT - URINE: SIGNIFICANT CHANGE UP
CREAT SERPL-MCNC: 1.28 MG/DL — SIGNIFICANT CHANGE UP (ref 0.5–1.3)
DIFF PNL FLD: (no result)
EOSINOPHIL # BLD AUTO: 0 K/UL — SIGNIFICANT CHANGE UP (ref 0–0.5)
EOSINOPHIL NFR BLD AUTO: 0.3 % — SIGNIFICANT CHANGE UP (ref 0–6)
GLUCOSE SERPL-MCNC: 114 MG/DL — HIGH (ref 70–99)
GLUCOSE UR QL: NEGATIVE MG/DL — SIGNIFICANT CHANGE UP
HCT VFR BLD CALC: 33.3 % — LOW (ref 39–50)
HGB BLD-MCNC: 10.9 G/DL — LOW (ref 13–17)
KETONES UR-MCNC: (no result)
LACTATE SERPL-SCNC: 1.6 MMOL/L — SIGNIFICANT CHANGE UP (ref 0.7–2)
LEUKOCYTE ESTERASE UR-ACNC: (no result)
LYMPHOCYTES # BLD AUTO: 1.2 K/UL — SIGNIFICANT CHANGE UP (ref 1–3.3)
LYMPHOCYTES # BLD AUTO: 9.1 % — LOW (ref 13–44)
MCHC RBC-ENTMCNC: 32.6 GM/DL — SIGNIFICANT CHANGE UP (ref 32–36)
MCHC RBC-ENTMCNC: 33 PG — SIGNIFICANT CHANGE UP (ref 27–34)
MCV RBC AUTO: 101.2 FL — HIGH (ref 80–100)
MONOCYTES # BLD AUTO: 0.9 K/UL — SIGNIFICANT CHANGE UP (ref 0–0.9)
MONOCYTES NFR BLD AUTO: 6.9 % — SIGNIFICANT CHANGE UP (ref 2–14)
NEUTROPHILS # BLD AUTO: 10.8 K/UL — HIGH (ref 1.8–7.4)
NEUTROPHILS NFR BLD AUTO: 83 % — HIGH (ref 43–77)
NITRITE UR-MCNC: NEGATIVE — SIGNIFICANT CHANGE UP
PH UR: 5 — SIGNIFICANT CHANGE UP (ref 5–8)
PLATELET # BLD AUTO: 238 K/UL — SIGNIFICANT CHANGE UP (ref 150–400)
POTASSIUM SERPL-MCNC: 4.5 MMOL/L — SIGNIFICANT CHANGE UP (ref 3.5–5.3)
POTASSIUM SERPL-SCNC: 4.5 MMOL/L — SIGNIFICANT CHANGE UP (ref 3.5–5.3)
PROT SERPL-MCNC: 6.8 GM/DL — SIGNIFICANT CHANGE UP (ref 6–8.3)
PROT UR-MCNC: 30 MG/DL
RAPID RVP RESULT: SIGNIFICANT CHANGE UP
RBC # BLD: 3.29 M/UL — LOW (ref 4.2–5.8)
RBC # FLD: 14.6 % — HIGH (ref 10.3–14.5)
RBC CASTS # UR COMP ASSIST: >50 /HPF (ref 0–4)
SODIUM SERPL-SCNC: 134 MMOL/L — LOW (ref 135–145)
SP GR SPEC: 1.02 — SIGNIFICANT CHANGE UP (ref 1.01–1.02)
UROBILINOGEN FLD QL: NEGATIVE MG/DL — SIGNIFICANT CHANGE UP
WBC # BLD: 13.1 K/UL — HIGH (ref 3.8–10.5)
WBC # FLD AUTO: 13.1 K/UL — HIGH (ref 3.8–10.5)
WBC UR QL: (no result)

## 2018-02-20 PROCEDURE — 99285 EMERGENCY DEPT VISIT HI MDM: CPT

## 2018-02-20 PROCEDURE — 71045 X-RAY EXAM CHEST 1 VIEW: CPT | Mod: 26

## 2018-02-20 PROCEDURE — 70450 CT HEAD/BRAIN W/O DYE: CPT | Mod: 26

## 2018-02-20 PROCEDURE — 99024 POSTOP FOLLOW-UP VISIT: CPT

## 2018-02-20 RX ORDER — RIVAROXABAN 15 MG-20MG
15 KIT ORAL ONCE
Qty: 0 | Refills: 0 | Status: COMPLETED | OUTPATIENT
Start: 2018-02-20 | End: 2018-02-20

## 2018-02-20 RX ORDER — SODIUM CHLORIDE 9 MG/ML
1000 INJECTION INTRAMUSCULAR; INTRAVENOUS; SUBCUTANEOUS ONCE
Qty: 0 | Refills: 0 | Status: COMPLETED | OUTPATIENT
Start: 2018-02-20 | End: 2018-02-20

## 2018-02-20 RX ORDER — SODIUM CHLORIDE 9 MG/ML
3 INJECTION INTRAMUSCULAR; INTRAVENOUS; SUBCUTANEOUS ONCE
Qty: 0 | Refills: 0 | Status: COMPLETED | OUTPATIENT
Start: 2018-02-20 | End: 2018-02-20

## 2018-02-20 RX ORDER — LEVETIRACETAM 250 MG/1
1000 TABLET, FILM COATED ORAL ONCE
Qty: 0 | Refills: 0 | Status: COMPLETED | OUTPATIENT
Start: 2018-02-20 | End: 2018-02-20

## 2018-02-20 RX ADMIN — SODIUM CHLORIDE 1000 MILLILITER(S): 9 INJECTION INTRAMUSCULAR; INTRAVENOUS; SUBCUTANEOUS at 21:34

## 2018-02-20 RX ADMIN — SODIUM CHLORIDE 3 MILLILITER(S): 9 INJECTION INTRAMUSCULAR; INTRAVENOUS; SUBCUTANEOUS at 18:59

## 2018-02-20 RX ADMIN — RIVAROXABAN 15 MILLIGRAM(S): KIT at 22:13

## 2018-02-20 RX ADMIN — LEVETIRACETAM 1000 MILLIGRAM(S): 250 TABLET, FILM COATED ORAL at 22:13

## 2018-02-20 NOTE — ED ADULT NURSE NOTE - OBJECTIVE STATEMENT
Pt brought in by fiance for increasing lethargy.  Pt lethargic on arrival, unable to answer questions.  Extensive medical history, including meningitis and neurosurgery with Dr. Cee.  VS and cardiac monitoring initiated on arrival.  20g PIV placed in LAC.  Straight cath for urine specimen.

## 2018-02-20 NOTE — ED PROVIDER NOTE - ATTENDING CONTRIBUTION TO CARE
I, Tj Tineo MD, personally saw the patient with resident.  I have personally performed a face to face diagnostic evaluation on this patient.  I have reviewed the resident note and agree with the history, exam, and plan of care, except as noted.

## 2018-02-20 NOTE — ED PROVIDER NOTE - PROGRESS NOTE DETAILS
Attending Rivka: PE: Lethargic but responsive to voice, Cardiac S1S2 no mrg, Resp CTAB, Abd soft NTND, Neuro pupils equal, generalized fatigue and does not fully follow commands  Pt seen by Dr Cee in ED - believes this was likely a seizure, rec keppra 1000mg IV, hold empiric abx at this time for possible LP, admit for neuro workup

## 2018-02-20 NOTE — ED ADULT TRIAGE NOTE - CHIEF COMPLAINT QUOTE
patient seen by dr austin today for follow up for port removal from brain secondary to meningitis one month ago, leakage assessed by dr austin. patient diaphoretic and lethargic at triage.  meka's wife reports that patient did not have any complaints today patient seen by dr austin today for follow up for port removal from brain secondary to meningitis one month ago, leakage from access site assessed by dr austin. patient diaphoretic and lethargic at triage.  patient's wife reports that patient did not have any complaints today, other than abdominal pain.  patient sent to [facility] for further evaluation.  wife reports patient awake and alert at dr austin's office at 1330 pm

## 2018-02-20 NOTE — CONSULT NOTE ADULT - PROBLEM SELECTOR RECOMMENDATION 2
ongoing surveillance and treatment with chemotherapy. current scan does not indicate obvious new pathology

## 2018-02-20 NOTE — ED PROVIDER NOTE - ENMT, MLM
Airway patent, Nasal mucosa clear. Mouth with normal mucosa. Throat has no vesicles, no oropharyngeal exudates and uvula is midline., prior omaya site clean but with underlying fluctance without erythema and warmth

## 2018-02-20 NOTE — ED ADULT NURSE NOTE - CHIEF COMPLAINT QUOTE
patient seen by dr austin today for follow up for port removal from brain secondary to meningitis one month ago, leakage from access site assessed by dr austin. patient diaphoretic and lethargic at triage.  patient's wife reports that patient did not have any complaints today, other than abdominal pain.  patient sent to [facility] for further evaluation.  wife reports patient awake and alert at dr austin's office at 1330 pm

## 2018-02-20 NOTE — ED PROVIDER NOTE - OBJECTIVE STATEMENT
48 yo M with lung cancer s/p brain mets receiving intrathecal chemo for brain lesions complicated by meningitis presenting with ams, lethargy and concern for meningitis again. Pt is afebrile at home. Arousable but unable to follow commands. Pt is followed by Dr. Cee. Discussed with neurosurgery. Pt is unable to provide history due to lethargy.

## 2018-02-20 NOTE — ED PROVIDER NOTE - MEDICAL DECISION MAKING DETAILS
46 yo M with brain mets - concern for meningitis vs, hydrocephalus vs worsening tumor zbigniew - discussed with neurosurgery will see patient

## 2018-02-21 ENCOUNTER — TRANSCRIPTION ENCOUNTER (OUTPATIENT)
Age: 48
End: 2018-02-21

## 2018-02-21 VITALS
SYSTOLIC BLOOD PRESSURE: 109 MMHG | DIASTOLIC BLOOD PRESSURE: 76 MMHG | RESPIRATION RATE: 18 BRPM | HEART RATE: 81 BPM | OXYGEN SATURATION: 98 % | TEMPERATURE: 99 F

## 2018-02-21 DIAGNOSIS — Z98.890 OTHER SPECIFIED POSTPROCEDURAL STATES: Chronic | ICD-10-CM

## 2018-02-21 LAB
ANION GAP SERPL CALC-SCNC: 7 MMOL/L — SIGNIFICANT CHANGE UP (ref 5–17)
BUN SERPL-MCNC: 22 MG/DL — SIGNIFICANT CHANGE UP (ref 7–23)
CALCIUM SERPL-MCNC: 8.9 MG/DL — SIGNIFICANT CHANGE UP (ref 8.5–10.1)
CHLORIDE SERPL-SCNC: 103 MMOL/L — SIGNIFICANT CHANGE UP (ref 96–108)
CK SERPL-CCNC: 26 U/L — SIGNIFICANT CHANGE UP (ref 26–308)
CO2 SERPL-SCNC: 25 MMOL/L — SIGNIFICANT CHANGE UP (ref 22–31)
CREAT SERPL-MCNC: 0.95 MG/DL — SIGNIFICANT CHANGE UP (ref 0.5–1.3)
CRP SERPL-MCNC: 0.7 MG/DL — HIGH (ref 0–0.4)
CULTURE RESULTS: NO GROWTH — SIGNIFICANT CHANGE UP
ERYTHROCYTE [SEDIMENTATION RATE] IN BLOOD: 28 MM/HR — HIGH (ref 0–15)
GLUCOSE SERPL-MCNC: 108 MG/DL — HIGH (ref 70–99)
HCT VFR BLD CALC: 31.6 % — LOW (ref 39–50)
HGB BLD-MCNC: 10.2 G/DL — LOW (ref 13–17)
MCHC RBC-ENTMCNC: 32.5 GM/DL — SIGNIFICANT CHANGE UP (ref 32–36)
MCHC RBC-ENTMCNC: 33.4 PG — SIGNIFICANT CHANGE UP (ref 27–34)
MCV RBC AUTO: 102.9 FL — HIGH (ref 80–100)
PLATELET # BLD AUTO: 191 K/UL — SIGNIFICANT CHANGE UP (ref 150–400)
POTASSIUM SERPL-MCNC: 4 MMOL/L — SIGNIFICANT CHANGE UP (ref 3.5–5.3)
POTASSIUM SERPL-SCNC: 4 MMOL/L — SIGNIFICANT CHANGE UP (ref 3.5–5.3)
RBC # BLD: 3.07 M/UL — LOW (ref 4.2–5.8)
RBC # FLD: 14.7 % — HIGH (ref 10.3–14.5)
SODIUM SERPL-SCNC: 135 MMOL/L — SIGNIFICANT CHANGE UP (ref 135–145)
SPECIMEN SOURCE: SIGNIFICANT CHANGE UP
WBC # BLD: 11.2 K/UL — HIGH (ref 3.8–10.5)
WBC # FLD AUTO: 11.2 K/UL — HIGH (ref 3.8–10.5)

## 2018-02-21 PROCEDURE — 99223 1ST HOSP IP/OBS HIGH 75: CPT

## 2018-02-21 PROCEDURE — 93010 ELECTROCARDIOGRAM REPORT: CPT

## 2018-02-21 PROCEDURE — 95819 EEG AWAKE AND ASLEEP: CPT | Mod: 26

## 2018-02-21 RX ORDER — DEXAMETHASONE 0.5 MG/5ML
2 ELIXIR ORAL ONCE
Qty: 0 | Refills: 0 | Status: COMPLETED | OUTPATIENT
Start: 2018-02-21 | End: 2018-02-21

## 2018-02-21 RX ORDER — PANTOPRAZOLE SODIUM 20 MG/1
40 TABLET, DELAYED RELEASE ORAL
Qty: 0 | Refills: 0 | Status: DISCONTINUED | OUTPATIENT
Start: 2018-02-21 | End: 2018-02-21

## 2018-02-21 RX ORDER — LEVETIRACETAM 250 MG/1
500 TABLET, FILM COATED ORAL
Qty: 0 | Refills: 0 | Status: DISCONTINUED | OUTPATIENT
Start: 2018-02-21 | End: 2018-02-21

## 2018-02-21 RX ORDER — HYDROMORPHONE HYDROCHLORIDE 2 MG/ML
4 INJECTION INTRAMUSCULAR; INTRAVENOUS; SUBCUTANEOUS THREE TIMES A DAY
Qty: 0 | Refills: 0 | Status: DISCONTINUED | OUTPATIENT
Start: 2018-02-21 | End: 2018-02-21

## 2018-02-21 RX ORDER — HYDROMORPHONE HYDROCHLORIDE 2 MG/ML
1 INJECTION INTRAMUSCULAR; INTRAVENOUS; SUBCUTANEOUS
Qty: 0 | Refills: 0 | COMMUNITY

## 2018-02-21 RX ORDER — FOLIC ACID 0.8 MG
1 TABLET ORAL DAILY
Qty: 0 | Refills: 0 | Status: DISCONTINUED | OUTPATIENT
Start: 2018-02-21 | End: 2018-02-21

## 2018-02-21 RX ORDER — VANCOMYCIN HCL 1 G
1000 VIAL (EA) INTRAVENOUS ONCE
Qty: 0 | Refills: 0 | Status: COMPLETED | OUTPATIENT
Start: 2018-02-21 | End: 2018-02-21

## 2018-02-21 RX ORDER — SODIUM CHLORIDE 9 MG/ML
500 INJECTION INTRAMUSCULAR; INTRAVENOUS; SUBCUTANEOUS ONCE
Qty: 0 | Refills: 0 | Status: COMPLETED | OUTPATIENT
Start: 2018-02-21 | End: 2018-02-21

## 2018-02-21 RX ORDER — LEVETIRACETAM 250 MG/1
1 TABLET, FILM COATED ORAL
Qty: 28 | Refills: 0 | OUTPATIENT
Start: 2018-02-21 | End: 2018-03-06

## 2018-02-21 RX ORDER — METOPROLOL TARTRATE 50 MG
25 TABLET ORAL
Qty: 0 | Refills: 0 | Status: DISCONTINUED | OUTPATIENT
Start: 2018-02-21 | End: 2018-02-21

## 2018-02-21 RX ORDER — LEVETIRACETAM 250 MG/1
1 TABLET, FILM COATED ORAL
Qty: 0 | Refills: 0 | COMMUNITY
Start: 2018-02-21 | End: 2018-03-06

## 2018-02-21 RX ORDER — RIVAROXABAN 15 MG-20MG
15 KIT ORAL
Qty: 0 | Refills: 0 | Status: COMPLETED | OUTPATIENT
Start: 2018-02-21 | End: 2018-02-21

## 2018-02-21 RX ORDER — DIAZEPAM 5 MG
2 TABLET ORAL THREE TIMES A DAY
Qty: 0 | Refills: 0 | Status: DISCONTINUED | OUTPATIENT
Start: 2018-02-21 | End: 2018-02-21

## 2018-02-21 RX ORDER — MIRTAZAPINE 45 MG/1
30 TABLET, ORALLY DISINTEGRATING ORAL AT BEDTIME
Qty: 0 | Refills: 0 | Status: DISCONTINUED | OUTPATIENT
Start: 2018-02-21 | End: 2018-02-21

## 2018-02-21 RX ORDER — COLCHICINE 0.6 MG
0.6 TABLET ORAL
Qty: 0 | Refills: 0 | Status: DISCONTINUED | OUTPATIENT
Start: 2018-02-21 | End: 2018-02-21

## 2018-02-21 RX ORDER — SODIUM CHLORIDE 9 MG/ML
1000 INJECTION INTRAMUSCULAR; INTRAVENOUS; SUBCUTANEOUS
Qty: 0 | Refills: 0 | Status: DISCONTINUED | OUTPATIENT
Start: 2018-02-21 | End: 2018-02-21

## 2018-02-21 RX ORDER — RIVAROXABAN 15 MG-20MG
20 KIT ORAL EVERY 24 HOURS
Qty: 0 | Refills: 0 | Status: DISCONTINUED | OUTPATIENT
Start: 2018-02-22 | End: 2018-02-21

## 2018-02-21 RX ORDER — CEFTRIAXONE 500 MG/1
1000 INJECTION, POWDER, FOR SOLUTION INTRAMUSCULAR; INTRAVENOUS ONCE
Qty: 0 | Refills: 0 | Status: COMPLETED | OUTPATIENT
Start: 2018-02-21 | End: 2018-02-21

## 2018-02-21 RX ORDER — HYDROMORPHONE HYDROCHLORIDE 2 MG/ML
1 INJECTION INTRAMUSCULAR; INTRAVENOUS; SUBCUTANEOUS ONCE
Qty: 0 | Refills: 0 | Status: DISCONTINUED | OUTPATIENT
Start: 2018-02-21 | End: 2018-02-21

## 2018-02-21 RX ORDER — CEFTRIAXONE 500 MG/1
1 INJECTION, POWDER, FOR SOLUTION INTRAMUSCULAR; INTRAVENOUS EVERY 24 HOURS
Qty: 0 | Refills: 0 | Status: DISCONTINUED | OUTPATIENT
Start: 2018-02-21 | End: 2018-02-21

## 2018-02-21 RX ORDER — DEXAMETHASONE 0.5 MG/5ML
2 ELIXIR ORAL DAILY
Qty: 0 | Refills: 0 | Status: DISCONTINUED | OUTPATIENT
Start: 2018-02-21 | End: 2018-02-21

## 2018-02-21 RX ORDER — CEFUROXIME AXETIL 250 MG
500 TABLET ORAL EVERY 12 HOURS
Qty: 0 | Refills: 0 | Status: DISCONTINUED | OUTPATIENT
Start: 2018-02-21 | End: 2018-02-21

## 2018-02-21 RX ORDER — HYDROMORPHONE HYDROCHLORIDE 2 MG/ML
1 INJECTION INTRAMUSCULAR; INTRAVENOUS; SUBCUTANEOUS
Qty: 10 | Refills: 0 | OUTPATIENT
Start: 2018-02-21 | End: 2018-02-25

## 2018-02-21 RX ADMIN — SODIUM CHLORIDE 100 MILLILITER(S): 9 INJECTION INTRAMUSCULAR; INTRAVENOUS; SUBCUTANEOUS at 04:56

## 2018-02-21 RX ADMIN — HYDROMORPHONE HYDROCHLORIDE 4 MILLIGRAM(S): 2 INJECTION INTRAMUSCULAR; INTRAVENOUS; SUBCUTANEOUS at 10:24

## 2018-02-21 RX ADMIN — Medication 0.6 MILLIGRAM(S): at 18:41

## 2018-02-21 RX ADMIN — Medication 250 MILLIGRAM(S): at 12:28

## 2018-02-21 RX ADMIN — Medication 2 MILLIGRAM(S): at 04:54

## 2018-02-21 RX ADMIN — Medication 500 MILLIGRAM(S): at 18:41

## 2018-02-21 RX ADMIN — HYDROMORPHONE HYDROCHLORIDE 1 MILLIGRAM(S): 2 INJECTION INTRAMUSCULAR; INTRAVENOUS; SUBCUTANEOUS at 01:09

## 2018-02-21 RX ADMIN — PANTOPRAZOLE SODIUM 40 MILLIGRAM(S): 20 TABLET, DELAYED RELEASE ORAL at 10:15

## 2018-02-21 RX ADMIN — Medication 2 MILLIGRAM(S): at 10:15

## 2018-02-21 RX ADMIN — Medication 25 MILLIGRAM(S): at 10:15

## 2018-02-21 RX ADMIN — HYDROMORPHONE HYDROCHLORIDE 1 MILLIGRAM(S): 2 INJECTION INTRAMUSCULAR; INTRAVENOUS; SUBCUTANEOUS at 01:30

## 2018-02-21 RX ADMIN — Medication 0.6 MILLIGRAM(S): at 10:14

## 2018-02-21 RX ADMIN — Medication 1 TABLET(S): at 12:28

## 2018-02-21 RX ADMIN — Medication 1 MILLIGRAM(S): at 12:28

## 2018-02-21 RX ADMIN — CEFTRIAXONE 1000 MILLIGRAM(S): 500 INJECTION, POWDER, FOR SOLUTION INTRAMUSCULAR; INTRAVENOUS at 10:16

## 2018-02-21 RX ADMIN — Medication 2 MILLIGRAM(S): at 19:03

## 2018-02-21 RX ADMIN — SODIUM CHLORIDE 500 MILLILITER(S): 9 INJECTION INTRAMUSCULAR; INTRAVENOUS; SUBCUTANEOUS at 02:42

## 2018-02-21 RX ADMIN — Medication 25 MILLIGRAM(S): at 18:42

## 2018-02-21 RX ADMIN — RIVAROXABAN 15 MILLIGRAM(S): KIT at 18:41

## 2018-02-21 RX ADMIN — LEVETIRACETAM 500 MILLIGRAM(S): 250 TABLET, FILM COATED ORAL at 18:41

## 2018-02-21 RX ADMIN — RIVAROXABAN 15 MILLIGRAM(S): KIT at 10:15

## 2018-02-21 RX ADMIN — LEVETIRACETAM 500 MILLIGRAM(S): 250 TABLET, FILM COATED ORAL at 10:15

## 2018-02-21 NOTE — CONSULT NOTE ADULT - ASSESSMENT
46 yo male known to our service with Stage 4 Lung CA with mets to brain s/p Ommaya reservoir 11/2017 for leptomeningeal disease s/p chemo and radiation s/p acute bacterial meningitis and an Ommaya site infection s/p removal of Ommaya 1/12 presents to the ER with c/o lethargy / AMS. CT head in ER showed no hemorrhage and no signs of infection.      -No acute neurosurgical intervention  -Admit to medicine service for observation  -Ddx favors seizure over infectious cause  -Keppra 1000mg now, then 500mg 2 x day  -EEG  -Neurology evaluation  -ID / infectious w/u - ESR / CRP, blood cultures - may need LP in IR if meningitis is suspected  -DVT ppx    Discussed with Dr Cee / Dr Epstein
46 yo male known to our service with Stage 4 Lung CA with mets to brain s/p Ommaya reservoir 11/2017 for leptomeningeal disease s/p chemo and radiation s/p acute bacterial meningitis and an Ommaya site infection s/p removal of Ommaya 1/12 presents to the ER with c/o lethargy / AMS. CT head in ER showed no hemorrhage and no signs of infection.      lethargy, confusion due to likely seizure activity  Non focal exam and Pt feeling better wants to go home.  No recurrent episodes on Keppra  REc continue Keppra 500 mg bid.  Consider 24- 48 hr Ambulatory EEG if any recurrent episodes.  F/u with Neurology.  F/u with MRI/PEt scans.  Continue Tegressi, Chemo.  F/u with Oncologist./ .  Discussed with Pt, Pt's Gopal and family member and .
47 y.o. male with PMH stage IV lung cancer to brain s/p [facility] reservior 11/2017 for leptomeningeal disease s/p chemo/radiation, anxiety, depression, hx nephrolithiasis, hx pericarditis, recent admission for MSSA meningitis secondary to omaya infection currently on ceftin 500 mg po q 12 hours now admitted on 2/20 for evaluation discharge from incision on top of head that was clear, nonpurulent, no fever, headache or stiff neck or photophobia. Family noted he was mildly confused and possibly had jerking motions. Now with clear sensorium, though is tired.   1. Patient admitted with mild confusion, ?seizure like activity, at baseline  - follow up cultures   - serial cbc and monitor temperature   - had EEG  - will continue ceftin 500 mg po q 12 hours that patient was discharged home on from prior hospitalization as plan was to complete this antibiotic until 2/28  - if any decompensation will need repeat lumbar puncture and broaden antibiotics to iv  - neurosurgery evaluation in progress  Will follow

## 2018-02-21 NOTE — PROGRESS NOTE ADULT - SUBJECTIVE AND OBJECTIVE BOX
CHIEF COMPLAINT: Tremors, confusion, AMS    HPI:  47 y.o. male with PMH stage IV lung cancer to brain s/p Ommaya reservior 11/2017 for leptomeningeal disease s/p chemo/radiation, anxiety, depression, hx nephrolithiasis, hx pericarditis presents with lethargy, AMS. Pt was recently admitted ~1 mo ago with sepsis with acute bacterial meningitis, RUE DVT. Pt s/p removal of Ommaya 1/12. Pt was seen at neurosurg clinic with Dr Cee for clear discharge from incision. Pt went home where he developed tremors and became lethargic. Pt and moises reports that after going home, he became increasing lethargic and "like Parkinsons" tremor. His eyes rolled back and lasted around 30-45 minutes in duration. During this time, moises reports that towards the end, he was able to comprehend questions and answer appropriately. In the beginning, pt was able to answer and appears confused. Currently, moises reports he's improved but still not to baseline. Pt currently denies subjective fever, sore throat, n/v, CP, SOB, abd pain, dysuria, diarrhea. Pt has forehead burhole and reports increased CSF leakage with raised bump.    2/21/18: Pt seen and examined at bedside. Pt moises Russo is also at bedside.        REVIEW OF SYSTEMS:    CONSTITUTIONAL: No weakness, fevers or chills  EYES/ENT: No visual changes;  No vertigo or throat pain   NECK: No pain or stiffness  RESPIRATORY: No cough, wheezing, hemoptysis; No shortness of breath  CARDIOVASCULAR: No chest pain or palpitations  GASTROINTESTINAL: No abdominal or epigastric pain. No nausea, vomiting, or hematemesis; No diarrhea or constipation. No melena or hematochezia.  GENITOURINARY: No dysuria, frequency or hematuria  NEUROLOGICAL: No numbness or weakness  SKIN: No itching, burning, rashes, or lesions   All other review of systems is negative unless indicated above    Vital Signs Last 24 Hrs  T(C): 36.7 (21 Feb 2018 10:05), Max: 36.9 (20 Feb 2018 17:47)  T(F): 98.1 (21 Feb 2018 10:05), Max: 98.5 (20 Feb 2018 17:47)  HR: 77 (21 Feb 2018 10:05) (65 - 78)  BP: 101/67 (21 Feb 2018 10:05) (88/66 - 123/73)  BP(mean): --  RR: 18 (21 Feb 2018 10:05) (11 - 18)  SpO2: 98% (21 Feb 2018 10:05) (95% - 100%)        PHYSICAL EXAM:    Constitutional: NAD, awake and alert, well-developed  HEENT: PERR, EOMI, Normal Hearing, MMM  Neck: Soft and supple, No LAD, No JVD  Respiratory: Breath sounds are clear bilaterally, No wheezing, rales or rhonchi  Cardiovascular: S1 and S2, regular rate and rhythm, no Murmurs, gallops or rubs  Gastrointestinal: Bowel Sounds present, soft, nontender, nondistended, no guarding, no rebound  Extremities: No peripheral edema  Vascular: 2+ peripheral pulses  Neurological: A/O x 3, no focal deficits  Musculoskeletal: 5/5 strength b/l upper and lower extremities  Skin: No rashes    MEDICATIONS:  MEDICATIONS  (STANDING):  cefuroxime   Tablet 500 milliGRAM(s) Oral every 12 hours  colchicine 0.6 milliGRAM(s) Oral two times a day  dexamethasone     Tablet 2 milliGRAM(s) Oral daily  folic acid 1 milliGRAM(s) Oral daily  levETIRAcetam 500 milliGRAM(s) Oral two times a day  metoprolol     tartrate 25 milliGRAM(s) Oral two times a day  mirtazapine 30 milliGRAM(s) Oral at bedtime  pantoprazole    Tablet 40 milliGRAM(s) Oral before breakfast  rivaroxaban 15 milliGRAM(s) Oral two times a day  sodium chloride 0.9%. 1000 milliLiter(s) (100 mL/Hr) IV Continuous <Continuous>  trimethoprim  160 mG/sulfamethoxazole 800 mG 1 Tablet(s) Oral <User Schedule>      LABS: All Labs Reviewed:                        10.2   11.2  )-----------( 191      ( 21 Feb 2018 06:00 )             31.6     02-21    135  |  103  |  22  ----------------------------<  108<H>  4.0   |  25  |  0.95    Ca    8.9      21 Feb 2018 06:00    TPro  6.8  /  Alb  3.3  /  TBili  0.4  /  DBili  x   /  AST  25  /  ALT  64  /  AlkPhos  71  02-20      CARDIAC MARKERS ( 21 Feb 2018 06:00 )  x     / x     / 26 U/L / x     / x          Blood Culture:     RADIOLOGY/EKG:    DVT PPX:    ADVANCED DIRECTIVE:    DISPOSITION: CHIEF COMPLAINT: Tremors, confusion, AMS    HPI:  47 y.o. male with PMH stage IV lung cancer to brain s/p Ommaya reservior 11/2017 for leptomeningeal disease s/p chemo/radiation, anxiety, depression, hx nephrolithiasis, hx pericarditis presents with lethargy, AMS. Pt was recently admitted ~1 mo ago with sepsis with acute bacterial meningitis, RUE DVT. Pt s/p removal of Ommaya 1/12. Pt was seen at neurosurg clinic with Dr Cee for clear discharge from incision. Pt went home where he developed tremors and became lethargic. Pt and moises reports that after going home, he became increasing lethargic and "like Parkinsons" tremor. His eyes rolled back and lasted around 30-45 minutes in duration. During this time, moises reports that towards the end, he was able to comprehend questions and answer appropriately. In the beginning, pt was able to answer and appears confused. Currently, moises reports he's improved but still not to baseline. Pt currently denies subjective fever, sore throat, n/v, CP, SOB, abd pain, dysuria, diarrhea. Pt has forehead burhole and reports increased CSF leakage with raised bump.    2/21/18: Pt seen and examined at bedside. Pt moises Russo is also at bedside. Pt denies any fever, chills, HA, CP, dizziness, palpitations or SOB. He just notes having some slight pedal edema which he takes Lasix for at home.  He is still taking antibiotics for meningitis from last month. Pt takes dexamethasone st home for cerebral edema. Dose was recently adjusted from 2mg BID to 2 mg daily. As per pt moises, this seizure like activity occurred only on this occasion. Neurology evaluation pending. Pt is eager to go home today, but he agrees to stay for complete evaluation and workup.       REVIEW OF SYSTEMS:  CONSTITUTIONAL: No weakness, fevers or chills  EYES/ENT: No visual changes;  No vertigo or throat pain   NECK: No pain or stiffness  RESPIRATORY: No cough, wheezing, hemoptysis; No shortness of breath  CARDIOVASCULAR: No chest pain or palpitations  GASTROINTESTINAL: No abdominal or epigastric pain. No nausea, vomiting, or hematemesis; No diarrhea or constipation. No melena or hematochezia.  GENITOURINARY: No dysuria, frequency or hematuria  NEUROLOGICAL: No numbness or weakness  SKIN: No itching, burning, rashes, or lesions   All other review of systems is negative unless indicated above    Vital Signs Last 24 Hrs  T(C): 36.7 (21 Feb 2018 10:05), Max: 36.9 (20 Feb 2018 17:47)  T(F): 98.1 (21 Feb 2018 10:05), Max: 98.5 (20 Feb 2018 17:47)  HR: 77 (21 Feb 2018 10:05) (65 - 78)  BP: 101/67 (21 Feb 2018 10:05) (88/66 - 123/73)  RR: 18 (21 Feb 2018 10:05) (11 - 18)  SpO2: 98% (21 Feb 2018 10:05) (95% - 100%)        PHYSICAL EXAM:  Constitutional: NAD, awake and alert, well-developed  HEENT: Bandage over area of burrhole, PERR, EOMI, Normal Hearing, MMM  Neck: Soft and supple, No LAD, No JVD  Respiratory: Breath sounds are clear bilaterally, No wheezing, rales or rhonchi  Cardiovascular: S1 and S2, regular rate and rhythm, no Murmurs, gallops or rubs  Gastrointestinal: Bowel Sounds present, soft, nontender, nondistended, no guarding, no rebound  Extremities: +Pedal edema  Vascular: 2+ peripheral pulses  Neurological: A/O x 3, no focal deficits  Musculoskeletal: 5/5 strength b/l upper and lower extremities  Skin: No rashes    MEDICATIONS:  MEDICATIONS  (STANDING):  cefuroxime   Tablet 500 milliGRAM(s) Oral every 12 hours  colchicine 0.6 milliGRAM(s) Oral two times a day  dexamethasone     Tablet 2 milliGRAM(s) Oral daily  folic acid 1 milliGRAM(s) Oral daily  levETIRAcetam 500 milliGRAM(s) Oral two times a day  metoprolol     tartrate 25 milliGRAM(s) Oral two times a day  mirtazapine 30 milliGRAM(s) Oral at bedtime  pantoprazole    Tablet 40 milliGRAM(s) Oral before breakfast  rivaroxaban 15 milliGRAM(s) Oral two times a day  sodium chloride 0.9%. 1000 milliLiter(s) (100 mL/Hr) IV Continuous <Continuous>  trimethoprim  160 mG/sulfamethoxazole 800 mG 1 Tablet(s) Oral <User Schedule>      LABS: All Labs Reviewed:                        10.2   11.2  )-----------( 191      ( 21 Feb 2018 06:00 )             31.6     02-21    135  |  103  |  22  ----------------------------<  108<H>  4.0   |  25  |  0.95    Ca    8.9      21 Feb 2018 06:00    TPro  6.8  /  Alb  3.3  /  TBili  0.4  /  DBili  x   /  AST  25  /  ALT  64  /  AlkPhos  71  02-20      CARDIAC MARKERS ( 21 Feb 2018 06:00 )  x     / x     / 26 U/L / x     / x          RADIOLOGY/EKG:  < from: CT Head No Cont (02.20.18 @ 18:07) >  IMPRESSION:       No acute intracranial findings. 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 Numerous previously visualized subcentimeter lesions   throughout the brain parenchyma, seen on prior MRI brain, are not seen to   good advantage on this CT head without contrast. Small area of   encephalomalacia/gliosis within the right anterior frontal lobe along the   prior ventricular shunt tract.         < from: Xray Chest 1 View AP/PA. (02.20.18 @ 18:55) >  Impression:    Moderate left pleural effusion with suggestion of loculated components in   the left upper hemithorax. A pleural-based mass cannot entirely be   excluded. Mild pulmonary vascular congestion        DVT PPX: On Xarelto

## 2018-02-21 NOTE — PATIENT PROFILE ADULT. - PSH
Acute pericarditis, unspecified type  pericardial window 11/2015, 12/2015  H/O bilateral inguinal hernia repair  as a child  History of kenia hole surgery    History of cardiac monitoring  s/p LINQ device placement 4/2017  History of lumbar puncture  chemo placed  History of lumbar puncture within last 21 days    History of vascular access device  PORT placed  Pleuritic chest pain  Pleurodesis left chest 10/20/2017

## 2018-02-21 NOTE — H&P ADULT - HISTORY OF PRESENT ILLNESS
47 y.o. male with PMH stage IV lung cancer to brain s/p Ommaya reservior 11/2017 for leptomeningeal disease s/p chemo/radiation, anxiety, depression, hx nephrolithiasis, hx pericarditis presents with lethargy, AMS. Pt was recently admitted ~1 mo ago with sepsis with acute bacterial meningitis, RUE DVT. Pt s/p removal of Ommaya 1/12. Pt was seen at neurosurg clinic with Dr Cee for clear discharge from incision. Pt went home where he developed tremors and became lethargic. Pt and moises reports that after going home, he became increasing lethargic and "like Parkinsons" tremor. His eyes rolled back and lasted around 30-45 minutes in duration. During this time, moises reports that towards the end, he was able to comprehend questions and answer appropriately. In the beginning, pt was able to answer and appears confused. Currently, moises reports he's improved but still not to baseline. Pt currently denies subjective fever, sore throat, n/v, CP, SOB, abd pain, dysuria, diarrhea. Pt has forehad burhole and reports increased CSF leakage with raised bump.    PMH: as above  PSH: as above  Social Hx: no tobacco, EtOH, drugs  Family Hx:  Father-leukemia; Mother-cancer  ROS: per HPI

## 2018-02-21 NOTE — H&P ADULT - NSHPPHYSICALEXAM_GEN_ALL_CORE
Vital Signs Last 24 Hrs  T(C): 36.9 (20 Feb 2018 17:47), Max: 36.9 (20 Feb 2018 17:47)  T(F): 98.5 (20 Feb 2018 17:47), Max: 98.5 (20 Feb 2018 17:47)  HR: 76 (21 Feb 2018 03:18) (65 - 76)  BP: 103/75 (21 Feb 2018 03:18) (88/66 - 112/81)  BP(mean): --  RR: 14 (21 Feb 2018 01:30) (11 - 18)  SpO2: 100% (21 Feb 2018 01:30) (95% - 100%)    GEN: appears comfortable  Neuro: Alert and oriented, moves extremities spontaneously, right forehead sutures and gauze; no erythema  HEENT: NC/AT, EOMI  Neck: no thyroidmegaly, no JVD  Cardiovascular: S1S2 present, regular rhythm, no murmur  Respiratory: breath sounds normal bilaterally, no wheezing, no rales, no rhonchi  Gastrointestinal: bowel sounds normal, soft, no abdominal tenderness  Musculoskeletal: no muscle tenderness  Extremities: 2+ pitting edema bilaterally  Skin: No rash

## 2018-02-21 NOTE — CONSULT NOTE ADULT - SUBJECTIVE AND OBJECTIVE BOX
Patient is a 47y old  Male who presents with a chief complaint of lethargy / AMS    HPI:  46 yo male known to our service with Stage 4 Lung CA with mets to brain s/p Ommaya reservoir 11/2017 for leptomeningeal disease s/p chemo and radiation s/p acute bacterial meningitis and an Ommaya site infection s/p removal of Ommaya 1/12 presents to the ER with c/o lethargy / AMS. As per pt's fiance at bedside, pt has some fluid leaking from the incision from when the Ommaya was removed. He was seen in Dr Hickman office earlier today where wound was inspected. Pt noted to have clear discharge from incision, no puss or erythema noted. Interrupted sutures were placed with good result. When pt returned home he had an episode where he began to tremble, "like he had Parkinson's". After that episode pt became lethargic and was not responding appropriately so he was brought to the ER for evaluation. He was seen and examined in the ER by myself and Dr Cee at ~20:30. He had seemed to make some improvement per fiance. He was now able to recognize us, respond to basic questions and follow simple commands briskly. CT head in ER showed no hemorrhage and no signs of infection.        PAST MEDICAL & SURGICAL HISTORY:  Hearing loss: right ear-no device  Cancer with leptomeningeal spread  Dyspnea on exertion  Gait disturbance  Memory loss  Anxiety and depression  Erectile dysfunction, unspecified erectile dysfunction type  History of kidney stones  History of pericarditis  Pleural effusion  Syncope, unspecified syncope type: X3-4 episodes; s/p LINQ device placed  Lung cancer, upper lobe: right. metastatic non small cell Lung Carcinoma.  History of lumbar puncture: chemo placed  History of cardiac monitoring: s/p LINQ device placement 4/2017  History of lumbar puncture within last 21 days  History of vascular access device: PORT placed  Pleuritic chest pain: Pleurodesis left chest 10/20/2017  H/O bilateral inguinal hernia repair: as a child  Acute pericarditis, unspecified type: pericardial window 11/2015, 12/2015      FAMILY HISTORY:  Family history of leukemia (Father)  Family history of cancer (Mother)        Social Hx:  Nonsmoker, no drug or alcohol use      Allergies  adhesives (Rash)  No Known Drug Allergies      MEDICATIONS Reviewed       ROS: Unable to obtain 2nd to lethargy        Vital Signs Last 24 Hrs  T(C): 36.9 (20 Feb 2018 17:47), Max: 36.9 (20 Feb 2018 17:47)  T(F): 98.5 (20 Feb 2018 17:47), Max: 98.5 (20 Feb 2018 17:47)  HR: 75 (20 Feb 2018 17:29) (75 - 75)  BP: 112/81 (20 Feb 2018 17:29) (112/81 - 112/81)  RR: 18 (20 Feb 2018 17:29) (18 - 18)  SpO2: 95% (20 Feb 2018 17:29) (95% - 95%)      Labs:                        10.9   13.1  )-----------( 238      ( 20 Feb 2018 18:41 )             33.3     134<L>  |  100  |  22  ----------------------------<  114<H>  4.5   |  27  |  1.28    Ca    9.2      20 Feb 2018 18:41    TPro  6.8  /  Alb  3.3  /  TBili  0.4  /  DBili  x   /  AST  25  /  ALT  64  /  AlkPhos  71  02-20      Radiology report:  CT Head No Cont (02.20.18 @ 18:07)   No acute intracranial findings. 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 Numerous previously visualized subcentimeter lesions   throughout the brain parenchyma, seen on prior MRI brain, are not seen to   good advantage on this CT head without contrast. Small area of   encephalomalacia/gliosis within the right anterior frontal lobe along the   prior ventricular shunt tract.       Physical Exam:  Constitutional: Lethargic / arouseable to voice  HEENT: PERRLA, EOMI grossly  Neck: Supple  Respiratory: Breath sounds are clear bilaterally  Cardiovascular: S1 and S2, regular rhythm  Gastrointestinal: Soft, NT/ND  Extremities: +RUE / RLE edema  Musculoskeletal: no joint swelling/tenderness, no abnormal movements  Skin: No rashes    Neurological Exam:  HF: A x O x 2-3, scant spont speech / appropriate, speech fluent, no aphasia or paraphasic errors. Naming /repetition intact   CN: PERRL, EOMI, facial sensation normal, no NLFD, tongue midline  Motor: No pronator drift, Strength 4+/5 in all 4 ext, normal bulk and tone, no tremor, rigidity or bradykinesia  Sens: Intact to light touch  Reflexes: Symmetric / depressed, no clonus  Coord: Can not assess  Gait/Balance: Cannot test
Patient is a 47y old  Male who presents with a chief complaint of seizure  yesterday morning and with h/o Brain mets complicated with Omaya reservoir infection in 1/18       HPI:  47 y.o. male with PMH stage IV lung cancer  with mets to  brain s/p Ommaya reservior 11/2017 for leptomeningeal disease s/p chemo/radiation, anxiety, depression, hx nephrolithiasis, hx pericarditis presents with lethargy, AMS with possible seizure like episode witnessed by his fiancee. . Pt was recently admitted ~1 mo ago with sepsis with acute bacterial meningitis, RUE DVT. Pt s/p removal of Ommaya 1/12. Pt was seen at neurosurg clinic with Dr Cee for clear discharge from incision. Pt went home where he developed tremors and became lethargic. Pt and steffe reports that after going home, he became increasing lethargic and "like Parkinsons" tremor. His eyes rolled back and lasted around 30-45 minutes in duration. During this time, moises reports that towards the end, he was able to comprehend questions and answer appropriately. In the beginning, pt was able to answer and appears confused. Currently, moises reports he's improved but still not to baseline. Pt currently denies subjective fever, sore throat, n/v, CP, SOB, abd pain, dysuria, diarrhea. Pt has forehad burhole and reports increased CSF leakage with raised bump. Pt seen by NS with new stiches and leak stopped currently. No recurrent episodes since admitted and started on Keppra . Completed RT and F/u MRI done results pending and PET scan not done. Pt being followed at [facility].    PMH: as above  PSH: as above  Social Hx: no tobacco, EtOH, drugs  Family Hx:  Father-leukemia; Mother-cancer  ROS: per HPI (21 Feb 2018 03:52)      PAST MEDICAL & SURGICAL HISTORY:  Hearing loss: right ear-no device  Cancer with leptomeningeal spread  Dyspnea on exertion  Gait disturbance  Memory loss  Anxiety and depression  Erectile dysfunction, unspecified erectile dysfunction type  History of kidney stones  History of pericarditis  Pleural effusion  Syncope, unspecified syncope type: X3-4 episodes; s/p LINQ device placed  Lung cancer, upper lobe: right. metastatic non small cell Lung Carcinoma.  History of kenia hole surgery  History of lumbar puncture: chemo placed  History of cardiac monitoring: s/p LINQ device placement 4/2017  History of lumbar puncture within last 21 days  History of vascular access device: PORT placed  Pleuritic chest pain: Pleurodesis left chest 10/20/2017  H/O bilateral inguinal hernia repair: as a child  Acute pericarditis, unspecified type: pericardial window 11/2015, 12/2015      FAMILY HISTORY:  Family history of leukemia (Father)  Family history of cancer (Mother)      Social Hx:  Nonsmoker, no drug or alcohol use    MEDICATIONS  (STANDING):  cefuroxime   Tablet 500 milliGRAM(s) Oral every 12 hours  colchicine 0.6 milliGRAM(s) Oral two times a day  dexamethasone     Tablet 2 milliGRAM(s) Oral daily  folic acid 1 milliGRAM(s) Oral daily  levETIRAcetam 500 milliGRAM(s) Oral two times a day  metoprolol     tartrate 25 milliGRAM(s) Oral two times a day  mirtazapine 30 milliGRAM(s) Oral at bedtime  pantoprazole    Tablet 40 milliGRAM(s) Oral before breakfast  rivaroxaban 15 milliGRAM(s) Oral two times a day  sodium chloride 0.9%. 1000 milliLiter(s) (100 mL/Hr) IV Continuous <Continuous>  trimethoprim  160 mG/sulfamethoxazole 800 mG 1 Tablet(s) Oral <User Schedule>       Allergies    adhesives (Rash)  No Known Drug Allergies    ROS: Pertinent positives in HPI, all other ROS were reviewed and are negative.      Vital Signs Last 24 Hrs  T(C): 36.7 (21 Feb 2018 10:05), Max: 36.9 (20 Feb 2018 17:47)  T(F): 98.1 (21 Feb 2018 10:05), Max: 98.5 (20 Feb 2018 17:47)  HR: 77 (21 Feb 2018 10:05) (65 - 78)  BP: 101/67 (21 Feb 2018 10:05) (88/66 - 123/73)  BP(mean): --  RR: 18 (21 Feb 2018 10:05) (11 - 18)  SpO2: 98% (21 Feb 2018 10:05) (95% - 100%)        Constitutional: awake and alert., Rt scalp indentation with kenia hole with some swelling   HEENT: PERRLA, EOMI,   Neck: Supple.  Respiratory: Breath sounds are clear bilaterally  Cardiovascular: S1 and S2, regular rhythm  Gastrointestinal: soft, nontender  Extremities:  no edema  Vascular:  Carotid Bruit - no  Musculoskeletal:  no abnormal movements  Skin: No rashes    Neurological exam:  HF: A x O x 3. Appropriately interactive, normal affect. Speech fluent, No Aphasia   CN: KENDELL, EOMI, VFF, facial sensation normal, no NLFD, tongue midline,gag intact  Motor: No pronator drift, Strength 5/5 in all 4 ext, normal bulk and tone.   Sens: Intact to light touch     Reflexes: Symmetric and normal . BJ 2+, BR 2+, KJ 2+, AJ 1+, downgoing toes b/l  Coord:  No  dysmetria,   Gait/Balance: Cannot test      Labs:   02-21    135  |  103  |  22  ----------------------------<  108<H>  4.0   |  25  |  0.95    Ca    8.9      21 Feb 2018 06:00    TPro  6.8  /  Alb  3.3  /  TBili  0.4  /  DBili  x   /  AST  25  /  ALT  64  /  AlkPhos  71  02-20                              10.2   11.2  )-----------( 191      ( 21 Feb 2018 06:00 )             31.6       Radiology:  - CT Head: 2/20/18    IMPRESSION:       No acute intracranial findings. 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 Numerous previously visualized subcentimeter lesions   throughout the brain parenchyma, seen on prior MRI brain, are not seen to   good advantage on this CT head without contrast. Small area of   encephalomalacia/gliosis within the right anterior frontal lobe along the   prior ventricular shunt tract.     - EEG 2/21/18    Impression : This is mildly abnormal EEG with intermittent bifrontal   slowing right more than left side suggestive of underlying structural   abnormality. No evidence of epileptiform activity. Clinical correlation   recommended.
Patient is a 47y old  Male who presents with a chief complaint of seizure (2018 05:05)    HPI:  47 y.o. male with PMH stage IV lung cancer to brain s/p [facility] reservior 2017 for leptomeningeal disease s/p chemo/radiation, anxiety, depression, hx nephrolithiasis, hx pericarditis, recent admission for MSSA meningitis secondary to omaya infection currently on ceftin 500 mg po q 12 hours now admitted on  for evaluation discharge from incision on top of head that was clear, nonpurulent, no fever, headache or stiff neck or photophobia. Family noted he was mildly confused and possibly had jerking motions. Now with clear sensorium, though is tired.                 PMH: as above  PSH: as above  Meds: per reconcilation sheet, noted below  MEDICATIONS  (STANDING):  cefuroxime   Tablet 500 milliGRAM(s) Oral every 12 hours  colchicine 0.6 milliGRAM(s) Oral two times a day  dexamethasone     Tablet 2 milliGRAM(s) Oral daily  folic acid 1 milliGRAM(s) Oral daily  levETIRAcetam 500 milliGRAM(s) Oral two times a day  metoprolol     tartrate 25 milliGRAM(s) Oral two times a day  mirtazapine 30 milliGRAM(s) Oral at bedtime  pantoprazole    Tablet 40 milliGRAM(s) Oral before breakfast  rivaroxaban 15 milliGRAM(s) Oral two times a day  sodium chloride 0.9%. 1000 milliLiter(s) (100 mL/Hr) IV Continuous <Continuous>  trimethoprim  160 mG/sulfamethoxazole 800 mG 1 Tablet(s) Oral <User Schedule>    MEDICATIONS  (PRN):  diazepam    Tablet 2 milliGRAM(s) Oral three times a day PRN anxiety  HYDROmorphone   Tablet 4 milliGRAM(s) Oral three times a day PRN Severe Pain (7 - 10)  LORazepam   Injectable 2 milliGRAM(s) IV Push every 15 minutes PRN Seizure activity    Allergies    adhesives (Rash)  No Known Drug Allergies    Intolerances      Social: no smoking, no alcohol, no illegal drugs; no recent travel, no exposure to TB  FAMILY HISTORY:  Family history of leukemia (Father)  Family history of cancer (Mother)    ROS: the patient has no fever, no chills, no HA, no dizziness, no sore throat, no blurry vision, no CP, no palpitations, no SOB, no cough, no abdominal pain, no diarrhea, no N/V, no dysuria, no leg pain, no claudication, no rash, no joint aches, no rectal pain or bleeding, no night sweats  Vital Signs Last 24 Hrs  T(C): 36.7 (2018 10:05), Max: 36.9 (2018 17:47)  T(F): 98.1 (2018 10:05), Max: 98.5 (2018 17:47)  HR: 77 (2018 10:05) (65 - 78)  BP: 101/67 (2018 10:05) (88/66 - 123/73)  BP(mean): --  RR: 18 (2018 10:05) (11 - 18)  SpO2: 98% (2018 10:05) (95% - 100%)  Daily Height in cm: 170.18 (2018 17:29)    Daily   Constitutional: frail looking  HEENT: NC/AT, EOMI, PERRLA, dressing over top of head; did not remove as placed by NS  Neck: supple  Respiratory: clear, no r/r/w  Cardiovascular: S1S2 regular, no murmurs  Abdomen: soft, not tender, not distended, positive BS  Genitourinary: deferred  Rectal: deferred  Musculoskeletal: no muscle tenderness, mild peripheral edema  Neurological: AxOx3, moving all extremities, no focal deficits  Skin: no rashes                          10.2   11.2  )-----------( 191      ( 2018 06:00 )             31.6     02-    135  |  103  |  22  ----------------------------<  108<H>  4.0   |  25  |  0.95    Ca    8.9      2018 06:00    TPro  6.8  /  Alb  3.3  /  TBili  0.4  /  DBili  x   /  AST  25  /  ALT  64  /  AlkPhos  71  02-20     LIVER FUNCTIONS - ( 2018 18:41 )  Alb: 3.3 g/dL / Pro: 6.8 gm/dL / ALK PHOS: 71 U/L / ALT: 64 U/L / AST: 25 U/L / GGT: x           Urinalysis Basic - ( 2018 18:27 )    Color: Yellow / Appearance: Clear / S.025 / pH: x  Gluc: x / Ketone: Trace  / Bili: Negative / Urobili: Negative mg/dL   Blood: x / Protein: 30 mg/dL / Nitrite: Negative   Leuk Esterase: Trace / RBC: >50 /HPF / WBC 6-10   Sq Epi: x / Non Sq Epi: x / Bacteria: x          Radiology:< from: CT Head No Cont (18 @ 18:07) >    EXAM:  CT BRAIN                            PROCEDURE DATE:  2018          INTERPRETATION:  Exam Date: 2018 6:07 PM    CT head without IV contrast    CLINICAL INFORMATION:  recent shunt removal, lethargic        TECHNIQUE: Contiguous axial sections were obtained through the head.     This scan was performed using automatic exposure control (radiation dose   reduction software) to obtain a diagnostic image quality scan with   patient dose as low as reasonably achievable.      COMPARISON: MRI brain 2018     FINDINGS:       Right frontal kenia hole is again identified. Numerous previously   visualized subcentimeter lesions throughout the brain parenchyma, seen on   prior MRI brain, are not seen to good advantage on this CThead without   contrast. Small area of encephalomalacia/gliosis within the right   anterior frontal lobe along the prior ventricular shunt tract. There is   no evidence of intraparenchymal or extraaxial hemorrhage.   There is no   CT evidence of large vessel acute infarct. No mass effect is found in the   brain.  No evidence of midline shift or herniation pattern.    The ventricles, sulci and basal cisterns appear unremarkable.         Visualized paranasal sinuses are clear.      IMPRESSION:       No acute intracranial findings. 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 Numerous previously visualized subcentimeter lesions   throughout the brain parenchyma, seen on prior MRI brain, are not seen to   good advantage on this CT head without contrast. Small area of   encephalomalacia/gliosis within the right anterior frontal lobe along the   prior ventricular shunt tract.     < end of copied text >      Advanced directive addressed: full resuscitation

## 2018-02-21 NOTE — DISCHARGE NOTE ADULT - PATIENT PORTAL LINK FT
You can access the [email or web address] Portal, offered by [facility], by registering with the following website: [email or web address]

## 2018-02-21 NOTE — PROGRESS NOTE ADULT - SUBJECTIVE AND OBJECTIVE BOX
HPI:   46 yo male known to our service with Stage 4 Lung CA with mets to brain s/p Ommaya reservoir 11/2017 for leptomeningeal disease s/p chemo and radiation s/p acute bacterial meningitis and an Ommaya site infection s/p removal of Ommaya 1/12 presents to the ER with c/o lethargy / AMS. As per pt's fiance at bedside, pt has some fluid leaking from the incision from when the Ommaya was removed. He was seen in Dr Hickman office earlier today where wound was inspected. Pt noted to have clear discharge from incision, no puss or erythema noted. Interrupted sutures were placed with good result. When pt returned home he had an episode where he began to tremble, "like he had Parkinson's". After that episode pt became lethargic and was not responding appropriately so he was brought to the ER for evaluation. He was seen and examined in the ER by myself and Dr Cee at ~20:30. He had seemed to make some improvement per fiance. He was now able to recognize us, respond to basic questions and follow simple commands briskly. CT head in ER showed no hemorrhage and no signs of infection.      2/21- Pt seen and examined, pt back at baseline, moving all extremities, afebrile, WBC improved, EEG done this AM awaiting results     Vital Signs Last 24 Hrs  T(C): 36.7 (21 Feb 2018 10:05), Max: 36.9 (20 Feb 2018 17:47)  T(F): 98.1 (21 Feb 2018 10:05), Max: 98.5 (20 Feb 2018 17:47)  HR: 77 (21 Feb 2018 10:05) (65 - 78)  BP: 101/67 (21 Feb 2018 10:05) (88/66 - 123/73)  RR: 18 (21 Feb 2018 10:05) (11 - 18)  SpO2: 98% (21 Feb 2018 10:05) (95% - 100%)    MEDICATIONS  (STANDING):  colchicine 0.6 milliGRAM(s) Oral two times a day  dexamethasone  Tablet 2 milliGRAM(s) Oral daily  folic acid 1 milliGRAM(s) Oral daily  levETIRAcetam 500 milliGRAM(s) Oral two times a day  metoprolol  tartrate 25 milliGRAM(s) Oral two times a day  mirtazapine 30 milliGRAM(s) Oral at bedtime  pantoprazole    Tablet 40 milliGRAM(s) Oral before breakfast  rivaroxaban 15 milliGRAM(s) Oral two times a day  sodium chloride 0.9%. 1000 milliLiter(s) (100 mL/Hr) IV Continuous   trimethoprim  160 mG/sulfamethoxazole 800 mG 1 Tablet(s) Oral   vancomycin  IVPB 1000 milliGRAM(s) IV Intermittent once    MEDICATIONS  (PRN):  diazepam    Tablet 2 milliGRAM(s) Oral three times a day PRN anxiety  HYDROmorphone   Tablet 4 milliGRAM(s) Oral three times a day PRN Severe Pain (7 - 10)  LORazepam   Injectable 2 milliGRAM(s) IV Push every 15 minutes PRN Seizure activity      PHYSICAL EXAM:  Constitutional: awake, alert, participating in conversation   HEENT: PERRLA, EOMI   Neck: Supple  Respiratory: Breath sounds are clear bilaterally  Cardiovascular: S1 and S2, regular rhythm  Gastrointestinal: Soft, NT/ND  Extremities: +RUE / RLE edema  Musculoskeletal: no joint swelling/tenderness, no abnormal movements  Skin: Pt had sutures placed in Dr. Cee's office yesterday morning-- no signs of leakage from Ommaya reservoir site     Neurological Exam:  HF: A x O x 3, speech intact / appropriate, speech fluent, no aphasia or paraphasic errors. Naming /repetition intact   CN: PEARRL, EOMI, facial sensation normal, no NLFD, tongue midline  Motor: No pronator drift, Strength 4+/5 in all 4 ext, normal bulk and tone, no tremor, rigidity or bradykinesia  Sens: Intact to light touch  Reflexes: Symmetric / depressed, no clonus  Coord: finger to nose intact   Gait/Balance: Cannot test    LABS:                         10.2   11.2  )-----------( 191      ( 21 Feb 2018 06:00 )             31.6     02-21    135  |  103  |  22  ----------------------------<  108<H>  4.0   |  25  |  0.95    Ca    8.9      21 Feb 2018 06:00    TPro  6.8  /  Alb  3.3  /  TBili  0.4  /  DBili  x   /  AST  25  /  ALT  64  /  AlkPhos  71  02-20    LIVER FUNCTIONS - ( 20 Feb 2018 18:41 )  Alb: 3.3 g/dL / Pro: 6.8 gm/dL / ALK PHOS: 71 U/L / ALT: 64 U/L / AST: 25 U/L / GGT: x           RADIOLOGY:  < from: CT Head No Cont (02.20.18 @ 18:07) >  No acute intracranial findings. 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 Numerous previously visualized subcentimeter lesions   throughout the brain parenchyma, seen on prior MRI brain, are not seen to   good advantage on this CT head without contrast. Small area of   encephalomalacia/gliosis within the right anterior frontal lobe along the   prior ventricular shunt tract.

## 2018-02-21 NOTE — ED ADULT NURSE REASSESSMENT NOTE - NS ED NURSE REASSESS COMMENT FT1
BP 88/66 2.5 hrs s/p 1mg dilaudid administration. pt arousable. o2 sat 96% on 2 L. MD Becker notified- 500cc bolus NS ordered and initiated. will continue to monitor.

## 2018-02-21 NOTE — H&P ADULT - ASSESSMENT
47 y.o. male with PMH stage IV lung cancer to brain s/p Ommaya reservior 11/2017 for leptomeningeal disease s/p chemo/radiation, anxiety, depression, hx nephrolithiasis, hx pericarditis presents with lethargy, AMS. Pt was recently admitted ~1 mo ago with sepsis with acute bacterial meningitis, RUE DVT. Pt s/p removal of Ommaya 1/12. Pt was seen at neurosurg clinic with Dr Cee for clear discharge from incision. Pt went home where he developed tremors and became lethargic. Pt and moises reports that after going home, he became increasing lethargic and "like Parkinsons" tremor. His eyes rolled back and lasted around 30-45 minutes in duration. During this time, moises reports that towards the end, he was able to comprehend questions and answer appropriately. In the beginning, pt was able to answer and appears confused. Currently, moises reports he's improved but still not to baseline. Pt currently denies subjective fever, sore throat, n/v, CP, SOB, abd pain, dysuria, diarrhea. Pt has forehad burhole and reports increased CSF leakage with raised bump.    #lethargy, confusion due to likely seizure activity  -admit to hospitalist service  -cont keppra 500mg BID  -neurosurg consult appreciated  -neuro checks  -neuro consult  -EEG  -iv hydration  -fall, aspiration, seizure precautions  -pulse ox monitoring    #hypotension  -hold lasix  -BB with holding parameters  -IV fluids    #leukocytosis with neutrophil predominance  -UA showing hematuria  -CXR appears clear  -no meningeal signs  -will give ceftriaxone 1g q24 and vanco 1g x1  -f/u cultures  -ID consult, Dr Hardy    #stage IV lung ca  -pt taking Tagrisso (started yesterday)  -oncology consult, Dr Arenas    #anxiety/depression  -cont home meds    #RUE DVT  -cont xarelto    #DVT ppx  -on xarelto 47 y.o. male with PMH stage IV lung cancer to brain s/p Ommaya reservior 11/2017 for leptomeningeal disease s/p chemo/radiation, anxiety, depression, hx nephrolithiasis, hx pericarditis presents with lethargy, AMS. Pt was recently admitted ~1 mo ago with sepsis with acute bacterial meningitis, RUE DVT. Pt s/p removal of Ommaya 1/12. Pt was seen at neurosurg clinic with Dr Cee for clear discharge from incision. Pt went home where he developed tremors and became lethargic. Pt and moises reports that after going home, he became increasing lethargic and "like Parkinsons" tremor. His eyes rolled back and lasted around 30-45 minutes in duration. During this time, moises reports that towards the end, he was able to comprehend questions and answer appropriately. In the beginning, pt was able to answer and appears confused. Currently, moises reports he's improved but still not to baseline. Pt currently denies subjective fever, sore throat, n/v, CP, SOB, abd pain, dysuria, diarrhea. Pt has forehad burhole and reports increased CSF leakage with raised bump.    #lethargy, confusion due to likely seizure activity  -admit to hospitalist service  -cont keppra 500mg BID  -neurosurg consult appreciated  -neuro checks  -neuro consult  -EEG  -iv hydration  -fall, aspiration, seizure precautions  -pulse ox monitoring    #hypotension  -hold lasix  -BB with holding parameters  -IV fluids    #leukocytosis with neutrophil predominance  -UA showing hematuria  -CXR follow up radiology report  -no meningeal signs  -will give ceftriaxone 1g q24 and vanco 1g x1  -f/u cultures  -ID consult, Dr Hardy    #stage IV lung ca  -pt taking Tagrisso (started yesterday)  -oncology consult, Dr Arenas    #anxiety/depression  -cont home meds    #RUE DVT  -cont xarelto    #DVT ppx  -on xarelto

## 2018-02-21 NOTE — DISCHARGE NOTE ADULT - CARE PROVIDER_API CALL
Azucena Cervantes), Family Medicine  [address] [place] [id number]  Phone: [phone]  Fax: [phone]    Karin Arenas), Internal Medicine; Medical Oncology  [address] [place] [address]  Phone: [phone]  Fax: [phone]

## 2018-02-21 NOTE — DISCHARGE NOTE ADULT - PLAN OF CARE
No recurrent episodes SEIZURE LIKE ACTIVITY  EEG-mildly abnormal EEG with intermittent bifrontal slowing right more than left side suggestive of underlying structural abnormality.   -FOLLOW UP WITH DR WADE FOR OUTPATIENT 48 HRS EEG  -CONTINUE KEPPRA 500 MG BID  -Tips for taking your seizure medicines:  -DO NOT skip a dose.  -Get refills before you run out.  -Keep seizure medicines in a safe place, away from children.  -Store medicines in a dry place, in the bottle that they came in. Throw away all old bottles.   If you miss a dose:  -Take it as soon as you remember.  -Check with your doctor about what to do if you miss a dose for more than a few hours. There are many seizure medicines with different dosing schedules.  -If you miss more than one dose, talk with your doctor or nurse. Mistakes are unavoidable and you may miss several doses at some point. So it may be useful to have this discussion ahead of time rather than when it happens.   -Drinking alcohol or doing illegal drugs can cause seizures.  -DO NOT drink alcohol if you take seizure medicines  -continue Ceftin 500 mg po q 12 hours that patient was discharged home on from prior hospitalization as plan was to complete this antibiotic until 2/28  -RECOMMENDED  AND STRONGLY ADVISED [facility] STAY PATIENT AND HIS PARTNER STATES HE IS FEELING WELL WILL FOLLOW UP WITH DR FLORES  -EXPLAINED RISK INCLUDED POSSIBLE RECURRENCE OF SEIZURES LIKE EPISODE no chest pain ,Shortness of breath -follow up with Dr Crystal chaudhry states he has an appointment at 3 PM 2/22 and at [facility].  - pt taking Tagrisso  continue with [facility]    - CXR: Moderate left pleural effusion with suggestion of loculated components in the left upper hemithorax. A pleural-based mass cannot entirely be excluded. Mild pulmonary vascular congestion no dizziness ,weakness will hold lasix as his BP was 80s/40s when he was initially hospitalized   please follow up with PCP or Dr Rojas no chest pain ,pain in the extremities h/o of Right upper extremity DVT  -continue Eliquis  -follow up with Dr Rojas

## 2018-02-21 NOTE — DISCHARGE NOTE ADULT - MEDICATION SUMMARY - MEDICATIONS TO STOP TAKING
I will STOP taking the medications listed below when I get home from the hospital:    furosemide 40 mg oral tablet  -- 1 tab(s) by mouth 2 times a day

## 2018-02-21 NOTE — PROGRESS NOTE ADULT - PROBLEM SELECTOR PLAN 2
ongoing surveillance and treatment with chemotherapy. current scan does not indicate obvious new pathology - will coordinate follow up MRI with oncology team in next month

## 2018-02-21 NOTE — DISCHARGE NOTE ADULT - HOSPITAL COURSE
47 y.o. male with PMH stage IV lung cancer to brain s/p Ommaya reservior 2017 for leptomeningeal disease s/p chemo/radiation, anxiety, depression, hx nephrolithiasis, hx pericarditis presents with lethargy, AMS. Pt was recently admitted ~1 mo ago with sepsis with acute bacterial meningitis, RUE DVT. Pt s/p removal of Ommaya . Pt was seen at neurosurg clinic with Dr Cee for clear discharge from incision admitted for Seizure like Episode. Patient was hypotensive -80/40s in ED lasix held  ,mild elevation of WBC -seen by Infectious dz attending follow up cultures serial cbc and monitor temperature continue Ceftin 500 mg po q 12 hours that patient was discharged home on from prior hospitalization as plan was to complete this antibiotic until  if any decompensation will need repeat lumbar puncture and broaden antibiotics to iv .Neurosurgery -added keppra 500 mg BID and seen by Neurologist -EEG showed recommended 24- 48 hr Ambulatory EEG if any recurrent episodes. This is mildly abnormal EEG with intermittent bifrontal slowing right more than left side suggestive of underlying structural abnormality. No evidence of epileptiform activity.    After extensive discussion with Dr Keane she recommends MRI which i explained to patient .He states he had an MRI done 1 week ago and explained extensively subsequent events if he leaves hospital and  he understand recommendation including partner refusing stay at hospital .          Vital Signs Last 24 Hrs  T(C): 37.3 (2018 18:07), Max: 37.3 (2018 18:07)  T(F): 99.1 (2018 18:07), Max: 99.1 (2018 18:07)  HR: 81 (2018 18:07) (65 - 81)  BP: 109/76 (2018 18:07) (88/66 - 123/73)  BP(mean): --  RR: 18 (2018 18:07) (11 - 18)  SpO2: 98% (2018 18:07) (98% - 100%)                              10.2   11.2  )-----------( 191      ( 2018 06:00 )             31.6     CBC Full  -  ( 2018 06:00 )  WBC Count : 11.2 K/uL  Hemoglobin : 10.2 g/dL  Hematocrit : 31.6 %  Platelet Count - Automated : 191 K/uL  Mean Cell Volume : 102.9 fl  Mean Cell Hemoglobin : 33.4 pg  Mean Cell Hemoglobin Concentration : 32.5 gm/dL  Auto Neutrophil # : x  Auto Lymphocyte # : x  Auto Monocyte # : x  Auto Eosinophil # : x  Auto Basophil # : x  Auto Neutrophil % : x  Auto Lymphocyte % : x  Auto Monocyte % : x  Auto Eosinophil % : x  Auto Basophil % : x        135  |  103  |  22  ----------------------------<  108<H>  4.0   |  25  |  0.95    Ca    8.9      2018 06:00    TPro  6.8  /  Alb  3.3  /  TBili  0.4  /  DBili  x   /  AST  25  /  ALT  64  /  AlkPhos  71        Urinalysis Basic - ( 2018 18:27 )    Color: Yellow / Appearance: Clear / S.025 / pH: x  Gluc: x / Ketone: Trace  / Bili: Negative / Urobili: Negative mg/dL   Blood: x / Protein: 30 mg/dL / Nitrite: Negative   Leuk Esterase: Trace / RBC: >50 /HPF / WBC 6-10   Sq Epi: x / Non Sq Epi: x / Bacteria: x        LIVER FUNCTIONS - ( 2018 18:41 )  Alb: 3.3 g/dL / Pro: 6.8 gm/dL / ALK PHOS: 71 U/L / ALT: 64 U/L / AST: 25 U/L / GGT: x           CAPILLARY BLOOD GLUCOSE              I&O's Detail    I&O's Summary 47 y.o. male with PMH stage IV lung cancer to brain s/p Ommaya reservior 2017 for leptomeningeal disease s/p chemo/radiation, anxiety, depression, hx nephrolithiasis, hx pericarditis presents with lethargy, AMS. Pt was recently admitted ~1 mo ago with sepsis with acute bacterial meningitis, RUE DVT. Pt s/p removal of Ommaya . Pt was seen at neurosurg clinic with Dr Cee for clear discharge from incision admitted for Seizure like Episode. Patient was hypotensive -80/40s in ED lasix held  ,mild elevation of WBC -seen by Infectious dz attending follow up cultures serial cbc and monitor temperature continue Ceftin 500 mg po q 12 hours that patient was discharged home on from prior hospitalization as plan was to complete this antibiotic until  if any decompensation will need repeat lumbar puncture and broaden antibiotics to iv .Neurosurgery -added keppra 500 mg BID and seen by Neurologist -EEG showed recommended 24- 48 hr Ambulatory EEG if any recurrent episodes. This is mildly abnormal EEG with intermittent bifrontal slowing right more than left side suggestive of underlying structural abnormality. No evidence of epileptiform activity.    After extensive discussion with Dr Keane she recommended MRI which i explained to patient .He states he had an MRI done 1 week ago and explained extensively subsequent events if he leaves hospital and  he understand recommendation including partner refusing stay at hospital .He reports he has an appointment with Dr Rojas at 3 PM tomorrow.        Vital Signs Last 24 Hrs  T(C): 37.3 (2018 18:07), Max: 37.3 (2018 18:07)  T(F): 99.1 (2018 18:07), Max: 99.1 (2018 18:07)  HR: 81 (2018 18:07) (65 - 81)  BP: 109/76 (2018 18:07) (88/66 - 123/73)  BP(mean): --  RR: 18 (2018 18:07) (11 - 18)  SpO2: 98% (2018 18:07) (98% - 100%)                              10.2   11.2  )-----------( 191      ( 2018 06:00 )             31.6     CBC Full  -  ( 2018 06:00 )  WBC Count : 11.2 K/uL  Hemoglobin : 10.2 g/dL  Hematocrit : 31.6 %  Platelet Count - Automated : 191 K/uL  Mean Cell Volume : 102.9 fl  Mean Cell Hemoglobin : 33.4 pg  Mean Cell Hemoglobin Concentration : 32.5 gm/dL  Auto Neutrophil # : x  Auto Lymphocyte # : x  Auto Monocyte # : x  Auto Eosinophil # : x  Auto Basophil # : x  Auto Neutrophil % : x  Auto Lymphocyte % : x  Auto Monocyte % : x  Auto Eosinophil % : x  Auto Basophil % : x        135  |  103  |  22  ----------------------------<  108<H>  4.0   |  25  |  0.95    Ca    8.9      2018 06:00    TPro  6.8  /  Alb  3.3  /  TBili  0.4  /  DBili  x   /  AST  25  /  ALT  64  /  AlkPhos  71  02-20      Urinalysis Basic - ( 2018 18:27 )    Color: Yellow / Appearance: Clear / S.025 / pH: x  Gluc: x / Ketone: Trace  / Bili: Negative / Urobili: Negative mg/dL   Blood: x / Protein: 30 mg/dL / Nitrite: Negative   Leuk Esterase: Trace / RBC: >50 /HPF / WBC 6-10   Sq Epi: x / Non Sq Epi: x / Bacteria: x        LIVER FUNCTIONS - ( 2018 18:41 )  Alb: 3.3 g/dL / Pro: 6.8 gm/dL / ALK PHOS: 71 U/L / ALT: 64 U/L / AST: 25 U/L / GGT: x           CAPILLARY BLOOD GLUCOSE              I&O's Detail    I&O's Summary

## 2018-02-21 NOTE — DISCHARGE NOTE ADULT - MEDICATION SUMMARY - MEDICATIONS TO TAKE
I will START or STAY ON the medications listed below when I get home from the hospital:    dexamethasone 2 mg oral tablet  -- 1 tab(s) by mouth once a day  -- Indication: For brain mets    Dilaudid 4 mg oral tablet  -- 1 tab(s) by mouth 2 times a day, As Needed MDD:2  -- Indication: For cancer pain    Xarelto 15 mg oral tablet  -- 1 tab(s) by mouth 2 times a day   -- Check with your doctor before becoming pregnant.  It is very important that you take or use this exactly as directed.  Do not skip doses or discontinue unless directed by your doctor.  Obtain medical advice before taking any non-prescription drugs as some may affect the action of this medication.  Take with food.    -- Indication: For dvt prophylaxis    Valium 2 mg oral tablet  -- 1 tab(s) by mouth 3 times a day, As Needed  -- Indication: For Seizure like episode    levETIRAcetam 500 mg oral tablet  -- 1 tab(s) by mouth 2 times a day  -- Indication: For Seizure like episode    mirtazapine 30 mg oral tablet  -- 1 tab(s) by mouth once a day (at bedtime)  -- Indication: For antidepressant    colchicine 0.6 mg oral tablet  -- 1 tab(s) by mouth 2 times a day  -- Indication: For gout    metoprolol tartrate 25 mg oral tablet  -- 1 tab(s) by mouth 2 times a day  -- Indication: For Htn    cefuroxime 500 mg oral tablet  -- 1 tab(s) by mouth 2 times a day   -- Finish all this medication unless otherwise directed by prescriber.  Medication should be taken with plenty of water.  Take with food or milk.    -- Indication: For meningitis    Protonix 40 mg oral delayed release tablet  -- 1 tab(s) by mouth once a day  -- Indication: For gerd    sulfamethoxazole-trimethoprim 800 mg-160 mg oral tablet  -- 1 tab(s) by mouth Monday, Wednesday, and Friday   -- Indication: For prophylaxis    folic acid 1 mg oral tablet  -- 1 tab(s) by mouth once a day  -- Indication: For multivitamin I will START or STAY ON the medications listed below when I get home from the hospital:    dexamethasone 2 mg oral tablet  -- 1 tab(s) by mouth once a day  -- Indication: For brain mets    Dilaudid 4 mg oral tablet  -- 1 tab(s) by mouth 2 times a day, As Needed MDD:2  -- Indication: For cancer pain    Xarelto 15 mg oral tablet  -- 1 tab(s) by mouth 2 times a day   -- Check with your doctor before becoming pregnant.  It is very important that you take or use this exactly as directed.  Do not skip doses or discontinue unless directed by your doctor.  Obtain medical advice before taking any non-prescription drugs as some may affect the action of this medication.  Take with food.    -- Indication: For H/O OF DVT    Valium 2 mg oral tablet  -- 1 tab(s) by mouth 3 times a day, As Needed  -- Indication: For Seizure like episode    levETIRAcetam 500 mg oral tablet  -- 1 tab(s) by mouth 2 times a day  -- Indication: For Seizure like episode    mirtazapine 30 mg oral tablet  -- 1 tab(s) by mouth once a day (at bedtime)  -- Indication: For antidepressant    colchicine 0.6 mg oral tablet  -- 1 tab(s) by mouth 2 times a day  -- Indication: For gout    metoprolol tartrate 25 mg oral tablet  -- 1 tab(s) by mouth 2 times a day  -- Indication: For Htn    cefuroxime 500 mg oral tablet  -- 1 tab(s) by mouth 2 times a day   -- Finish all this medication unless otherwise directed by prescriber.  Medication should be taken with plenty of water.  Take with food or milk.    -- Indication: For meningitis    Protonix 40 mg oral delayed release tablet  -- 1 tab(s) by mouth once a day  -- Indication: For gerd    sulfamethoxazole-trimethoprim 800 mg-160 mg oral tablet  -- 1 tab(s) by mouth Monday, Wednesday, and Friday   -- Indication: For prophylaxis    folic acid 1 mg oral tablet  -- 1 tab(s) by mouth once a day  -- Indication: For multivitamin

## 2018-02-21 NOTE — PROGRESS NOTE ADULT - ASSESSMENT
Patient is a 47 year old male known to our service with Stage 4 Lung CA with mets to brain s/p Ommaya reservoir 11/2017 for leptomeningeal disease s/p chemo and radiation s/p acute bacterial meningitis and an Ommaya site infection s/p removal of Ommaya 1/12 presented to the ER with c/o lethargy/AMS. CT head in ER showed no hemorrhage and no signs of infection, most likely pt post-ictal from seizure at home    PLAN-  No acute neurosurgical intervention  WBC is improved- low likelihood/suspicion of infection   Follow up EEG   Neurology consult pending  Continue Keppra
47 y.o. male with PMH stage IV lung cancer to brain s/p Ommaya reservior 11/2017 for leptomeningeal disease s/p chemo/radiation, anxiety, depression, hx nephrolithiasis, hx pericarditis presents with lethargy, AMS. Pt was recently admitted ~1 mo ago with sepsis with acute bacterial meningitis, RUE DVT. Pt s/p removal of Ommaya 1/12. Pt was seen at neurosurg clinic with Dr Cee for clear discharge from incision. Pt went home where he developed tremors and became lethargic. Pt and moises reports that after going home, he became increasing lethargic and "like Parkinsons" tremor. His eyes rolled back and lasted around 30-45 minutes in duration. During this time, moises reports that towards the end, he was able to comprehend questions and answer appropriately. In the beginning, pt was able to answer and appears confused. Currently, moises reports he's improved but still not to baseline. Pt currently denies subjective fever, sore throat, n/v, CP, SOB, abd pain, dysuria, diarrhea. Pt has forehad burhole and reports increased CSF leakage with raised bump.    #lethargy, confusion due to likely seizure activity  -continue keppra 500mg BID  -neurosurg consult appreciated  -neuro checks  - F/U Neuro consult  - F/U EEG  - IV hydration  -fall, aspiration, seizure precautions  -pulse ox monitoring    #hypotension  -hold lasix  -BB with holding parameters  -IV fluids    #leukocytosis with neutrophil predominance  -UA showing hematuria  -CXR follow up radiology report  -no meningeal signs  -s/p ceftriaxone 1g q24 and vanco 1g x1  -f/u cultures  -ID consult appreciated    #stage IV lung ca  -pt taking Tagrisso ,started recently  - F/U oncology consult, Dr Arenas    #anxiety/depression  -cont home meds    #RUE DVT  -cont xarelto    #DVT ppx  -on xarelto

## 2018-02-21 NOTE — DISCHARGE NOTE ADULT - CARE PLAN
Principal Discharge DX:	Seizure  Goal:	No recurrent episodes  Assessment and plan of treatment:	SEIZURE LIKE ACTIVITY  EEG-mildly abnormal EEG with intermittent bifrontal slowing right more than left side suggestive of underlying structural abnormality.   -FOLLOW UP WITH DR WADE FOR OUTPATIENT 48 HRS EEG  -CONTINUE KEPPRA 500 MG BID  -Tips for taking your seizure medicines:  -DO NOT skip a dose.  -Get refills before you run out.  -Keep seizure medicines in a safe place, away from children.  -Store medicines in a dry place, in the bottle that they came in. Throw away all old bottles.   If you miss a dose:  -Take it as soon as you remember.  -Check with your doctor about what to do if you miss a dose for more than a few hours. There are many seizure medicines with different dosing schedules.  -If you miss more than one dose, talk with your doctor or nurse. Mistakes are unavoidable and you may miss several doses at some point. So it may be useful to have this discussion ahead of time rather than when it happens.   -Drinking alcohol or doing illegal drugs can cause seizures.  -DO NOT drink alcohol if you take seizure medicines  -continue Ceftin 500 mg po q 12 hours that patient was discharged home on from prior hospitalization as plan was to complete this antibiotic until 2/28  -RECOMMENDED  AND STRONGLY ADVISED [facility] STAY PATIENT AND HIS PARTNER STATES HE IS FEELING WELL WILL FOLLOW UP WITH DR FLORES  -EXPLAINED RISK INCLUDED POSSIBLE RECURRENCE OF SEIZURES LIKE EPISODE  Secondary Diagnosis:	Lung cancer, upper lobe  Goal:	no chest pain ,Shortness of breath  Assessment and plan of treatment:	-follow up with Dr Flores he states he has an appointment at 3 PM 2/22 and at [facility].  - pt taking Tagrisso  continue with [facility]    - CXR: Moderate left pleural effusion with suggestion of loculated components in the left upper hemithorax. A pleural-based mass cannot entirely be excluded. Mild pulmonary vascular congestion  Secondary Diagnosis:	Hypotension  Goal:	no dizziness ,weakness  Assessment and plan of treatment:	will hold lasix as his BP was 80s/40s when he was initially hospitalized   please follow up with PCP or Dr Flores  Secondary Diagnosis:	DVT (deep venous thrombosis)  Goal:	no chest pain ,pain in the extremities  Assessment and plan of treatment:	h/o of Right upper extremity DVT  -continue Eliquis  -follow up with Dr Flores

## 2018-02-21 NOTE — PROGRESS NOTE ADULT - SUBJECTIVE AND OBJECTIVE BOX
Chief Complaint/Reason for Admission: tremors, confusion, AMS	  History of Present Illness: 	    Patient is a 48 y/o/m with pmhx of stage IV lung cancer to brain s/p Ommaya reservior 2017 for leptomeningeal disease s/p chemo/radiation, anxiety, depression, hx nephrolithiasis, hx pericarditis presents with lethargy, AMS.   Recent hospitalization, sepsis with acute bacterial meningitis, RUE DVT. Pt s/p removal of Ommaya . Pt was seen at neurosurg clinic with Dr Cee for clear discharge from incision. Pt went home where he developed tremors and became lethargic. Pt and moises reports that after going home, he became increasing lethargic and "like Parkinsons" tremor. His eyes rolled back and lasted around 30-45 minutes in duration. During this time, moises reports that towards the end, he was able to comprehend questions and answer appropriately. In the beginning, pt was able to answer and appears confused. Currently, moises reports he's improved but still not to baseline. Pt currently denies subjective fever, sore throat, n/v, CP, SOB, abd pain, dysuria, diarrhea. Pt has forehad burhole and reports increased CSF leakage with raised bump.    REVIEW OF SYSTEMS:  General: NAD, hemodynamically stable   HEENT:  Eyes:  No visual loss, blurred vision, double vision or yellow sclerae. Ears, Nose, Throat:  No hearing loss, sneezing, congestion, runny nose or sore throat.  SKIN:  No rash or itching.  CARDIOVASCULAR:  No chest pain, chest pressure or chest discomfort. No palpitations or edema.  RESPIRATORY:  No shortness of breath, cough or sputum.  GASTROINTESTINAL:  No anorexia, nausea, vomiting or diarrhea. No abdominal pain or blood.  NEUROLOGICAL:  shakes,   MUSCULOSKELETAL:  No muscle, back pain, joint pain or stiffness.  HEMATOLOGIC:  No anemia, bleeding or bruising.       Physical Exam:   GENERAL APPEARANCE:  NAD, hemodynamically stable, sick, deconditioned. comfortably resting  T(C): 36.7 (18 @ 10:05), Max: 36.9 (18 @ 17:47)  HR: 77 (18 @ 10:05) (65 - 78)  BP: 101/67 (02-21-18 @ 10:05) (88/66 - 123/73)  RR: 18 (18 @ 10:05) (11 - 18)  SpO2: 98% (18 @ 10:05) (95% - 100%)  HEENT:  Head is normocephalic    Skin: no signs of leakage from Ommaya reservoir site, as per neuro surgery note - had a follow up with Dr. Cee yesterday  NECK:  no JVD  HEART:  Regular rate and rhythm. normal S1 and S2, No M/R/G  LUNGS:  Good ins/exp effort, no W/R/R/C  ABDOMEN:  Soft, nontender, nondistended with good bowel sounds heard  EXTREMITIES:  Without cyanosis, clubbing or edema.   NEUROLOGICAL:  4/5 muscle strength in all extremities.     Labs:   CBC Full  -  ( 2018 06:00 )  WBC Count : 11.2 K/uL  Hemoglobin : 10.2 g/dL  Hematocrit : 31.6 %  Platelet Count - Automated : 191 K/uL    Urinalysis Basic - ( 2018 18:27 )    Color: Yellow / Appearance: Clear / S.025 / pH: x  Gluc: x / Ketone: Trace  / Bili: Negative / Urobili: Negative mg/dL   Blood: x / Protein: 30 mg/dL / Nitrite: Negative   Leuk Esterase: Trace / RBC: >50 /HPF / WBC 6-10   Sq Epi: x / Non Sq Epi: x / Bacteria: x    135  |  103  |  22  ----------------------------<  108<H>  4.0   |  25  |  0.95    Ca    8.9      2018 06:00    TPro  6.8  /  Alb  3.3  /  TBili  0.4  /  DBili  x   /  AST  25  /  ALT  64  /  AlkPhos  71        #lethargy, confusion due to likely seizure activity  - No acute neurosurgical intervention as per neurosurgery consultation  - WBC is improved- low likelihood/suspicion of infection   - EEG report pending   - Continue Keppra / Decadrone    #hypotension  - hold lasix  - BB with holding parameters  - IV fluids    #leukocytosis with neutrophil predominance  - will continue ceftin 500 mg po q 12 hours that patient was discharged home on from prior hospitalization as plan was to complete this antibiotic until   - if any decompensation will need repeat lumbar puncture and broaden antibiotics to iv  - ID evaluation appreciated    #stage IV lung ca  - pt taking Tagrisso (started yesterday)  - CXRAY: Moderate left pleural effusion with suggestion of loculated components in the left upper hemithorax. A pleural-based mass cannot entirely be excluded. Mild pulmonary vascular congestion  - continue with decadrone  - oncology consult, Dr Arenas    #anxiety/depression  - cont home meds    #RUE DVT  - cont xarelto    #DVT ppx  -on xarelto

## 2018-02-22 DIAGNOSIS — C34.12 MALIGNANT NEOPLASM OF UPPER LOBE, LEFT BRONCHUS OR LUNG: ICD-10-CM

## 2018-02-22 DIAGNOSIS — R56.9 UNSPECIFIED CONVULSIONS: ICD-10-CM

## 2018-02-22 DIAGNOSIS — C79.31 SECONDARY MALIGNANT NEOPLASM OF BRAIN: ICD-10-CM

## 2018-02-22 DIAGNOSIS — C79.49 SECONDARY MALIGNANT NEOPLASM OF OTHER PARTS OF NERVOUS SYSTEM: ICD-10-CM

## 2018-02-22 NOTE — CHART NOTE - NSCHARTNOTEFT_GEN_A_CORE
Called Dr Rojas  the patient follow up , I called patient mutiple times no answer ,she states patient is upset because of recurrent hospitalization and a very compliant and she would consider repeat MRI and Intrathecal chemotherapy. I called patient multiple times see how is he doing no answer ,Called Dr Rojas regarding the patient follow up, she states patient is upset because of recurrent hospitalization and a very compliant with her , she would consider repeat MRI and Intrathecal chemotherapy.

## 2018-02-23 ENCOUNTER — LABORATORY RESULT (OUTPATIENT)
Age: 48
End: 2018-02-23

## 2018-02-23 ENCOUNTER — APPOINTMENT (OUTPATIENT)
Dept: HEMATOLOGY ONCOLOGY | Facility: CLINIC | Age: 48
End: 2018-02-23
Payer: COMMERCIAL

## 2018-02-23 VITALS
HEIGHT: 67 IN | TEMPERATURE: 98.5 F | DIASTOLIC BLOOD PRESSURE: 72 MMHG | HEART RATE: 94 BPM | WEIGHT: 176 LBS | SYSTOLIC BLOOD PRESSURE: 106 MMHG | BODY MASS INDEX: 27.62 KG/M2

## 2018-02-23 DIAGNOSIS — C79.49 SECONDARY MALIGNANT NEOPLASM OF OTHER PARTS OF NERVOUS SYSTEM: ICD-10-CM

## 2018-02-23 DIAGNOSIS — Z15.89 GENETIC SUSCEPTIBILITY TO OTHER DISEASE: ICD-10-CM

## 2018-02-23 DIAGNOSIS — Z15.09 GENETIC SUSCEPTIBILITY TO OTHER DISEASE: ICD-10-CM

## 2018-02-23 DIAGNOSIS — Z51.11 ENCOUNTER FOR ANTINEOPLASTIC CHEMOTHERAPY: ICD-10-CM

## 2018-02-23 DIAGNOSIS — G89.3 NEOPLASM RELATED PAIN (ACUTE) (CHRONIC): ICD-10-CM

## 2018-02-23 DIAGNOSIS — C79.31 SECONDARY MALIGNANT NEOPLASM OF BRAIN: ICD-10-CM

## 2018-02-23 DIAGNOSIS — Z51.5 ENCOUNTER FOR ANTINEOPLASTIC CHEMOTHERAPY: ICD-10-CM

## 2018-02-23 LAB
HCT VFR BLD CALC: 36.3 %
HGB BLD-MCNC: 11.9 G/DL
MCHC RBC-ENTMCNC: 32.7 GM/DL
MCHC RBC-ENTMCNC: 33.7 PG
MCV RBC AUTO: 103 FL
PLATELET # BLD AUTO: 211 K/UL
RBC # BLD: 3.52 M/UL
RBC # FLD: 15.1 %
WBC # FLD AUTO: 11.1 K/UL

## 2018-02-23 PROCEDURE — 36415 COLL VENOUS BLD VENIPUNCTURE: CPT

## 2018-02-23 PROCEDURE — 99215 OFFICE O/P EST HI 40 MIN: CPT | Mod: 25

## 2018-02-23 PROCEDURE — 85025 COMPLETE CBC W/AUTO DIFF WBC: CPT

## 2018-02-23 RX ORDER — LEVETIRACETAM 500 MG/1
500 TABLET, FILM COATED ORAL
Qty: 28 | Refills: 0 | Status: ACTIVE | COMMUNITY
Start: 2018-02-21

## 2018-02-24 LAB
NIGHT BLUE STAIN TISS: SIGNIFICANT CHANGE UP
NIGHT BLUE STAIN TISS: SIGNIFICANT CHANGE UP

## 2018-02-26 LAB
ALBUMIN SERPL ELPH-MCNC: 3.6 G/DL
ALP BLD-CCNC: 70 U/L
ALT SERPL-CCNC: 74 U/L
ANION GAP SERPL CALC-SCNC: 15 MMOL/L
AST SERPL-CCNC: 17 U/L
BILIRUB SERPL-MCNC: 0.2 MG/DL
BUN SERPL-MCNC: 13 MG/DL
CALCIUM SERPL-MCNC: 9.5 MG/DL
CHLORIDE SERPL-SCNC: 103 MMOL/L
CO2 SERPL-SCNC: 21 MMOL/L
CREAT SERPL-MCNC: 1.07 MG/DL
CULTURE RESULTS: SIGNIFICANT CHANGE UP
CULTURE RESULTS: SIGNIFICANT CHANGE UP
GLUCOSE SERPL-MCNC: 159 MG/DL
POTASSIUM SERPL-SCNC: 3.8 MMOL/L
PROT SERPL-MCNC: 5.9 G/DL
SODIUM SERPL-SCNC: 139 MMOL/L
SPECIMEN SOURCE: SIGNIFICANT CHANGE UP
SPECIMEN SOURCE: SIGNIFICANT CHANGE UP

## 2018-02-27 DIAGNOSIS — H91.91 UNSPECIFIED HEARING LOSS, RIGHT EAR: ICD-10-CM

## 2018-02-27 DIAGNOSIS — J90 PLEURAL EFFUSION, NOT ELSEWHERE CLASSIFIED: ICD-10-CM

## 2018-02-27 DIAGNOSIS — Z79.01 LONG TERM (CURRENT) USE OF ANTICOAGULANTS: ICD-10-CM

## 2018-02-27 DIAGNOSIS — F32.9 MAJOR DEPRESSIVE DISORDER, SINGLE EPISODE, UNSPECIFIED: ICD-10-CM

## 2018-02-27 DIAGNOSIS — C79.31 SECONDARY MALIGNANT NEOPLASM OF BRAIN: ICD-10-CM

## 2018-02-27 DIAGNOSIS — Z87.442 PERSONAL HISTORY OF URINARY CALCULI: ICD-10-CM

## 2018-02-27 DIAGNOSIS — C34.90 MALIGNANT NEOPLASM OF UNSPECIFIED PART OF UNSPECIFIED BRONCHUS OR LUNG: ICD-10-CM

## 2018-02-27 DIAGNOSIS — C79.32 SECONDARY MALIGNANT NEOPLASM OF CEREBRAL MENINGES: ICD-10-CM

## 2018-02-27 DIAGNOSIS — Z86.718 PERSONAL HISTORY OF OTHER VENOUS THROMBOSIS AND EMBOLISM: ICD-10-CM

## 2018-02-27 DIAGNOSIS — Z92.21 PERSONAL HISTORY OF ANTINEOPLASTIC CHEMOTHERAPY: ICD-10-CM

## 2018-02-27 DIAGNOSIS — I95.9 HYPOTENSION, UNSPECIFIED: ICD-10-CM

## 2018-02-27 DIAGNOSIS — Z92.3 PERSONAL HISTORY OF IRRADIATION: ICD-10-CM

## 2018-02-27 DIAGNOSIS — R56.9 UNSPECIFIED CONVULSIONS: ICD-10-CM

## 2018-02-27 DIAGNOSIS — D72.829 ELEVATED WHITE BLOOD CELL COUNT, UNSPECIFIED: ICD-10-CM

## 2018-02-27 DIAGNOSIS — F41.9 ANXIETY DISORDER, UNSPECIFIED: ICD-10-CM

## 2018-03-03 LAB
CULTURE RESULTS: SIGNIFICANT CHANGE UP
NIGHT BLUE STAIN TISS: SIGNIFICANT CHANGE UP
SPECIMEN SOURCE: SIGNIFICANT CHANGE UP

## 2018-03-06 ENCOUNTER — OUTPATIENT (OUTPATIENT)
Dept: OUTPATIENT SERVICES | Facility: HOSPITAL | Age: 48
LOS: 1 days | Discharge: HOME | End: 2018-03-06
Payer: COMMERCIAL

## 2018-03-06 VITALS
TEMPERATURE: 98 F | DIASTOLIC BLOOD PRESSURE: 98 MMHG | HEART RATE: 83 BPM | SYSTOLIC BLOOD PRESSURE: 135 MMHG | OXYGEN SATURATION: 100 % | RESPIRATION RATE: 17 BRPM

## 2018-03-06 VITALS
RESPIRATION RATE: 16 BRPM | WEIGHT: 176.37 LBS | SYSTOLIC BLOOD PRESSURE: 139 MMHG | TEMPERATURE: 98 F | OXYGEN SATURATION: 98 % | HEART RATE: 72 BPM | HEIGHT: 67 IN | DIASTOLIC BLOOD PRESSURE: 97 MMHG

## 2018-03-06 DIAGNOSIS — Z98.890 OTHER SPECIFIED POSTPROCEDURAL STATES: Chronic | ICD-10-CM

## 2018-03-06 DIAGNOSIS — Z92.89 PERSONAL HISTORY OF OTHER MEDICAL TREATMENT: Chronic | ICD-10-CM

## 2018-03-06 DIAGNOSIS — I30.9 ACUTE PERICARDITIS, UNSPECIFIED: Chronic | ICD-10-CM

## 2018-03-06 DIAGNOSIS — R07.81 PLEURODYNIA: Chronic | ICD-10-CM

## 2018-03-06 LAB
INR BLD: 1.08 RATIO — SIGNIFICANT CHANGE UP (ref 0.88–1.16)
PROTHROM AB SERPL-ACNC: 11.7 SEC — SIGNIFICANT CHANGE UP (ref 9.8–12.7)

## 2018-03-06 PROCEDURE — 32555 ASPIRATE PLEURA W/ IMAGING: CPT | Mod: RT,59

## 2018-03-06 PROCEDURE — 71045 X-RAY EXAM CHEST 1 VIEW: CPT | Mod: 26

## 2018-03-06 RX ORDER — HYDROMORPHONE HYDROCHLORIDE 2 MG/ML
1 INJECTION INTRAMUSCULAR; INTRAVENOUS; SUBCUTANEOUS EVERY 4 HOURS
Qty: 0 | Refills: 0 | Status: DISCONTINUED | OUTPATIENT
Start: 2018-03-06 | End: 2018-03-06

## 2018-03-06 RX ADMIN — HYDROMORPHONE HYDROCHLORIDE 1 MILLIGRAM(S): 2 INJECTION INTRAMUSCULAR; INTRAVENOUS; SUBCUTANEOUS at 13:02

## 2018-03-06 RX ADMIN — HYDROMORPHONE HYDROCHLORIDE 1 MILLIGRAM(S): 2 INJECTION INTRAMUSCULAR; INTRAVENOUS; SUBCUTANEOUS at 12:38

## 2018-03-06 NOTE — BRIEF OPERATIVE NOTE - PROCEDURE
<<-----Click on this checkbox to enter Procedure Thoracentesis, therapeutic, with imaging guidance  03/06/2018  right  Active  DAXELROD

## 2018-03-06 NOTE — BRIEF OPERATIVE NOTE - OPERATION/FINDINGS
large right pl eff. sono guidance. 5fr access. 820cc clear yellow out. cxr ok, but pt with shoulder pain post. hydromorphone ordered. pain should be transient. likely referred from pleura/diaphragm.

## 2018-03-10 LAB
CULTURE RESULTS: SIGNIFICANT CHANGE UP
SPECIMEN SOURCE: SIGNIFICANT CHANGE UP

## 2018-03-14 ENCOUNTER — APPOINTMENT (OUTPATIENT)
Dept: HEMATOLOGY ONCOLOGY | Facility: CLINIC | Age: 48
End: 2018-03-14

## 2018-03-15 DIAGNOSIS — L21.9 SEBORRHEIC DERMATITIS, UNSPECIFIED: ICD-10-CM

## 2018-03-15 DIAGNOSIS — J90 PLEURAL EFFUSION, NOT ELSEWHERE CLASSIFIED: ICD-10-CM

## 2018-03-15 DIAGNOSIS — L30.1 DYSHIDROSIS [POMPHOLYX]: ICD-10-CM

## 2018-03-15 DIAGNOSIS — C79.31 SECONDARY MALIGNANT NEOPLASM OF BRAIN: ICD-10-CM

## 2018-03-15 DIAGNOSIS — F40.240 CLAUSTROPHOBIA: ICD-10-CM

## 2018-03-15 DIAGNOSIS — L85.3 XEROSIS CUTIS: ICD-10-CM

## 2018-03-15 DIAGNOSIS — G89.3 NEOPLASM RELATED PAIN (ACUTE) (CHRONIC): ICD-10-CM

## 2018-03-15 DIAGNOSIS — Z87.891 PERSONAL HISTORY OF NICOTINE DEPENDENCE: ICD-10-CM

## 2018-03-15 DIAGNOSIS — C34.90 MALIGNANT NEOPLASM OF UNSPECIFIED PART OF UNSPECIFIED BRONCHUS OR LUNG: ICD-10-CM

## 2018-03-15 DIAGNOSIS — F41.9 ANXIETY DISORDER, UNSPECIFIED: ICD-10-CM

## 2018-03-27 ENCOUNTER — EMERGENCY (EMERGENCY)
Facility: HOSPITAL | Age: 48
LOS: 0 days | Discharge: HOME | End: 2018-03-27
Attending: EMERGENCY MEDICINE | Admitting: EMERGENCY MEDICINE
Payer: COMMERCIAL

## 2018-03-27 VITALS
OXYGEN SATURATION: 99 % | SYSTOLIC BLOOD PRESSURE: 115 MMHG | HEIGHT: 67 IN | DIASTOLIC BLOOD PRESSURE: 95 MMHG | TEMPERATURE: 99 F | WEIGHT: 179.9 LBS | RESPIRATION RATE: 20 BRPM | HEART RATE: 96 BPM

## 2018-03-27 DIAGNOSIS — C34.90 MALIGNANT NEOPLASM OF UNSPECIFIED PART OF UNSPECIFIED BRONCHUS OR LUNG: ICD-10-CM

## 2018-03-27 DIAGNOSIS — Z98.890 OTHER SPECIFIED POSTPROCEDURAL STATES: Chronic | ICD-10-CM

## 2018-03-27 DIAGNOSIS — R06.00 DYSPNEA, UNSPECIFIED: ICD-10-CM

## 2018-03-27 DIAGNOSIS — J90 PLEURAL EFFUSION, NOT ELSEWHERE CLASSIFIED: ICD-10-CM

## 2018-03-27 DIAGNOSIS — I30.9 ACUTE PERICARDITIS, UNSPECIFIED: Chronic | ICD-10-CM

## 2018-03-27 DIAGNOSIS — Z92.89 PERSONAL HISTORY OF OTHER MEDICAL TREATMENT: Chronic | ICD-10-CM

## 2018-03-27 DIAGNOSIS — R07.81 PLEURODYNIA: Chronic | ICD-10-CM

## 2018-03-27 LAB
ALBUMIN SERPL ELPH-MCNC: 3.1 G/DL — LOW (ref 3.3–5)
ALP SERPL-CCNC: 60 U/L — SIGNIFICANT CHANGE UP (ref 40–120)
ALT FLD-CCNC: 54 U/L — SIGNIFICANT CHANGE UP (ref 12–78)
ANION GAP SERPL CALC-SCNC: 9 MMOL/L — SIGNIFICANT CHANGE UP (ref 5–17)
APTT BLD: 23.5 SEC — LOW (ref 27.5–37.4)
AST SERPL-CCNC: 80 U/L — HIGH (ref 15–37)
BASOPHILS # BLD AUTO: 0.03 K/UL — SIGNIFICANT CHANGE UP (ref 0–0.2)
BASOPHILS NFR BLD AUTO: 0.3 % — SIGNIFICANT CHANGE UP (ref 0–2)
BILIRUB SERPL-MCNC: 0.5 MG/DL — SIGNIFICANT CHANGE UP (ref 0.2–1.2)
BUN SERPL-MCNC: 22 MG/DL — SIGNIFICANT CHANGE UP (ref 7–23)
CALCIUM SERPL-MCNC: 9.3 MG/DL — SIGNIFICANT CHANGE UP (ref 8.5–10.1)
CHLORIDE SERPL-SCNC: 103 MMOL/L — SIGNIFICANT CHANGE UP (ref 96–108)
CO2 SERPL-SCNC: 23 MMOL/L — SIGNIFICANT CHANGE UP (ref 22–31)
CREAT SERPL-MCNC: 1.27 MG/DL — SIGNIFICANT CHANGE UP (ref 0.5–1.3)
EOSINOPHIL # BLD AUTO: 0.02 K/UL — SIGNIFICANT CHANGE UP (ref 0–0.5)
EOSINOPHIL NFR BLD AUTO: 0.2 % — SIGNIFICANT CHANGE UP (ref 0–6)
GLUCOSE SERPL-MCNC: 145 MG/DL — HIGH (ref 70–99)
HCT VFR BLD CALC: 44.4 % — SIGNIFICANT CHANGE UP (ref 39–50)
HGB BLD-MCNC: 14.6 G/DL — SIGNIFICANT CHANGE UP (ref 13–17)
IMM GRANULOCYTES NFR BLD AUTO: 1.5 % — SIGNIFICANT CHANGE UP (ref 0–1.5)
INR BLD: 0.92 RATIO — SIGNIFICANT CHANGE UP (ref 0.88–1.16)
LYMPHOCYTES # BLD AUTO: 1.05 K/UL — SIGNIFICANT CHANGE UP (ref 1–3.3)
LYMPHOCYTES # BLD AUTO: 9.2 % — LOW (ref 13–44)
MCHC RBC-ENTMCNC: 31.5 PG — SIGNIFICANT CHANGE UP (ref 27–34)
MCHC RBC-ENTMCNC: 32.9 GM/DL — SIGNIFICANT CHANGE UP (ref 32–36)
MCV RBC AUTO: 95.7 FL — SIGNIFICANT CHANGE UP (ref 80–100)
MONOCYTES # BLD AUTO: 0.65 K/UL — SIGNIFICANT CHANGE UP (ref 0–0.9)
MONOCYTES NFR BLD AUTO: 5.7 % — SIGNIFICANT CHANGE UP (ref 2–14)
NEUTROPHILS # BLD AUTO: 9.49 K/UL — HIGH (ref 1.8–7.4)
NEUTROPHILS NFR BLD AUTO: 83.1 % — HIGH (ref 43–77)
NRBC # BLD: 0 /100 WBCS — SIGNIFICANT CHANGE UP (ref 0–0)
NT-PROBNP SERPL-SCNC: 337 PG/ML — HIGH (ref 0–125)
PLATELET # BLD AUTO: 246 K/UL — SIGNIFICANT CHANGE UP (ref 150–400)
POTASSIUM SERPL-MCNC: 5.5 MMOL/L — HIGH (ref 3.5–5.3)
POTASSIUM SERPL-SCNC: 5.5 MMOL/L — HIGH (ref 3.5–5.3)
PROT SERPL-MCNC: 6.9 GM/DL — SIGNIFICANT CHANGE UP (ref 6–8.3)
PROTHROM AB SERPL-ACNC: 9.9 SEC — SIGNIFICANT CHANGE UP (ref 9.8–12.7)
RBC # BLD: 4.64 M/UL — SIGNIFICANT CHANGE UP (ref 4.2–5.8)
RBC # FLD: 13.3 % — SIGNIFICANT CHANGE UP (ref 10.3–14.5)
SODIUM SERPL-SCNC: 135 MMOL/L — SIGNIFICANT CHANGE UP (ref 135–145)
TROPONIN I SERPL-MCNC: <0.015 NG/ML — SIGNIFICANT CHANGE UP (ref 0.01–0.04)
WBC # BLD: 11.41 K/UL — HIGH (ref 3.8–10.5)
WBC # FLD AUTO: 11.41 K/UL — HIGH (ref 3.8–10.5)

## 2018-03-27 PROCEDURE — 93010 ELECTROCARDIOGRAM REPORT: CPT

## 2018-03-27 PROCEDURE — 99284 EMERGENCY DEPT VISIT MOD MDM: CPT

## 2018-03-27 PROCEDURE — 71045 X-RAY EXAM CHEST 1 VIEW: CPT | Mod: 26

## 2018-03-27 NOTE — ED PROVIDER NOTE - PROGRESS NOTE DETAILS
Attending Sharma, reviewed with pt and fiance results of tests.  Pt currently at baseline (pt does have some confusion at baseline).  Offer to hold for IR to evaluated for possible drainage of pleural effusion, but decision is to follow up with doc at [facility].  Has pain medication and ativan for anxiety.  Pt instructed to return to ed for worsening symptoms.

## 2018-03-27 NOTE — ED ADULT NURSE REASSESSMENT NOTE - NS ED NURSE REASSESS COMMENT FT1
0228: report received from MILTON Clark. pt resting comfortably. spouse at bedside. updated on poc. will cont to monitor.

## 2018-03-27 NOTE — ED PROVIDER NOTE - OBJECTIVE STATEMENT
46 yo pt here with fianna marie for SOB.  Pt with hx of metastatic lung ca.  Pt is due to have a permacath placed for plural effusion on wed.  Tonba artis states that pt was sleeping and then appeared that he was becoming apneic.  Went to wake pt up, was initially a little confuse and did have a fall.  Pt now feel better.  Pt states for the last 2 weeks when he goes to wake up he has episodes of SOB and pain.

## 2018-03-27 NOTE — ED PROVIDER NOTE - CARE PLAN
Principal Discharge DX:	Malignant neoplasm of lung, unspecified laterality, unspecified part of lung  Secondary Diagnosis:	Pleural effusion

## 2018-03-27 NOTE — ED ADULT TRIAGE NOTE - CHIEF COMPLAINT QUOTE
Pt presents to ER with spouse c/o SOB. Pt reports hx of lung/brain CA. Onset of symptoms began one month ago and progressively became worse. Pt spouse reports while pt was sleeping tonight he became apneic and then woke up disoriented and had difficulty catching breath. Denies CP

## 2018-03-27 NOTE — ED ADULT NURSE NOTE - OBJECTIVE STATEMENT
As per spouse, pt woke from being asleep tonight feeling like he couldn't catch his breath. Spouse states pt had apneic episode, lasted only a few secs, but ever since pt has been having increased WOB. Pt has hx of stage 4 lung ca. Pt also has been more disoriented since apneic episode occurred.

## 2018-03-28 RX ORDER — DRONABINOL 5 MG/1
CAPSULE ORAL
Refills: 0 | Status: ACTIVE | COMMUNITY

## 2018-03-30 ENCOUNTER — APPOINTMENT (OUTPATIENT)
Dept: HEMATOLOGY ONCOLOGY | Facility: CLINIC | Age: 48
End: 2018-03-30

## 2018-04-01 ENCOUNTER — INPATIENT (INPATIENT)
Facility: HOSPITAL | Age: 48
LOS: 6 days | End: 2018-04-08
Attending: SURGERY | Admitting: SURGERY
Payer: SELF-PAY

## 2018-04-01 VITALS — WEIGHT: 179.9 LBS | HEIGHT: 68 IN

## 2018-04-01 DIAGNOSIS — Z98.890 OTHER SPECIFIED POSTPROCEDURAL STATES: Chronic | ICD-10-CM

## 2018-04-01 DIAGNOSIS — Z92.89 PERSONAL HISTORY OF OTHER MEDICAL TREATMENT: Chronic | ICD-10-CM

## 2018-04-01 DIAGNOSIS — I30.9 ACUTE PERICARDITIS, UNSPECIFIED: Chronic | ICD-10-CM

## 2018-04-01 DIAGNOSIS — R07.81 PLEURODYNIA: Chronic | ICD-10-CM

## 2018-04-01 LAB
ALBUMIN SERPL ELPH-MCNC: 2.5 G/DL — LOW (ref 3.3–5)
ALP SERPL-CCNC: 221 U/L — HIGH (ref 40–120)
ALT FLD-CCNC: 62 U/L — SIGNIFICANT CHANGE UP (ref 12–78)
AMMONIA BLD-MCNC: 24 UMOL/L — SIGNIFICANT CHANGE UP (ref 11–32)
ANION GAP SERPL CALC-SCNC: 8 MMOL/L — SIGNIFICANT CHANGE UP (ref 5–17)
APPEARANCE UR: (no result)
APTT BLD: 26.8 SEC — LOW (ref 27.5–37.4)
AST SERPL-CCNC: 80 U/L — HIGH (ref 15–37)
BACTERIA # UR AUTO: (no result)
BASE EXCESS BLDA CALC-SCNC: -3.8 MMOL/L — LOW (ref -2–2)
BASE EXCESS BLDA CALC-SCNC: -4.6 MMOL/L — LOW (ref -2–2)
BASE EXCESS BLDA CALC-SCNC: -5.2 MMOL/L — LOW (ref -2–2)
BASOPHILS # BLD AUTO: 0.02 K/UL — SIGNIFICANT CHANGE UP (ref 0–0.2)
BASOPHILS NFR BLD AUTO: 0.2 % — SIGNIFICANT CHANGE UP (ref 0–2)
BILIRUB SERPL-MCNC: 0.5 MG/DL — SIGNIFICANT CHANGE UP (ref 0.2–1.2)
BILIRUB UR-MCNC: (no result)
BLOOD GAS COMMENTS ARTERIAL: 10 — SIGNIFICANT CHANGE UP
BLOOD GAS COMMENTS ARTERIAL: SIGNIFICANT CHANGE UP
BUN SERPL-MCNC: 27 MG/DL — HIGH (ref 7–23)
CALCIUM SERPL-MCNC: 10.4 MG/DL — HIGH (ref 8.5–10.1)
CHLORIDE SERPL-SCNC: 94 MMOL/L — LOW (ref 96–108)
CO2 SERPL-SCNC: 29 MMOL/L — SIGNIFICANT CHANGE UP (ref 22–31)
COLOR SPEC: (no result)
COMMENT - URINE: SIGNIFICANT CHANGE UP
COMMENT - URINE: SIGNIFICANT CHANGE UP
CREAT SERPL-MCNC: 1.82 MG/DL — HIGH (ref 0.5–1.3)
DIFF PNL FLD: (no result)
EOSINOPHIL # BLD AUTO: 0 K/UL — SIGNIFICANT CHANGE UP (ref 0–0.5)
EOSINOPHIL NFR BLD AUTO: 0 % — SIGNIFICANT CHANGE UP (ref 0–6)
EPI CELLS # UR: SIGNIFICANT CHANGE UP
ETHANOL SERPL-MCNC: <10 MG/DL — SIGNIFICANT CHANGE UP (ref 0–10)
GAS PNL BLDA: SIGNIFICANT CHANGE UP
GLUCOSE SERPL-MCNC: 188 MG/DL — HIGH (ref 70–99)
GLUCOSE UR QL: NEGATIVE MG/DL — SIGNIFICANT CHANGE UP
HCO3 BLDA-SCNC: 24 MMOL/L — SIGNIFICANT CHANGE UP (ref 21–29)
HCO3 BLDA-SCNC: 25 MMOL/L — SIGNIFICANT CHANGE UP (ref 21–29)
HCO3 BLDA-SCNC: 26 MMOL/L — SIGNIFICANT CHANGE UP (ref 21–29)
HCT VFR BLD CALC: 47.9 % — SIGNIFICANT CHANGE UP (ref 39–50)
HGB BLD-MCNC: 15.4 G/DL — SIGNIFICANT CHANGE UP (ref 13–17)
IMM GRANULOCYTES NFR BLD AUTO: 1.1 % — SIGNIFICANT CHANGE UP (ref 0–1.5)
INR BLD: 1.29 RATIO — HIGH (ref 0.88–1.16)
KETONES UR-MCNC: NEGATIVE — SIGNIFICANT CHANGE UP
LACTATE SERPL-SCNC: 1.7 MMOL/L — SIGNIFICANT CHANGE UP (ref 0.7–2)
LEUKOCYTE ESTERASE UR-ACNC: (no result)
LYMPHOCYTES # BLD AUTO: 0.83 K/UL — LOW (ref 1–3.3)
LYMPHOCYTES # BLD AUTO: 10.3 % — LOW (ref 13–44)
MCHC RBC-ENTMCNC: 31.4 PG — SIGNIFICANT CHANGE UP (ref 27–34)
MCHC RBC-ENTMCNC: 32.2 GM/DL — SIGNIFICANT CHANGE UP (ref 32–36)
MCV RBC AUTO: 97.8 FL — SIGNIFICANT CHANGE UP (ref 80–100)
MONOCYTES # BLD AUTO: 0.63 K/UL — SIGNIFICANT CHANGE UP (ref 0–0.9)
MONOCYTES NFR BLD AUTO: 7.8 % — SIGNIFICANT CHANGE UP (ref 2–14)
NEUTROPHILS # BLD AUTO: 6.51 K/UL — SIGNIFICANT CHANGE UP (ref 1.8–7.4)
NEUTROPHILS NFR BLD AUTO: 80.6 % — HIGH (ref 43–77)
NITRITE UR-MCNC: NEGATIVE — SIGNIFICANT CHANGE UP
NRBC # BLD: 0 /100 WBCS — SIGNIFICANT CHANGE UP (ref 0–0)
PCO2 BLDA: 61 MMHG — HIGH (ref 32–46)
PCO2 BLDA: 67 MMHG — HIGH (ref 32–46)
PCO2 BLDA: 75 MMHG — CRITICAL HIGH (ref 32–46)
PH BLDA: 7.16 — CRITICAL LOW (ref 7.35–7.45)
PH BLDA: 7.19 — CRITICAL LOW (ref 7.35–7.45)
PH BLDA: 7.23 — LOW (ref 7.35–7.45)
PH UR: 5 — SIGNIFICANT CHANGE UP (ref 5–8)
PLATELET # BLD AUTO: 133 K/UL — LOW (ref 150–400)
PO2 BLDA: 42 MMHG — CRITICAL LOW (ref 74–108)
PO2 BLDA: 48 MMHG — CRITICAL LOW (ref 74–108)
PO2 BLDA: 54 MMHG — LOW (ref 74–108)
POTASSIUM SERPL-MCNC: 5.4 MMOL/L — HIGH (ref 3.5–5.3)
POTASSIUM SERPL-SCNC: 5.4 MMOL/L — HIGH (ref 3.5–5.3)
PROT SERPL-MCNC: 6.9 GM/DL — SIGNIFICANT CHANGE UP (ref 6–8.3)
PROT UR-MCNC: 30 MG/DL
PROTHROM AB SERPL-ACNC: 14 SEC — HIGH (ref 9.8–12.7)
RBC # BLD: 4.9 M/UL — SIGNIFICANT CHANGE UP (ref 4.2–5.8)
RBC # FLD: 13.2 % — SIGNIFICANT CHANGE UP (ref 10.3–14.5)
RBC CASTS # UR COMP ASSIST: >50 /HPF (ref 0–4)
SAO2 % BLDA: 66 % — LOW (ref 92–96)
SAO2 % BLDA: 76 % — LOW (ref 92–96)
SAO2 % BLDA: 83 % — LOW (ref 92–96)
SODIUM SERPL-SCNC: 131 MMOL/L — LOW (ref 135–145)
SP GR SPEC: 1.02 — SIGNIFICANT CHANGE UP (ref 1.01–1.02)
TROPONIN I SERPL-MCNC: <0.015 NG/ML — SIGNIFICANT CHANGE UP (ref 0.01–0.04)
TROPONIN I SERPL-MCNC: <0.015 NG/ML — SIGNIFICANT CHANGE UP (ref 0.01–0.04)
UROBILINOGEN FLD QL: 1 MG/DL
WBC # BLD: 8.08 K/UL — SIGNIFICANT CHANGE UP (ref 3.8–10.5)
WBC # FLD AUTO: 8.08 K/UL — SIGNIFICANT CHANGE UP (ref 3.8–10.5)
WBC UR QL: SIGNIFICANT CHANGE UP

## 2018-04-01 PROCEDURE — 70450 CT HEAD/BRAIN W/O DYE: CPT | Mod: 26

## 2018-04-01 PROCEDURE — 93010 ELECTROCARDIOGRAM REPORT: CPT

## 2018-04-01 PROCEDURE — 74018 RADEX ABDOMEN 1 VIEW: CPT | Mod: 26

## 2018-04-01 PROCEDURE — 71045 X-RAY EXAM CHEST 1 VIEW: CPT | Mod: 26

## 2018-04-01 PROCEDURE — 71250 CT THORAX DX C-: CPT | Mod: 26

## 2018-04-01 PROCEDURE — 99291 CRITICAL CARE FIRST HOUR: CPT

## 2018-04-01 RX ORDER — INFLUENZA VIRUS VACCINE 15; 15; 15; 15 UG/.5ML; UG/.5ML; UG/.5ML; UG/.5ML
0.5 SUSPENSION INTRAMUSCULAR ONCE
Qty: 0 | Refills: 0 | Status: DISCONTINUED | OUTPATIENT
Start: 2018-04-01 | End: 2018-04-08

## 2018-04-01 RX ORDER — PIPERACILLIN AND TAZOBACTAM 4; .5 G/20ML; G/20ML
3.38 INJECTION, POWDER, LYOPHILIZED, FOR SOLUTION INTRAVENOUS EVERY 8 HOURS
Qty: 0 | Refills: 0 | Status: DISCONTINUED | OUTPATIENT
Start: 2018-04-01 | End: 2018-04-08

## 2018-04-01 RX ORDER — FENTANYL CITRATE 50 UG/ML
2 INJECTION INTRAVENOUS
Qty: 5000 | Refills: 0 | Status: DISCONTINUED | OUTPATIENT
Start: 2018-04-01 | End: 2018-04-01

## 2018-04-01 RX ORDER — SODIUM CHLORIDE 9 MG/ML
1000 INJECTION INTRAMUSCULAR; INTRAVENOUS; SUBCUTANEOUS ONCE
Qty: 0 | Refills: 0 | Status: COMPLETED | OUTPATIENT
Start: 2018-04-01 | End: 2018-04-01

## 2018-04-01 RX ORDER — HEPARIN SODIUM 5000 [USP'U]/ML
6500 INJECTION INTRAVENOUS; SUBCUTANEOUS EVERY 6 HOURS
Qty: 0 | Refills: 0 | Status: DISCONTINUED | OUTPATIENT
Start: 2018-04-01 | End: 2018-04-07

## 2018-04-01 RX ORDER — FENTANYL CITRATE 50 UG/ML
1 INJECTION INTRAVENOUS
Qty: 2500 | Refills: 0 | Status: DISCONTINUED | OUTPATIENT
Start: 2018-04-01 | End: 2018-04-02

## 2018-04-01 RX ORDER — PROPOFOL 10 MG/ML
10 INJECTION, EMULSION INTRAVENOUS
Qty: 1000 | Refills: 0 | Status: DISCONTINUED | OUTPATIENT
Start: 2018-04-01 | End: 2018-04-08

## 2018-04-01 RX ORDER — HEPARIN SODIUM 5000 [USP'U]/ML
INJECTION INTRAVENOUS; SUBCUTANEOUS
Qty: 25000 | Refills: 0 | Status: DISCONTINUED | OUTPATIENT
Start: 2018-04-01 | End: 2018-04-07

## 2018-04-01 RX ORDER — RIVAROXABAN 15 MG-20MG
15 KIT ORAL
Qty: 0 | Refills: 0 | Status: DISCONTINUED | OUTPATIENT
Start: 2018-04-01 | End: 2018-04-01

## 2018-04-01 RX ORDER — HEPARIN SODIUM 5000 [USP'U]/ML
3000 INJECTION INTRAVENOUS; SUBCUTANEOUS EVERY 6 HOURS
Qty: 0 | Refills: 0 | Status: DISCONTINUED | OUTPATIENT
Start: 2018-04-01 | End: 2018-04-07

## 2018-04-01 RX ORDER — CHLORHEXIDINE GLUCONATE 213 G/1000ML
15 SOLUTION TOPICAL
Qty: 0 | Refills: 0 | Status: DISCONTINUED | OUTPATIENT
Start: 2018-04-01 | End: 2018-04-08

## 2018-04-01 RX ORDER — PANTOPRAZOLE SODIUM 20 MG/1
40 TABLET, DELAYED RELEASE ORAL DAILY
Qty: 0 | Refills: 0 | Status: DISCONTINUED | OUTPATIENT
Start: 2018-04-01 | End: 2018-04-08

## 2018-04-01 RX ORDER — MIRTAZAPINE 45 MG/1
30 TABLET, ORALLY DISINTEGRATING ORAL AT BEDTIME
Qty: 0 | Refills: 0 | Status: DISCONTINUED | OUTPATIENT
Start: 2018-04-01 | End: 2018-04-08

## 2018-04-01 RX ORDER — PIPERACILLIN AND TAZOBACTAM 4; .5 G/20ML; G/20ML
3.38 INJECTION, POWDER, LYOPHILIZED, FOR SOLUTION INTRAVENOUS ONCE
Qty: 0 | Refills: 0 | Status: COMPLETED | OUTPATIENT
Start: 2018-04-01 | End: 2018-04-01

## 2018-04-01 RX ORDER — LEVETIRACETAM 250 MG/1
500 TABLET, FILM COATED ORAL DAILY
Qty: 0 | Refills: 0 | Status: DISCONTINUED | OUTPATIENT
Start: 2018-04-01 | End: 2018-04-03

## 2018-04-01 RX ORDER — DEXAMETHASONE 0.5 MG/5ML
2 ELIXIR ORAL DAILY
Qty: 0 | Refills: 0 | Status: DISCONTINUED | OUTPATIENT
Start: 2018-04-01 | End: 2018-04-07

## 2018-04-01 RX ADMIN — PROPOFOL 4.9 MICROGRAM(S)/KG/MIN: 10 INJECTION, EMULSION INTRAVENOUS at 23:10

## 2018-04-01 RX ADMIN — HEPARIN SODIUM 6500 UNIT(S): 5000 INJECTION INTRAVENOUS; SUBCUTANEOUS at 20:35

## 2018-04-01 RX ADMIN — PIPERACILLIN AND TAZOBACTAM 200 GRAM(S): 4; .5 INJECTION, POWDER, LYOPHILIZED, FOR SOLUTION INTRAVENOUS at 18:09

## 2018-04-01 RX ADMIN — HEPARIN SODIUM 1500 UNIT(S)/HR: 5000 INJECTION INTRAVENOUS; SUBCUTANEOUS at 20:34

## 2018-04-01 RX ADMIN — PIPERACILLIN AND TAZOBACTAM 25 GRAM(S): 4; .5 INJECTION, POWDER, LYOPHILIZED, FOR SOLUTION INTRAVENOUS at 21:40

## 2018-04-01 RX ADMIN — PROPOFOL 4.9 MICROGRAM(S)/KG/MIN: 10 INJECTION, EMULSION INTRAVENOUS at 18:09

## 2018-04-01 RX ADMIN — SODIUM CHLORIDE 1000 MILLILITER(S): 9 INJECTION INTRAMUSCULAR; INTRAVENOUS; SUBCUTANEOUS at 17:50

## 2018-04-01 NOTE — ED PROVIDER NOTE - MEDICAL DECISION MAKING DETAILS
Denice BERGER: Unresponsive, patient intubated prior to arrival, admitted to ICU with ICU and NSG icaring for patient. Admission by ICU and follow up care by NSG is appreciated.

## 2018-04-01 NOTE — H&P ADULT - ASSESSMENT
47y old M with:  Acute Hypoxemic / Hypercapneic Resp Failure - On Vent  Inc A-a gradient  Stage IV Lung CA with Brain Mets  Bacterial Meningitis  s/p Ommaya reservoir on 11/2017  Leptomeningeal disease s/p Chemo and XRT  Depression  Anxiety  AMS    Plan:  Admit to ICU  Serial Neuro Exams  NS Eval - Dr. Cee aware  Likely hypoxemic / hypercapneic Resp Failure  s/p Bacterial Meningitis  BP Stable  Continue Mech Vent   Follow up CXRAY  NPO / IVF  IV Vanco / Zosyn per ID  Monitor renal function  Strict I/O's  Venous Doppler r/o DVT  DVT prophylaxis - Xarelto  Case d/w sister (HCP) at bedside regarding patients current status, plan of care, and prognosis with all questions answered in detail.  Patient is full-code

## 2018-04-01 NOTE — ED PROVIDER NOTE - PROGRESS NOTE DETAILS
Myron DG: Informed Intensivist Dr. Maza of pt arrival, code stroke called, and neurosurgeon Dr. Coleman bailey. Myron DG: NS Dr. Schmidt contacted, states he is not covering for pt NS Dr. Cee, will contact Dr. Cee Myron DG: MANSI Cee contacted, will f/u with CT scan, agrees with plan for ICU admission. States due to condition of 13 metastatic lesions, pt likely needs palliative care will follow case and d/w family Scribe DG: tPa not indicated due to unknown time of onset. Myron DG: ICU Dr. Maza requesting Doppler US of all extremities, will order.

## 2018-04-01 NOTE — ED PROVIDER NOTE - RESPIRATORY, MLM
B/L crackles noted. B/L crackles noted. Intubated, bilateral breath sounds with external ventilation.

## 2018-04-01 NOTE — ED PROVIDER NOTE - CARE PLAN
Principal Discharge DX:	Altered mental status, unspecified altered mental status type  Secondary Diagnosis:	Hypoxia

## 2018-04-01 NOTE — H&P ADULT - HISTORY OF PRESENT ILLNESS
History from chart and sister  47y old M with PMH Stage IV lung cancer to brain s/p Ommaya reservoir 11/2017 for leptomeningeal disease s/p chemo/radiation, sepsis with acute bacterial meningitis, RUE DVT. Pt s/p removal of Ommaya 1/12.   As per sister, patient has been getting more lethargic the last few days. He had a right sided PleurX catheter placed a few days back and his DOAC was held. During drainage of fluid he slumped over and was brought to the ER. He was intubated by EMS en route. On arrival he is intubated and sedated.

## 2018-04-01 NOTE — ED PROVIDER NOTE - OBJECTIVE STATEMENT
46 y/o M with Hx of lung ca w/ metastasis to the brain, HTN, GERD, pericardial effusion surgically alleviated, pleural effusion with recent drainage,  shunt placed in December complicated by meningitis and removed in february presents to ED for evaluation of unresponsiveness. Per EMS pt's fiancee noted purulent d/c from his head today and noted pt to be altered. On arrival EMS noted pt to have agonal breathing and pt was intubated in the field. No recent trauma reported by EMS and family. Complete Hx not available secondary to pt condition and urgent need for intervention. 46 y/o M with Hx of lung ca w/ metastasis to the brain, HTN, GERD, pericardial effusion surgically alleviated, pleural effusion with recent drainage,  shunt placed in December complicated by meningitis and removed in february, DVT RUE presents to ED for evaluation of unresponsiveness. Per EMS pt's fiancee noted purulent d/c from his head today and noted pt to be altered. On arrival EMS noted pt to have agonal breathing and pt was intubated in the field. No recent trauma reported by EMS and family. Complete Hx not available secondary to pt condition and urgent need for intervention.

## 2018-04-01 NOTE — ED ADULT TRIAGE NOTE - CHIEF COMPLAINT QUOTE
pt BIBA unresponsive , intubated , pt was having agonal breathing , recent  shunt revision  hx Lung ca WITH METS TO BRAIN , ETT 7.0 , lip 22cm , RT arm DVT

## 2018-04-01 NOTE — ED PROVIDER NOTE - NS_ ATTENDINGSCRIBEDETAILS _ED_A_ED_FT
I Lino Galdamez MD saw and examined the patient. Scribe documented for me and under my supervision. I have modified the scribe's documentation where necessary to reflect my history, physical exam and other relevant documentations pertinent to the care of the patient.

## 2018-04-01 NOTE — ED PROVIDER NOTE - MUSCULOSKELETAL, MLM
Rt sided thoracic catheter noted on the lower chest wall. Rt sided thoracic catheter noted on the lower chest wall. No visible deformities noted. No spinal step off.

## 2018-04-02 DIAGNOSIS — C79.49 SECONDARY MALIGNANT NEOPLASM OF OTHER PARTS OF NERVOUS SYSTEM: ICD-10-CM

## 2018-04-02 DIAGNOSIS — C34.90 MALIGNANT NEOPLASM OF UNSPECIFIED PART OF UNSPECIFIED BRONCHUS OR LUNG: ICD-10-CM

## 2018-04-02 DIAGNOSIS — J96.01 ACUTE RESPIRATORY FAILURE WITH HYPOXIA: ICD-10-CM

## 2018-04-02 DIAGNOSIS — J90 PLEURAL EFFUSION, NOT ELSEWHERE CLASSIFIED: ICD-10-CM

## 2018-04-02 DIAGNOSIS — J18.9 PNEUMONIA, UNSPECIFIED ORGANISM: ICD-10-CM

## 2018-04-02 DIAGNOSIS — R41.82 ALTERED MENTAL STATUS, UNSPECIFIED: ICD-10-CM

## 2018-04-02 DIAGNOSIS — C34.11 MALIGNANT NEOPLASM OF UPPER LOBE, RIGHT BRONCHUS OR LUNG: ICD-10-CM

## 2018-04-02 LAB
APTT BLD: 150 SEC — CRITICAL HIGH (ref 27.5–37.4)
APTT BLD: 64.4 SEC — HIGH (ref 27.5–37.4)
APTT BLD: 76 SEC — HIGH (ref 27.5–37.4)
HCT VFR BLD CALC: 38.9 % — LOW (ref 39–50)
HGB BLD-MCNC: 12.8 G/DL — LOW (ref 13–17)
MCHC RBC-ENTMCNC: 31.2 PG — SIGNIFICANT CHANGE UP (ref 27–34)
MCHC RBC-ENTMCNC: 32.9 GM/DL — SIGNIFICANT CHANGE UP (ref 32–36)
MCV RBC AUTO: 94.9 FL — SIGNIFICANT CHANGE UP (ref 80–100)
NRBC # BLD: 0 /100 WBCS — SIGNIFICANT CHANGE UP (ref 0–0)
PLATELET # BLD AUTO: 98 K/UL — LOW (ref 150–400)
RBC # BLD: 4.1 M/UL — LOW (ref 4.2–5.8)
RBC # FLD: 13.2 % — SIGNIFICANT CHANGE UP (ref 10.3–14.5)
WBC # BLD: 3.53 K/UL — LOW (ref 3.8–10.5)
WBC # FLD AUTO: 3.53 K/UL — LOW (ref 3.8–10.5)

## 2018-04-02 PROCEDURE — 99233 SBSQ HOSP IP/OBS HIGH 50: CPT

## 2018-04-02 PROCEDURE — 71045 X-RAY EXAM CHEST 1 VIEW: CPT | Mod: 26

## 2018-04-02 PROCEDURE — 99222 1ST HOSP IP/OBS MODERATE 55: CPT

## 2018-04-02 PROCEDURE — 93970 EXTREMITY STUDY: CPT | Mod: 26,59

## 2018-04-02 PROCEDURE — 95819 EEG AWAKE AND ASLEEP: CPT | Mod: 26

## 2018-04-02 RX ORDER — SODIUM CHLORIDE 9 MG/ML
1000 INJECTION INTRAMUSCULAR; INTRAVENOUS; SUBCUTANEOUS
Qty: 0 | Refills: 0 | Status: DISCONTINUED | OUTPATIENT
Start: 2018-04-02 | End: 2018-04-04

## 2018-04-02 RX ORDER — VANCOMYCIN HCL 1 G
1000 VIAL (EA) INTRAVENOUS ONCE
Qty: 0 | Refills: 0 | Status: COMPLETED | OUTPATIENT
Start: 2018-04-02 | End: 2018-04-02

## 2018-04-02 RX ADMIN — PROPOFOL 4.9 MICROGRAM(S)/KG/MIN: 10 INJECTION, EMULSION INTRAVENOUS at 18:18

## 2018-04-02 RX ADMIN — CHLORHEXIDINE GLUCONATE 15 MILLILITER(S): 213 SOLUTION TOPICAL at 17:28

## 2018-04-02 RX ADMIN — HEPARIN SODIUM 1200 UNIT(S)/HR: 5000 INJECTION INTRAVENOUS; SUBCUTANEOUS at 10:11

## 2018-04-02 RX ADMIN — Medication 2 MILLIGRAM(S): at 20:56

## 2018-04-02 RX ADMIN — PIPERACILLIN AND TAZOBACTAM 25 GRAM(S): 4; .5 INJECTION, POWDER, LYOPHILIZED, FOR SOLUTION INTRAVENOUS at 21:29

## 2018-04-02 RX ADMIN — Medication 250 MILLIGRAM(S): at 11:17

## 2018-04-02 RX ADMIN — SODIUM CHLORIDE 100 MILLILITER(S): 9 INJECTION INTRAMUSCULAR; INTRAVENOUS; SUBCUTANEOUS at 20:20

## 2018-04-02 RX ADMIN — CHLORHEXIDINE GLUCONATE 15 MILLILITER(S): 213 SOLUTION TOPICAL at 05:11

## 2018-04-02 RX ADMIN — HEPARIN SODIUM 1200 UNIT(S)/HR: 5000 INJECTION INTRAVENOUS; SUBCUTANEOUS at 17:29

## 2018-04-02 RX ADMIN — Medication 2 MILLIGRAM(S): at 05:11

## 2018-04-02 RX ADMIN — HEPARIN SODIUM 1200 UNIT(S)/HR: 5000 INJECTION INTRAVENOUS; SUBCUTANEOUS at 04:02

## 2018-04-02 RX ADMIN — PIPERACILLIN AND TAZOBACTAM 25 GRAM(S): 4; .5 INJECTION, POWDER, LYOPHILIZED, FOR SOLUTION INTRAVENOUS at 14:08

## 2018-04-02 RX ADMIN — HEPARIN SODIUM 0 UNIT(S)/HR: 5000 INJECTION INTRAVENOUS; SUBCUTANEOUS at 03:00

## 2018-04-02 RX ADMIN — Medication 2 MILLIGRAM(S): at 10:06

## 2018-04-02 RX ADMIN — PANTOPRAZOLE SODIUM 40 MILLIGRAM(S): 20 TABLET, DELAYED RELEASE ORAL at 11:59

## 2018-04-02 RX ADMIN — LEVETIRACETAM 400 MILLIGRAM(S): 250 TABLET, FILM COATED ORAL at 12:05

## 2018-04-02 RX ADMIN — Medication 2 MILLIGRAM(S): at 17:28

## 2018-04-02 RX ADMIN — MIRTAZAPINE 30 MILLIGRAM(S): 45 TABLET, ORALLY DISINTEGRATING ORAL at 21:29

## 2018-04-02 RX ADMIN — PIPERACILLIN AND TAZOBACTAM 25 GRAM(S): 4; .5 INJECTION, POWDER, LYOPHILIZED, FOR SOLUTION INTRAVENOUS at 05:12

## 2018-04-02 RX ADMIN — PROPOFOL 4.9 MICROGRAM(S)/KG/MIN: 10 INJECTION, EMULSION INTRAVENOUS at 05:28

## 2018-04-02 RX ADMIN — PROPOFOL 4.9 MICROGRAM(S)/KG/MIN: 10 INJECTION, EMULSION INTRAVENOUS at 11:59

## 2018-04-02 NOTE — DIETITIAN INITIAL EVALUATION ADULT. - ENERGY NEEDS
Ht. 68 "        Wt. 81.2 kg        27 BMI                  IBW 70  kg               Pt is at 116 %  IBW

## 2018-04-02 NOTE — CONSULT NOTE ADULT - SUBJECTIVE AND OBJECTIVE BOX
HPI:  History from chart and sister  47y old M with PMH Stage IV lung cancer to brain s/p Ommaya reservoir 2017 for leptomeningeal disease s/p chemo/radiation, sepsis with acute bacterial meningitis, RUE DVT. Pt s/p removal of Ommaya .   As per sister, patient has been getting more lethargic the last few days. He had a right sided PleurX catheter placed a few days back and his DOAC was held. During drainage of fluid he slumped over and was brought to the ER. He was intubated by EMS en route. On arrival he is intubated and sedated.    History as above. Patient seen and examined in ICU. ABG's showed acute respiratory acidosis. CT scan chest shows loculated, large left effussion with LLL consolidation. Also, patchy right lung ground glass opacities. Patient sedated.      PAST MEDICAL & SURGICAL HISTORY:  Hearing loss: right ear-no device  Cancer with leptomeningeal spread  Dyspnea on exertion  Gait disturbance  Memory loss  Anxiety and depression  Erectile dysfunction, unspecified erectile dysfunction type  History of kidney stones  History of pericarditis  Pleural effusion  Syncope, unspecified syncope type: X3-4 episodes; s/p LINQ device placed  Lung cancer, upper lobe: right. metastatic non small cell Lung Carcinoma.  History of kenia hole surgery  History of lumbar puncture: chemo placed  History of cardiac monitoring: s/p LINQ device placement 2017  History of lumbar puncture within last 21 days  History of vascular access device: PORT placed  Pleuritic chest pain: Pleurodesis left chest 10/20/2017  H/O bilateral inguinal hernia repair: as a child  Acute pericarditis, unspecified type: pericardial window 2015, 2015      MEDICATIONS  (STANDING):  chlorhexidine 0.12% Liquid 15 milliLiter(s) Swish and Spit two times a day  dexamethasone     Tablet 2 milliGRAM(s) Oral daily  fentaNYL   Infusion 1 MICROgram(s)/kG/Hr (8.16 mL/Hr) IV Continuous <Continuous>  fentaNYL   Infusion 1 MICROgram(s)/kG/Hr (8.16 mL/Hr) IV Continuous <Continuous>  heparin  Infusion.  Unit(s)/Hr (15 mL/Hr) IV Continuous <Continuous>  influenza   Vaccine 0.5 milliLiter(s) IntraMuscular once  levETIRAcetam  IVPB 500 milliGRAM(s) IV Intermittent daily  mirtazapine 30 milliGRAM(s) Oral at bedtime  pantoprazole  Injectable 40 milliGRAM(s) IV Push daily  piperacillin/tazobactam IVPB. 3.375 Gram(s) IV Intermittent every 8 hours  propofol Infusion 10 MICROgram(s)/kG/Min (4.896 mL/Hr) IV Continuous <Continuous>    MEDICATIONS  (PRN):  heparin  Injectable 6500 Unit(s) IV Push every 6 hours PRN For aPTT less than 40  heparin  Injectable 3000 Unit(s) IV Push every 6 hours PRN For aPTT between 40 - 57      Allergies    adhesives (Rash)  No Known Drug Allergies    Intolerances        SOCIAL HISTORY: Denies tobacco, etoh abuse or illicit drug use    FAMILY HISTORY:  Family history of leukemia (Father)  Family history of cancer (Mother)      Vital Signs Last 24 Hrs  T(C): 37.4 (2018 04:00), Max: 37.4 (2018 17:30)  T(F): 99.4 (2018 04:00), Max: 99.4 (2018 17:30)  HR: 100 (2018 08:12) (100 - 143)  BP: 118/73 (2018 08:00) (88/63 - 142/92)  BP(mean): 79 (2018 08:00) (64 - 91)  RR: 24 (2018 08:00) (15 - 24)  SpO2: 100% (2018 08:12) (82% - 100%)    REVIEW OF SYSTEMS: unobtainable    Mode: AC/ CMV (Assist Control/ Continuous Mandatory Ventilation)  RR (machine): 24  TV (machine): 550  FiO2: 60  PEEP: 8  ITime: 1  MAP: 18  PIP: 40      PHYSICAL EXAMINATION:    GENERAL APPEARANCE:  Pt. is not currently dyspneic, in no distress. Pt. is alert, oriented, and pleasant.  HEENT:  Pupils are normal and react normally. No icterus. Mucous membranes well colored.  NECK:  Supple. No lymphadenopathy. Jugular venous pressure not elevated. Carotids equal.   HEART:   S1S2 tachy  CHEST:  decreased BS left w bronchial BS left base; coarse ronchi right  ABDOMEN:  Soft and nontender.   EXTREMITIES:  There is no cyanosis, clubbing or edema.   SKIN:  No rash or significant lesions are noted.  CNS: sedated    LABS:                        12.8   3.53  )-----------( 98       ( 2018 02:02 )             38.9     -    131<L>  |  94<L>  |  27<H>  ----------------------------<  188<H>  5.4<H>   |  29  |  1.82<H>    Ca    10.4<H>      2018 17:10    TPro  6.9  /  Alb  2.5<L>  /  TBili  0.5  /  DBili  x   /  AST  80<H>  /  ALT  62  /  AlkPhos  221<H>  04-    LIVER FUNCTIONS - ( 2018 17:10 )  Alb: 2.5 g/dL / Pro: 6.9 gm/dL / ALK PHOS: 221 U/L / ALT: 62 U/L / AST: 80 U/L / GGT: x           PT/INR - ( 2018 17:10 )   PT: 14.0 sec;   INR: 1.29 ratio         PTT - ( 2018 02:02 )  PTT:150.0 sec  CARDIAC MARKERS ( 2018 19:47 )  <0.015 ng/mL / x     / x     / x     / x      CARDIAC MARKERS ( 2018 17:10 )  <0.015 ng/mL / x     / x     / x     / x          Urinalysis Basic - ( 2018 16:58 )    Color: Jannet / Appearance: Slightly Turbid / S.025 / pH: x  Gluc: x / Ketone: Negative  / Bili: Small / Urobili: 1 mg/dL   Blood: x / Protein: 30 mg/dL / Nitrite: Negative   Leuk Esterase: Trace / RBC: >50 /HPF / WBC 3-5   Sq Epi: x / Non Sq Epi: Few / Bacteria: Occasional      ABG - ( 2018 21:45 )  pH: 7.23  /  pCO2: 61    /  pO2: 54    / HCO3: 24    / Base Excess: -3.8  /  SaO2: 83                  RADIOLOGY & ADDITIONAL STUDIES:

## 2018-04-02 NOTE — DIETITIAN INITIAL EVALUATION ADULT. - NS AS NUTRI INTERV ENTERAL NUTRITION
Composition/Concentration/Rate/start TF with oxepa @ 20mL/hr and titrate to goal of 53mL/hr x 20hrs with 2pkts prosource per day

## 2018-04-02 NOTE — PROGRESS NOTE ADULT - ASSESSMENT
Patient with Stage IV Lung CA on vent  desaturation  heparin for dvt/PE  loculated on left, improved with aeration with ventilation, check sputum,  will have ct surgery surgery evaluate, dont think aspiration would help  start tube feeds  still desaturation

## 2018-04-02 NOTE — CONSULT NOTE ADULT - SUBJECTIVE AND OBJECTIVE BOX
HPI:  History from chart and sister  47y old M with PMH Stage IV lung cancer to brain s/p Ommaya reservoir 2017 for leptomeningeal disease s/p chemo/radiation, sepsis with acute bacterial meningitis, hospitalized, requiring removal of Ommaya . Pt also noted to have RUE DVT and was discharged on XArelto   As per sister, patient has been getting more lethargic the last few days. He had a right sided PleurX catheter placed a few days back and his Xarelto was held. During drainage of fluid he slumped over and was brought to the ER. He was intubated by EMS en route. On arrival he is intubated and sedated. CT Chest shows Left loculated pleural effusion w/ LLL atelectasis and Right goundglass opacifications. Pt had a previous Left Pleurodesis   Pt remains intubated with hypoxia      PAST MEDICAL & SURGICAL HISTORY:  Hearing loss: right ear-no device  Cancer with leptomeningeal spread  Dyspnea on exertion  Gait disturbance  Memory loss  Anxiety and depression  Erectile dysfunction, unspecified erectile dysfunction type  History of kidney stones  History of pericarditis  Pleural effusion  Syncope, unspecified syncope type: X3-4 episodes; s/p LINQ device placed  Lung cancer, upper lobe: right. metastatic non small cell Lung Carcinoma.  History of kenia hole surgery  History of lumbar puncture: chemo placed  History of cardiac monitoring: s/p LINQ device placement 2017  History of lumbar puncture within last 21 days  History of vascular access device: PORT placed  Pleuritic chest pain: Pleurodesis left chest 10/20/2017  H/O bilateral inguinal hernia repair: as a child  Acute pericarditis, unspecified type: pericardial window 2015, 2015      REVIEW OF SYSTEMS: Pt intubated and sedated. As per sister      General:+ Weight change/ Fatigue   No HA/Dizzy	    Skin/Breast: No Rashes/ Lesions/ Masses  	  Ophthalmologic: No Blurry vision/ Glaucoma/ Blindness  	  ENMT: No Hearing loss/ Drainage/ Lesions	    Respiratory and Thorax: + SOB     No Cough/ Wheezing/ / Hemoptysis/ Sputum production  	  Cardiovascular: + Chest pain       No Palpitations/ Diaphoresis	    Gastrointestinal: No Nausea/ Vomiting/ Constipation/ Appetite Change	    Genitourinary: No Heamturia/ Dysuria/ Frequency change/ Impotence	    Musculoskeletal: No Pain/ Weakness/ Claudication	    Neurological: No Seizures/ TIA/CVA/ Parastesias	    Psychiatric: No Dementia/ Depression/ SI/HI	    Hematology/Lymphatics: No hx of bleeding/ Edema	    Endocrine:	No Hyperglycemia/ Hypoglycemia    Allergic/Immunologic:	 No Anaphylaxis/ Intolerance/ Recent illnesses    MEDICATIONS  (STANDING):  chlorhexidine 0.12% Liquid 15 milliLiter(s) Swish and Spit two times a day  dexamethasone     Tablet 2 milliGRAM(s) Oral daily  heparin  Infusion.  Unit(s)/Hr (15 mL/Hr) IV Continuous <Continuous>  influenza   Vaccine 0.5 milliLiter(s) IntraMuscular once  levETIRAcetam  IVPB 500 milliGRAM(s) IV Intermittent daily  mirtazapine 30 milliGRAM(s) Oral at bedtime  pantoprazole  Injectable 40 milliGRAM(s) IV Push daily  piperacillin/tazobactam IVPB. 3.375 Gram(s) IV Intermittent every 8 hours  propofol Infusion 10 MICROgram(s)/kG/Min (4.896 mL/Hr) IV Continuous <Continuous>  sodium chloride 0.9%. 1000 milliLiter(s) (100 mL/Hr) IV Continuous <Continuous>  vancomycin  IVPB 1000 milliGRAM(s) IV Intermittent once    MEDICATIONS  (PRN):  heparin  Injectable 6500 Unit(s) IV Push every 6 hours PRN For aPTT less than 40  heparin  Injectable 3000 Unit(s) IV Push every 6 hours PRN For aPTT between 40 - 57  LORazepam   Injectable 2 milliGRAM(s) IV Push every 2 hours PRN Agitation      Allergies    adhesives (Rash)  No Known Drug Allergies    Intolerances        SOCIAL HISTORY:  Occupation:  Smoking Hx: denies  Etoh Hx: denies  IVDA Hx: denies    FAMILY HISTORY:  Family history of leukemia (Father)  Family history of cancer (Mother)    unless noted, no significant family hx with Mother, Father, Siblings    Vital Signs Last 24 Hrs  T(C): 36 (2018 08:00), Max: 37.4 (2018 17:30)  T(F): 96.8 (2018 08:00), Max: 99.4 (2018 17:30)  HR: 104 (2018 09:00) (100 - 143)  BP: 127/68 (2018 09:00) (88/63 - 142/92)  BP(mean): 80 (2018 09:00) (64 - 91)  RR: 23 (2018 09:00) (15 - 24)  SpO2: 100% (2018 09:00) (82% - 100%)    General: WN/WD Intubated  Neurology: Sedated  Eyes: Scleras clear, PERRLA/  ENT:Gross hearing intact, grossly patent pharynx, no stridor  Neck: Neck supple, trachea midline, No JVD,   Respiratory: Lg airway rhonchi B/L, No wheezing,  CV: RRR, S1S2, no murmurs, rubs or gallops  Abdominal: Soft, NT, ND +BS,   Extremities: No edema, + peripheral pulses  Skin: No Rashes, Hematoma, Ecchymosis  Lymphatic: No Neck, axilla, groin LAD  Psych: Oriented x 3, normal affect  Incisions: CDI  Tubes: Right pleurX inatct    LABS:                        12.8   3.53  )-----------( 98       ( 2018 02:02 )             38.9     04-01    131<L>  |  94<L>  |  27<H>  ----------------------------<  188<H>  5.4<H>   |  29  |  1.82<H>    Ca    10.4<H>      2018 17:10    TPro  6.9  /  Alb  2.5<L>  /  TBili  0.5  /  DBili  x   /  AST  80<H>  /  ALT  62  /  AlkPhos  221<H>  04-01    PT/INR - ( 2018 17:10 )   PT: 14.0 sec;   INR: 1.29 ratio         PTT - ( 2018 09:44 )  PTT:76.0 sec  Urinalysis Basic - ( 2018 16:58 )    Color: Jannet / Appearance: Slightly Turbid / S.025 / pH: x  Gluc: x / Ketone: Negative  / Bili: Small / Urobili: 1 mg/dL   Blood: x / Protein: 30 mg/dL / Nitrite: Negative   Leuk Esterase: Trace / RBC: >50 /HPF / WBC 3-5   Sq Epi: x / Non Sq Epi: Few / Bacteria: Occasional        RADIOLOGY & ADDITIONAL STUDIES:    ASSESSMENT:   47yMalePAST MEDICAL & SURGICAL HISTORY:  Hearing loss: right ear-no device  Cancer with leptomeningeal spread  Dyspnea on exertion  Gait disturbance  Memory loss  Anxiety and depression  Erectile dysfunction, unspecified erectile dysfunction type  History of kidney stones  History of pericarditis  Pleural effusion  Syncope, unspecified syncope type: X3-4 episodes; s/p LINQ device placed  Lung cancer, upper lobe: right. metastatic non small cell Lung Carcinoma.  History of kenia hole surgery  History of lumbar puncture: chemo placed  History of cardiac monitoring: s/p LINQ device placement 2017  History of lumbar puncture within last 21 days  History of vascular access device: PORT placed  Pleuritic chest pain: Pleurodesis left chest 10/20/2017  H/O bilateral inguinal hernia repair: as a child  Acute pericarditis, unspecified type: pericardial window 2015, 2015  HEALTH ISSUES - PROBLEM Dx:  Pneumonia of both lower lobes: Pneumonia of both lower lobes  Malignant neoplasm of upper lobe of right lung: Malignant neoplasm of upper lobe of right lung  Cancer with leptomeningeal spread: Cancer with leptomeningeal spread  Stage 4 malignant neoplasm of lung, unspecified laterality: Stage 4 malignant neoplasm of lung, unspecified laterality  Altered mental status, unspecified altered mental status type: Altered mental status, unspecified altered mental status type  Pleural effusion on left: Pleural effusion on left  Acute respiratory failure with hypoxia and hypercapnia: Acute respiratory failure with hypoxia and hypercapnia  47y old M with PMH Stage IV lung cancer to brain s/p Ommaya reservoir 2017 for leptomeningeal disease s/p chemo/radiation, sepsis with acute bacterial meningitis, hospitalized, requiring removal of Ommaya . Pt also noted to have RUE DVT and was discharged on Xarelto readmitted for respiratory distress/ failure with hypoxia/hypercarbia req intubation, left loculated pleural effusion    Plan: No clear benefit for drainage of multiple collections with elevated risk for airleak/ pleural space          If diagnostic tap needed, rec IR for CT guidance          Pt at high risk for failure of medical therapy of Pulmonary embolism with new LE clot while on Xarelto          Continue anticoagulation, Heme consult          Poor prognosis, rec palliative consult          Plan d/w Dr. Salvador, Dr. Barragan, Dr Del Castillo

## 2018-04-02 NOTE — DIETITIAN INITIAL EVALUATION ADULT. - OTHER INFO
46yo male with PMH of stage IV lung CA to brain s/p ommaya reservoir 11/2018 for leptomeningeal disease s/p chemo/radiation, sepsis with acute bacterial meningitis, RUE DVT, s/p removal of Ommaya 1/12/18.  Pt has been lethargic few days PTA.  Pt had pleurx catheter placed few days PTA during drainage of fluid pt slumped over and was brought to ER.  Pt was intubated by EMS in route.  Received verbal consult for TF Rx as pt pending initation of TF.  Upon visit, pt appears well nourished.  Per family at bedside, PO intake depends on pain level.  On average pt consumes 2-3meals per day; possibly meeting nutr needs.  Pt wt fluctuates due to fluid retention/loss as pt with chest tube for drainage (range of 15-20#).  Pt at high nutr risk 2/2 CA (increased metabolic needs).  Labs noted: hyponatremia, hyperkalemia (only slightly elevated and downtrending), hypercalcemia.  Continue to monitor lytes/mins; replete/correct prn.  Based on current labs;nutr needs; propofol running at 12.4mL/hr (=327Kcal/day) RECOMMENDATIONS: 1) when feasible, start TF with Oxepa @ 20mL/hr and titrate with tolerance to goal of 53mL/hr x 20hrs with 2pkts prosource daily.  TF at goal rate with prosource will provide ~1710Kcal, 96g protein.  2) monitor lytes/mins; if hyperkalemia continues or worsens; hold TF and re-consult for change of formula  3) monitor TF tolerance, keep HOB> 30-45 degrees  4) biweekly wt checks  5) monitor hydration status; consider free water flushes of 250mL q6hrs

## 2018-04-02 NOTE — DIETITIAN INITIAL EVALUATION ADULT. - PERTINENT LABORATORY DATA
4/1: Na 131 (L), K 5.4 (H), BUN 27 (H), Cr 1.82 (H), glucose 181 (H), Ca 10.4 (H), alb 2.5 (L), alk phos 221 (H), AST 80 (H), GFR 43 (L)

## 2018-04-03 ENCOUNTER — APPOINTMENT (OUTPATIENT)
Dept: HEMATOLOGY ONCOLOGY | Facility: CLINIC | Age: 48
End: 2018-04-03

## 2018-04-03 DIAGNOSIS — I82.439 ACUTE EMBOLISM AND THROMBOSIS OF UNSPECIFIED POPLITEAL VEIN: ICD-10-CM

## 2018-04-03 LAB
ANION GAP SERPL CALC-SCNC: 6 MMOL/L — SIGNIFICANT CHANGE UP (ref 5–17)
APTT BLD: 65.5 SEC — HIGH (ref 27.5–37.4)
BUN SERPL-MCNC: 26 MG/DL — HIGH (ref 7–23)
CALCIUM SERPL-MCNC: 9.8 MG/DL — SIGNIFICANT CHANGE UP (ref 8.5–10.1)
CHLORIDE SERPL-SCNC: 101 MMOL/L — SIGNIFICANT CHANGE UP (ref 96–108)
CO2 SERPL-SCNC: 28 MMOL/L — SIGNIFICANT CHANGE UP (ref 22–31)
CREAT SERPL-MCNC: 1.28 MG/DL — SIGNIFICANT CHANGE UP (ref 0.5–1.3)
GLUCOSE SERPL-MCNC: 111 MG/DL — HIGH (ref 70–99)
GRAM STN FLD: SIGNIFICANT CHANGE UP
HCT VFR BLD CALC: 35.6 % — LOW (ref 39–50)
HGB BLD-MCNC: 12.1 G/DL — LOW (ref 13–17)
MCHC RBC-ENTMCNC: 31.8 PG — SIGNIFICANT CHANGE UP (ref 27–34)
MCHC RBC-ENTMCNC: 34 GM/DL — SIGNIFICANT CHANGE UP (ref 32–36)
MCV RBC AUTO: 93.7 FL — SIGNIFICANT CHANGE UP (ref 80–100)
NRBC # BLD: 1 /100 WBCS — HIGH (ref 0–0)
PLATELET # BLD AUTO: 118 K/UL — LOW (ref 150–400)
POTASSIUM SERPL-MCNC: 4.1 MMOL/L — SIGNIFICANT CHANGE UP (ref 3.5–5.3)
POTASSIUM SERPL-SCNC: 4.1 MMOL/L — SIGNIFICANT CHANGE UP (ref 3.5–5.3)
RBC # BLD: 3.8 M/UL — LOW (ref 4.2–5.8)
RBC # FLD: 13.5 % — SIGNIFICANT CHANGE UP (ref 10.3–14.5)
SODIUM SERPL-SCNC: 135 MMOL/L — SIGNIFICANT CHANGE UP (ref 135–145)
SPECIMEN SOURCE: SIGNIFICANT CHANGE UP
WBC # BLD: 3.86 K/UL — SIGNIFICANT CHANGE UP (ref 3.8–10.5)
WBC # FLD AUTO: 3.86 K/UL — SIGNIFICANT CHANGE UP (ref 3.8–10.5)

## 2018-04-03 PROCEDURE — 71045 X-RAY EXAM CHEST 1 VIEW: CPT | Mod: 26

## 2018-04-03 PROCEDURE — 99291 CRITICAL CARE FIRST HOUR: CPT

## 2018-04-03 PROCEDURE — 99233 SBSQ HOSP IP/OBS HIGH 50: CPT

## 2018-04-03 RX ORDER — MORPHINE SULFATE 50 MG/1
5 CAPSULE, EXTENDED RELEASE ORAL
Qty: 0 | Refills: 0 | COMMUNITY

## 2018-04-03 RX ORDER — OSIMERTINIB 80 1/1
1 TABLET, FILM COATED ORAL
Qty: 0 | Refills: 0 | COMMUNITY

## 2018-04-03 RX ORDER — FOLIC ACID 0.8 MG
1 TABLET ORAL
Qty: 0 | Refills: 0 | COMMUNITY

## 2018-04-03 RX ORDER — ACETAMINOPHEN 500 MG
650 TABLET ORAL EVERY 6 HOURS
Qty: 0 | Refills: 0 | Status: DISCONTINUED | OUTPATIENT
Start: 2018-04-03 | End: 2018-04-08

## 2018-04-03 RX ORDER — HYDROMORPHONE HYDROCHLORIDE 2 MG/ML
2 INJECTION INTRAMUSCULAR; INTRAVENOUS; SUBCUTANEOUS
Qty: 0 | Refills: 0 | COMMUNITY

## 2018-04-03 RX ORDER — RIVAROXABAN 15 MG-20MG
1 KIT ORAL
Qty: 0 | Refills: 0 | COMMUNITY

## 2018-04-03 RX ORDER — COLCHICINE 0.6 MG
1 TABLET ORAL
Qty: 0 | Refills: 0 | COMMUNITY

## 2018-04-03 RX ORDER — SENNA PLUS 8.6 MG/1
2 TABLET ORAL
Qty: 0 | Refills: 0 | COMMUNITY

## 2018-04-03 RX ORDER — DIAZEPAM 5 MG
1 TABLET ORAL
Qty: 0 | Refills: 0 | COMMUNITY

## 2018-04-03 RX ORDER — LEVETIRACETAM 250 MG/1
500 TABLET, FILM COATED ORAL
Qty: 0 | Refills: 0 | Status: DISCONTINUED | OUTPATIENT
Start: 2018-04-03 | End: 2018-04-04

## 2018-04-03 RX ORDER — PANTOPRAZOLE SODIUM 20 MG/1
1 TABLET, DELAYED RELEASE ORAL
Qty: 0 | Refills: 0 | COMMUNITY

## 2018-04-03 RX ORDER — CEFUROXIME AXETIL 250 MG
1 TABLET ORAL
Qty: 0 | Refills: 0 | COMMUNITY

## 2018-04-03 RX ORDER — HYDROMORPHONE HYDROCHLORIDE 2 MG/ML
1 INJECTION INTRAMUSCULAR; INTRAVENOUS; SUBCUTANEOUS
Qty: 0 | Refills: 0 | COMMUNITY

## 2018-04-03 RX ORDER — FUROSEMIDE 40 MG
1 TABLET ORAL
Qty: 0 | Refills: 0 | COMMUNITY

## 2018-04-03 RX ADMIN — CHLORHEXIDINE GLUCONATE 15 MILLILITER(S): 213 SOLUTION TOPICAL at 05:25

## 2018-04-03 RX ADMIN — SODIUM CHLORIDE 100 MILLILITER(S): 9 INJECTION INTRAMUSCULAR; INTRAVENOUS; SUBCUTANEOUS at 16:00

## 2018-04-03 RX ADMIN — PROPOFOL 4.9 MICROGRAM(S)/KG/MIN: 10 INJECTION, EMULSION INTRAVENOUS at 15:00

## 2018-04-03 RX ADMIN — PROPOFOL 4.9 MICROGRAM(S)/KG/MIN: 10 INJECTION, EMULSION INTRAVENOUS at 11:00

## 2018-04-03 RX ADMIN — Medication 2 MILLIGRAM(S): at 05:24

## 2018-04-03 RX ADMIN — SODIUM CHLORIDE 100 MILLILITER(S): 9 INJECTION INTRAMUSCULAR; INTRAVENOUS; SUBCUTANEOUS at 05:24

## 2018-04-03 RX ADMIN — PIPERACILLIN AND TAZOBACTAM 25 GRAM(S): 4; .5 INJECTION, POWDER, LYOPHILIZED, FOR SOLUTION INTRAVENOUS at 21:13

## 2018-04-03 RX ADMIN — LEVETIRACETAM 500 MILLIGRAM(S): 250 TABLET, FILM COATED ORAL at 17:31

## 2018-04-03 RX ADMIN — PROPOFOL 4.9 MICROGRAM(S)/KG/MIN: 10 INJECTION, EMULSION INTRAVENOUS at 22:59

## 2018-04-03 RX ADMIN — PANTOPRAZOLE SODIUM 40 MILLIGRAM(S): 20 TABLET, DELAYED RELEASE ORAL at 12:56

## 2018-04-03 RX ADMIN — PROPOFOL 4.9 MICROGRAM(S)/KG/MIN: 10 INJECTION, EMULSION INTRAVENOUS at 00:38

## 2018-04-03 RX ADMIN — PROPOFOL 4.9 MICROGRAM(S)/KG/MIN: 10 INJECTION, EMULSION INTRAVENOUS at 18:30

## 2018-04-03 RX ADMIN — HEPARIN SODIUM 1200 UNIT(S)/HR: 5000 INJECTION INTRAVENOUS; SUBCUTANEOUS at 10:11

## 2018-04-03 RX ADMIN — Medication 2 MILLIGRAM(S): at 08:02

## 2018-04-03 RX ADMIN — PIPERACILLIN AND TAZOBACTAM 25 GRAM(S): 4; .5 INJECTION, POWDER, LYOPHILIZED, FOR SOLUTION INTRAVENOUS at 14:35

## 2018-04-03 RX ADMIN — PROPOFOL 4.9 MICROGRAM(S)/KG/MIN: 10 INJECTION, EMULSION INTRAVENOUS at 05:37

## 2018-04-03 RX ADMIN — CHLORHEXIDINE GLUCONATE 15 MILLILITER(S): 213 SOLUTION TOPICAL at 17:31

## 2018-04-03 RX ADMIN — Medication 2 MILLIGRAM(S): at 09:30

## 2018-04-03 RX ADMIN — PIPERACILLIN AND TAZOBACTAM 25 GRAM(S): 4; .5 INJECTION, POWDER, LYOPHILIZED, FOR SOLUTION INTRAVENOUS at 05:24

## 2018-04-03 RX ADMIN — Medication 650 MILLIGRAM(S): at 18:06

## 2018-04-03 NOTE — ADVANCED PRACTICE NURSE CONSULT - ASSESSMENT
Patient is sedated on respirator. Spoke with girlfriend Rafaela who remains by patient's bedside. States she is more comfortable by his side and does not sleep well when home ("with all the memories") . Did not speak about plan of care as intensivists is in touch with sister who is the health care proxy and has explained that at this point cannot come off  and may never be able to be removed from the respirator. Rafaela understands what is happening although does not speak about plan of care going forward

## 2018-04-03 NOTE — ADVANCED PRACTICE NURSE CONSULT - RECOMMEDATIONS
Rafaela asked that I visit again so will follow up tomorrow. She is aware she can call me for support and further assistance

## 2018-04-03 NOTE — CONSULT NOTE ADULT - SUBJECTIVE AND OBJECTIVE BOX
46 yo gentleman with PMHx of Metastatic lung cancer, leptomeningeal disease,  presented with changes in MS and respiratory failure. Patient is intubated and sedated. Patient became more lethargic for a few days prior to admission. He [place] a right sided [place] [facility] [place]. During drainage of fluid he slumped over and was brought to the ER. He was intubated by EMS en route. CT Chest shows Left loculated pleural effusion w/ LLL atelectasis and Right ground glass opacifications. Pt had a previous Left Pleurodesis   Pt remains intubated with hypoxia, unable to wean, on heparin for left leg dvt, f/u cxr improved aeration of left lung, but unable to wean 48 yo gentleman with PMHx of Metastatic lung cancer, leptomeningeal disease,  presented with changes in MS and respiratory failure. Patient is intubated and sedated. Patient became more lethargic for a few days prior to admission. He had a right sided PleurX catheter placed a few days back  at [facility] [place] and his Xarelto was held. During drainage of fluid he slumped over and was brought to the ER. He was intubated by EMS en route. CT Chest shows Left loculated pleural effusion w/ LLL atelectasis and Right ground glass opacifications. Pt had a previous Left Pleurodesis   Pt remains intubated with hypoxia, unable to wean, on heparin for left leg dvt, f/u cxr improved aeration of left lung, but unable to wean.    ICU Vital Signs Last 24 Hrs  T(C): 37.9 (03 Apr 2018 08:00), Max: 37.9 (03 Apr 2018 01:00)  T(F): 100.2 (03 Apr 2018 08:00), Max: 100.3 (03 Apr 2018 01:00)  HR: 101 (03 Apr 2018 09:00) (94 - 110)  BP: 128/74 (03 Apr 2018 09:00) (96/47 - 137/75)  BP(mean): 84 (03 Apr 2018 09:00) (60 - 98)  ABP: --  ABP(mean): --  RR: 29 (03 Apr 2018 09:00) (21 - 31)  SpO2: 95% (03 Apr 2018 09:00) (95% - 100%)      general gentleman intubated, sedated, mild labor breathing.   Lungs bilateral decreased breath sounds  CVS S1 S2 regular, no M/R/G  Abdomen mildly distended, positive for BS, no organomegaly.   Extremities: positive for pitting edema.   Neuro: sedated.     Complete Blood Count (04.03.18 @ 05:01)    Nucleated RBC: 1 /100 WBCs    WBC Count: 3.86 K/uL    RBC Count: 3.80 M/uL    Hemoglobin: 12.1 g/dL    Hematocrit: 35.6 %    Mean Cell Volume: 93.7 fl    Mean Cell Hemoglobin: 31.8 pg    Mean Cell Hemoglobin Conc: 34.0 gm/dL    Red Cell Distrib Width: 13.5 %    Platelet Count - Automated: 118 K/uL    Basic Metabolic Panel (04.03.18 @ 05:01)    Sodium, Serum: 135 mmol/L    Potassium, Serum: 4.1 mmol/L    Chloride, Serum: 101 mmol/L    Carbon Dioxide, Serum: 28 mmol/L    Anion Gap, Serum: 6 mmol/L    Blood Urea Nitrogen, Serum: 26 mg/dL    Creatinine, Serum: 1.28 mg/dL    Glucose, Serum: 111 mg/dL    Calcium, Total Serum: 9.8 mg/dL    eGFR if Non : 66: Interpretative comment  The units for eGFR are ml/min/1.73m2 (normalized body surface area). The  eGFR is calculated from a serum creatinine using the CKD-EPI equation.  Other variables required for calculation are race, age and sex. Among  patients with chronic kidney disease (CKD), the eGFR is useful in  determining the stage of disease according to KDOQI CKD classification.  All eGFR results are reported numerically with the following  interpretation.          GFR                    With                 Without     (ml/min/1.73 m2)    Kidney Damage       Kidney Damage        >= 90                    Stage 1                     Normal        60-89                    Stage 2                     Decreased GFR        30-59     Stage 3                     Stage 3        15-29                    Stage 4                     Stage 4        < 15                      Stage 5                     Stage 5  Each stage of CKD assumes that the associated GFR level has been in  effect for at least 3 months. Determination of stages one and two (with  eGFR > 59 ml/min/m2) requires estimation of kidney damage for at least 3  months as defined by structural or functional abnormalities.  Limitations: All estimates of GFR will be less accurate for patients at  extremes of muscle mass (including but not limited to frail elderly,  critically ill, or cancer patients), those with unusual diets, and those  with conditions associated with reduced secretion or extrarenal  elimination of creatinine. The eGFR equation is not recommended for use  in patients with unstable creatinine levels. mL/min/1.73M2    eGFR if African American: 77 mL/min/1.73M2        CT of the brain 4/1/18    FINDINGS: No intracranial hemorrhage is seen.    Right frontal gliosis from prior ventriculostomy catheter. Right frontal   kenia hole. Stable punctate cortical calcification in the right parietal   lobe. No evidence for acute territorial infarct.    Mild involutional changes are present with proportional prominence of the   ventricles and sulci. No mass effect or midline shift is seen. Basal   cisterns are not effaced.    Tiny retention cysts versus polyps in the visualized maxillary sinuses.   The tympanomastoid cavities are clear.     No osseous abnormality seen.    IMPRESSION: No intracranial hemorrhage, mass effect, or evidence for   acute territorial infarct. If there is continued concern for acute stroke   MRI of the brain is recommended.    Findings discussed with Dr. Galdamez by Dr. Browne on 4/1/2018 at 5:20   PM  with read back.    CT of the chest without contrast 4/1/18    FINDINGS:    CHEST:     LUNGS AND LARGE AIRWAYS: ET tube  in place. Left lower mucous plugging   with near complete left lower lobe atelectasis. Confluent groundglass   opacities with peripheral orientation in the right lung..  PLEURA: Moderate sized loculated left pleural effusion. Right chest tube   in place. Left basilar pleural calcification.  VESSELS: Right internal jugular central venous catheter tip at the cava   atrial junction..  HEART: Heart size is normal.No pericardial effusion.  MEDIASTINUM AND PILO: No lymphadenopathy.  CHEST WALL AND LOWER NECK: Within normal limits.  VISUALIZED UPPER ABDOMEN: Within normal limits.  BONES: Within normal limits.    IMPRESSION: Near complete left lower lobe atelectasis with a moderate   sized loculated left pleural effusion.    Peripherally oriented confluent groundglass opacities in the right lung   may represent infection.    Right chest tube in place with trace right pleural effusion.            MEDICATIONS  (STANDING):  chlorhexidine 0.12% Liquid 15 milliLiter(s) Swish and Spit two times a day  dexamethasone     Tablet 2 milliGRAM(s) Oral daily  heparin  Infusion.  Unit(s)/Hr (15 mL/Hr) IV Continuous <Continuous>  influenza   Vaccine 0.5 milliLiter(s) IntraMuscular once  levETIRAcetam 500 milliGRAM(s) Oral two times a day  mirtazapine 30 milliGRAM(s) Oral at bedtime  pantoprazole  Injectable 40 milliGRAM(s) IV Push daily  piperacillin/tazobactam IVPB. 3.375 Gram(s) IV Intermittent every 8 hours  propofol Infusion 10 MICROgram(s)/kG/Min (4.896 mL/Hr) IV Continuous <Continuous>  sodium chloride 0.9%. 1000 milliLiter(s) (100 mL/Hr) IV Continuous <Continuous>

## 2018-04-03 NOTE — PROGRESS NOTE ADULT - ASSESSMENT
- cont vent support  - has large, loculated left effussion with LLL consolidation; Right sided ground glass infiltrates  - cont zosyn/vanco - has leptomeningeal spread of tumor  - on seizure meds  - has left DVT despite anticoagulation  - consider IVC filter  - dvt proph  - prognosis poor

## 2018-04-03 NOTE — PROGRESS NOTE ADULT - ASSESSMENT
1. Patient with stage iv lung ca  2. now with respiratory failure, on vent  3. leptomeningeal spread of tumor    on abx for possible pneumonia  heparin for dvt possible, PE was off ac prior to admission for pleurex catheter  unable to wean  tube feeds  will discuss with family goals of care 1. Patient with stage iv lung ca  2. now with respiratory failure, on vent  3. leptomeningeal spread of tumor  4. Patient had hyponatremia to 133, improving now 136  5. metabolic encephalopathy on admission likley related to respiraotry failure, or leptomeningeal disease, ct negative for new findings on admission  on abx for possible pneumonia  heparin for dvt possible, PE was off ac prior to admission for pleurex catheter  unable to wean  tube feeds  will discuss with family goals of care

## 2018-04-03 NOTE — CDI QUERY NOTE - NSCDIOTHERTXTBX_GEN_ALL_CORE_HH
1.      Noted per Laboratory   Na = 131    >    131    > 132     Can you please clarify if these findings demonstrate a diagnosis    2.     ED  Documentation reveals 47 yr old pt  BIBA unresponsive , intubated.  AMS has been documented.  Can you please further clarify AMS  A) Encephalopathy (If so, please specify Type)   B) Unable to determine  C) Other, please specify    If so, please document so all diagnoses are reflected in record.  Thank you,

## 2018-04-04 LAB
ANION GAP SERPL CALC-SCNC: 6 MMOL/L — SIGNIFICANT CHANGE UP (ref 5–17)
APTT BLD: 51.3 SEC — HIGH (ref 27.5–37.4)
APTT BLD: 53.9 SEC — HIGH (ref 27.5–37.4)
APTT BLD: 57.4 SEC — HIGH (ref 27.5–37.4)
BUN SERPL-MCNC: 18 MG/DL — SIGNIFICANT CHANGE UP (ref 7–23)
CALCIUM SERPL-MCNC: 8.9 MG/DL — SIGNIFICANT CHANGE UP (ref 8.5–10.1)
CHLORIDE SERPL-SCNC: 105 MMOL/L — SIGNIFICANT CHANGE UP (ref 96–108)
CO2 SERPL-SCNC: 26 MMOL/L — SIGNIFICANT CHANGE UP (ref 22–31)
CREAT SERPL-MCNC: 0.83 MG/DL — SIGNIFICANT CHANGE UP (ref 0.5–1.3)
GLUCOSE SERPL-MCNC: 108 MG/DL — HIGH (ref 70–99)
HCT VFR BLD CALC: 33 % — LOW (ref 39–50)
HGB BLD-MCNC: 10.7 G/DL — LOW (ref 13–17)
MCHC RBC-ENTMCNC: 30.8 PG — SIGNIFICANT CHANGE UP (ref 27–34)
MCHC RBC-ENTMCNC: 32.4 GM/DL — SIGNIFICANT CHANGE UP (ref 32–36)
MCV RBC AUTO: 95.1 FL — SIGNIFICANT CHANGE UP (ref 80–100)
NRBC # BLD: 1 /100 WBCS — HIGH (ref 0–0)
PLATELET # BLD AUTO: 110 K/UL — LOW (ref 150–400)
POTASSIUM SERPL-MCNC: 3.9 MMOL/L — SIGNIFICANT CHANGE UP (ref 3.5–5.3)
POTASSIUM SERPL-SCNC: 3.9 MMOL/L — SIGNIFICANT CHANGE UP (ref 3.5–5.3)
RBC # BLD: 3.47 M/UL — LOW (ref 4.2–5.8)
RBC # FLD: 13.6 % — SIGNIFICANT CHANGE UP (ref 10.3–14.5)
SODIUM SERPL-SCNC: 137 MMOL/L — SIGNIFICANT CHANGE UP (ref 135–145)
WBC # BLD: 3.24 K/UL — LOW (ref 3.8–10.5)
WBC # FLD AUTO: 3.24 K/UL — LOW (ref 3.8–10.5)

## 2018-04-04 PROCEDURE — 99233 SBSQ HOSP IP/OBS HIGH 50: CPT

## 2018-04-04 RX ORDER — LEVETIRACETAM 250 MG/1
500 TABLET, FILM COATED ORAL
Qty: 0 | Refills: 0 | Status: DISCONTINUED | OUTPATIENT
Start: 2018-04-04 | End: 2018-04-08

## 2018-04-04 RX ADMIN — HEPARIN SODIUM 3000 UNIT(S): 5000 INJECTION INTRAVENOUS; SUBCUTANEOUS at 08:39

## 2018-04-04 RX ADMIN — PIPERACILLIN AND TAZOBACTAM 25 GRAM(S): 4; .5 INJECTION, POWDER, LYOPHILIZED, FOR SOLUTION INTRAVENOUS at 14:31

## 2018-04-04 RX ADMIN — PROPOFOL 4.9 MICROGRAM(S)/KG/MIN: 10 INJECTION, EMULSION INTRAVENOUS at 06:47

## 2018-04-04 RX ADMIN — SODIUM CHLORIDE 100 MILLILITER(S): 9 INJECTION INTRAMUSCULAR; INTRAVENOUS; SUBCUTANEOUS at 02:57

## 2018-04-04 RX ADMIN — Medication 2 MILLIGRAM(S): at 00:56

## 2018-04-04 RX ADMIN — PANTOPRAZOLE SODIUM 40 MILLIGRAM(S): 20 TABLET, DELAYED RELEASE ORAL at 11:26

## 2018-04-04 RX ADMIN — Medication 2 MILLIGRAM(S): at 05:37

## 2018-04-04 RX ADMIN — PIPERACILLIN AND TAZOBACTAM 25 GRAM(S): 4; .5 INJECTION, POWDER, LYOPHILIZED, FOR SOLUTION INTRAVENOUS at 21:54

## 2018-04-04 RX ADMIN — CHLORHEXIDINE GLUCONATE 15 MILLILITER(S): 213 SOLUTION TOPICAL at 17:01

## 2018-04-04 RX ADMIN — PROPOFOL 4.9 MICROGRAM(S)/KG/MIN: 10 INJECTION, EMULSION INTRAVENOUS at 12:24

## 2018-04-04 RX ADMIN — MIRTAZAPINE 30 MILLIGRAM(S): 45 TABLET, ORALLY DISINTEGRATING ORAL at 21:53

## 2018-04-04 RX ADMIN — PROPOFOL 4.9 MICROGRAM(S)/KG/MIN: 10 INJECTION, EMULSION INTRAVENOUS at 02:57

## 2018-04-04 RX ADMIN — LEVETIRACETAM 500 MILLIGRAM(S): 250 TABLET, FILM COATED ORAL at 17:01

## 2018-04-04 RX ADMIN — HEPARIN SODIUM 1600 UNIT(S)/HR: 5000 INJECTION INTRAVENOUS; SUBCUTANEOUS at 21:46

## 2018-04-04 RX ADMIN — HEPARIN SODIUM 1400 UNIT(S)/HR: 5000 INJECTION INTRAVENOUS; SUBCUTANEOUS at 08:42

## 2018-04-04 RX ADMIN — PROPOFOL 4.9 MICROGRAM(S)/KG/MIN: 10 INJECTION, EMULSION INTRAVENOUS at 20:44

## 2018-04-04 RX ADMIN — Medication 650 MILLIGRAM(S): at 17:02

## 2018-04-04 RX ADMIN — HEPARIN SODIUM 3000 UNIT(S): 5000 INJECTION INTRAVENOUS; SUBCUTANEOUS at 21:49

## 2018-04-04 RX ADMIN — Medication 650 MILLIGRAM(S): at 00:56

## 2018-04-04 RX ADMIN — LEVETIRACETAM 500 MILLIGRAM(S): 250 TABLET, FILM COATED ORAL at 05:37

## 2018-04-04 RX ADMIN — PIPERACILLIN AND TAZOBACTAM 25 GRAM(S): 4; .5 INJECTION, POWDER, LYOPHILIZED, FOR SOLUTION INTRAVENOUS at 05:37

## 2018-04-04 RX ADMIN — CHLORHEXIDINE GLUCONATE 15 MILLILITER(S): 213 SOLUTION TOPICAL at 05:37

## 2018-04-04 NOTE — PROGRESS NOTE ADULT - ASSESSMENT
1. Patient with widely metastatic lung ca with      respiratory failure      leptomeningeal spread      chronic left effusion not ammenable to drainage      on Zosyn, althoug no positive culture  \   iv heparin for dvt      d/w sister, may not be able to wean, palliative consult      will try

## 2018-04-04 NOTE — PROGRESS NOTE ADULT - ASSESSMENT
- cont vent support  - has large, loculated left effussion with LLL consolidation; Right sided ground glass infiltrates  - cont zosyn/vanco - has leptomeningeal spread of tumor  - CXR noted  - check ABG  - on seizure meds  - has left DVT despite anticoagulation  - dvt proph  - prognosis poor

## 2018-04-04 NOTE — CONSULT NOTE ADULT - ASSESSMENT
- cont vent support  - has large, loculated left effussion with LLL consolidation; Right sided ground glass infiltrates  - cont zosyn- has leptomeningeal spread of tumor  - on seizure meds  - for EEG  - dvt proph  - prognosis poor
47y old M with PMH Stage IV lung cancer to brain s/p Ommaya reservoir 11/2017 for leptomeningeal disease s/p chemo/radiation, sepsis with acute bacterial meningitis, RUE DVT. Pt s/p removal of Ommaya 1/12.   As per sister, patient has been getting more lethargic the last few days. He had a right sided PleurX catheter placed a few days back and his DOAC was held. During drainage of fluid he slumped over and was brought to the ER. He was intubated by EMS en route. On arrival he was intubated and sedated.         1) Respiratory failure  -Presented with acute respiratory acidosis  -Was intubated emergently in ambulance en route to ED  -Has large loculated left pleural effusion,  LLL consolidation  -CT scan : patchy right lung ground glass opacities  -Not been able to tolerate weaning from the ventilator    2)Lung CA  -Aggressive disease, metastatic  -Appreciate Dr Arenas's input - she feels he is dying and will not likely be weaned from vent    3)Fevers  -?source?  -On empiric antibiotics  -Tylenol PRN fever    4) Advance directives  -Has HCP naming sister Mayra Ring as HCP  -Will arrange family meeting later this week - pt's sisters want to wait until 2 neices are home from college and can participate in the meeting  - I left my office phone number and cell number of EZRA Saxena to call for appointment (likely Friday)                  - cont vent support  - has large, loculated left effussion with LLL consolidation; Right sided ground glass infiltrates  - cont zosyn- has leptomeningeal spread of tumor  - on seizure meds  - for EEG  - dvt proph  - prognosis poor
46 yo gentleman with PMHx of Metastatic lung cancer, leptomeningeal disease,  presented with changes in MS and respiratory failure. Patient is intubated and sedated.    Patient's condition is poor, approaching the end of life. Recommend a Palliative Medicine Consult.     I met with the sister and moises last evening, I reviewed with them the course of the disease, the aggressive nature of this cancer, poor prognosis, unable to wean and approaching the end of life.  I recommended terminal extubation, the sister still wants to wait to see if Jordan can be wean of the respirator.     Continue family support.    Patient's care as per MICU team.    Thanks you.

## 2018-04-04 NOTE — CONSULT NOTE ADULT - SUBJECTIVE AND OBJECTIVE BOX
HPI: 47y old M with PMH Stage IV lung cancer to brain s/p Ommaya reservoir 2017 for leptomeningeal disease s/p chemo/radiation, sepsis with acute bacterial meningitis, RUE DVT. Pt s/p removal of Ommaya .   As per sister, patient has been getting more lethargic the last few days. He had a right sided PleurX catheter placed a few days back and his DOAC was held. During drainage of fluid he slumped over and was brought to the ER. He was intubated by EMS en route. On arrival he was intubated and sedated.    On admission,  ABG's showed acute respiratory acidosis. CT scan chest shows loculated, large left effusion with LLL consolidation. Also, patchy right lung ground glass opacities. Pt has not been able to tolerate weaning from the ventilator. He is sedated          PAIN: No grimacing or other non-verbal cues as per RN  Location  Intensity  Quality  Aggravating Factors  Alleviating Factors  Timing    DYSPNEA: Not objectively as per RN     PAST MEDICAL & SURGICAL HISTORY:  Hearing loss: right ear-no device  Cancer with leptomeningeal spread  Dyspnea on exertion  Gait disturbance  Memory loss  Anxiety and depression  Erectile dysfunction, unspecified erectile dysfunction type  History of kidney stones  History of pericarditis  Pleural effusion  Syncope, unspecified syncope type: X3-4 episodes; s/p LINQ device placed  Lung cancer, upper lobe: right. metastatic non small cell Lung Carcinoma.  History of kenia hole surgery  History of lumbar puncture: chemo placed  History of cardiac monitoring: s/p LINQ device placement 2017  History of lumbar puncture within last 21 days  History of vascular access device: PORT placed  Pleuritic chest pain: Pleurodesis left chest 10/20/2017  H/O bilateral inguinal hernia repair: as a child  Acute pericarditis, unspecified type: pericardial window 2015, 2015      SOCIAL HX: Engaged; has 2 sisters - Mayra is HCP    FAMILY HISTORY:  Family history of leukemia (Father)  Family history of cancer (Mother)      ROS: CANNOT OBTAIN    Anxiety  Depression  Physical Discomfort  Dyspnea  Constipation  Diarrhea  Nausea  Vomiting  Anorexia  Cough  Secretions  Fatigue  Weakness          PHYSICAL EXAM:    Vital Signs Last 24 Hrs  T(C): 38.4 (2018 16:00), Max: 38.4 (2018 16:00)  T(F): 101.2 (2018 16:00), Max: 101.2 (2018 16:00)  HR: 114 (2018 18:00) (92 - 122)  BP: 111/59 (2018 18:00) (104/57 - 148/77)  BP(mean): 72 (2018 18:00) (65 - 94)  RR: 28 (2018 18:00) (17 - 33)  SpO2: 100% (2018 18:00) (81% - 100%)  Daily     Daily Weight in k.3 (2018 04:00)    PPSV2 = 10%    General: Unresponsive, sedated mid-aged man, orally intubated - on vent support. Diaphoretic.  HEENT: OGT, ETT in place  Lungs: decreased BS bilat, L>R  Cardiac: RRR   GI: + BS Soft No masses or tenderness  :  Ext: Min edema BLE's   Neuro: Sedated, unresponsive      LABS:                        10.7   3.24  )-----------( 110      ( 2018 05:10 )             33.0         137  |  105  |  18  ----------------------------<  108<H>  3.9   |  26  |  0.83    Ca    8.9      2018 05:10      PTT - ( 2018 14:40 )  PTT:57.4 sec  Albumin: Albumin, Serum: 2.5 g/dL ( @ 17:10)      Allergies    adhesives (Rash)  No Known Drug Allergies    Intolerances      MEDICATIONS  (STANDING):  chlorhexidine 0.12% Liquid 15 milliLiter(s) Swish and Spit two times a day  dexamethasone     Tablet 2 milliGRAM(s) Oral daily  heparin  Infusion.  Unit(s)/Hr (15 mL/Hr) IV Continuous <Continuous>  influenza   Vaccine 0.5 milliLiter(s) IntraMuscular once  levETIRAcetam  Solution 500 milliGRAM(s) Oral two times a day  mirtazapine 30 milliGRAM(s) Oral at bedtime  pantoprazole  Injectable 40 milliGRAM(s) IV Push daily  piperacillin/tazobactam IVPB. 3.375 Gram(s) IV Intermittent every 8 hours  propofol Infusion 10 MICROgram(s)/kG/Min (4.896 mL/Hr) IV Continuous <Continuous>    MEDICATIONS  (PRN):  acetaminophen   Tablet 650 milliGRAM(s) Oral every 6 hours PRN For Temp greater than 38.5 C (101.3 F)  heparin  Injectable 6500 Unit(s) IV Push every 6 hours PRN For aPTT less than 40  heparin  Injectable 3000 Unit(s) IV Push every 6 hours PRN For aPTT between 40 - 57  LORazepam   Injectable 2 milliGRAM(s) IV Push every 2 hours PRN Agitation      RADIOLOGY:

## 2018-04-04 NOTE — PROCEDURAL SAFETY CHECKLIST WITH OR WITHOUT SEDATION - NSPOSTCOMMENTFT_GEN_ALL_CORE
CVC Insertion Checklist:  Optimal site was chosen for CVC ,Hand hygiene was performed by all caregivers,Chlorhexidine prep was applied to insertion site & allowed to air dry ,Full sterile barrier used to drape patient,Maximal barrier precautions (mask,cap,sterile gown,sterile gloves) worn by  all within 3 feet of sterile field,Sterile field maintained throughout,Biopatch and sterile dressing applied & dated post procedure,All materials including guidewire were accounted for post procedure
CVC Insertion Checklist:  Optimal site was chosen for CVC ,Hand hygiene was performed by all caregivers,Chlorhexidine prep was applied to insertion site & allowed to air dry ,Full sterile barrier used to drape patient,Maximal barrier precautions (mask,cap,sterile gown,sterile gloves) worn by  all within 3 feet of sterile field,Sterile field maintained throughout,Biopatch and sterile dressing applied & dated post procedure,All materials including guidewire were accounted for post procedure

## 2018-04-05 LAB
ANION GAP SERPL CALC-SCNC: 6 MMOL/L — SIGNIFICANT CHANGE UP (ref 5–17)
APTT BLD: 60.4 SEC — HIGH (ref 27.5–37.4)
APTT BLD: 67.2 SEC — HIGH (ref 27.5–37.4)
BUN SERPL-MCNC: 13 MG/DL — SIGNIFICANT CHANGE UP (ref 7–23)
CALCIUM SERPL-MCNC: 9 MG/DL — SIGNIFICANT CHANGE UP (ref 8.5–10.1)
CHLORIDE SERPL-SCNC: 106 MMOL/L — SIGNIFICANT CHANGE UP (ref 96–108)
CO2 SERPL-SCNC: 25 MMOL/L — SIGNIFICANT CHANGE UP (ref 22–31)
CREAT SERPL-MCNC: 0.72 MG/DL — SIGNIFICANT CHANGE UP (ref 0.5–1.3)
CULTURE RESULTS: SIGNIFICANT CHANGE UP
GLUCOSE SERPL-MCNC: 127 MG/DL — HIGH (ref 70–99)
HCT VFR BLD CALC: 33.3 % — LOW (ref 39–50)
HGB BLD-MCNC: 11.1 G/DL — LOW (ref 13–17)
MCHC RBC-ENTMCNC: 31.5 PG — SIGNIFICANT CHANGE UP (ref 27–34)
MCHC RBC-ENTMCNC: 33.3 GM/DL — SIGNIFICANT CHANGE UP (ref 32–36)
MCV RBC AUTO: 94.6 FL — SIGNIFICANT CHANGE UP (ref 80–100)
NRBC # BLD: 1 /100 WBCS — HIGH (ref 0–0)
PLATELET # BLD AUTO: 142 K/UL — LOW (ref 150–400)
POTASSIUM SERPL-MCNC: 3.9 MMOL/L — SIGNIFICANT CHANGE UP (ref 3.5–5.3)
POTASSIUM SERPL-SCNC: 3.9 MMOL/L — SIGNIFICANT CHANGE UP (ref 3.5–5.3)
RBC # BLD: 3.52 M/UL — LOW (ref 4.2–5.8)
RBC # FLD: 13.8 % — SIGNIFICANT CHANGE UP (ref 10.3–14.5)
SODIUM SERPL-SCNC: 137 MMOL/L — SIGNIFICANT CHANGE UP (ref 135–145)
SPECIMEN SOURCE: SIGNIFICANT CHANGE UP
TRIGL SERPL-MCNC: 477 MG/DL — HIGH (ref 10–149)
WBC # BLD: 5.1 K/UL — SIGNIFICANT CHANGE UP (ref 3.8–10.5)
WBC # FLD AUTO: 5.1 K/UL — SIGNIFICANT CHANGE UP (ref 3.8–10.5)

## 2018-04-05 PROCEDURE — 71045 X-RAY EXAM CHEST 1 VIEW: CPT | Mod: 26

## 2018-04-05 PROCEDURE — 99232 SBSQ HOSP IP/OBS MODERATE 35: CPT

## 2018-04-05 PROCEDURE — 71045 X-RAY EXAM CHEST 1 VIEW: CPT | Mod: 26,77

## 2018-04-05 RX ORDER — MORPHINE SULFATE 50 MG/1
2 CAPSULE, EXTENDED RELEASE ORAL
Qty: 0 | Refills: 0 | Status: DISCONTINUED | OUTPATIENT
Start: 2018-04-05 | End: 2018-04-06

## 2018-04-05 RX ORDER — MORPHINE SULFATE 50 MG/1
2 CAPSULE, EXTENDED RELEASE ORAL EVERY 4 HOURS
Qty: 0 | Refills: 0 | Status: DISCONTINUED | OUTPATIENT
Start: 2018-04-05 | End: 2018-04-05

## 2018-04-05 RX ADMIN — MORPHINE SULFATE 2 MILLIGRAM(S): 50 CAPSULE, EXTENDED RELEASE ORAL at 17:00

## 2018-04-05 RX ADMIN — PIPERACILLIN AND TAZOBACTAM 25 GRAM(S): 4; .5 INJECTION, POWDER, LYOPHILIZED, FOR SOLUTION INTRAVENOUS at 13:35

## 2018-04-05 RX ADMIN — PIPERACILLIN AND TAZOBACTAM 25 GRAM(S): 4; .5 INJECTION, POWDER, LYOPHILIZED, FOR SOLUTION INTRAVENOUS at 05:34

## 2018-04-05 RX ADMIN — Medication 650 MILLIGRAM(S): at 17:25

## 2018-04-05 RX ADMIN — MIRTAZAPINE 30 MILLIGRAM(S): 45 TABLET, ORALLY DISINTEGRATING ORAL at 23:54

## 2018-04-05 RX ADMIN — PROPOFOL 4.9 MICROGRAM(S)/KG/MIN: 10 INJECTION, EMULSION INTRAVENOUS at 06:48

## 2018-04-05 RX ADMIN — PROPOFOL 4.9 MICROGRAM(S)/KG/MIN: 10 INJECTION, EMULSION INTRAVENOUS at 10:30

## 2018-04-05 RX ADMIN — PROPOFOL 4.9 MICROGRAM(S)/KG/MIN: 10 INJECTION, EMULSION INTRAVENOUS at 13:35

## 2018-04-05 RX ADMIN — PIPERACILLIN AND TAZOBACTAM 25 GRAM(S): 4; .5 INJECTION, POWDER, LYOPHILIZED, FOR SOLUTION INTRAVENOUS at 21:22

## 2018-04-05 RX ADMIN — HEPARIN SODIUM 1600 UNIT(S)/HR: 5000 INJECTION INTRAVENOUS; SUBCUTANEOUS at 04:57

## 2018-04-05 RX ADMIN — PROPOFOL 4.9 MICROGRAM(S)/KG/MIN: 10 INJECTION, EMULSION INTRAVENOUS at 02:48

## 2018-04-05 RX ADMIN — Medication 2 MILLIGRAM(S): at 17:43

## 2018-04-05 RX ADMIN — LEVETIRACETAM 500 MILLIGRAM(S): 250 TABLET, FILM COATED ORAL at 05:34

## 2018-04-05 RX ADMIN — CHLORHEXIDINE GLUCONATE 15 MILLILITER(S): 213 SOLUTION TOPICAL at 05:35

## 2018-04-05 RX ADMIN — PROPOFOL 4.9 MICROGRAM(S)/KG/MIN: 10 INJECTION, EMULSION INTRAVENOUS at 21:40

## 2018-04-05 RX ADMIN — HEPARIN SODIUM 1600 UNIT(S)/HR: 5000 INJECTION INTRAVENOUS; SUBCUTANEOUS at 11:58

## 2018-04-05 RX ADMIN — LEVETIRACETAM 500 MILLIGRAM(S): 250 TABLET, FILM COATED ORAL at 17:24

## 2018-04-05 RX ADMIN — PANTOPRAZOLE SODIUM 40 MILLIGRAM(S): 20 TABLET, DELAYED RELEASE ORAL at 11:09

## 2018-04-05 RX ADMIN — MORPHINE SULFATE 2 MILLIGRAM(S): 50 CAPSULE, EXTENDED RELEASE ORAL at 15:40

## 2018-04-05 RX ADMIN — MORPHINE SULFATE 2 MILLIGRAM(S): 50 CAPSULE, EXTENDED RELEASE ORAL at 21:22

## 2018-04-05 RX ADMIN — MORPHINE SULFATE 2 MILLIGRAM(S): 50 CAPSULE, EXTENDED RELEASE ORAL at 22:00

## 2018-04-05 RX ADMIN — PROPOFOL 4.9 MICROGRAM(S)/KG/MIN: 10 INJECTION, EMULSION INTRAVENOUS at 18:33

## 2018-04-05 RX ADMIN — CHLORHEXIDINE GLUCONATE 15 MILLILITER(S): 213 SOLUTION TOPICAL at 17:25

## 2018-04-05 RX ADMIN — Medication 2 MILLIGRAM(S): at 05:35

## 2018-04-05 NOTE — PROGRESS NOTE ADULT - ASSESSMENT
Patient with Stage IV Lung CA - on vent  hx. leptomeingeal disease  on vent, unable to wean, loculated collections on left, infiltrate on right on zosyn  heparin for dvt  continue tube feeds  Pallatiative care input appreciated  in continue d/w family on goals of care as prognosis is guarded, and doubt will be able to wean

## 2018-04-05 NOTE — PROGRESS NOTE ADULT - ASSESSMENT
47y old M with PMH Stage IV lung cancer to brain s/p Ommaya reservoir 11/2017 for leptomeningeal disease s/p chemo/radiation, sepsis with acute bacterial meningitis, RUE DVT. Pt s/p removal of Ommaya 1/12.   As per sister, patient has been getting more lethargic the last few days. He had a right sided PleurX catheter placed a few days back and his DOAC was held. During drainage of fluid he slumped over and was brought to the ER. He was intubated by EMS en route. On arrival he was intubated and sedated. Palliative Care consulted to assist with establishing GOC.        1) Respiratory failure  -Presented with acute respiratory acidosis  -Was intubated emergently in ambulance en route to ED  -Has large loculated left pleural effusion,  LLL consolidation  -CT scan : patchy right lung ground glass opacities  -Not been able to tolerate weaning from the ventilator  - will add 2mg IV morphine q4h prn given tachypnea at times    2)Lung CA  -Aggressive disease, metastatic  -Appreciate Dr Arenas's input - she feels he is dying and will not likely be weaned from vent    3)Fevers  -?source?  -On empiric antibiotics  -Tylenol PRN fever    4) Prognosis  - poor  - advancing cancer without option for further treatment currently and respiratory failure with inability to wean from vent    5) GOC/Advanced directives  - pt does not have capacity for decision making  -Has HCP naming sister Mayra Ring as HCP  - Full code  - GOC meeting scheduled for tomorrow at 1pm    Thank you for including us in Mr. Castellon's care. Will continue to follow with you.    Celeste Jenkins MD  Palliative Care Attending

## 2018-04-05 NOTE — PROGRESS NOTE ADULT - ASSESSMENT
- cont vent support  - has large, loculated left effussion with LLL consolidation; Right sided ground glass infiltrates  - cont zosyn/vanco - has leptomeningeal spread of tumor  - CXR noted  - on seizure meds  - has left DVT despite anticoagulation  - dvt proph  - prognosis poor

## 2018-04-05 NOTE — CHART NOTE - NSCHARTNOTEFT_GEN_A_CORE
Assessment:   *pt remains sedated on vent with diarrhea.  Propofol running at 24.5mL/hr (=647Kcal/day).  Vent rate of 17 at visit ([place] = 2384 Kcal).  Pt with large loculated Lt effusion with LLL consolidation, right sided ground glass infiltrates.  Pt unable to be weaned.  Pending [facility] meeting with pt's family.  pt with 1200mL urine output via intermittent catheterization in past 24hrs.  *TF at bedside running at 50mL/hr (goal rate).  Per pump, pt has received an average of 852mL of formula per day over the past two days.  Which provides ~1277Kcal and ~53g protein; with propofol meeting ~81% of estimated caloric needs and ~58% of estimated protein needs.  Pt tolerating TF well with soft nontender abd.  Pt with diarrhea, however, based on RN discussion likely no TF related.  Based on nutr needs, recommend add prosource 4pkts per day to meet increased protein needs.  With addition of prosource, pt will meet ~91% of estimated caloric needs and 100% of estimated protein needs.  *new wt shows 2.2Kg wt gain; likely fluid as pt with chest tube.      Recommendations:  1) add 4pkts prosource per day  2) monitor TF tolerance, keep HOB> 30-45 degrees  3) monitor BM's  4) obtain new wt biweekly to track/trend changes      Diet Presciption: Diet, NPO with Tube Feed:   Oxepa (OXEPARTH)    Tube Feeding Modality: Orogastric  Total Volume for 24 Hours (mL): 1200  Continuous  Starting Tube Feed Rate {mL per Hour}: 25  Increase Tube Feed Rate by (mL): 25  Until Goal Tube Feed Rate (mL per Hour): 50  Tube Feed Duration (in Hours): 24  Tube Feed Start Time: 11:00 (04-02-18 @ 10:05)      Wt Hx:  4/5: 83.8Kg  4/1: 81.6Kg    Estimated Needs:   [x] no change since previous assessment  Estimated Energy Needs (25-30 calories/kg):  · Weight  (lbs) 169.7 lb  · Weight (kg) 77 kg  · From 1925 To 2310 Kcal    Other Calculation:  · Other Calculation  Ht. 68 "        Wt. 81.2 kg        27 BMI                  IBW 70  kg               Pt is at 116 %  IBW    Estimated Protein Needs (1.2-1.6 gm/kg):  · From 92.4 To 123.2 g protein    Nutrition Diagnostic #1:  · Nutrition Diagnostic Terminology #1: Nutrient  · Nutrient: Increased nutrient needs (specify)  · Etiology: increased demand for calorie/protein due to increased metabolic needs 2/2 CA  · Signs/Symptoms: s/p intubation, Dx lung CA with mets to brain  · Nutrition Intervention: Enteral Nutrition  · Enteral Nutrition: Composition; Concentration; Rate; start TF with oxepa @ 20mL/hr and titrate to goal of 53mL/hr x 20hrs with 2pkts prosource per day  · Goal/Expected Outcome: pt meet >80% of estimated nutr needs with tolerance    Nutrition Diagnosis is [x] ongoing  [ ] resolved [ ] not applicable     New Nutrition Diagnosis: [x] not applicable         Pertinent Medications: MEDICATIONS  (STANDING):  chlorhexidine 0.12% Liquid 15 milliLiter(s) Swish and Spit two times a day  dexamethasone     Tablet 2 milliGRAM(s) Oral daily  heparin  Infusion.  Unit(s)/Hr (15 mL/Hr) IV Continuous <Continuous>  influenza   Vaccine 0.5 milliLiter(s) IntraMuscular once  levETIRAcetam  Solution 500 milliGRAM(s) Oral two times a day  mirtazapine 30 milliGRAM(s) Oral at bedtime  pantoprazole  Injectable 40 milliGRAM(s) IV Push daily  piperacillin/tazobactam IVPB. 3.375 Gram(s) IV Intermittent every 8 hours  propofol Infusion 10 MICROgram(s)/kG/Min (4.896 mL/Hr) IV Continuous <Continuous>    MEDICATIONS  (PRN):  acetaminophen   Tablet 650 milliGRAM(s) Oral every 6 hours PRN For Temp greater than 38.5 C (101.3 F)  heparin  Injectable 6500 Unit(s) IV Push every 6 hours PRN For aPTT less than 40  heparin  Injectable 3000 Unit(s) IV Push every 6 hours PRN For aPTT between 40 - 57  LORazepam   Injectable 2 milliGRAM(s) IV Push every 2 hours PRN Agitation    Pertinent Labs: 04-05 Na137 mmol/L Glu 127 mg/dL<H> K+ 3.9 mmol/L Cr  0.72 mg/dL BUN 13 mg/dL 04-01 Alb 2.5 g/dL<L>      Skin: helene score = 13: skin unchanged    Monitoring and Evaluation:   [x] PO intake/Nutr support infusion [ x ] Tolerance to Nutr [ x ] weights [ x ] labs[ x ] follow up per protocol  [ ] other:

## 2018-04-06 LAB
ANION GAP SERPL CALC-SCNC: 8 MMOL/L — SIGNIFICANT CHANGE UP (ref 5–17)
APTT BLD: 59.5 SEC — HIGH (ref 27.5–37.4)
BUN SERPL-MCNC: 16 MG/DL — SIGNIFICANT CHANGE UP (ref 7–23)
CALCIUM SERPL-MCNC: 9.4 MG/DL — SIGNIFICANT CHANGE UP (ref 8.5–10.1)
CHLORIDE SERPL-SCNC: 105 MMOL/L — SIGNIFICANT CHANGE UP (ref 96–108)
CO2 SERPL-SCNC: 27 MMOL/L — SIGNIFICANT CHANGE UP (ref 22–31)
CREAT SERPL-MCNC: 0.58 MG/DL — SIGNIFICANT CHANGE UP (ref 0.5–1.3)
GLUCOSE SERPL-MCNC: 122 MG/DL — HIGH (ref 70–99)
HCT VFR BLD CALC: 31.6 % — LOW (ref 39–50)
HGB BLD-MCNC: 10.3 G/DL — LOW (ref 13–17)
MCHC RBC-ENTMCNC: 30.8 PG — SIGNIFICANT CHANGE UP (ref 27–34)
MCHC RBC-ENTMCNC: 32.6 GM/DL — SIGNIFICANT CHANGE UP (ref 32–36)
MCV RBC AUTO: 94.6 FL — SIGNIFICANT CHANGE UP (ref 80–100)
NRBC # BLD: 0 /100 WBCS — SIGNIFICANT CHANGE UP (ref 0–0)
PLATELET # BLD AUTO: 185 K/UL — SIGNIFICANT CHANGE UP (ref 150–400)
POTASSIUM SERPL-MCNC: 3.8 MMOL/L — SIGNIFICANT CHANGE UP (ref 3.5–5.3)
POTASSIUM SERPL-SCNC: 3.8 MMOL/L — SIGNIFICANT CHANGE UP (ref 3.5–5.3)
RBC # BLD: 3.34 M/UL — LOW (ref 4.2–5.8)
RBC # FLD: 14 % — SIGNIFICANT CHANGE UP (ref 10.3–14.5)
SODIUM SERPL-SCNC: 140 MMOL/L — SIGNIFICANT CHANGE UP (ref 135–145)
WBC # BLD: 5.86 K/UL — SIGNIFICANT CHANGE UP (ref 3.8–10.5)
WBC # FLD AUTO: 5.86 K/UL — SIGNIFICANT CHANGE UP (ref 3.8–10.5)

## 2018-04-06 PROCEDURE — 99233 SBSQ HOSP IP/OBS HIGH 50: CPT

## 2018-04-06 RX ORDER — HYDROMORPHONE HYDROCHLORIDE 2 MG/ML
1 INJECTION INTRAMUSCULAR; INTRAVENOUS; SUBCUTANEOUS
Qty: 0 | Refills: 0 | Status: DISCONTINUED | OUTPATIENT
Start: 2018-04-06 | End: 2018-04-06

## 2018-04-06 RX ORDER — HYDROMORPHONE HYDROCHLORIDE 2 MG/ML
0.5 INJECTION INTRAMUSCULAR; INTRAVENOUS; SUBCUTANEOUS
Qty: 0 | Refills: 0 | Status: DISCONTINUED | OUTPATIENT
Start: 2018-04-06 | End: 2018-04-07

## 2018-04-06 RX ORDER — HYDROMORPHONE HYDROCHLORIDE 2 MG/ML
2 INJECTION INTRAMUSCULAR; INTRAVENOUS; SUBCUTANEOUS
Qty: 0 | Refills: 0 | Status: DISCONTINUED | OUTPATIENT
Start: 2018-04-06 | End: 2018-04-07

## 2018-04-06 RX ORDER — HYDROMORPHONE HYDROCHLORIDE 2 MG/ML
4 INJECTION INTRAMUSCULAR; INTRAVENOUS; SUBCUTANEOUS
Qty: 0 | Refills: 0 | Status: DISCONTINUED | OUTPATIENT
Start: 2018-04-06 | End: 2018-04-06

## 2018-04-06 RX ADMIN — Medication 650 MILLIGRAM(S): at 12:24

## 2018-04-06 RX ADMIN — PROPOFOL 4.9 MICROGRAM(S)/KG/MIN: 10 INJECTION, EMULSION INTRAVENOUS at 06:35

## 2018-04-06 RX ADMIN — PROPOFOL 4.9 MICROGRAM(S)/KG/MIN: 10 INJECTION, EMULSION INTRAVENOUS at 01:44

## 2018-04-06 RX ADMIN — CHLORHEXIDINE GLUCONATE 15 MILLILITER(S): 213 SOLUTION TOPICAL at 17:23

## 2018-04-06 RX ADMIN — HYDROMORPHONE HYDROCHLORIDE 1 MILLIGRAM(S): 2 INJECTION INTRAMUSCULAR; INTRAVENOUS; SUBCUTANEOUS at 15:03

## 2018-04-06 RX ADMIN — PIPERACILLIN AND TAZOBACTAM 25 GRAM(S): 4; .5 INJECTION, POWDER, LYOPHILIZED, FOR SOLUTION INTRAVENOUS at 22:11

## 2018-04-06 RX ADMIN — Medication 2 MILLIGRAM(S): at 06:35

## 2018-04-06 RX ADMIN — CHLORHEXIDINE GLUCONATE 15 MILLILITER(S): 213 SOLUTION TOPICAL at 06:34

## 2018-04-06 RX ADMIN — PANTOPRAZOLE SODIUM 40 MILLIGRAM(S): 20 TABLET, DELAYED RELEASE ORAL at 11:04

## 2018-04-06 RX ADMIN — MORPHINE SULFATE 2 MILLIGRAM(S): 50 CAPSULE, EXTENDED RELEASE ORAL at 11:15

## 2018-04-06 RX ADMIN — PIPERACILLIN AND TAZOBACTAM 25 GRAM(S): 4; .5 INJECTION, POWDER, LYOPHILIZED, FOR SOLUTION INTRAVENOUS at 06:34

## 2018-04-06 RX ADMIN — PROPOFOL 4.9 MICROGRAM(S)/KG/MIN: 10 INJECTION, EMULSION INTRAVENOUS at 09:30

## 2018-04-06 RX ADMIN — HYDROMORPHONE HYDROCHLORIDE 1 MILLIGRAM(S): 2 INJECTION INTRAMUSCULAR; INTRAVENOUS; SUBCUTANEOUS at 15:15

## 2018-04-06 RX ADMIN — LEVETIRACETAM 500 MILLIGRAM(S): 250 TABLET, FILM COATED ORAL at 17:22

## 2018-04-06 RX ADMIN — HEPARIN SODIUM 1600 UNIT(S)/HR: 5000 INJECTION INTRAVENOUS; SUBCUTANEOUS at 07:11

## 2018-04-06 RX ADMIN — HYDROMORPHONE HYDROCHLORIDE 2 MILLIGRAM(S): 2 INJECTION INTRAMUSCULAR; INTRAVENOUS; SUBCUTANEOUS at 22:21

## 2018-04-06 RX ADMIN — Medication 650 MILLIGRAM(S): at 18:49

## 2018-04-06 RX ADMIN — HYDROMORPHONE HYDROCHLORIDE 1 MILLIGRAM(S): 2 INJECTION INTRAMUSCULAR; INTRAVENOUS; SUBCUTANEOUS at 22:00

## 2018-04-06 RX ADMIN — HYDROMORPHONE HYDROCHLORIDE 1 MILLIGRAM(S): 2 INJECTION INTRAMUSCULAR; INTRAVENOUS; SUBCUTANEOUS at 21:30

## 2018-04-06 RX ADMIN — PROPOFOL 4.9 MICROGRAM(S)/KG/MIN: 10 INJECTION, EMULSION INTRAVENOUS at 09:42

## 2018-04-06 RX ADMIN — MORPHINE SULFATE 2 MILLIGRAM(S): 50 CAPSULE, EXTENDED RELEASE ORAL at 11:04

## 2018-04-06 RX ADMIN — PROPOFOL 4.9 MICROGRAM(S)/KG/MIN: 10 INJECTION, EMULSION INTRAVENOUS at 17:22

## 2018-04-06 RX ADMIN — MIRTAZAPINE 30 MILLIGRAM(S): 45 TABLET, ORALLY DISINTEGRATING ORAL at 22:11

## 2018-04-06 RX ADMIN — PIPERACILLIN AND TAZOBACTAM 25 GRAM(S): 4; .5 INJECTION, POWDER, LYOPHILIZED, FOR SOLUTION INTRAVENOUS at 15:04

## 2018-04-06 RX ADMIN — LEVETIRACETAM 500 MILLIGRAM(S): 250 TABLET, FILM COATED ORAL at 06:35

## 2018-04-06 NOTE — PROGRESS NOTE ADULT - ASSESSMENT
Patient with Stage IV Lung CA - on vent  hx. leptomeningeal disease  on vent, unable to wean, loculated collections on left, infiltrate on right on zosyn, worsening infiltrate negative sputum culture  heparin for dvt  continue tube feeds  Pallatiative care input appreciated meeting today  prognosis is guarded

## 2018-04-06 NOTE — PROGRESS NOTE ADULT - ASSESSMENT
- cont vent support  - has large, loculated left effussion with LLL consolidation; Right sided ground glass infiltrates  - cont zosyn/vanco - has leptomeningeal spread of tumor  - CXR noted  - on seizure meds  - has left DVT despite anticoagulation  - Has advanced stage IV non-small cell lung carcinoma. For palliative care evaluation today. Prognosis is  extremely poor. Would recommend comfort care.

## 2018-04-06 NOTE — PROGRESS NOTE ADULT - ASSESSMENT
47y old M with PMH Stage IV lung cancer to brain s/p Ommaya reservoir 11/2017 for leptomeningeal disease s/p chemo/radiation, sepsis with acute bacterial meningitis, RUE DVT. Pt s/p removal of Ommaya 1/12.   As per sister, patient has been getting more lethargic the last few days. He had a right sided PleurX catheter placed a few days back and his DOAC was held. During drainage of fluid he slumped over and was brought to the ER. He was intubated by EMS en route. On arrival he was intubated and sedated. Palliative Care consulted to assist with establishing GOC.        1) Respiratory failure  -Presented with acute respiratory acidosis  -Was intubated emergently in ambulance en route to ED  -Has large loculated left pleural effusion,  LLL consolidation  -CT scan : patchy right lung ground glass opacities  - worsening right sided infiltrate  -Not been able to tolerate weaning from the ventilator  - c/w 2mg IV morphine q4h prn given tachypnea at times (was on chronic opioids at home,  paperwork on chart)    2) Lung CA  -Aggressive disease, metastatic  -Appreciate Dr Arenas's input - she feels he is dying and will not likely be weaned from vent    3)Fevers  -On empiric antibiotics  -Tylenol PRN fever    4) Prognosis  - poor  - advancing cancer without option for further treatment currently and respiratory failure with inability to wean from vent    5) GOC/Advanced directives  - pt does not have capacity for decision making  -Has HCP naming sister Mayra Ring as HCP  - Full code  - GOC meeting scheduled for today at 1pm    Thank you for including us in Mr. Castellon's care. Will continue to follow with you.    Celeste Jenkins MD  Palliative Care Attending

## 2018-04-06 NOTE — GOALS OF CARE CONVERSATION - PERSONAL ADVANCE DIRECTIVE - CONVERSATION DETAILS
Met with family to discuss medical issues and overall goals for pt's care. Pt's family was very emotional, saddened by the impending loss of their loved one, but open to sharing his story. They explained that prior to being ill the pt had a big personality, loved to eat, was a brilliant , and loving person. He has unfortunately been stripped of many of these qualities by the quick onset of cancer and unfortunately tumultuous journey through attempts at treatment. His fiance shared that he was wracked by pain most often, still loving despite this, and hopeful for the future. He shared with his sister Mayra his trust in her to be his HCP, always saying to do whatever it took to give him a chance at living, beating his cancer, until there was no hope. This, as well as the fast decline has been most difficult for the family to navigate.     Family well aware of how he has declined to this point. They understand that his prognosis is poor and shared subspecialists impressions that he is unable to be weaned off the vent and is going to succumb to his ailments. They have considered "best" case scenarios where he would have a trach and a PEG, knowing that he would never want to live this way. Spent time sharing clinician's compassion for how difficult this must be for them. Also reiterated what they shared of who the pt is and what is truly considered living. Offered a different perspective on hope. Noted that hope for cure of his disease and even hope for cure of successful weaning, may not be present, but that nonetheless hope survives, just in a different form. They agreed that despite this there remains hope that he will die with dignity, lack of suffering, and completely surrounded by people who love him and are able to care for him in the way that he deserves.     We gave the family time to ask questions about the compassionate weaning process, attempting to normalize their tears and respect the fact that their hearts were clearly breaking. They confirmed that pt has been on opioids for a prolonged period and shared that morphine made pt twitchy in the past, preferring dilaudid. Reassured them that we would rotate this and gtt would be started when initiating weaning process to ensure comfort, along with prn doses of opioid and ativan for any breakthrough symptoms. They eventually were able to agree to put in place a DNR and DNI, the latter for after removal from vent. They requested to speak with nursing manager to have pt cared for by some of the nurses they have grown fond of and want to care for them as well as they go through this impossible situation. They agree that liberation is what is best for him and will let the team know when they are prepared to do so, seeming to only want to ensure these nurses will be present before moving forward. Reassured them that we are respecting their process and that ICU team would check in with them to confirm when they will be ready.     Shared intent to continue to support them and reminded them that this is not a decision to take the pt's life (stressing that his disease has decided his trajectory not them), but rather a choice to allow what is left of his life to be without suffering.

## 2018-04-06 NOTE — PROGRESS NOTE ADULT - PROBLEM SELECTOR PROBLEM 8
Deep vein thrombosis (DVT) of popliteal vein, unspecified chronicity, unspecified laterality

## 2018-04-06 NOTE — GOALS OF CARE CONVERSATION - PERSONAL ADVANCE DIRECTIVE - TREATMENT GUIDELINE COMMENT
as above When ready to wean can start dilaudid gtt (for sx of pain and dyspnea, will need an hour to reach steady state in blood, can give pushes for faster action in interim), prn doses of IV dilaudid already ordered to give as needed and ativan also already ordered as well. Will continue to follow with you. When ready to wean can start dilaudid gtt (for sx of pain and dyspnea, will need an hour to reach steady state in blood, can give pushes for faster action in interim), prn doses of IV dilaudid already ordered to give as needed and ativan also already ordered as well. Spent 50 minutes discussing GOC with family including Advance care planning, explanation and discussion of advance directives, reviewed plans and completed DNR/DNI form.

## 2018-04-07 LAB
APTT BLD: 58 SEC — HIGH (ref 27.5–37.4)
HCT VFR BLD CALC: 31.6 % — LOW (ref 39–50)
HGB BLD-MCNC: 10.2 G/DL — LOW (ref 13–17)
MCHC RBC-ENTMCNC: 31.2 PG — SIGNIFICANT CHANGE UP (ref 27–34)
MCHC RBC-ENTMCNC: 32.3 GM/DL — SIGNIFICANT CHANGE UP (ref 32–36)
MCV RBC AUTO: 96.6 FL — SIGNIFICANT CHANGE UP (ref 80–100)
NRBC # BLD: 0 /100 WBCS — SIGNIFICANT CHANGE UP (ref 0–0)
PLATELET # BLD AUTO: 193 K/UL — SIGNIFICANT CHANGE UP (ref 150–400)
RBC # BLD: 3.27 M/UL — LOW (ref 4.2–5.8)
RBC # FLD: 13.9 % — SIGNIFICANT CHANGE UP (ref 10.3–14.5)
WBC # BLD: 5.63 K/UL — SIGNIFICANT CHANGE UP (ref 3.8–10.5)
WBC # FLD AUTO: 5.63 K/UL — SIGNIFICANT CHANGE UP (ref 3.8–10.5)

## 2018-04-07 RX ORDER — HYDROMORPHONE HYDROCHLORIDE 2 MG/ML
2 INJECTION INTRAMUSCULAR; INTRAVENOUS; SUBCUTANEOUS
Qty: 0 | Refills: 0 | Status: DISCONTINUED | OUTPATIENT
Start: 2018-04-07 | End: 2018-04-08

## 2018-04-07 RX ADMIN — HYDROMORPHONE HYDROCHLORIDE 2 MILLIGRAM(S): 2 INJECTION INTRAMUSCULAR; INTRAVENOUS; SUBCUTANEOUS at 23:38

## 2018-04-07 RX ADMIN — PROPOFOL 4.9 MICROGRAM(S)/KG/MIN: 10 INJECTION, EMULSION INTRAVENOUS at 10:30

## 2018-04-07 RX ADMIN — PIPERACILLIN AND TAZOBACTAM 25 GRAM(S): 4; .5 INJECTION, POWDER, LYOPHILIZED, FOR SOLUTION INTRAVENOUS at 23:39

## 2018-04-07 RX ADMIN — CHLORHEXIDINE GLUCONATE 15 MILLILITER(S): 213 SOLUTION TOPICAL at 17:34

## 2018-04-07 RX ADMIN — PIPERACILLIN AND TAZOBACTAM 25 GRAM(S): 4; .5 INJECTION, POWDER, LYOPHILIZED, FOR SOLUTION INTRAVENOUS at 05:17

## 2018-04-07 RX ADMIN — LEVETIRACETAM 500 MILLIGRAM(S): 250 TABLET, FILM COATED ORAL at 05:20

## 2018-04-07 RX ADMIN — HYDROMORPHONE HYDROCHLORIDE 2 MILLIGRAM(S): 2 INJECTION INTRAMUSCULAR; INTRAVENOUS; SUBCUTANEOUS at 17:34

## 2018-04-07 RX ADMIN — HYDROMORPHONE HYDROCHLORIDE 2 MILLIGRAM(S): 2 INJECTION INTRAMUSCULAR; INTRAVENOUS; SUBCUTANEOUS at 04:12

## 2018-04-07 RX ADMIN — PROPOFOL 4.9 MICROGRAM(S)/KG/MIN: 10 INJECTION, EMULSION INTRAVENOUS at 17:34

## 2018-04-07 RX ADMIN — HYDROMORPHONE HYDROCHLORIDE 2 MILLIGRAM(S): 2 INJECTION INTRAMUSCULAR; INTRAVENOUS; SUBCUTANEOUS at 11:11

## 2018-04-07 RX ADMIN — HEPARIN SODIUM 1600 UNIT(S)/HR: 5000 INJECTION INTRAVENOUS; SUBCUTANEOUS at 07:04

## 2018-04-07 RX ADMIN — PANTOPRAZOLE SODIUM 40 MILLIGRAM(S): 20 TABLET, DELAYED RELEASE ORAL at 11:11

## 2018-04-07 RX ADMIN — HYDROMORPHONE HYDROCHLORIDE 2 MILLIGRAM(S): 2 INJECTION INTRAMUSCULAR; INTRAVENOUS; SUBCUTANEOUS at 09:03

## 2018-04-07 RX ADMIN — HYDROMORPHONE HYDROCHLORIDE 2 MILLIGRAM(S): 2 INJECTION INTRAMUSCULAR; INTRAVENOUS; SUBCUTANEOUS at 15:01

## 2018-04-07 RX ADMIN — MIRTAZAPINE 30 MILLIGRAM(S): 45 TABLET, ORALLY DISINTEGRATING ORAL at 23:39

## 2018-04-07 RX ADMIN — Medication 2 MILLIGRAM(S): at 21:24

## 2018-04-07 RX ADMIN — HYDROMORPHONE HYDROCHLORIDE 2 MILLIGRAM(S): 2 INJECTION INTRAMUSCULAR; INTRAVENOUS; SUBCUTANEOUS at 20:49

## 2018-04-07 RX ADMIN — PIPERACILLIN AND TAZOBACTAM 25 GRAM(S): 4; .5 INJECTION, POWDER, LYOPHILIZED, FOR SOLUTION INTRAVENOUS at 13:29

## 2018-04-07 RX ADMIN — Medication 2 MILLIGRAM(S): at 09:03

## 2018-04-07 RX ADMIN — HYDROMORPHONE HYDROCHLORIDE 2 MILLIGRAM(S): 2 INJECTION INTRAMUSCULAR; INTRAVENOUS; SUBCUTANEOUS at 00:41

## 2018-04-07 RX ADMIN — HYDROMORPHONE HYDROCHLORIDE 2 MILLIGRAM(S): 2 INJECTION INTRAMUSCULAR; INTRAVENOUS; SUBCUTANEOUS at 21:24

## 2018-04-07 RX ADMIN — HYDROMORPHONE HYDROCHLORIDE 2 MILLIGRAM(S): 2 INJECTION INTRAMUSCULAR; INTRAVENOUS; SUBCUTANEOUS at 01:00

## 2018-04-07 RX ADMIN — CHLORHEXIDINE GLUCONATE 15 MILLILITER(S): 213 SOLUTION TOPICAL at 05:17

## 2018-04-07 RX ADMIN — Medication 650 MILLIGRAM(S): at 07:12

## 2018-04-07 RX ADMIN — PROPOFOL 4.9 MICROGRAM(S)/KG/MIN: 10 INJECTION, EMULSION INTRAVENOUS at 13:29

## 2018-04-07 RX ADMIN — HYDROMORPHONE HYDROCHLORIDE 0.5 MILLIGRAM(S): 2 INJECTION INTRAMUSCULAR; INTRAVENOUS; SUBCUTANEOUS at 09:03

## 2018-04-07 RX ADMIN — Medication 2 MILLIGRAM(S): at 05:20

## 2018-04-07 RX ADMIN — HYDROMORPHONE HYDROCHLORIDE 2 MILLIGRAM(S): 2 INJECTION INTRAMUSCULAR; INTRAVENOUS; SUBCUTANEOUS at 07:13

## 2018-04-07 NOTE — PROGRESS NOTE ADULT - PROBLEM SELECTOR PROBLEM 5
Cancer with leptomeningeal spread

## 2018-04-07 NOTE — PROGRESS NOTE ADULT - PROBLEM SELECTOR PROBLEM 1
Acute respiratory failure with hypoxia and hypercapnia
Stage 4 malignant neoplasm of lung, unspecified laterality
Acute respiratory failure with hypoxia and hypercapnia

## 2018-04-07 NOTE — PROGRESS NOTE ADULT - ASSESSMENT
Patient with stage Iv lung cancer  now with respiratory failure unable to wean  d/w hcp plan for comfort measures only  terminal wean tomorrow

## 2018-04-07 NOTE — PROGRESS NOTE ADULT - PROBLEM SELECTOR PROBLEM 7
Pneumonia of both lower lobes

## 2018-04-07 NOTE — PROGRESS NOTE ADULT - PROBLEM SELECTOR PROBLEM 2
Pleural effusion on left

## 2018-04-07 NOTE — PROGRESS NOTE ADULT - PROBLEM SELECTOR PROBLEM 3
Altered mental status, unspecified altered mental status type

## 2018-04-07 NOTE — PROGRESS NOTE ADULT - ASSESSMENT
47y old M with PMH Stage IV lung cancer to brain s/p Ommaya reservoir 11/2017 for leptomeningeal disease s/p chemo/radiation, sepsis with acute bacterial meningitis, RUE DVT. Pt s/p removal of Ommaya 1/12.   As per sister, patient has been getting more lethargic the last few days. He had a right sided PleurX catheter placed a few days back and his DOAC was held. During drainage of fluid he slumped over and was brought to the ER. He was intubated by EMS en route. On arrival he was intubated and sedated.         1) Respiratory failure  -Presented with acute respiratory acidosis  -Was intubated emergently in ambulance en route to ED  -Has large loculated left pleural effusion,  LLL consolidation  -CT scan : patchy right lung ground glass opacities  -Not been able to tolerate weaning from the ventilator    2)Lung CA  -Aggressive disease, metastatic  -Appreciate Dr Arenas's input - she feels he is dying and will not likely be weaned from vent    3)Fevers  -?source?  -On empiric antibiotics  -Tylenol PRN fever    4) Advance directives  -Has HCP naming sister Mayra Ring as HCP  -Family meeting done 4/6  -DNR/DNI  -They plan compassionate liberation from vent suppot 4/8/18 as discussed with staff.                    -

## 2018-04-07 NOTE — PROGRESS NOTE ADULT - PROBLEM SELECTOR PROBLEM 6
Malignant neoplasm of upper lobe of right lung

## 2018-04-08 RX ORDER — HYDROMORPHONE HYDROCHLORIDE 2 MG/ML
2 INJECTION INTRAMUSCULAR; INTRAVENOUS; SUBCUTANEOUS
Qty: 100 | Refills: 0 | Status: DISCONTINUED | OUTPATIENT
Start: 2018-04-08 | End: 2018-04-08

## 2018-04-08 RX ORDER — ROBINUL 0.2 MG/ML
0.2 INJECTION INTRAMUSCULAR; INTRAVENOUS ONCE
Qty: 0 | Refills: 0 | Status: COMPLETED | OUTPATIENT
Start: 2018-04-08 | End: 2018-04-08

## 2018-04-08 RX ORDER — HYDROMORPHONE HYDROCHLORIDE 2 MG/ML
2 INJECTION INTRAMUSCULAR; INTRAVENOUS; SUBCUTANEOUS
Qty: 10 | Refills: 0 | Status: DISCONTINUED | OUTPATIENT
Start: 2018-04-08 | End: 2018-04-08

## 2018-04-08 RX ADMIN — HYDROMORPHONE HYDROCHLORIDE 2 MG/HR: 2 INJECTION INTRAMUSCULAR; INTRAVENOUS; SUBCUTANEOUS at 11:10

## 2018-04-08 RX ADMIN — HYDROMORPHONE HYDROCHLORIDE 2 MILLIGRAM(S): 2 INJECTION INTRAMUSCULAR; INTRAVENOUS; SUBCUTANEOUS at 03:29

## 2018-04-08 RX ADMIN — Medication 2 MILLIGRAM(S): at 00:01

## 2018-04-08 RX ADMIN — HYDROMORPHONE HYDROCHLORIDE 10 MG/HR: 2 INJECTION INTRAMUSCULAR; INTRAVENOUS; SUBCUTANEOUS at 10:40

## 2018-04-08 RX ADMIN — Medication 2 MILLIGRAM(S): at 11:50

## 2018-04-08 RX ADMIN — HYDROMORPHONE HYDROCHLORIDE 2 MILLIGRAM(S): 2 INJECTION INTRAMUSCULAR; INTRAVENOUS; SUBCUTANEOUS at 12:24

## 2018-04-08 RX ADMIN — ROBINUL 0.2 MILLIGRAM(S): 0.2 INJECTION INTRAMUSCULAR; INTRAVENOUS at 12:29

## 2018-04-08 RX ADMIN — LEVETIRACETAM 500 MILLIGRAM(S): 250 TABLET, FILM COATED ORAL at 06:54

## 2018-04-08 RX ADMIN — HYDROMORPHONE HYDROCHLORIDE 2 MILLIGRAM(S): 2 INJECTION INTRAMUSCULAR; INTRAVENOUS; SUBCUTANEOUS at 10:40

## 2018-04-08 RX ADMIN — HYDROMORPHONE HYDROCHLORIDE 2 MILLIGRAM(S): 2 INJECTION INTRAMUSCULAR; INTRAVENOUS; SUBCUTANEOUS at 09:13

## 2018-04-08 RX ADMIN — HYDROMORPHONE HYDROCHLORIDE 2 MILLIGRAM(S): 2 INJECTION INTRAMUSCULAR; INTRAVENOUS; SUBCUTANEOUS at 12:54

## 2018-04-08 RX ADMIN — HYDROMORPHONE HYDROCHLORIDE 2 MILLIGRAM(S): 2 INJECTION INTRAMUSCULAR; INTRAVENOUS; SUBCUTANEOUS at 06:38

## 2018-04-08 RX ADMIN — PIPERACILLIN AND TAZOBACTAM 25 GRAM(S): 4; .5 INJECTION, POWDER, LYOPHILIZED, FOR SOLUTION INTRAVENOUS at 05:07

## 2018-04-08 RX ADMIN — Medication 2 MILLIGRAM(S): at 12:16

## 2018-04-08 RX ADMIN — PROPOFOL 4.9 MICROGRAM(S)/KG/MIN: 10 INJECTION, EMULSION INTRAVENOUS at 08:04

## 2018-04-08 RX ADMIN — HYDROMORPHONE HYDROCHLORIDE 2 MILLIGRAM(S): 2 INJECTION INTRAMUSCULAR; INTRAVENOUS; SUBCUTANEOUS at 04:00

## 2018-04-08 RX ADMIN — HYDROMORPHONE HYDROCHLORIDE 2 MILLIGRAM(S): 2 INJECTION INTRAMUSCULAR; INTRAVENOUS; SUBCUTANEOUS at 01:04

## 2018-04-08 RX ADMIN — HYDROMORPHONE HYDROCHLORIDE 2 MILLIGRAM(S): 2 INJECTION INTRAMUSCULAR; INTRAVENOUS; SUBCUTANEOUS at 11:10

## 2018-04-08 RX ADMIN — HYDROMORPHONE HYDROCHLORIDE 2 MILLIGRAM(S): 2 INJECTION INTRAMUSCULAR; INTRAVENOUS; SUBCUTANEOUS at 02:00

## 2018-04-08 RX ADMIN — Medication 2 MILLIGRAM(S): at 13:05

## 2018-04-08 RX ADMIN — HYDROMORPHONE HYDROCHLORIDE 2 MILLIGRAM(S): 2 INJECTION INTRAMUSCULAR; INTRAVENOUS; SUBCUTANEOUS at 00:33

## 2018-04-08 RX ADMIN — HYDROMORPHONE HYDROCHLORIDE 2 MILLIGRAM(S): 2 INJECTION INTRAMUSCULAR; INTRAVENOUS; SUBCUTANEOUS at 07:00

## 2018-04-08 RX ADMIN — Medication 2 MILLIGRAM(S): at 05:06

## 2018-04-08 RX ADMIN — Medication 2 MILLIGRAM(S): at 12:34

## 2018-04-08 RX ADMIN — CHLORHEXIDINE GLUCONATE 15 MILLILITER(S): 213 SOLUTION TOPICAL at 05:07

## 2018-04-08 RX ADMIN — Medication 2 MILLIGRAM(S): at 10:07

## 2018-04-08 RX ADMIN — HYDROMORPHONE HYDROCHLORIDE 2 MILLIGRAM(S): 2 INJECTION INTRAMUSCULAR; INTRAVENOUS; SUBCUTANEOUS at 09:45

## 2018-04-08 RX ADMIN — Medication 2 MILLIGRAM(S): at 07:45

## 2018-04-08 RX ADMIN — Medication 2 MILLIGRAM(S): at 10:10

## 2018-04-08 NOTE — AIRWAY REMOVAL NOTE  ADULT & PEDS - ARTIFICAL AIRWAY REMOVAL COMMENTS
Patient terminally weaned by RRT CA as per order by Dr. Del Castillo, MILTON Abrams and Dr. GOMEZ in room during procedure

## 2018-04-08 NOTE — DISCHARGE NOTE FOR THE EXPIRED PATIENT - HOSPITAL COURSE
47 year old with known stage IV Lungs CA, with known brain mets, with recent treatment of meningitis presented with inc obtundation and respiratory failure  likely post obstructive pneumonia. patient was intubated. treated with antibiotics, was on heparin for dvt.  had loculated collection left lung that was not amenable to drainage  was unable to wean  after 7 days on vent after extensive d/w family patient was terminal weaned and  a few hours later.

## 2018-04-08 NOTE — PROGRESS NOTE ADULT - ASSESSMENT
Patient with end stage lung cancer with respiratory failure and inabiltiy to wean  I have dad extensive discussion with family. Plan is for terminal wean todaq.  proceding with only comfort measures at this poing

## 2018-04-08 NOTE — PROGRESS NOTE ADULT - SUBJECTIVE AND OBJECTIVE BOX
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 Pt also noted to have RUE DVT and was discharged on XArelto   As per sister, patient has been getting more lethargic the last few days. He had a right sided PleurX catheter placed a few days back and his Xarelto was held. During drainage of fluid he slumped over and was brought to the ER. He was intubated by EMS en route. On arrival he is intubated and sedated. CT Chest shows Left loculated pleural effusion w/ LLL atelectasis and Right goundglass opacifications. Pt had a previous Left Pleurodesis   Pt remains intubated with hypoxia    4/3 No acute events overnight, pt remains sedated and ventilated    PMHx/PSHx:PAST MEDICAL & SURGICAL HISTORY:  Hearing loss: right ear-no device  Cancer with leptomeningeal spread  Dyspnea on exertion  Gait disturbance  Memory loss  Anxiety and depression  Erectile dysfunction, unspecified erectile dysfunction type  History of kidney stones  History of pericarditis  Pleural effusion  Syncope, unspecified syncope type: X3-4 episodes; s/p LINQ device placed  Lung cancer, upper lobe: right. metastatic non small cell Lung Carcinoma.  History of kenia hole surgery  History of lumbar puncture: chemo placed  History of cardiac monitoring: s/p LINQ device placement 4/2017  History of lumbar puncture within last 21 days  History of vascular access device: PORT placed  Pleuritic chest pain: Pleurodesis left chest 10/20/2017  H/O bilateral inguinal hernia repair: as a child  Acute pericarditis, unspecified type: pericardial window 11/2015, 12/2015      Vital Signs:  Vital Signs Last 24 Hrs  T(C): 37.9 (04-03-18 @ 08:00), Max: 37.9 (04-03-18 @ 01:00)  T(F): 100.2 (04-03-18 @ 08:00), Max: 100.3 (04-03-18 @ 01:00)  HR: 101 (04-03-18 @ 12:00) (94 - 110)  BP: 132/79 (04-03-18 @ 12:00) (96/51 - 137/75)  RR: 36 (04-03-18 @ 12:00) (21 - 36)  SpO2: 100% (04-03-18 @ 12:00) (95% - 100%) on (O2)    Gen: Sedated, unarousable  Neuro: Sedated   Head/Neck: small dressing over superior aspect of calvarium   Pulm: exp/inspiratory wheeze, worst L  Card: s1/s2 tachy  Abd: mildly distended, soft   Skin: R pleurex cath in place    CXR **Pending official Review**: AP radiograph of the chest demonstrates loculatedpleural fluid along the   LEFT lung unchanged. Underlying infiltrate is noted. Lines and tubes are   unchanged. The cardiac silhouette is normal in size. Osseous structures   are intact.      Relevant labs, radiology and Medications reviewed                        12.1   3.86  )-----------( 118      ( 03 Apr 2018 05:01 )             35.6     04-03    135  |  101  |  26<H>  ----------------------------<  111<H>  4.1   |  28  |  1.28    Ca    9.8      03 Apr 2018 05:01    TPro  6.9  /  Alb  2.5<L>  /  TBili  0.5  /  DBili  x   /  AST  80<H>  /  ALT  62  /  AlkPhos  221<H>  04-01    PT/INR - ( 01 Apr 2018 17:10 )   PT: 14.0 sec;   INR: 1.29 ratio         PTT - ( 03 Apr 2018 05:01 )  PTT:65.5 sec  Pertinent Physical Exam  I&O's Summary    02 Apr 2018 07:01  -  03 Apr 2018 07:00  --------------------------------------------------------  IN: 3931 mL / OUT: 1275 mL / NET: 2656 mL    03 Apr 2018 07:01  -  03 Apr 2018 12:36  --------------------------------------------------------  IN: 655 mL / OUT: 450 mL / NET: 205 mL        Assessment  47y Male  w/ PAST MEDICAL & SURGICAL HISTORY:  Hearing loss: right ear-no device  Cancer with leptomeningeal spread  Dyspnea on exertion  Gait disturbance  Memory loss  Anxiety and depression  Erectile dysfunction, unspecified erectile dysfunction type  History of kidney stones  History of pericarditis  Pleural effusion  Syncope, unspecified syncope type: X3-4 episodes; s/p LINQ device placed  Lung cancer, upper lobe: right. metastatic non small cell Lung Carcinoma.  History of kenia hole surgery  History of lumbar puncture: chemo placed  History of cardiac monitoring: s/p LINQ device placement 4/2017  History of lumbar puncture within last 21 days  History of vascular access device: PORT placed  Pleuritic chest pain: Pleurodesis left chest 10/20/2017  H/O bilateral inguinal hernia repair: as a child  Acute pericarditis, unspecified type: pericardial window 11/2015, 12/2015   Patient is a 47y old  Male who presents with a chief complaint of pt was very lethargic and unable to be woken up (01 Apr 2018 19:24)  .  On (Date), patient underwent .     Postoperative course/issues:                                                                                                        PLAN  Now tolerating 40% FiO2   L loculated effusion, difficult drainage, if clinically relevant needs IR intervention  Stage IV lung CA w/ poor prognosis, no clear indication to undergo aggressive procedure for loculated effusion. Risks outweigh the benefits  Cont with defining goals of care w/ primary team/heme/paliative care  Discussed w/ Dr. Salvador  Will cont to follow  Prognosis guarded                                                                                                           Contact spectra: [id number]
Events Overnight remains on vent, tachypneic , cxr improved aeration, still loculated left sided effusion    HPI:     This is a  47y /o Male who presents to PST prior to proposed procedure with Dr. Cee for placement Ommaya reservoir with stealth navigation. Reports PMHX: Lung cancer with metastasis to brain diagnosed 2015. recently treated for infected omaya reservior, which was removed, recently had left pleurex catheter placedhx. PE , DVT. on vent         MEDICATIONS  (STANDING):  chlorhexidine 0.12% Liquid 15 milliLiter(s) Swish and Spit two times a day  dexamethasone     Tablet 2 milliGRAM(s) Oral daily  fentaNYL   Infusion 1 MICROgram(s)/kG/Hr (8.16 mL/Hr) IV Continuous <Continuous>  heparin  Infusion.  Unit(s)/Hr (15 mL/Hr) IV Continuous <Continuous>  influenza   Vaccine 0.5 milliLiter(s) IntraMuscular once  levETIRAcetam  IVPB 500 milliGRAM(s) IV Intermittent daily  LORazepam   Injectable 2 milliGRAM(s) IV Push every 2 hours  mirtazapine 30 milliGRAM(s) Oral at bedtime  pantoprazole  Injectable 40 milliGRAM(s) IV Push daily  piperacillin/tazobactam IVPB. 3.375 Gram(s) IV Intermittent every 8 hours  propofol Infusion 10 MICROgram(s)/kG/Min (4.896 mL/Hr) IV Continuous <Continuous>    MEDICATIONS  (PRN):  heparin  Injectable 6500 Unit(s) IV Push every 6 hours PRN For aPTT less than 40  heparin  Injectable 3000 Unit(s) IV Push every 6 hours PRN For aPTT between 40 - 57       Neuro sedated on vent   respiratory dec bs on left   cv rrr   abdomen soft nontender   ext trace edema         Height (cm): 172.72 ( @ 16:55)  Weight (kg): 81.6 ( @ 16:55)  BMI (kg/m2): 27.4 ( @ 16:55)    ICU Vital Signs Last 24 Hrs  T(C): 36 (2018 08:00), Max: 37.4 (2018 17:30)  T(F): 96.8 (2018 08:00), Max: 99.4 (2018 17:30)  HR: 104 (2018 09:00) (100 - 143)  BP: 127/68 (2018 09:00) (88/63 - 142/92)  BP(mean): 80 (2018 09:00) (64 - 91)  ABP: --  ABP(mean): --  RR: 23 (2018 09:00) (15 - 24)  SpO2: 100% (2018 09:00) (82% - 100%)      Mode: AC/ CMV (Assist Control/ Continuous Mandatory Ventilation)  RR (machine): 24  TV (machine): 550  FiO2: 60  PEEP: 8  ITime: 1  MAP: 18  PIP: 40      I&O's Summary    2018 07:01  -  2018 07:00  --------------------------------------------------------  IN: 586.2 mL / OUT: 600 mL / NET: -13.8 mL                        12.8   3.53  )-----------( 98       ( 2018 02:02 )             38.9       04-01    131<L>  |  94<L>  |  27<H>  ----------------------------<  188<H>  5.4<H>   |  29  |  1.82<H>    Ca    10.4<H>      2018 17:10    TPro  6.9  /  Alb  2.5<L>  /  TBili  0.5  /  DBili  x   /  AST  80<H>  /  ALT  62  /  AlkPhos  221<H>  04-      CARDIAC MARKERS ( 2018 19:47 )  <0.015 ng/mL / x     / x     / x     / x      CARDIAC MARKERS ( 2018 17:10 )  <0.015 ng/mL / x     / x     / x     / x            ABG - ( 2018 21:45 )  pH: 7.23  /  pCO2: 61    /  pO2: 54    / HCO3: 24    / Base Excess: -3.8  /  SaO2: 83                  Urinalysis Basic - ( 2018 16:58 )    Color: Jannet / Appearance: Slightly Turbid / S.025 / pH: x  Gluc: x / Ketone: Negative  / Bili: Small / Urobili: 1 mg/dL   Blood: x / Protein: 30 mg/dL / Nitrite: Negative   Leuk Esterase: Trace / RBC: >50 /HPF / WBC 3-5   Sq Epi: x / Non Sq Epi: Few / Bacteria: Occasional        DVT Prophylaxis:                                                                 Advanced Directives:
Events Overnight: Palliative care input appreciated    HPI:     Patient with stage IV Lung CA, with right sided pleurex catheter, with respiratory failure, on vent unable to wean     Failing planning terminal wean tomorrow       MEDICATIONS  (STANDING):  chlorhexidine 0.12% Liquid 15 milliLiter(s) Swish and Spit two times a day  dexamethasone     Tablet 2 milliGRAM(s) Oral daily  heparin  Infusion.  Unit(s)/Hr (15 mL/Hr) IV Continuous <Continuous>  influenza   Vaccine 0.5 milliLiter(s) IntraMuscular once  levETIRAcetam  Solution 500 milliGRAM(s) Oral two times a day  mirtazapine 30 milliGRAM(s) Oral at bedtime  pantoprazole  Injectable 40 milliGRAM(s) IV Push daily  piperacillin/tazobactam IVPB. 3.375 Gram(s) IV Intermittent every 8 hours  propofol Infusion 10 MICROgram(s)/kG/Min (4.896 mL/Hr) IV Continuous <Continuous>    MEDICATIONS  (PRN):  acetaminophen   Tablet 650 milliGRAM(s) Oral every 6 hours PRN For Temp greater than 38.5 C (101.3 F)  heparin  Injectable 6500 Unit(s) IV Push every 6 hours PRN For aPTT less than 40  heparin  Injectable 3000 Unit(s) IV Push every 6 hours PRN For aPTT between 40 - 57  HYDROmorphone  Injectable 0.5 milliGRAM(s) IV Push every 2 hours PRN pain or dyspnea  HYDROmorphone  Injectable 2 milliGRAM(s) IV Push every 2 hours PRN pain  LORazepam   Injectable 2 milliGRAM(s) IV Push every 2 hours PRN Agitation       Physical Exam:   general - ill, sedated   Neuro -sedated on vent, becomes dyssynchrony with vent when sedation decreased   HEENT Pupils small , reactive, old scar on fore head               orally intubated, og tube   cv dec bs on left, pleurex on right   cv rrr   abdomen soft , non tender, increased ng residural    ext minimal edema      ICU Vital Signs Last 24 Hrs  T(C): 37.4 (07 Apr 2018 04:00), Max: 37.7 (06 Apr 2018 12:00)  T(F): 99.4 (07 Apr 2018 04:00), Max: 99.8 (06 Apr 2018 12:00)  HR: 100 (07 Apr 2018 06:00) (91 - 122)  BP: 120/69 (07 Apr 2018 06:00) (90/66 - 133/77)  BP(mean): 80 (07 Apr 2018 06:00) (67 - 89)  ABP: --  ABP(mean): --  RR: 19 (07 Apr 2018 06:00) (14 - 39)  SpO2: 93% (07 Apr 2018 06:00) (89% - 100%)      Mode: AC/ CMV (Assist Control/ Continuous Mandatory Ventilation)  RR (machine): 12  TV (machine): 550  FiO2: 40  PEEP: 5  ITime: 1  PIP: 30      I&O's Summary    06 Apr 2018 07:01  -  07 Apr 2018 07:00  --------------------------------------------------------  IN: 2494.6 mL / OUT: 1075 mL / NET: 1419.6 mL                            10.2   5.63  )-----------( 193      ( 07 Apr 2018 05:30 )             31.6       04-06    140  |  105  |  16  ----------------------------<  122<H>  3.8   |  27  |  0.58    Ca    9.4      06 Apr 2018 04:41          DVT Prophylaxis:   comfort measures                                                              Advanced Directives: DNR
Events Overnight: on vent , fevers, tmep to 100.6, on zosyn, sputum culture negative    HPI:          47y old M with PMH Stage IV lung cancer to brain s/p Ommaya reservoir 11/2017 for leptomeningeal disease s/p chemo/radiation, sepsis with acute bacterial meningitis, hospitalized, requiring removal of Ommaya 1/12. Pt also noted to have RUE DVT and was discharged on XArelto   Patient became more lethargic for a few days prior to admission.. He had a right sided PleurX catheter placed a few days back and his Xarelto was held. During drainage of fluid he slumped over and was brought to the ER. He was intubated by EMS en route. On arrival he is intubated and sedated. CT Chest shows Left loculated pleural effusion w/ LLL atelectasis and Right goundglass opacifications. Pt had a previous Left Pleurodesis   Pt remains intubated with hypoxia, unable to wean, on heparin for left leg dvt, f/u cxr improved aeration of left lung, but unable to wean, possible infiltrate  sputum culture negative still with fevers       MEDICATIONS  (STANDING):  chlorhexidine 0.12% Liquid 15 milliLiter(s) Swish and Spit two times a day  dexamethasone     Tablet 2 milliGRAM(s) Oral daily  heparin  Infusion.  Unit(s)/Hr (15 mL/Hr) IV Continuous <Continuous>  influenza   Vaccine 0.5 milliLiter(s) IntraMuscular once  levETIRAcetam  Solution 500 milliGRAM(s) Oral two times a day  mirtazapine 30 milliGRAM(s) Oral at bedtime  pantoprazole  Injectable 40 milliGRAM(s) IV Push daily  piperacillin/tazobactam IVPB. 3.375 Gram(s) IV Intermittent every 8 hours  propofol Infusion 10 MICROgram(s)/kG/Min (4.896 mL/Hr) IV Continuous <Continuous>    MEDICATIONS  (PRN):  acetaminophen   Tablet 650 milliGRAM(s) Oral every 6 hours PRN For Temp greater than 38.5 C (101.3 F)  heparin  Injectable 6500 Unit(s) IV Push every 6 hours PRN For aPTT less than 40  heparin  Injectable 3000 Unit(s) IV Push every 6 hours PRN For aPTT between 40 - 57  LORazepam   Injectable 2 milliGRAM(s) IV Push every 2 hours PRN Agitation    ROS cant obtain                ICU Vital Signs Last 24 Hrs  T(C): 38.1 (03 Apr 2018 20:00), Max: 38.6 (03 Apr 2018 17:00)  T(F): 100.6 (03 Apr 2018 20:00), Max: 101.5 (03 Apr 2018 17:00)  HR: 118 (04 Apr 2018 11:00) (92 - 118)  BP: 139/69 (04 Apr 2018 10:00) (104/57 - 139/69)  BP(mean): 84 (04 Apr 2018 10:00) (65 - 93)  ABP: --  ABP(mean): --  RR: 17 (04 Apr 2018 11:00) (17 - 36)  SpO2: 99% (04 Apr 2018 11:00) (81% - 100%)      Mode: AC/ CMV (Assist Control/ Continuous Mandatory Ventilation)  RR (machine): 12  TV (machine): 550  FiO2: 40  PEEP: 5  ITime: 1  PIP: 34      I&O's Summary    03 Apr 2018 07:01  -  04 Apr 2018 07:00  --------------------------------------------------------  IN: 3883 mL / OUT: 1350 mL / NET: 2533 mL    04 Apr 2018 07:01  -  04 Apr 2018 11:08  --------------------------------------------------------  IN: 242.6 mL / OUT: 500 mL / NET: -257.4 mL        Physical Exam:   Neuro -sedated on vent, becomes dyssynchronis with vent when sedation dec   HEENT Pupils small , reactive               orally intubated, og tube   cv dec bs on left   cv rrr   abdomen soft , non tender, toilerating tube feeds  ext minimal edema                               10.7   3.24  )-----------( 110      ( 04 Apr 2018 05:10 )             33.0       04-04    137  |  105  |  18  ----------------------------<  108<H>  3.9   |  26  |  0.83    Ca    8.9      04 Apr 2018 05:10                      DVT Prophylaxis:                                                                 Advanced Directives:
Events Overnight: on vent, dyspneic on full support    HPI:      47y old M with PMH Stage IV lung cancer to brain s/p Ommaya reservoir 11/2017 for leptomeningeal disease s/p chemo/radiation, sepsis with acute bacterial meningitis, hospitalized, requiring removal of Ommaya 1/12. Pt also noted to have RUE DVT and was discharged on XArelto   Patient became more lethargic for a few days prior to admission.. He had a right sided PleurX catheter placed a few days back and his Xarelto was held. During drainage of fluid he slumped over and was brought to the ER. He was intubated by EMS en route. On arrival he is intubated and sedated. CT Chest shows Left loculated pleural effusion w/ LLL atelectasis and Right goundglass opacifications. Pt had a previous Left Pleurodesis   Pt remains intubated with hypoxia, unable to wean, on heparin for left leg dvt, f/u cxr improved aeration of left lung, but unable to wean, possible infiltrate, sputum culture negative  on zosyn, new right sided infiltrate.     ROS :  unable to obtain          MEDICATIONS  (STANDING):  chlorhexidine 0.12% Liquid 15 milliLiter(s) Swish and Spit two times a day  dexamethasone     Tablet 2 milliGRAM(s) Oral daily  heparin  Infusion.  Unit(s)/Hr (15 mL/Hr) IV Continuous <Continuous>  influenza   Vaccine 0.5 milliLiter(s) IntraMuscular once  levETIRAcetam  Solution 500 milliGRAM(s) Oral two times a day  mirtazapine 30 milliGRAM(s) Oral at bedtime  pantoprazole  Injectable 40 milliGRAM(s) IV Push daily  piperacillin/tazobactam IVPB. 3.375 Gram(s) IV Intermittent every 8 hours  propofol Infusion 10 MICROgram(s)/kG/Min (4.896 mL/Hr) IV Continuous <Continuous>    MEDICATIONS  (PRN):  acetaminophen   Tablet 650 milliGRAM(s) Oral every 6 hours PRN For Temp greater than 38.5 C (101.3 F)  heparin  Injectable 6500 Unit(s) IV Push every 6 hours PRN For aPTT less than 40  heparin  Injectable 3000 Unit(s) IV Push every 6 hours PRN For aPTT between 40 - 57  LORazepam   Injectable 2 milliGRAM(s) IV Push every 2 hours PRN Agitation  morphine  - Injectable 2 milliGRAM(s) IV Push every 2 hours PRN dyspnea or pain                    ICU Vital Signs Last 24 Hrs  T(C): 37.8 (06 Apr 2018 08:00), Max: 38 (06 Apr 2018 04:00)  T(F): 100 (06 Apr 2018 08:00), Max: 100.4 (06 Apr 2018 04:00)  HR: 122 (06 Apr 2018 10:00) (108 - 126)  BP: 115/56 (06 Apr 2018 09:00) (95/54 - 146/78)  BP(mean): 71 (06 Apr 2018 09:00) (64 - 100)  ABP: --  ABP(mean): --  RR: 30 (06 Apr 2018 10:00) (20 - 31)  SpO2: 95% (06 Apr 2018 10:00) (92% - 98%)      Mode: AC/ CMV (Assist Control/ Continuous Mandatory Ventilation)  RR (machine): 12  TV (machine): 550  FiO2: 40  PEEP: 5  ITime: 1  PIP: 35      I&O's Summary    05 Apr 2018 07:01  -  06 Apr 2018 07:00  --------------------------------------------------------  IN: 3090.1 mL / OUT: 1500 mL / NET: 1590.1 mL        Physical Exam:   general - ill, sedated   Neuro -sedated on vent, becomes dyssynchrony with vent when sedation   HEENT Pupils small , reactive, old scar on fore head               orally intubated, og tube   cv dec bs on left, pleurex on right   cv rrr   abdomen soft , non tender, toilerating tube feeds   ext minimal edema                                 10.3   5.86  )-----------( 185      ( 06 Apr 2018 04:41 )             31.6       04-06    140  |  105  |  16  ----------------------------<  122<H>  3.8   |  27  |  0.58    Ca    9.4      06 Apr 2018 04:41                      DVT Prophylaxis:                                                                 Advanced Directives:
Events Overnight: on vent, dysychronis with vent, tachypneic with any weaning attempt                            200 cc drained via right pleurex yesterday, afebrile, sputum culture has been negative    HPI:            47y old M with PMH Stage IV lung cancer to brain s/p Ommaya reservoir 11/2017 for leptomeningeal disease s/p chemo/radiation, sepsis with acute bacterial meningitis, hospitalized, requiring removal of Ommaya 1/12. Pt also noted to have RUE DVT and was discharged on XArelto   Patient became more lethargic for a few days prior to admission.. He had a right sided PleurX catheter placed a few days back and his Xarelto was held. During drainage of fluid he slumped over and was brought to the ER. He was intubated by EMS en route. On arrival he is intubated and sedated. CT Chest shows Left loculated pleural effusion w/ LLL atelectasis and Right goundglass opacifications. Pt had a previous Left Pleurodesis   Pt remains intubated with hypoxia, unable to wean, on heparin for left leg dvt, f/u cxr improved aeration of left lung, but unable to wean, possible infiltrate, sputum culture negative  on zosyn      PMH:    As Above       MEDICATIONS  (STANDING):  chlorhexidine 0.12% Liquid 15 milliLiter(s) Swish and Spit two times a day  dexamethasone     Tablet 2 milliGRAM(s) Oral daily  heparin  Infusion.  Unit(s)/Hr (15 mL/Hr) IV Continuous <Continuous>  influenza   Vaccine 0.5 milliLiter(s) IntraMuscular once  levETIRAcetam  Solution 500 milliGRAM(s) Oral two times a day  mirtazapine 30 milliGRAM(s) Oral at bedtime  pantoprazole  Injectable 40 milliGRAM(s) IV Push daily  piperacillin/tazobactam IVPB. 3.375 Gram(s) IV Intermittent every 8 hours  propofol Infusion 10 MICROgram(s)/kG/Min (4.896 mL/Hr) IV Continuous <Continuous>    MEDICATIONS  (PRN):  acetaminophen   Tablet 650 milliGRAM(s) Oral every 6 hours PRN For Temp greater than 38.5 C (101.3 F)  heparin  Injectable 6500 Unit(s) IV Push every 6 hours PRN For aPTT less than 40  heparin  Injectable 3000 Unit(s) IV Push every 6 hours PRN For aPTT between 40 - 57  LORazepam   Injectable 2 milliGRAM(s) IV Push every 2 hours PRN Agitation        ICU Vital Signs Last 24 Hrs  T(C): 37.3 (05 Apr 2018 08:00), Max: 38.4 (04 Apr 2018 16:00)  T(F): 99.1 (05 Apr 2018 08:00), Max: 101.2 (04 Apr 2018 16:00)  HR: 121 (05 Apr 2018 09:00) (104 - 122)  BP: 123/69 (05 Apr 2018 09:00) (111/59 - 148/77)  BP(mean): 82 (05 Apr 2018 09:00) (72 - 94)  ABP: --  ABP(mean): --  RR: 34 (05 Apr 2018 09:00) (17 - 34)  SpO2: 92% (05 Apr 2018 09:00) (92% - 100%)      Mode: AC/ CMV (Assist Control/ Continuous Mandatory Ventilation)  RR (machine): 12  TV (machine): 550  FiO2: 40  PEEP: 5  ITime: 4  MAP: 16  PIP: 38      I&O's Summary    04 Apr 2018 07:01  -  05 Apr 2018 07:00  --------------------------------------------------------  IN: 2844.8 mL / OUT: 1400 mL / NET: 1444.8 mL        Physical Exam:     Physical Exam:   general - ill, sedated   Neuro -sedated on vent, becomes dyssynchrony with vent when sedation   HEENT Pupils small , reactive, old scar on fore head               orally intubated, og tube   cv dec bs on left, pleurex on right   cv rrr   abdomen soft , non tender, toilerating tube feeds   ext minimal edema                                11.1   5.10  )-----------( 142      ( 05 Apr 2018 03:38 )             33.3       04-05    137  |  106  |  13  ----------------------------<  127<H>  3.9   |  25  |  0.72    Ca    9.0      05 Apr 2018 03:38      DVT Prophylaxis:  IV heparin                                                                Advanced Directives: Full Code
Events Overnight: on vent, on high level sedation for vent dyssynchrony, on vent    HPI:        47y old M with PMH Stage IV lung cancer to brain s/p Ommaya reservoir 2017 for leptomeningeal disease s/p chemo/radiation, sepsis with acute bacterial meningitis, hospitalized, requiring removal of Ommaya . Pt also noted to have RUE DVT and was discharged on XArelto   Patient became more lethargic for a few days prior to admission.. He had a right sided PleurX catheter placed a few days back and his Xarelto was held. During drainage of fluid he slumped over and was brought to the ER. He was intubated by EMS en route. On arrival he is intubated and sedated. CT Chest shows Left loculated pleural effusion w/ LLL atelectasis and Right goundglass opacifications. Pt had a previous Left Pleurodesis   Pt remains intubated with hypoxia, unable to wean, on heparin for left leg dvt, f/u cxr improved aeration of left lung, but unable to wean            MEDICATIONS  (STANDING):  chlorhexidine 0.12% Liquid 15 milliLiter(s) Swish and Spit two times a day  dexamethasone     Tablet 2 milliGRAM(s) Oral daily  heparin  Infusion.  Unit(s)/Hr (15 mL/Hr) IV Continuous <Continuous>  influenza   Vaccine 0.5 milliLiter(s) IntraMuscular once  levETIRAcetam  IVPB 500 milliGRAM(s) IV Intermittent daily  LORazepam   Injectable 2 milliGRAM(s) IV Push once  mirtazapine 30 milliGRAM(s) Oral at bedtime  pantoprazole  Injectable 40 milliGRAM(s) IV Push daily  piperacillin/tazobactam IVPB. 3.375 Gram(s) IV Intermittent every 8 hours  propofol Infusion 10 MICROgram(s)/kG/Min (4.896 mL/Hr) IV Continuous <Continuous>  sodium chloride 0.9%. 1000 milliLiter(s) (100 mL/Hr) IV Continuous <Continuous>    MEDICATIONS  (PRN):  heparin  Injectable 6500 Unit(s) IV Push every 6 hours PRN For aPTT less than 40  heparin  Injectable 3000 Unit(s) IV Push every 6 hours PRN For aPTT between 40 - 57  LORazepam   Injectable 2 milliGRAM(s) IV Push every 2 hours PRN Agitation       Neuro -sedated on vent   HEENT Pupils small , reactive               orally intubated, og tuve   cv dec bs on left   cv rrr   abdomen soft , non tender   extremity soft non tender    ICU Vital Signs Last 24 Hrs  T(C): 37.4 (2018 05:00), Max: 37.9 (2018 01:00)  T(F): 99.3 (2018 05:00), Max: 100.3 (2018 01:00)  HR: 98 (2018 08:00) (94 - 110)  BP: 122/76 (2018 08:00) (90/61 - 146/80)  BP(mean): 86 (2018 08:00) (60 - 98)  ABP: --  ABP(mean): --  RR: 31 (2018 08:00) (21 - 31)  SpO2: 100% (2018 08:00) (84% - 100%)      Mode: AC/ CMV (Assist Control/ Continuous Mandatory Ventilation),peep of 8 confirmed w/RN   RR (machine): 24  TV (machine): 550  FiO2: 40  PEEP: 8  ITime: 1  PIP: 42      I&O's Summary    2018 07:01  -  2018 07:00  --------------------------------------------------------  IN: 3804 mL / OUT: 1275 mL / NET: 2529 mL                            12.1   3.86  )-----------( 118      ( 2018 05:01 )             35.6       04-03    135  |  101  |  26<H>  ----------------------------<  111<H>  4.1   |  28  |  1.28    Ca    9.8      2018 05:01    TPro  6.9  /  Alb  2.5<L>  /  TBili  0.5  /  DBili  x   /  AST  80<H>  /  ALT  62  /  AlkPhos  221<H>  04-01      CARDIAC MARKERS ( 2018 19:47 )  <0.015 ng/mL / x     / x     / x     / x      CARDIAC MARKERS ( 2018 17:10 )  <0.015 ng/mL / x     / x     / x     / x            ABG - ( 2018 21:45 )  pH: 7.23  /  pCO2: 61    /  pO2: 54    / HCO3: 24    / Base Excess: -3.8  /  SaO2: 83                  Urinalysis Basic - ( 2018 16:58 )    Color: Jannet / Appearance: Slightly Turbid / S.025 / pH: x  Gluc: x / Ketone: Negative  / Bili: Small / Urobili: 1 mg/dL   Blood: x / Protein: 30 mg/dL / Nitrite: Negative   Leuk Esterase: Trace / RBC: >50 /HPF / WBC 3-5   Sq Epi: x / Non Sq Epi: Few / Bacteria: Occasional        DVT Prophylaxis:                                                                 Advanced Directives:
Events Overnight: on vent, tachypneic with any attempts at weaning    HPI:      Patient with stage IV Lung CA, with right sided pleurex catheter, with respiratory failure, on vent unable to wean      also dvt, leptomeningeal spread of disease and recent meningitis.       ROS - Patient intubated on vent         MEDICATIONS  (STANDING):  chlorhexidine 0.12% Liquid 15 milliLiter(s) Swish and Spit two times a day  influenza   Vaccine 0.5 milliLiter(s) IntraMuscular once  levETIRAcetam  Solution 500 milliGRAM(s) Oral two times a day  mirtazapine 30 milliGRAM(s) Oral at bedtime  pantoprazole  Injectable 40 milliGRAM(s) IV Push daily  piperacillin/tazobactam IVPB. 3.375 Gram(s) IV Intermittent every 8 hours  propofol Infusion 10 MICROgram(s)/kG/Min (4.896 mL/Hr) IV Continuous <Continuous>    MEDICATIONS  (PRN):  acetaminophen   Tablet 650 milliGRAM(s) Oral every 6 hours PRN For Temp greater than 38.5 C (101.3 F)  HYDROmorphone  Injectable 2 milliGRAM(s) IV Push every 1 hour PRN Severe Pain (7 - 10)dyspnea  LORazepam   Injectable 2 milliGRAM(s) IV Push every 2 hours PRN Agitation       PE:      General ill lethartic       Neuro unable to fully access as dysynchronis with vent with any weanin      HEENT pupils erlla, orally intubateed, og tube, old forhead scar      neck supple      cv rrr       lungs right pleure catheter, dec bs on aright with bilateral rhonchi      abdomen soft non tender      extremities - trace edema        ICU Vital Signs Last 24 Hrs  T(C): 37.3 (08 Apr 2018 04:00), Max: 37.7 (07 Apr 2018 17:00)  T(F): 99.1 (08 Apr 2018 04:00), Max: 99.8 (07 Apr 2018 17:00)  HR: 99 (08 Apr 2018 06:00) (92 - 108)  BP: 107/61 (08 Apr 2018 06:00) (101/66 - 124/58)  BP(mean): 68 (08 Apr 2018 06:00) (59 - 78)  ABP: --  ABP(mean): --  RR: 17 (08 Apr 2018 06:00) (13 - 22)  SpO2: 96% (08 Apr 2018 06:00) (93% - 100%)      Mode: AC/ CMV (Assist Control/ Continuous Mandatory Ventilation)  RR (machine): 12  TV (machine): 550  FiO2: 40  PEEP: 5  ITime: 1  PIP: 41      I&O's Summary    07 Apr 2018 07:01  -  08 Apr 2018 07:00  --------------------------------------------------------  IN: 668.8 mL / OUT: 1950 mL / NET: -1281.2 mL                              10.2   5.63  )-----------( 193      ( 07 Apr 2018 05:30 )             31.6                                                                    Advanced Directives:  DNR/DNI
HPI: 47y old M with PMH Stage IV lung cancer to brain s/p Ommaya reservoir 11/2017 for leptomeningeal disease s/p chemo/radiation, sepsis with acute bacterial meningitis, RUE DVT. Pt s/p removal of Ommaya 1/12.   As per sister, patient has been getting more lethargic the last few days. He had a right sided PleurX catheter placed a few days back and his DOAC was held. During drainage of fluid he slumped over and was brought to the ER. He was intubated by EMS en route. On arrival he was intubated and sedated.    On admission,  ABG's showed acute respiratory acidosis. CT scan chest shows loculated, large left effusion with LLL consolidation. Also, patchy right lung ground glass opacities. Pt has not been able to tolerate weaning from the ventilator. He is sedated  4/7/18 Seen and examined at bedside wfor symptom management/supportive care. Sister Mayra at the bedside. Pt restless and reaching for ET tube.    PAIN: No grimacing or other non-verbal cues as per RN  Location  Intensity  Quality  Aggravating Factors  Alleviating Factors  Timing    DYSPNEA: Not objectively as per RN     PAST MEDICAL & SURGICAL HISTORY:  Hearing loss: right ear-no device  Cancer with leptomeningeal spread  Dyspnea on exertion  Gait disturbance  Memory loss  Anxiety and depression  Erectile dysfunction, unspecified erectile dysfunction type  History of kidney stones  History of pericarditis  Pleural effusion  Syncope, unspecified syncope type: X3-4 episodes; s/p LINQ device placed  Lung cancer, upper lobe: right. metastatic non small cell Lung Carcinoma.  History of kenia hole surgery  History of lumbar puncture: chemo placed  History of cardiac monitoring: s/p LINQ device placement 4/2017  History of lumbar puncture within last 21 days  History of vascular access device: PORT placed  Pleuritic chest pain: Pleurodesis left chest 10/20/2017  H/O bilateral inguinal hernia repair: as a child  Acute pericarditis, unspecified type: pericardial window 11/2015, 12/2015      SOCIAL HX: Engaged; has 2 sisters - Mayra is HCP    FAMILY HISTORY:  Family history of leukemia (Father)  Family history of cancer (Mother)      ROS: CANNOT OBTAIN    PHYSICAL EXAM:  ICU Vital Signs Last 24 Hrs  T(C): 37.4 (07 Apr 2018 04:00), Max: 37.7 (06 Apr 2018 12:00)  T(F): 99.4 (07 Apr 2018 04:00), Max: 99.8 (06 Apr 2018 12:00)  HR: 112 (07 Apr 2018 09:00) (91 - 119)  BP: 111/64 (07 Apr 2018 09:00) (90/66 - 133/77)  BP(mean): 74 (07 Apr 2018 09:00) (67 - 89)  ABP: --  ABP(mean): --  RR: 20 (07 Apr 2018 09:00) (14 - 39)  SpO2: 93% (07 Apr 2018 09:00) (89% - 100%)    PPSV2 = 10%    General: Restless, sedated mid-aged man, orally intubated - on vent support.   HEENT: OGT, ETT in place  Lungs: decreased BS bilat, L>R  Cardiac: RRR   GI: + BS Soft No masses or tenderness  :  Ext: Min edema BLE's   Neuro: Sedated, unresponsive      LABS:                               10.2   5.63  )-----------( 193      ( 07 Apr 2018 05:30 )             31.6                 04-06    140  |  105  |  16  ----------------------------<  122<H>  3.8   |  27  |  0.58    Ca    9.4      06 Apr 2018 04:41      PTT - ( 04 Apr 2018 14:40 )  PTT:57.4 sec  Albumin: Albumin, Serum: 2.5 g/dL (04-01 @ 17:10)      Allergies    adhesives (Rash)  No Known Drug Allergies
HPI: Pt seen and examined this afternoon in follow up for sx and GoC, sister at bedside. Pt sedated on vent, unable to contribute to HPI. Appears comfortable. As per nursing, some signs of elevated respiratory rate and HR at times, with history of chronic opioid use as per fiance as well.       PAIN: no non-verbal signs of pain currently  DYSPNEA: no non-verbal signs of dyspnea currently      ROS:    Unable to gather     PHYSICAL EXAM:    Vital Signs Last 24 Hrs  T(C): 37.8 (2018 12:00), Max: 38.4 (2018 16:00)  T(F): 100 (2018 12:00), Max: 101.2 (2018 16:00)  HR: 117 (2018 14:00) (104 - 122)  BP: 136/77 (2018 14:00) (111/59 - 145/62)  BP(mean): 91 (2018 14:00) (72 - 100)  RR: 29 (:00) (22 - 34)  SpO2: 97% (2018 14:00) (92% - 100%)  Daily     Daily Weight in k.8 (2018 04:00)      PPSV2 = 10%    General: Unresponsive, sedated mid-aged man, orally intubated, NAD  HEENT: OGT, ETT in place  Lungs: decreased BS bilat, L>R  Cardiac: RRR   GI: + BS Soft No masses or tenderness  : engel in place  Ext: +mild edema BL lower extremities  Neuro: Sedated, unresponsive, limited as a result    LABS:                        11.1   5.10  )-----------( 142      ( 2018 03:38 )             33.3         137  |  106  |  13  ----------------------------<  127<H>  3.9   |  25  |  0.72    Ca    9.0      2018 03:38      PTT - ( 2018 10:45 )  PTT:60.4 sec  Albumin: Albumin, Serum: 2.5 g/dL ( @ 17:10)      Allergies    adhesives (Rash)  No Known Drug Allergies    Intolerances      MEDICATIONS  (STANDING):  chlorhexidine 0.12% Liquid 15 milliLiter(s) Swish and Spit two times a day  dexamethasone     Tablet 2 milliGRAM(s) Oral daily  heparin  Infusion.  Unit(s)/Hr (15 mL/Hr) IV Continuous <Continuous>  influenza   Vaccine 0.5 milliLiter(s) IntraMuscular once  levETIRAcetam  Solution 500 milliGRAM(s) Oral two times a day  mirtazapine 30 milliGRAM(s) Oral at bedtime  pantoprazole  Injectable 40 milliGRAM(s) IV Push daily  piperacillin/tazobactam IVPB. 3.375 Gram(s) IV Intermittent every 8 hours  propofol Infusion 10 MICROgram(s)/kG/Min (4.896 mL/Hr) IV Continuous <Continuous>    MEDICATIONS  (PRN):  acetaminophen   Tablet 650 milliGRAM(s) Oral every 6 hours PRN For Temp greater than 38.5 C (101.3 F)  heparin  Injectable 6500 Unit(s) IV Push every 6 hours PRN For aPTT less than 40  heparin  Injectable 3000 Unit(s) IV Push every 6 hours PRN For aPTT between 40 - 57  LORazepam   Injectable 2 milliGRAM(s) IV Push every 2 hours PRN Agitation      RADIOLOGY:
HPI: Pt seen and examined this am in follow up for sx and GOC. Pt remains intubated and sedated, unable to respond. Pt still with tenuous state as per staff. Only needed 2 doses of IV morphine in past 24h. Plan for family meeting today at 1pm.      PAIN: no non-verbal signs of pain currently  DYSPNEA: no non-verbal signs of dyspnea currently      ROS:  Unable to gather     PHYSICAL EXAM:    Vital Signs Last 24 Hrs  T(C): 37.8 (2018 08:00), Max: 38 (2018 04:00)  T(F): 100 (2018 08:00), Max: 100.4 (2018 04:00)  HR: 122 (2018 10:00) (108 - 126)  BP: 112/73 (2018 10:00) (95/54 - 146/78)  BP(mean): 82 (2018 10:00) (64 - 100)  RR: 30 (2018 10:00) (20 - 31)  SpO2: 95% (2018 10:00) (92% - 98%)  Daily     Daily Weight in k.7 (2018 04:00)    PPSV2 = 10%    General: Unresponsive, sedated mid-aged man, orally intubated, NAD  HEENT: OGT, ETT in place, perrl  Lungs: decreased BS bilat, L>R  Cardiac: RRR   GI: + BS Soft No masses or tenderness  : negel in place  Ext: +mild edema BL lower extremities  Neuro: Sedated, unresponsive, limited as a result    LABS:                        10.3   5.86  )-----------( 185      ( 2018 04:41 )             31.6         140  |  105  |  16  ----------------------------<  122<H>  3.8   |  27  |  0.58    Ca    9.4      2018 04:41      PTT - ( 2018 04:41 )  PTT:59.5 sec  Albumin: Albumin, Serum: 2.5 g/dL ( @ 17:10)      Allergies    adhesives (Rash)  No Known Drug Allergies    Intolerances      MEDICATIONS  (STANDING):  chlorhexidine 0.12% Liquid 15 milliLiter(s) Swish and Spit two times a day  dexamethasone     Tablet 2 milliGRAM(s) Oral daily  heparin  Infusion.  Unit(s)/Hr (15 mL/Hr) IV Continuous <Continuous>  influenza   Vaccine 0.5 milliLiter(s) IntraMuscular once  levETIRAcetam  Solution 500 milliGRAM(s) Oral two times a day  mirtazapine 30 milliGRAM(s) Oral at bedtime  pantoprazole  Injectable 40 milliGRAM(s) IV Push daily  piperacillin/tazobactam IVPB. 3.375 Gram(s) IV Intermittent every 8 hours  propofol Infusion 10 MICROgram(s)/kG/Min (4.896 mL/Hr) IV Continuous <Continuous>    MEDICATIONS  (PRN):  acetaminophen   Tablet 650 milliGRAM(s) Oral every 6 hours PRN For Temp greater than 38.5 C (101.3 F)  heparin  Injectable 6500 Unit(s) IV Push every 6 hours PRN For aPTT less than 40  heparin  Injectable 3000 Unit(s) IV Push every 6 hours PRN For aPTT between 40 - 57  LORazepam   Injectable 2 milliGRAM(s) IV Push every 2 hours PRN Agitation  morphine  - Injectable 2 milliGRAM(s) IV Push every 2 hours PRN dyspnea or pain      RADIOLOGY:    EXAM:  XR CHEST PORTABLE ROUTINE 1V                        PROCEDURE DATE:  2018      IMPRESSION:  Interval development of infiltrates in the right lower lobe suggestive of   right lower lobe pneumonia.   Other findings as above are unchanged.        SVETLANA PAREDES M.D., ATTENDING RADIOLOGIST  This document has been electronically signed. 2018 11:24AM
Patient family wishes to terminal wean patient  patient placed on dilaudid drip  removed from ventilator  comfortable  family at bedside
Spoke to Pulmonolgist on call Dr. Castillo regarding patient on vent. he is on 100% FIO2/PEEP 10. He states patient is too unstable for a bronch at this time.
[facility] number [id number]
Subjective:  No clinical change  Unresponsive on ventilator    MEDICATIONS  (STANDING):  chlorhexidine 0.12% Liquid 15 milliLiter(s) Swish and Spit two times a day  dexamethasone     Tablet 2 milliGRAM(s) Oral daily  heparin  Infusion.  Unit(s)/Hr (15 mL/Hr) IV Continuous <Continuous>  influenza   Vaccine 0.5 milliLiter(s) IntraMuscular once  levETIRAcetam  Solution 500 milliGRAM(s) Oral two times a day  mirtazapine 30 milliGRAM(s) Oral at bedtime  pantoprazole  Injectable 40 milliGRAM(s) IV Push daily  piperacillin/tazobactam IVPB. 3.375 Gram(s) IV Intermittent every 8 hours  propofol Infusion 10 MICROgram(s)/kG/Min (4.896 mL/Hr) IV Continuous <Continuous>    MEDICATIONS  (PRN):  acetaminophen   Tablet 650 milliGRAM(s) Oral every 6 hours PRN For Temp greater than 38.5 C (101.3 F)  heparin  Injectable 6500 Unit(s) IV Push every 6 hours PRN For aPTT less than 40  heparin  Injectable 3000 Unit(s) IV Push every 6 hours PRN For aPTT between 40 - 57  HYDROmorphone  Injectable 0.5 milliGRAM(s) IV Push every 2 hours PRN pain or dyspnea  HYDROmorphone  Injectable 1 milliGRAM(s) IV Push every 2 hours PRN breakthrough pain or dyspnea  LORazepam   Injectable 2 milliGRAM(s) IV Push every 2 hours PRN Agitation      Allergies    adhesives (Rash)  No Known Drug Allergies    Intolerances        REVIEW OF SYSTEMS: Sedated and Unresponsive    Mode: AC/ CMV (Assist Control/ Continuous Mandatory Ventilation)  RR (machine): 12  TV (machine): 550  FiO2: 40  PEEP: 5  ITime: 1  PIP: 33        Vital Signs Last 24 Hrs  T(C): 37.2 (06 Apr 2018 17:00), Max: 38 (06 Apr 2018 04:00)  T(F): 99 (06 Apr 2018 17:00), Max: 100.4 (06 Apr 2018 04:00)  HR: 97 (06 Apr 2018 17:00) (96 - 126)  BP: 104/65 (06 Apr 2018 17:00) (95/54 - 122/67)  BP(mean): 74 (06 Apr 2018 17:00) (64 - 90)  RR: 21 (06 Apr 2018 17:00) (17 - 31)  SpO2: 97% (06 Apr 2018 17:00) (92% - 99%)    PHYSICAL EXAMINATION:  SKIN: no rashes  HEAD: NC/AT  EYES: PERRLA, EOMI  EARS: TM's intact  NOSE: no abnormalities  NECK:  Supple. No lymphadenopathy. Jugular venous pressure not elevated. Carotids equal.   HEART:   The cardiac impulse has a normal quality. Reg., Nl S1 and S2.  There are no murmurs, rubs or gallops noted  CHEST:  Decreased  breath sounds  ABDOMEN:  Soft and nontender.   EXTREMITIES:  Positive edema  NEURO: Sedated      LABS:                        10.3   5.86  )-----------( 185      ( 06 Apr 2018 04:41 )             31.6     04-06    140  |  105  |  16  ----------------------------<  122<H>  3.8   |  27  |  0.58    Ca    9.4      06 Apr 2018 04:41      PTT - ( 06 Apr 2018 04:41 )  PTT:59.5 sec      RADIOLOGY & ADDITIONAL TESTS:
Subjective:  sedated on vent    MEDICATIONS  (STANDING):  chlorhexidine 0.12% Liquid 15 milliLiter(s) Swish and Spit two times a day  dexamethasone     Tablet 2 milliGRAM(s) Oral daily  heparin  Infusion.  Unit(s)/Hr (15 mL/Hr) IV Continuous <Continuous>  influenza   Vaccine 0.5 milliLiter(s) IntraMuscular once  levETIRAcetam  IVPB 500 milliGRAM(s) IV Intermittent daily  mirtazapine 30 milliGRAM(s) Oral at bedtime  pantoprazole  Injectable 40 milliGRAM(s) IV Push daily  piperacillin/tazobactam IVPB. 3.375 Gram(s) IV Intermittent every 8 hours  propofol Infusion 10 MICROgram(s)/kG/Min (4.896 mL/Hr) IV Continuous <Continuous>  sodium chloride 0.9%. 1000 milliLiter(s) (100 mL/Hr) IV Continuous <Continuous>    MEDICATIONS  (PRN):  heparin  Injectable 6500 Unit(s) IV Push every 6 hours PRN For aPTT less than 40  heparin  Injectable 3000 Unit(s) IV Push every 6 hours PRN For aPTT between 40 - 57  LORazepam   Injectable 2 milliGRAM(s) IV Push every 2 hours PRN Agitation      Allergies    adhesives (Rash)  No Known Drug Allergies    Intolerances        REVIEW OF SYSTEMS: patient sedated    Mode: AC/ CMV (Assist Control/ Continuous Mandatory Ventilation)  RR (machine): 24  TV (machine): 550  FiO2: 40  PEEP: 5  ITime: 1  MAP: 18  PIP: 40      Vital Signs Last 24 Hrs  T(C): 37.4 (2018 05:00), Max: 37.9 (2018 01:00)  T(F): 99.3 (2018 05:00), Max: 100.3 (2018 01:00)  HR: 95 (2018 06:00) (94 - 110)  BP: 117/72 (2018 06:00) (90/61 - 146/80)  BP(mean): 83 (2018 06:00) (60 - 98)  RR: 26 (2018 06:00) (21 - 30)  SpO2: 100% (2018 06:00) (84% - 100%)    PHYSICAL EXAMINATION:  SKIN: no rashes  HEAD: NC/AT  EYES: PERRLA, EOMI  EARS: TM's intact  NOSE: no abnormalities  NECK:  Supple. No lymphadenopathy. Jugular venous pressure not elevated. Carotids equal.   HEART:   The cardiac impulse has a normal quality. Reg., Nl S1 and S2.  There are no murmurs, rubs or gallops noted  CHEST:  decreased BS left  ABDOMEN:  Soft and nontender.   EXTREMITIES:  no C/C/E  NEURO: AAO x 3, no focal deficts       LABS:                        12.1   3.86  )-----------( 118      ( 2018 05:01 )             35.6     04    135  |  101  |  26<H>  ----------------------------<  111<H>  4.1   |  28  |  1.28    Ca    9.8      2018 05:01    TPro  6.9  /  Alb  2.5<L>  /  TBili  0.5  /  DBili  x   /  AST  80<H>  /  ALT  62  /  AlkPhos  221<H>  04-    PT/INR - ( 2018 17:10 )   PT: 14.0 sec;   INR: 1.29 ratio         PTT - ( 2018 05:01 )  PTT:65.5 sec  Urinalysis Basic - ( 2018 16:58 )    Color: Jannet / Appearance: Slightly Turbid / S.025 / pH: x  Gluc: x / Ketone: Negative  / Bili: Small / Urobili: 1 mg/dL   Blood: x / Protein: 30 mg/dL / Nitrite: Negative   Leuk Esterase: Trace / RBC: >50 /HPF / WBC 3-5   Sq Epi: x / Non Sq Epi: Few / Bacteria: Occasional        RADIOLOGY & ADDITIONAL TESTS:
Subjective:  sedated on vent    MEDICATIONS  (STANDING):  chlorhexidine 0.12% Liquid 15 milliLiter(s) Swish and Spit two times a day  dexamethasone     Tablet 2 milliGRAM(s) Oral daily  heparin  Infusion.  Unit(s)/Hr (15 mL/Hr) IV Continuous <Continuous>  influenza   Vaccine 0.5 milliLiter(s) IntraMuscular once  levETIRAcetam 500 milliGRAM(s) Oral two times a day  mirtazapine 30 milliGRAM(s) Oral at bedtime  pantoprazole  Injectable 40 milliGRAM(s) IV Push daily  piperacillin/tazobactam IVPB. 3.375 Gram(s) IV Intermittent every 8 hours  propofol Infusion 10 MICROgram(s)/kG/Min (4.896 mL/Hr) IV Continuous <Continuous>  sodium chloride 0.9%. 1000 milliLiter(s) (100 mL/Hr) IV Continuous <Continuous>    MEDICATIONS  (PRN):  acetaminophen   Tablet 650 milliGRAM(s) Oral every 6 hours PRN For Temp greater than 38.5 C (101.3 F)  heparin  Injectable 6500 Unit(s) IV Push every 6 hours PRN For aPTT less than 40  heparin  Injectable 3000 Unit(s) IV Push every 6 hours PRN For aPTT between 40 - 57  LORazepam   Injectable 2 milliGRAM(s) IV Push every 2 hours PRN Agitation      Allergies    adhesives (Rash)  No Known Drug Allergies    Intolerances        REVIEW OF SYSTEMS: seadted    Mode: AC/ CMV (Assist Control/ Continuous Mandatory Ventilation)  RR (machine): 12  TV (machine): 550  FiO2: 40  PEEP: 8  ITime: 1  PIP: 38          Vital Signs Last 24 Hrs  T(C): 38.1 (03 Apr 2018 20:00), Max: 38.6 (03 Apr 2018 17:00)  T(F): 100.6 (03 Apr 2018 20:00), Max: 101.5 (03 Apr 2018 17:00)  HR: 94 (04 Apr 2018 06:00) (92 - 105)  BP: 118/69 (04 Apr 2018 06:00) (104/57 - 132/80)  BP(mean): 65 (04 Apr 2018 05:00) (65 - 93)  RR: 28 (04 Apr 2018 06:00) (22 - 36)  SpO2: 98% (04 Apr 2018 06:00) (81% - 100%)    PHYSICAL EXAMINATION:  SKIN: no rashes  HEAD: NC/AT  EYES: PERRLA, EOMI  EARS: TM's intact  NOSE: no abnormalities  NECK:  Supple. No lymphadenopathy. Jugular venous pressure not elevated. Carotids equal.   HEART:   The cardiac impulse has a normal quality. Reg., Nl S1 and S2.  There are no murmurs, rubs or gallops noted  CHEST:  good air entry, bilateral ronchi R>L  ABDOMEN:  Soft and nontender.   EXTREMITIES:  no C/C/E  NEURO: sedated      LABS:                        10.7   3.24  )-----------( 110      ( 04 Apr 2018 05:10 )             33.0     04-04    137  |  105  |  18  ----------------------------<  108<H>  3.9   |  26  |  0.83    Ca    8.9      04 Apr 2018 05:10      PTT - ( 04 Apr 2018 05:10 )  PTT:51.3 sec      RADIOLOGY & ADDITIONAL TESTS:
Subjective:  sedated on vent  having diarrhea    MEDICATIONS  (STANDING):  chlorhexidine 0.12% Liquid 15 milliLiter(s) Swish and Spit two times a day  dexamethasone     Tablet 2 milliGRAM(s) Oral daily  heparin  Infusion.  Unit(s)/Hr (15 mL/Hr) IV Continuous <Continuous>  influenza   Vaccine 0.5 milliLiter(s) IntraMuscular once  levETIRAcetam  Solution 500 milliGRAM(s) Oral two times a day  mirtazapine 30 milliGRAM(s) Oral at bedtime  pantoprazole  Injectable 40 milliGRAM(s) IV Push daily  piperacillin/tazobactam IVPB. 3.375 Gram(s) IV Intermittent every 8 hours  propofol Infusion 10 MICROgram(s)/kG/Min (4.896 mL/Hr) IV Continuous <Continuous>    MEDICATIONS  (PRN):  acetaminophen   Tablet 650 milliGRAM(s) Oral every 6 hours PRN For Temp greater than 38.5 C (101.3 F)  heparin  Injectable 6500 Unit(s) IV Push every 6 hours PRN For aPTT less than 40  heparin  Injectable 3000 Unit(s) IV Push every 6 hours PRN For aPTT between 40 - 57  LORazepam   Injectable 2 milliGRAM(s) IV Push every 2 hours PRN Agitation      Allergies    adhesives (Rash)  No Known Drug Allergies    Intolerances        REVIEW OF SYSTEMS: seadted on vent        Vital Signs Last 24 Hrs  T(C): 38.2 (05 Apr 2018 04:00), Max: 38.4 (04 Apr 2018 16:00)  T(F): 100.8 (05 Apr 2018 04:00), Max: 101.2 (04 Apr 2018 16:00)  HR: 106 (05 Apr 2018 06:00) (103 - 122)  BP: 127/66 (05 Apr 2018 06:00) (111/59 - 148/77)  BP(mean): 81 (05 Apr 2018 06:00) (72 - 94)  RR: 24 (05 Apr 2018 06:00) (17 - 31)  SpO2: 96% (05 Apr 2018 06:00) (96% - 100%)    Mode: AC/ CMV (Assist Control/ Continuous Mandatory Ventilation)  RR (machine): 12  TV (machine): 550  FiO2: 40  PEEP: 5  ITime: 4  MAP: 16  PIP: 38      PHYSICAL EXAMINATION:  SKIN: no rashes  HEAD: NC/AT  EYES: PERRLA, EOMI  EARS: TM's intact  NOSE: no abnormalities  NECK:  Supple. No lymphadenopathy. Jugular venous pressure not elevated. Carotids equal.   HEART:   S1S2 tachy  CHEST:  bilat ronchi, decreased BS left  ABDOMEN:  Soft and nontender.   EXTREMITIES:  no C/C/E  NEURO: sedated on vent      LABS:                        11.1   5.10  )-----------( 142      ( 05 Apr 2018 03:38 )             33.3     04-05    137  |  106  |  13  ----------------------------<  127<H>  3.9   |  25  |  0.72    Ca    9.0      05 Apr 2018 03:38      PTT - ( 05 Apr 2018 03:38 )  PTT:67.2 sec      RADIOLOGY & ADDITIONAL TESTS:

## 2018-04-08 NOTE — PROGRESS NOTE ADULT - PROVIDER SPECIALTY LIST ADULT
Critical Care
Palliative Care
Pulmonology
Thoracic Surgery
Pulmonology

## 2018-04-17 DIAGNOSIS — E87.2 ACIDOSIS: ICD-10-CM

## 2018-04-17 DIAGNOSIS — Z66 DO NOT RESUSCITATE: ICD-10-CM

## 2018-04-17 DIAGNOSIS — J96.02 ACUTE RESPIRATORY FAILURE WITH HYPERCAPNIA: ICD-10-CM

## 2018-04-17 DIAGNOSIS — J18.9 PNEUMONIA, UNSPECIFIED ORGANISM: ICD-10-CM

## 2018-04-17 DIAGNOSIS — G93.41 METABOLIC ENCEPHALOPATHY: ICD-10-CM

## 2018-04-17 DIAGNOSIS — C34.90 MALIGNANT NEOPLASM OF UNSPECIFIED PART OF UNSPECIFIED BRONCHUS OR LUNG: ICD-10-CM

## 2018-04-17 DIAGNOSIS — J96.01 ACUTE RESPIRATORY FAILURE WITH HYPOXIA: ICD-10-CM

## 2018-04-17 DIAGNOSIS — E87.1 HYPO-OSMOLALITY AND HYPONATREMIA: ICD-10-CM

## 2018-04-17 DIAGNOSIS — I82.409 ACUTE EMBOLISM AND THROMBOSIS OF UNSPECIFIED DEEP VEINS OF UNSPECIFIED LOWER EXTREMITY: ICD-10-CM

## 2018-04-17 DIAGNOSIS — J90 PLEURAL EFFUSION, NOT ELSEWHERE CLASSIFIED: ICD-10-CM

## 2018-04-17 DIAGNOSIS — I26.99 OTHER PULMONARY EMBOLISM WITHOUT ACUTE COR PULMONALE: ICD-10-CM

## 2018-04-17 DIAGNOSIS — Z51.5 ENCOUNTER FOR PALLIATIVE CARE: ICD-10-CM

## 2018-04-17 DIAGNOSIS — C79.31 SECONDARY MALIGNANT NEOPLASM OF BRAIN: ICD-10-CM

## 2018-06-13 NOTE — PROGRESS NOTE ADULT - GASTROINTESTINAL
Other (Free Text): Patient states the cyst is no longer inflamed. She is aware this is treated with surgical excision of the tract by a general surgeon. Detail Level: Detailed Note Text (......Xxx Chief Complaint.): This diagnosis correlates with the Soft, non-tender, no hepatosplenomegaly, normal bowel sounds

## 2018-06-22 NOTE — DISCHARGE NOTE ADULT - NS AS ACTIVITY OBS
Do not drive or operate machinery/Stairs allowed/No Heavy lifting/straining/Walking-Outdoors allowed/Showering allowed/Walking-Indoors allowed 22-Jun-2018 09:27

## 2018-07-16 PROBLEM — J40 BRONCHITIS, NOT SPECIFIED AS ACUTE OR CHRONIC: Chronic | Status: INACTIVE | Noted: 2017-10-13 | Resolved: 2017-11-22

## 2018-07-16 PROBLEM — L30.9 DERMATITIS, UNSPECIFIED: Chronic | Status: INACTIVE | Noted: 2017-10-13 | Resolved: 2017-11-22

## 2018-12-03 NOTE — PACU DISCHARGE NOTE - PAIN:
Telephone Encounter by Darrius Dugan CMA at 11/21/18 12:06 PM     Author:  Darrius Dugan CMA Service:  (none) Author Type:  Certified Medical Assistant     Filed:  11/21/18 12:07 PM Encounter Date:  11/20/2018 Status:  Signed     :  Darrius Dugan CMA (Certified Medical Assistant)            No protocol  Refilled supplies[MZ1.1M]      Revision History        User Key Date/Time User Provider Type Action    > MZ1.1 11/21/18 12:07 PM Darrius Dugan CMA Certified Medical Assistant Sign    M - Manual            
Controlled with current regime

## 2019-04-30 NOTE — CONSULT NOTE ADULT - MINUTES
[place] [address]  Phone: [phone]  Fax: [phone]  Follow Up Time: 60 Patric Ag)  Medicine  [address] [place] [address]  Phone: [phone]  Fax: [phone]  Follow Up Time: 2 weeks

## 2019-07-12 NOTE — PATIENT PROFILE ADULT. - NS TRANSFER PATIENT BELONGINGS
HPI:  71 y/o man with PMH CAD, stroke presenting s/p unwitnessed fall this AM in the bathroom. LKW is 10:30 PM yesterday. Initially pt presented with right gaze and left sided weakness which resolved upon presentation.  On ASA at home, dose unclear.    PAST MEDICAL & SURGICAL HISTORY:  CAD  Stroke    Vital Signs Last 24 Hrs  T(C): 36.1 (12 Jul 2019 07:12), Max: 36.1 (12 Jul 2019 07:12)  T(F): 97 (12 Jul 2019 07:12), Max: 97 (12 Jul 2019 07:12)  HR: 48 (12 Jul 2019 07:12) (48 - 48)  BP: 122/61 (12 Jul 2019 07:12) (122/61 - 122/61)  BP(mean): --  RR: 20 (12 Jul 2019 07:12) (20 - 20)  SpO2: 99% (12 Jul 2019 07:12) (99% - 99%)    NIH Stroke Scale:   · NIH Stroke Scale: LOC	(0) Alert; keenly responsive	  · NIH Stroke Scale: LOC Question	(0) Answers both questions correctly	  · NIH Stroke Scale: LOC Command	(0) Performs both tasks correctly	  · NIH Stroke Scale: Gaze	(0) Normal	  · NIH Stroke Scale: Visual	(0) No visual loss	  · NIH Stroke Scale: Facial	(0) Normal symmetrical movements	  · NIH Stroke Scale: Arm Left	(0) No drift; limb holds 90 (or 45) degrees for full 10 secs	  · NIH Stroke Scale: Arm Right	(0) No drift; limb holds 90 (or 45) degrees for full 10 secs	  · NIH Stroke Scale: Leg Left	(0) No drift; leg holds 30 degree position for full 5 secs	  · NIH Stroke Scale: Leg Right	(0) No drift; leg holds 30 degree position for full 5 secs	  · NIH Stroke Scale: Ataxia	(0) Absent	  · NIH Stroke Scale: Sensory	(0) Normal; no sensory loss	  · NIH Stroke Scale: Language	(0) No aphasia; normal	  · NIH Stroke Scale: Dysarthria	(0) Normal	  · NIH Stroke Scale: Extinct Inattention	(0) No abnormality	  · NIH Stroke Scale: Total	0	    Allergies    No Known Allergies    Intolerances      MEDICATIONS  (STANDING):    MEDICATIONS  (PRN):      LABS:                        14.0   7.83  )-----------( 207      ( 12 Jul 2019 07:10 )             42.1 HPI:  71 y/o man with PMH CAD, stroke presenting s/p unwitnessed fall this AM in the bathroom. LKW is 10:30 PM yesterday. Initially pt presented with right gaze and left sided weakness which resolved upon presentation.  On ASA at home, dose unclear.    PAST MEDICAL & SURGICAL HISTORY:  CAD  Stroke    Vital Signs Last 24 Hrs  T(C): 36.1 (12 Jul 2019 07:12), Max: 36.1 (12 Jul 2019 07:12)  T(F): 97 (12 Jul 2019 07:12), Max: 97 (12 Jul 2019 07:12)  HR: 48 (12 Jul 2019 07:12) (48 - 48)  BP: 122/61 (12 Jul 2019 07:12) (122/61 - 122/61)  BP(mean): --  RR: 20 (12 Jul 2019 07:12) (20 - 20)  SpO2: 99% (12 Jul 2019 07:12) (99% - 99%)    NIH Stroke Scale:   · NIH Stroke Scale: LOC	(0) Alert; keenly responsive	  · NIH Stroke Scale: LOC Question	(0) Answers both questions correctly	  · NIH Stroke Scale: LOC Command	(0) Performs both tasks correctly	  · NIH Stroke Scale: Gaze	(0) Normal	  · NIH Stroke Scale: Visual	(0) No visual loss	  · NIH Stroke Scale: Facial	(0) Normal symmetrical movements	  · NIH Stroke Scale: Arm Left	(0) No drift; limb holds 90 (or 45) degrees for full 10 secs	  · NIH Stroke Scale: Arm Right	(0) No drift; limb holds 90 (or 45) degrees for full 10 secs	  · NIH Stroke Scale: Leg Left	(0) No drift; leg holds 30 degree position for full 5 secs	  · NIH Stroke Scale: Leg Right	(0) No drift; leg holds 30 degree position for full 5 secs	  · NIH Stroke Scale: Ataxia	(0) Absent	  · NIH Stroke Scale: Sensory	(0) Normal; no sensory loss	  · NIH Stroke Scale: Language	(0) No aphasia; normal	  · NIH Stroke Scale: Dysarthria	(0) Normal	  · NIH Stroke Scale: Extinct Inattention	(0) No abnormality	  · NIH Stroke Scale: Total	0	    MRS: 0  exam limited, visual inconsistent, LOC questions difficult to assess    Allergies    No Known Allergies    Intolerances      MEDICATIONS  (STANDING):    MEDICATIONS  (PRN):      LABS:                        14.0   7.83  )-----------( 207      ( 12 Jul 2019 07:10 )             42.1     < from: CT Brain Stroke Protocol (07.12.19 @ 07:35) >  Ventricular system: Ventricular system and cerebral sulci are widened   likely reflecting parenchymal volume loss.    Brain parenchyma: Small wedge-shaped area of decreased attenuation is   seen in the right parietal lobe likely reflecting a chronic infarction.   Scattered periventricular and white matter hypodensities are noted   without mass effect which are nonspecific, however, likely reflective of   chronic microvascular ischemic changes. Chronic lacunar infarctions seen   in the left putamen region.    ASPECT score: 9    Intracranial hemorrhage: None.    Mass effect/Midline shift: No mass effect. No midline shift. Small amount   of thickening along the anterior falx to the right measuring up to 3 mm   likely are presenting a small meningioma.    Intracranial vessels: Unremarkable    Paranasal sinuses and mastoid air cells: Unremarkable.    Osseous structures: Unremarkable.    Impression:      No mass effect or intracranial hemorrhage.    Likely chronic small right parietal lobe infarction.    < end of copied text > HPI:  69 y/o man with PMH CAD, stroke presenting s/p unwitnessed fall this AM in the bathroom. LKW is 10:30 PM yesterday. Initially pt presented with right gaze and left sided weakness which resolved upon presentation.  On ASA at home, dose unclear.    PAST MEDICAL & SURGICAL HISTORY:  CAD  Stroke    Vital Signs Last 24 Hrs  T(C): 36.1 (12 Jul 2019 07:12), Max: 36.1 (12 Jul 2019 07:12)  T(F): 97 (12 Jul 2019 07:12), Max: 97 (12 Jul 2019 07:12)  HR: 48 (12 Jul 2019 07:12) (48 - 48)  BP: 122/61 (12 Jul 2019 07:12) (122/61 - 122/61)  BP(mean): --  RR: 20 (12 Jul 2019 07:12) (20 - 20)  SpO2: 99% (12 Jul 2019 07:12) (99% - 99%)    NIH Stroke Scale:   · NIH Stroke Scale: LOC	(0) Alert; keenly responsive	  · NIH Stroke Scale: LOC Question	(0) Answers both questions correctly	  · NIH Stroke Scale: LOC Command	(0) Performs both tasks correctly	  · NIH Stroke Scale: Gaze	(0) Normal	  · NIH Stroke Scale: Visual	1	  1	1	  · NIH Stroke Scale: Arm Left	(0) No drift; limb holds 90 (or 45) degrees for full 10 secs	  · NIH Stroke Scale: Arm Right	(0) No drift; limb holds 90 (or 45) degrees for full 10 secs	  · NIH Stroke Scale: Leg Left	(0) No drift; leg holds 30 degree position for full 5 secs	  · NIH Stroke Scale: Leg Right	(0) No drift; leg holds 30 degree position for full 5 secs	  · NIH Stroke Scale: Ataxia	(0) Absent	  · NIH Stroke Scale: Sensory	1	  · NIH Stroke Scale: Language	(0) No aphasia; normal	  · NIH Stroke Scale: Dysarthria	(0) Normal	  · NIH Stroke Scale: Extinct Inattention	1	  · NIH Stroke Scale: Total	4	    MRS: 0  exam limited, visual inconsistent, LOC questions difficult to assess    Allergies    No Known Allergies    Intolerances      MEDICATIONS  (STANDING):    MEDICATIONS  (PRN):      LABS:                        14.0   7.83  )-----------( 207      ( 12 Jul 2019 07:10 )             42.1     < from: CT Brain Stroke Protocol (07.12.19 @ 07:35) >  Ventricular system: Ventricular system and cerebral sulci are widened   likely reflecting parenchymal volume loss.    Brain parenchyma: Small wedge-shaped area of decreased attenuation is   seen in the right parietal lobe likely reflecting a chronic infarction.   Scattered periventricular and white matter hypodensities are noted   without mass effect which are nonspecific, however, likely reflective of   chronic microvascular ischemic changes. Chronic lacunar infarctions seen   in the left putamen region.    ASPECT score: 9    Intracranial hemorrhage: None.    Mass effect/Midline shift: No mass effect. No midline shift. Small amount   of thickening along the anterior falx to the right measuring up to 3 mm   likely are presenting a small meningioma.    Intracranial vessels: Unremarkable    Paranasal sinuses and mastoid air cells: Unremarkable.    Osseous structures: Unremarkable.    Impression:      No mass effect or intracranial hemorrhage.    Likely chronic small right parietal lobe infarction.    < end of copied text > None

## 2019-08-27 NOTE — PROGRESS NOTE ADULT - PROBLEM SELECTOR PROBLEM 4
Called pt no answer left vm   
Stage 4 malignant neoplasm of lung, unspecified laterality

## 2019-11-11 NOTE — CONSULT NOTE ADULT - CONSULT REASON
Continuity of Care Document

POS                          Created on: 2019

POS

Sarahy Carrillo

External Reference #: [id number]

: 2014

Sex: Female



Demographics







                          Address                   [address] [place]  [address]

 

                          Home Phone                [phone] x

SP 

                          Preferred Language        Unknown

SP 

                          Marital Status            Unknown

SP 

                          Orthodoxy Affiliation     Unknown

SP 

                          Race                      Unknown

SP 

                          Ethnic Group              Unknown

SP



Author







                          Organization              Unknown

POS 

                          Address                   Unknown

SP 

                          Phone                     Unavailable

SP

              



Allergies

      





             Active           Description           Code           Type         

  Severity   

POS             Reaction           Onset           Reported/Identified          

 

POS to Patient                          Clinical Status        

POS 

                Yes             No Known Drug Allergies           [id number]    

       Drug 

SP           Unknown           N/A                        2019            

 

SP                                               

SP

                  



Medications

      



There is no data.                  



Problems

      





             Date Dx Coded           Attending           Type           Code    

       

POS                                     Diagnosed By        

POS 

             2019           BATEMAN DO, RADHA EMILY           Ot           L55.0

           

SP OF FIRST DEGREE                               

SP 

             2019           BATEMAN DO, RADHA L           Ot           L55.0

           

SP OF FIRST DEGREE                               

SP 

             2019           BATEMAN DO, RADHA L           Ot           L55.0

           

SP OF FIRST DEGREE                               

SP 

             10/22/2019           KEISHA HUNTER, PARMINDER IBARRA           Ot           J06.9

           

SP UPPER RESPIRATORY INFECTION, UNSPE                    

SP 

             10/22/2019           KEISHA HUNTER, PARMINDER IBARRA           Ot           R05  

         

SP                                               

SP

                          



Procedures

      



There is no data.                  



Results

      



There is no data.              



Encounters

      





                ACCT No.           Visit Date/Time           Discharge          

 Status         

POS             Pt. Type           Provider           Facility           Loc./Un

it          

POS                                     Complaint        

POS 

                [id number]           2019 12:40:00           2019 23:59:

59           CLS

SP              Outpatient                                           [facility] 

SP                                               

SP 

                    [id number]           10/18/2019 23:44:00           10/19/2

019 00:25:00        

SP              DIS             Outpatient           PARMINDER VALDES MD          

 [facility]            ER FS                     [place]        

SP 

                    [id number]           2019 00:49:00            01:14:00        

SP              DIS             Emergency           BATEMAN DORADHA           

[facility]            ER FS                     [place]        

SP 

                    [id number]           10/09/2019 11:10:50           10/09/201

9 23:59:59          

SP              CLS             Outpatient           [facility] [place] 

SP                        [facility]                              

SP
Stage IV NSCLC
s/p L pleurodeisis, stage 4 lung cancer
CTSP by Dr Salvador for medical comanagement.

## 2021-01-07 NOTE — ED PROVIDER NOTE - AGGRAVATING FACTORS
Patient: Jc Zabala    Procedure Summary     Date: 01/07/21 Room / Location:  [facility] /  [facility]    Anesthesia Start: 0852 Anesthesia Stop: 0921    Procedure: COLONOSCOPY WITH ANESTHESIA (N/A ) Diagnosis:       Other constipation      Change in bowel habits      History of adenomatous polyp of colon      (Other constipation [K59.09])      (Change in bowel habits [R19.4])      (History of adenomatous polyp of colon [Z86.010])    Surgeon: Arleen Gannon MD Provider: Scott Rainey CRNA    Anesthesia Type: MAC ASA Status: 2          Anesthesia Type: MAC    Vitals  No vitals data found for the desired time range.          Post Anesthesia Care and Evaluation    Patient location during evaluation: PHASE II  Patient participation: complete - patient participated  Level of consciousness: awake and alert  Pain score: 0  Pain management: adequate  Airway patency: patent  Anesthetic complications: No anesthetic complications  PONV Status: none  Cardiovascular status: acceptable and stable  Respiratory status: acceptable  Hydration status: acceptable      
movment

## 2021-05-23 NOTE — PROVIDER CONTACT NOTE (OTHER) - SITUATION
ear/Left:
Dr. Arenas's office notified about pt.
Dr. Cee's office notified about pt.
Dr. Hardy's office notified about pt.
Dr. Kirkpatrick's office notified about pt.
pt needs po dilaudid for pain management

## 2021-09-07 NOTE — PROGRESS NOTE ADULT - SUBJECTIVE AND OBJECTIVE BOX
CHIEF COMPLAINT/Diagnosis:  Stage 4 Lung CA with mets to brain s/p Ommaya reservoir / mssa/ frontal lobe abcess/ meningitis    SUBJECTIVE: no complaints    REVIEW OF SYSTEMS:    CONSTITUTIONAL: No weakness, fevers or chills  EYES/ENT: No visual changes;  No vertigo or throat pain   NECK: No pain or stiffness  RESPIRATORY: No cough, wheezing, hemoptysis; No shortness of breath  CARDIOVASCULAR: No chest pain or palpitations  GASTROINTESTINAL: No abdominal or epigastric pain. No nausea, vomiting, or hematemesis; No diarrhea or constipation. No melena or hematochezia.  GENITOURINARY: No dysuria, frequency or hematuria  NEUROLOGICAL: No numbness or weakness  SKIN: No itching, burning, rashes, or lesions   All other review of systems is negative unless indicated above    Vital Signs Last 24 Hrs  T(C): 36.1 (20 Jan 2018 08:36), Max: 36.2 (20 Jan 2018 06:55)  T(F): 97 (20 Jan 2018 08:36), Max: 97.1 (20 Jan 2018 06:55)  HR: 97 (20 Jan 2018 13:01) (79 - 110)  BP: 109/84 (20 Jan 2018 10:01) (106/65 - 121/85)  BP(mean): 88 (20 Jan 2018 10:01) (75 - 98)  RR: 26 (20 Jan 2018 13:01) (17 - 27)  SpO2: 97% (20 Jan 2018 11:01) (95% - 99%)    I&O's Summary    19 Jan 2018 07:01  -  20 Jan 2018 07:00  --------------------------------------------------------  IN: 120 mL / OUT: 1200 mL / NET: -1080 mL        CAPILLARY BLOOD GLUCOSE          PHYSICAL EXAM:    Constitutional: NAD, awake and alert, well-developed  HEENT: PERR, EOMI, Normal Hearing, MMM  Neck: Soft and supple, No LAD, No JVD  Respiratory: Breath sounds are clear bilaterally, No wheezing, rales or rhonchi  Cardiovascular: S1 and S2, regular rate and rhythm, no Murmurs, gallops or rubs  Gastrointestinal: Bowel Sounds present, soft, nontender, nondistended, no guarding, no rebound  Extremities: No peripheral edema  Vascular: 2+ peripheral pulses  Neurological: A/O x 3, no focal deficits  Musculoskeletal: 5/5 strength b/l upper and lower extremities  Skin: No rashes    MEDICATIONS:  MEDICATIONS  (STANDING):  ALBUTerol    90 MICROgram(s) HFA Inhaler 1 Puff(s) Inhalation every 4 hours  cefepime  IVPB      cefepime  IVPB 2000 milliGRAM(s) IV Intermittent every 8 hours  colchicine 0.6 milliGRAM(s) Oral two times a day  dexamethasone  Injectable 2 milliGRAM(s) IV Push three times a day  folic acid 1 milliGRAM(s) Oral daily  heparin  Injectable 5000 Unit(s) SubCutaneous every 8 hours  metoprolol     tartrate 25 milliGRAM(s) Oral two times a day  mirtazapine 15 milliGRAM(s) Oral at bedtime  pantoprazole    Tablet 40 milliGRAM(s) Oral before breakfast  trimethoprim  160 mG/sulfamethoxazole 800 mG 1 Tablet(s) Oral daily      LABS: All Labs Reviewed:                        14.5   19.8  )-----------( 346      ( 20 Jan 2018 05:36 )             43.4     01-20    132<L>  |  101  |  20  ----------------------------<  127<H>  4.3   |  26  |  0.87    Ca    10.0      20 Jan 2018 05:36            Blood Culture: 01-19 @ 16:45  Organism --  Gram Stain Blood -- Gram Stain --  Specimen Source .CSF spinal fluid  Culture-Blood --    01-18 @ 12:15  Organism --  Gram Stain Blood -- Gram Stain   No polymorphonuclear leukocytes per low power field  Red blood cells per low power field  No organisms seen per oil power field  by cytocentrifuge  Specimen Source .Body Fluid Pleural Fluid  Culture-Blood --    01-17 @ 12:28  Organism --  Gram Stain Blood -- Gram Stain --  Specimen Source .Blood aerobic  Culture-Blood --      Assessment and Plan  	  48 y/o male with h/o Stage 4 Lung CA with mets to brain s/p [facility] 11/2017 for leptomeningeal disease s/p chemo and radiation, anxiety, depression, h/o nephrolithiasis, h/o pericarditis was admitted on 1/11 for fever and AMS.   Found to have 103.6F and complained of rigors. Found to be encephalopathic. Lactic acid 2.4    1. Severe sepsis secondary to Acute bacterial meningitis with MSSA. Right frontal lobe fluid collection ?postoperative ?abscess.   -Ommaya site infecton s/p port removal.  and s/p clean and wash out of the right frotnal lobe ommaya site.   -leukocytosis persistent  -encephalopathy is improved however, he is axox3  -has remained afebrile  -s/p LP >>  consistent with ongoing bacterial meningitis  -Dr. Cee to advice   -Infectious disease recco to c/w antibiotics >> cw/ cefepime and bactrim    2-RUL pneumonia with loculated left pleural effusion ?empyema.  -thoracentesis shows pleural fluid with pH 7.47 - empyema is unlikely    3- Lung CA stage 4 with brain metastasis. Immunocompromised host.     4-DVT proph- sc heparin [id number]: || ||00\01||False;

## 2021-10-26 NOTE — ASU PATIENT PROFILE, ADULT - PRO TOBACCO TYPE
[FreeTextEntry1] : Diagnostic Tests:\par -----------------------------------------------\par ECG:\par 10/26/21: sinus rhythm, normal ECG. \par 10/20/20: NSR, nonspecific T-wave abnormality.\par 01/11/19:  NSR, normal ECG. \par 09/24/18  NSR, DAVID, non-specific ST/T changes. \par 10/18/17: NSR, DAVID, non-specific ST/T changes. \par 11/10/16: NSR, non-specific ST/T changes.\par 02/18/15: NSR, non-specific ST/T changes. \par -----------------------------------------------\par Cath:\par 12/21/11: RCA PCI mid and distal, LCX distal 70%, D2 70%\par 12/18/06: patent stents RCA/LAD/LCX, PCI prox RCA\par 12/12/05: RCA 90%, LM 40%, LAD prox 40%, mid 90%, LCX 90%, EF 60%, PCI LCX/LAD/RCA\par 09/12/05: PCI mid LAD, PCI prox RCA\par 09/09/05: RCA prox 90%, mid 50%,LM 40%, LAD prox 40%, mid 90%, EF 60%, PCI LCx\par ------------------------------------------------\par Echo:\par 11/16/20: EF 67%, aortic sclerosis. \par 06/15/16: EF 65%, grade I diastolic dysfunction, mild MR\par 02/08/11: EF 63%, diastolic dysfunction, trace MR/TR\par -------------------------------------------------\par Stress tests:\par 12/03/19: exercise stress echo: EF 68%, aortic sclerosis, 7 METS, + exercise-induced ischemia basal inferior wall, normal ECG and PA pressures. \par 03/07/17: exercise stress echo: EF 65%, 7 METS, normal wall motion and PA pressures, hypertensive BP response. \par 10/174/13: dobutamine stress echo: EF 70%, normal wall motion.\par --------------------------------------------------\par Carotid arteries:\par 11/16/20: sono: b/l prox ICA 20-49% stenosis.  cigarettes

## 2021-12-28 NOTE — ED PROVIDER NOTE - NS ED MD DISPO SPECIAL CONSIDERATION1
Current Issues/Problems reviewed on call: Pt and care giver needing [employer] bus service when unable to get transportation to [facility] office appt.    Details for Interventions/Education completed on call:  CM provided Caregiver with [employer] reduced fare contact informtion. [phone] & [phone]      
Seizure Precautions

## 2022-08-02 NOTE — DISCHARGE NOTE ADULT - LEARNING BEHAVIORAL ACTIVITIES TO COPE WITH URGES. FOR EXAMPLE, DISTRACTION AND CHANGING ROUTINES.
Spoke with pt regarding a PA  Told her it first must be submitted to insurance to see if they decline to pay  She verbalized understanding   Statement Selected

## 2022-12-12 NOTE — H&P PST ADULT - PAIN SCALE PREFERRED, PROFILE
numerical 0-10 Doxycycline Counseling:  Patient counseled regarding possible photosensitivity and increased risk for sunburn.  Patient instructed to avoid sunlight, if possible.  When exposed to sunlight, patients should wear protective clothing, sunglasses, and sunscreen.  The patient was instructed to call the office immediately if the following severe adverse effects occur:  hearing changes, easy bruising/bleeding, severe headache, or vision changes.  The patient verbalized understanding of the proper use and possible adverse effects of doxycycline.  All of the patient's questions and concerns were addressed.

## 2023-05-17 NOTE — CONSULT NOTE ADULT - PROBLEM SELECTOR PROBLEM 2
Refill Request     CONFIRM preferred pharmacy with the patient. If Mail Order Rx - Pend for 90 day refill. Last Seen: Last Seen Department: 4/19/2023  Last Seen by PCP: 4/19/2023    Last Written:   lisinopril-12/06/2022 90 tablet 1 refills   Synthroid-12/06/2022 180 tablet 1 refills   Hydrochlorothiazide-12/06/2022 180 tablet 1 refills   Atorvastatin-12/06/2022 90 tablet 1 refills     If no future appointment scheduled:  Review the last OV with PCP and review information for follow-up visit,  Route STAFF MESSAGE with patient name to the [facility] for scheduling with the following information:            -  Timing of next visit           -  Visit type ie Physical, OV, etc           -  Diagnoses/Reason ie. COPD, HTN - Do not use MEDICATION, Follow-up or CHECK UP - Give reason for visit      Next Appointment:   Future Appointments   Date Time Provider Minh Valdez   5/25/2023 12:20 PM Stanley Hagan MD [facility] [place]   6/26/2023  2:50 PM Stanley Hagan MD [facility] [place]   10/23/2023  9:00 AM DO YAMILET Mccabe [place] - DYD       Message sent to [facility] to schedule appt with patient?   NO      Requested Prescriptions     Pending Prescriptions Disp Refills    lisinopril (PRINIVIL;ZESTRIL) 20 MG tablet [Pharmacy Med Name: LISINOPRIL 20 MG TABLET] 180 tablet 1     Sig: TAKE 1 TABLET BY MOUTH TWICE A DAY    levothyroxine (SYNTHROID) 112 MCG tablet [Pharmacy Med Name: LEVOTHYROXINE 112 MCG TABLET] 180 tablet 1     Sig: TAKE 2 TABLETS BY MOUTH EVERY DAY    hydroCHLOROthiazide (HYDRODIURIL) 25 MG tablet [Pharmacy Med Name: HYDROCHLOROTHIAZIDE 25 MG TAB] 180 tablet 1     Sig: TAKE 1 TABLET BY MOUTH TWICE A DAY    atorvastatin (LIPITOR) 40 MG tablet [Pharmacy Med Name: ATORVASTATIN 40 MG TABLET] 90 tablet 1     Sig: TAKE 1 TABLET BY MOUTH EVERY DAY AT NIGHT Brain metastasis

## 2023-10-01 PROBLEM — Z79.899 MEDICAL MARIJUANA USE: Status: ACTIVE | Noted: 2017-12-05

## 2024-12-23 NOTE — H&P ADULT - NSHPPOASURGSITEINCISION_GEN_ALL_CORE
[FreeTextEntry1] : I personally reviewed chart documentation - images (pertinent findings included into my note), including: -No images to review. yes

## 2025-04-15 NOTE — CONSULT NOTE ADULT - RESPIRATORY AND THORAX COMMENTS
New pediatric endocrine patient history form was mailed out to address on file.    pain from Lt CT's

## 2025-07-18 NOTE — ASU PREOP CHECKLIST - TEMPERATURE IN CELSIUS (DEGREES C)
Pt arrives to ED endorsing lightheadedness/dizziness/off balance, tingling in feet, and nausea that began 1 month ago.  Pt endorses episodes of blurred vision, states that overall symptoms can fluctuate/linger.   Pt also endorses \"foamy\" urine.    Patient states that onset of symptoms began 6/9/25 after having a dental procedure done.   
36.6